# Patient Record
Sex: FEMALE | Race: WHITE | NOT HISPANIC OR LATINO | Employment: OTHER | URBAN - METROPOLITAN AREA
[De-identification: names, ages, dates, MRNs, and addresses within clinical notes are randomized per-mention and may not be internally consistent; named-entity substitution may affect disease eponyms.]

---

## 2017-06-25 ENCOUNTER — HOSPITAL ENCOUNTER (INPATIENT)
Facility: HOSPITAL | Age: 72
LOS: 2 days | Discharge: HOME | End: 2017-06-27
Attending: FAMILY MEDICINE | Admitting: HOSPITALIST

## 2017-06-25 DIAGNOSIS — Z74.09 IMPAIRED FUNCTIONAL MOBILITY, BALANCE, GAIT, AND ENDURANCE: ICD-10-CM

## 2017-06-25 DIAGNOSIS — I63.9 ACUTE CEREBROVASCULAR ACCIDENT (CVA) (HCC): Primary | ICD-10-CM

## 2017-06-25 DIAGNOSIS — E11.8 TYPE 2 DIABETES MELLITUS WITH COMPLICATION, WITH LONG-TERM CURRENT USE OF INSULIN (HCC): Chronic | ICD-10-CM

## 2017-06-25 DIAGNOSIS — Z79.4 TYPE 2 DIABETES MELLITUS WITH COMPLICATION, WITH LONG-TERM CURRENT USE OF INSULIN (HCC): Chronic | ICD-10-CM

## 2017-06-25 PROBLEM — N30.00 ACUTE CYSTITIS WITHOUT HEMATURIA: Status: ACTIVE | Noted: 2017-06-25

## 2017-06-25 PROBLEM — D72.829 LEUKOCYTOSIS: Status: ACTIVE | Noted: 2017-06-25

## 2017-06-25 PROBLEM — K21.9 GERD (GASTROESOPHAGEAL REFLUX DISEASE): Chronic | Status: ACTIVE | Noted: 2017-06-25

## 2017-06-25 PROBLEM — I10 ESSENTIAL HYPERTENSION: Chronic | Status: ACTIVE | Noted: 2017-06-25

## 2017-06-25 LAB — GLUCOSE BLDC GLUCOMTR-MCNC: 382 MG/DL (ref 70–130)

## 2017-06-25 PROCEDURE — 99223 1ST HOSP IP/OBS HIGH 75: CPT | Performed by: FAMILY MEDICINE

## 2017-06-25 PROCEDURE — 25010000003 CEFTRIAXONE PER 250 MG: Performed by: FAMILY MEDICINE

## 2017-06-25 PROCEDURE — 82962 GLUCOSE BLOOD TEST: CPT

## 2017-06-25 PROCEDURE — 63710000001 INSULIN DETEMIR PER 5 UNITS: Performed by: FAMILY MEDICINE

## 2017-06-25 RX ORDER — NICOTINE POLACRILEX 4 MG
15 LOZENGE BUCCAL
Status: DISCONTINUED | OUTPATIENT
Start: 2017-06-25 | End: 2017-06-27 | Stop reason: HOSPADM

## 2017-06-25 RX ORDER — ATORVASTATIN CALCIUM 40 MG/1
80 TABLET, FILM COATED ORAL NIGHTLY
Status: DISCONTINUED | OUTPATIENT
Start: 2017-06-25 | End: 2017-06-27 | Stop reason: HOSPADM

## 2017-06-25 RX ORDER — DEXTROSE AND SODIUM CHLORIDE 5; .45 G/100ML; G/100ML
100 INJECTION, SOLUTION INTRAVENOUS CONTINUOUS
Status: DISCONTINUED | OUTPATIENT
Start: 2017-06-25 | End: 2017-06-26

## 2017-06-25 RX ORDER — DEXTROSE MONOHYDRATE 25 G/50ML
25 INJECTION, SOLUTION INTRAVENOUS
Status: DISCONTINUED | OUTPATIENT
Start: 2017-06-25 | End: 2017-06-27 | Stop reason: HOSPADM

## 2017-06-25 RX ORDER — ACETAMINOPHEN 325 MG/1
650 TABLET ORAL EVERY 4 HOURS PRN
Status: DISCONTINUED | OUTPATIENT
Start: 2017-06-25 | End: 2017-06-27 | Stop reason: HOSPADM

## 2017-06-25 RX ORDER — SODIUM CHLORIDE 0.9 % (FLUSH) 0.9 %
1-10 SYRINGE (ML) INJECTION AS NEEDED
Status: DISCONTINUED | OUTPATIENT
Start: 2017-06-25 | End: 2017-06-27 | Stop reason: HOSPADM

## 2017-06-25 RX ORDER — ASPIRIN 325 MG
325 TABLET ORAL DAILY
Status: DISCONTINUED | OUTPATIENT
Start: 2017-06-26 | End: 2017-06-27 | Stop reason: HOSPADM

## 2017-06-25 RX ORDER — ASPIRIN 81 MG/1
81 TABLET ORAL DAILY
COMMUNITY
End: 2017-06-27 | Stop reason: HOSPADM

## 2017-06-25 RX ORDER — SIMVASTATIN 40 MG
40 TABLET ORAL NIGHTLY
COMMUNITY
End: 2017-06-27 | Stop reason: HOSPADM

## 2017-06-25 RX ORDER — LOSARTAN POTASSIUM AND HYDROCHLOROTHIAZIDE 25; 100 MG/1; MG/1
1 TABLET ORAL DAILY
COMMUNITY
End: 2017-07-05 | Stop reason: ALTCHOICE

## 2017-06-25 RX ORDER — ASPIRIN 300 MG/1
300 SUPPOSITORY RECTAL DAILY
Status: DISCONTINUED | OUTPATIENT
Start: 2017-06-26 | End: 2017-06-27 | Stop reason: HOSPADM

## 2017-06-25 RX ORDER — ACETAMINOPHEN 650 MG/1
650 SUPPOSITORY RECTAL EVERY 4 HOURS PRN
Status: DISCONTINUED | OUTPATIENT
Start: 2017-06-25 | End: 2017-06-27 | Stop reason: HOSPADM

## 2017-06-25 RX ORDER — CETIRIZINE HYDROCHLORIDE 10 MG/1
10 TABLET ORAL DAILY
COMMUNITY
End: 2017-09-22

## 2017-06-25 RX ORDER — LORATADINE 10 MG/1
10 TABLET ORAL AS NEEDED
COMMUNITY
End: 2017-09-22

## 2017-06-25 RX ORDER — CEFTRIAXONE SODIUM 1 G/50ML
1 INJECTION, SOLUTION INTRAVENOUS
Status: DISCONTINUED | OUTPATIENT
Start: 2017-06-25 | End: 2017-06-27 | Stop reason: HOSPADM

## 2017-06-25 RX ORDER — ENALAPRILAT 2.5 MG/2ML
0.62 INJECTION INTRAVENOUS EVERY 6 HOURS PRN
Status: DISCONTINUED | OUTPATIENT
Start: 2017-06-25 | End: 2017-06-27 | Stop reason: HOSPADM

## 2017-06-25 RX ADMIN — CEFTRIAXONE SODIUM 1 G: 1 INJECTION, SOLUTION INTRAVENOUS at 23:33

## 2017-06-25 RX ADMIN — INSULIN DETEMIR 20 UNITS: 100 INJECTION, SOLUTION SUBCUTANEOUS at 23:34

## 2017-06-25 RX ADMIN — DEXTROSE AND SODIUM CHLORIDE 100 ML/HR: 5; 450 INJECTION, SOLUTION INTRAVENOUS at 23:33

## 2017-06-26 ENCOUNTER — APPOINTMENT (OUTPATIENT)
Dept: CARDIOLOGY | Facility: HOSPITAL | Age: 72
End: 2017-06-26
Attending: FAMILY MEDICINE

## 2017-06-26 ENCOUNTER — APPOINTMENT (OUTPATIENT)
Dept: MRI IMAGING | Facility: HOSPITAL | Age: 72
End: 2017-06-26

## 2017-06-26 LAB
ALBUMIN SERPL-MCNC: 4 G/DL (ref 3.2–4.8)
ALBUMIN/GLOB SERPL: 1.4 G/DL (ref 1.5–2.5)
ALP SERPL-CCNC: 73 U/L (ref 25–100)
ALT SERPL W P-5'-P-CCNC: 16 U/L (ref 7–40)
ANION GAP SERPL CALCULATED.3IONS-SCNC: 9 MMOL/L (ref 3–11)
ARTICHOKE IGE QN: 109 MG/DL (ref 0–130)
AST SERPL-CCNC: 14 U/L (ref 0–33)
BACTERIA UR QL AUTO: ABNORMAL /HPF
BASOPHILS # BLD AUTO: 0.08 10*3/MM3 (ref 0–0.2)
BASOPHILS NFR BLD AUTO: 0.8 % (ref 0–1)
BH CV ECHO MEAS - AO ROOT AREA (BSA CORRECTED): 1.5
BH CV ECHO MEAS - AO ROOT AREA: 5.1 CM^2
BH CV ECHO MEAS - AO ROOT DIAM: 2.5 CM
BH CV ECHO MEAS - BSA(HAYCOCK): 1.8 M^2
BH CV ECHO MEAS - BSA(HAYCOCK): 1.8 M^2
BH CV ECHO MEAS - BSA: 1.7 M^2
BH CV ECHO MEAS - BSA: 1.7 M^2
BH CV ECHO MEAS - BZI_BMI: 28.2 KILOGRAMS/M^2
BH CV ECHO MEAS - BZI_BMI: 28.2 KILOGRAMS/M^2
BH CV ECHO MEAS - BZI_METRIC_HEIGHT: 157.5 CM
BH CV ECHO MEAS - BZI_METRIC_HEIGHT: 157.5 CM
BH CV ECHO MEAS - BZI_METRIC_WEIGHT: 69.9 KG
BH CV ECHO MEAS - BZI_METRIC_WEIGHT: 69.9 KG
BH CV ECHO MEAS - CONTRAST EF (2CH): 48.3 ML/M^2
BH CV ECHO MEAS - CONTRAST EF 4CH: 64.6 ML/M^2
BH CV ECHO MEAS - EDV(CUBED): 42.9 ML
BH CV ECHO MEAS - EDV(MOD-SP2): 60 ML
BH CV ECHO MEAS - EDV(MOD-SP4): 79 ML
BH CV ECHO MEAS - EDV(TEICH): 50.9 ML
BH CV ECHO MEAS - EF(CUBED): 82.4 %
BH CV ECHO MEAS - EF(MOD-SP2): 48.3 %
BH CV ECHO MEAS - EF(MOD-SP4): 64.6 %
BH CV ECHO MEAS - EF(TEICH): 76.1 %
BH CV ECHO MEAS - ESV(CUBED): 7.6 ML
BH CV ECHO MEAS - ESV(MOD-SP2): 31 ML
BH CV ECHO MEAS - ESV(MOD-SP4): 28 ML
BH CV ECHO MEAS - ESV(TEICH): 12.1 ML
BH CV ECHO MEAS - FS: 43.9 %
BH CV ECHO MEAS - IVS/LVPW: 1.5
BH CV ECHO MEAS - IVSD: 1.1 CM
BH CV ECHO MEAS - LA/AO: 1.3
BH CV ECHO MEAS - LAT PEAK E' VEL: 7.5 CM/SEC
BH CV ECHO MEAS - LV DIASTOLIC VOL/BSA (35-75): 46.2 ML/M^2
BH CV ECHO MEAS - LV MASS(C)D: 96.2 GRAMS
BH CV ECHO MEAS - LV MASS(C)DI: 56.3 GRAMS/M^2
BH CV ECHO MEAS - LV SYSTOLIC VOL/BSA (12-30): 16.4 ML/M^2
BH CV ECHO MEAS - LVIDD: 3.6 CM
BH CV ECHO MEAS - LVIDS: 2.4 CM
BH CV ECHO MEAS - LVLD AP2: 7.1 CM
BH CV ECHO MEAS - LVLD AP4: 7.6 CM
BH CV ECHO MEAS - LVLS AP2: 6.6 CM
BH CV ECHO MEAS - LVLS AP4: 6.9 CM
BH CV ECHO MEAS - LVOT AREA (M): 2.5 CM^2
BH CV ECHO MEAS - LVOT AREA: 2.7 CM^2
BH CV ECHO MEAS - LVOT DIAM: 1.8 CM
BH CV ECHO MEAS - LVPWD: 1.1 CM
BH CV ECHO MEAS - MED PEAK E' VEL: 4.9 CM/SEC
BH CV ECHO MEAS - MV A MAX VEL: 100 CM/SEC
BH CV ECHO MEAS - MV DEC TIME: 0.21 SEC
BH CV ECHO MEAS - MV E MAX VEL: 66 CM/SEC
BH CV ECHO MEAS - MV E/A: 0.66
BH CV ECHO MEAS - PA ACC SLOPE: 1259 CM/SEC^2
BH CV ECHO MEAS - PA ACC TIME: 0.1 SEC
BH CV ECHO MEAS - PA PR(ACCEL): 34.6 MMHG
BH CV ECHO MEAS - PI END-D VEL: 119.1 CM/SEC
BH CV ECHO MEAS - SI(CUBED): 20.6 ML/M^2
BH CV ECHO MEAS - SI(MOD-SP2): 17 ML/M^2
BH CV ECHO MEAS - SI(MOD-SP4): 29.8 ML/M^2
BH CV ECHO MEAS - SI(TEICH): 22.6 ML/M^2
BH CV ECHO MEAS - SV(CUBED): 35.3 ML
BH CV ECHO MEAS - SV(MOD-SP2): 29 ML
BH CV ECHO MEAS - SV(MOD-SP4): 51 ML
BH CV ECHO MEAS - SV(TEICH): 38.7 ML
BH CV ECHO MEAS - TAPSE (>1.6): 1.7 CM2
BH CV XLRA - RV BASE: 3.1 CM
BH CV XLRA - RV LENGTH: 5.2 CM
BH CV XLRA - RV MID: 2.4 CM
BH CV XLRA - TDI S': 9.9 CM/SEC
BH CV XLRA MEAS LEFT CCA RATIO VEL: 138 CM/SEC
BH CV XLRA MEAS LEFT DIST CCA EDV: 19.6 CM/SEC
BH CV XLRA MEAS LEFT DIST CCA PSV: 139.5 CM/SEC
BH CV XLRA MEAS LEFT DIST ICA EDV: 22.6 CM/SEC
BH CV XLRA MEAS LEFT DIST ICA PSV: 69.8 CM/SEC
BH CV XLRA MEAS LEFT ICA RATIO VEL: 220 CM/SEC
BH CV XLRA MEAS LEFT ICA/CCA RATIO: 1.6
BH CV XLRA MEAS LEFT MID ICA EDV: 23.9 CM/SEC
BH CV XLRA MEAS LEFT MID ICA PSV: 98.1 CM/SEC
BH CV XLRA MEAS LEFT PROX CCA EDV: 10.1 CM/SEC
BH CV XLRA MEAS LEFT PROX CCA PSV: 81.7 CM/SEC
BH CV XLRA MEAS LEFT PROX ECA PSV: 234.3 CM/SEC
BH CV XLRA MEAS LEFT PROX ICA EDV: 23.9 CM/SEC
BH CV XLRA MEAS LEFT PROX ICA PSV: 221.7 CM/SEC
BH CV XLRA MEAS LEFT PROX SCLA PSV: 268.7 CM/SEC
BH CV XLRA MEAS LEFT VERTEBRAL A EDV: 15.7 CM/SEC
BH CV XLRA MEAS LEFT VERTEBRAL A PSV: 63.7 CM/SEC
BH CV XLRA MEAS RIGHT CCA RATIO VEL: 86.1 CM/SEC
BH CV XLRA MEAS RIGHT DIST CCA EDV: 14.5 CM/SEC
BH CV XLRA MEAS RIGHT DIST CCA PSV: 86.7 CM/SEC
BH CV XLRA MEAS RIGHT DIST ICA EDV: 28.7 CM/SEC
BH CV XLRA MEAS RIGHT DIST ICA PSV: 97 CM/SEC
BH CV XLRA MEAS RIGHT ICA RATIO VEL: 95.2 CM/SEC
BH CV XLRA MEAS RIGHT ICA/CCA RATIO: 1.1
BH CV XLRA MEAS RIGHT MID ICA EDV: 26.5 CM/SEC
BH CV XLRA MEAS RIGHT MID ICA PSV: 82.7 CM/SEC
BH CV XLRA MEAS RIGHT PROX CCA EDV: 15.1 CM/SEC
BH CV XLRA MEAS RIGHT PROX CCA PSV: 84.9 CM/SEC
BH CV XLRA MEAS RIGHT PROX ECA PSV: 131 CM/SEC
BH CV XLRA MEAS RIGHT PROX ICA EDV: 21.8 CM/SEC
BH CV XLRA MEAS RIGHT PROX ICA PSV: 96 CM/SEC
BH CV XLRA MEAS RIGHT PROX SCLA PSV: 156.2 CM/SEC
BH CV XLRA MEAS RIGHT VERTEBRAL A EDV: 24.3 CM/SEC
BH CV XLRA MEAS RIGHT VERTEBRAL A PSV: 103.1 CM/SEC
BILIRUB SERPL-MCNC: 0.6 MG/DL (ref 0.3–1.2)
BILIRUB UR QL STRIP: NEGATIVE
BUN BLD-MCNC: 15 MG/DL (ref 9–23)
BUN/CREAT SERPL: 16.7 (ref 7–25)
CALCIUM SPEC-SCNC: 9.9 MG/DL (ref 8.7–10.4)
CHLORIDE SERPL-SCNC: 96 MMOL/L (ref 99–109)
CHOLEST SERPL-MCNC: 169 MG/DL (ref 0–200)
CLARITY UR: CLEAR
CO2 SERPL-SCNC: 29 MMOL/L (ref 20–31)
COLOR UR: YELLOW
CREAT BLD-MCNC: 0.9 MG/DL (ref 0.6–1.3)
DEPRECATED RDW RBC AUTO: 43 FL (ref 37–54)
E/E' RATIO: 11
EOSINOPHIL # BLD AUTO: 0.45 10*3/MM3 (ref 0.1–0.3)
EOSINOPHIL NFR BLD AUTO: 4.3 % (ref 0–3)
ERYTHROCYTE [DISTWIDTH] IN BLOOD BY AUTOMATED COUNT: 12.5 % (ref 11.3–14.5)
GFR SERPL CREATININE-BSD FRML MDRD: 62 ML/MIN/1.73
GLOBULIN UR ELPH-MCNC: 2.8 GM/DL
GLUCOSE BLD-MCNC: 327 MG/DL (ref 70–100)
GLUCOSE BLDC GLUCOMTR-MCNC: 299 MG/DL (ref 70–130)
GLUCOSE BLDC GLUCOMTR-MCNC: 310 MG/DL (ref 70–130)
GLUCOSE BLDC GLUCOMTR-MCNC: 382 MG/DL (ref 70–130)
GLUCOSE BLDC GLUCOMTR-MCNC: 391 MG/DL (ref 70–130)
GLUCOSE BLDC GLUCOMTR-MCNC: 420 MG/DL (ref 70–130)
GLUCOSE BLDC GLUCOMTR-MCNC: 447 MG/DL (ref 70–130)
GLUCOSE BLDC GLUCOMTR-MCNC: 448 MG/DL (ref 70–130)
GLUCOSE BLDC GLUCOMTR-MCNC: 467 MG/DL (ref 70–130)
GLUCOSE UR STRIP-MCNC: ABNORMAL MG/DL
HBA1C MFR BLD: 13.3 % (ref 4.8–5.6)
HCT VFR BLD AUTO: 39.7 % (ref 34.5–44)
HDLC SERPL-MCNC: 47 MG/DL (ref 40–60)
HGB BLD-MCNC: 13.4 G/DL (ref 11.5–15.5)
HGB UR QL STRIP.AUTO: NEGATIVE
HYALINE CASTS UR QL AUTO: ABNORMAL /LPF
IMM GRANULOCYTES # BLD: 0.02 10*3/MM3 (ref 0–0.03)
IMM GRANULOCYTES NFR BLD: 0.2 % (ref 0–0.6)
KETONES UR QL STRIP: NEGATIVE
LEFT ARM BP: NORMAL MMHG
LEFT ATRIUM VOLUME INDEX: 37 ML/M2
LEUKOCYTE ESTERASE UR QL STRIP.AUTO: ABNORMAL
LV EF 2D ECHO EST: 65 %
LYMPHOCYTES # BLD AUTO: 4.69 10*3/MM3 (ref 0.6–4.8)
LYMPHOCYTES NFR BLD AUTO: 44.7 % (ref 24–44)
MCH RBC QN AUTO: 32.1 PG (ref 27–31)
MCHC RBC AUTO-ENTMCNC: 33.8 G/DL (ref 32–36)
MCV RBC AUTO: 95 FL (ref 80–99)
MONOCYTES # BLD AUTO: 0.72 10*3/MM3 (ref 0–1)
MONOCYTES NFR BLD AUTO: 6.9 % (ref 0–12)
NEUTROPHILS # BLD AUTO: 4.53 10*3/MM3 (ref 1.5–8.3)
NEUTROPHILS NFR BLD AUTO: 43.1 % (ref 41–71)
NITRITE UR QL STRIP: POSITIVE
PH UR STRIP.AUTO: 7.5 [PH] (ref 5–8)
PLATELET # BLD AUTO: 248 10*3/MM3 (ref 150–450)
PMV BLD AUTO: 10 FL (ref 6–12)
POTASSIUM BLD-SCNC: 3.6 MMOL/L (ref 3.5–5.5)
PROT SERPL-MCNC: 6.8 G/DL (ref 5.7–8.2)
PROT UR QL STRIP: NEGATIVE
RBC # BLD AUTO: 4.18 10*6/MM3 (ref 3.89–5.14)
RBC # UR: ABNORMAL /HPF
REF LAB TEST METHOD: ABNORMAL
SODIUM BLD-SCNC: 134 MMOL/L (ref 132–146)
SP GR UR STRIP: 1.02 (ref 1–1.03)
SQUAMOUS #/AREA URNS HPF: ABNORMAL /HPF
TRIGL SERPL-MCNC: 154 MG/DL (ref 0–150)
UROBILINOGEN UR QL STRIP: ABNORMAL
WBC NRBC COR # BLD: 10.49 10*3/MM3 (ref 3.5–10.8)
WBC UR QL AUTO: ABNORMAL /HPF

## 2017-06-26 PROCEDURE — 93306 TTE W/DOPPLER COMPLETE: CPT

## 2017-06-26 PROCEDURE — 25010000003 CEFTRIAXONE PER 250 MG: Performed by: FAMILY MEDICINE

## 2017-06-26 PROCEDURE — 63710000001 INSULIN LISPRO (HUMAN) PER 5 UNITS: Performed by: FAMILY MEDICINE

## 2017-06-26 PROCEDURE — 92523 SPEECH SOUND LANG COMPREHEN: CPT

## 2017-06-26 PROCEDURE — 93880 EXTRACRANIAL BILAT STUDY: CPT

## 2017-06-26 PROCEDURE — 97162 PT EVAL MOD COMPLEX 30 MIN: CPT

## 2017-06-26 PROCEDURE — 92610 EVALUATE SWALLOWING FUNCTION: CPT

## 2017-06-26 PROCEDURE — 93306 TTE W/DOPPLER COMPLETE: CPT | Performed by: INTERNAL MEDICINE

## 2017-06-26 PROCEDURE — 93880 EXTRACRANIAL BILAT STUDY: CPT | Performed by: INTERNAL MEDICINE

## 2017-06-26 PROCEDURE — 99223 1ST HOSP IP/OBS HIGH 75: CPT | Performed by: PSYCHIATRY & NEUROLOGY

## 2017-06-26 PROCEDURE — 83036 HEMOGLOBIN GLYCOSYLATED A1C: CPT | Performed by: FAMILY MEDICINE

## 2017-06-26 PROCEDURE — 80053 COMPREHEN METABOLIC PANEL: CPT | Performed by: FAMILY MEDICINE

## 2017-06-26 PROCEDURE — 99233 SBSQ HOSP IP/OBS HIGH 50: CPT | Performed by: HOSPITALIST

## 2017-06-26 PROCEDURE — 85025 COMPLETE CBC W/AUTO DIFF WBC: CPT | Performed by: FAMILY MEDICINE

## 2017-06-26 PROCEDURE — 63710000001 INSULIN DETEMIR PER 5 UNITS: Performed by: HOSPITALIST

## 2017-06-26 PROCEDURE — 82962 GLUCOSE BLOOD TEST: CPT

## 2017-06-26 PROCEDURE — 81001 URINALYSIS AUTO W/SCOPE: CPT | Performed by: FAMILY MEDICINE

## 2017-06-26 PROCEDURE — 87086 URINE CULTURE/COLONY COUNT: CPT | Performed by: FAMILY MEDICINE

## 2017-06-26 PROCEDURE — 97166 OT EVAL MOD COMPLEX 45 MIN: CPT

## 2017-06-26 PROCEDURE — 80061 LIPID PANEL: CPT | Performed by: FAMILY MEDICINE

## 2017-06-26 PROCEDURE — 70551 MRI BRAIN STEM W/O DYE: CPT

## 2017-06-26 RX ADMIN — INSULIN LISPRO 4 UNITS: 100 INJECTION, SOLUTION INTRAVENOUS; SUBCUTANEOUS at 10:01

## 2017-06-26 RX ADMIN — ATORVASTATIN CALCIUM 80 MG: 40 TABLET, FILM COATED ORAL at 20:47

## 2017-06-26 RX ADMIN — CEFTRIAXONE SODIUM 1 G: 1 INJECTION, SOLUTION INTRAVENOUS at 20:47

## 2017-06-26 RX ADMIN — INSULIN LISPRO 6 UNITS: 100 INJECTION, SOLUTION INTRAVENOUS; SUBCUTANEOUS at 17:53

## 2017-06-26 RX ADMIN — ASPIRIN 325 MG ORAL TABLET 325 MG: 325 PILL ORAL at 10:02

## 2017-06-26 RX ADMIN — INSULIN LISPRO 7 UNITS: 100 INJECTION, SOLUTION INTRAVENOUS; SUBCUTANEOUS at 12:04

## 2017-06-26 RX ADMIN — INSULIN DETEMIR 20 UNITS: 100 INJECTION, SOLUTION SUBCUTANEOUS at 20:48

## 2017-06-26 RX ADMIN — INSULIN LISPRO 6 UNITS: 100 INJECTION, SOLUTION INTRAVENOUS; SUBCUTANEOUS at 20:47

## 2017-06-26 RX ADMIN — INSULIN DETEMIR 30 UNITS: 100 INJECTION, SOLUTION SUBCUTANEOUS at 15:14

## 2017-06-27 VITALS
TEMPERATURE: 97.9 F | SYSTOLIC BLOOD PRESSURE: 160 MMHG | WEIGHT: 154 LBS | RESPIRATION RATE: 16 BRPM | DIASTOLIC BLOOD PRESSURE: 68 MMHG | OXYGEN SATURATION: 95 % | HEIGHT: 62 IN | BODY MASS INDEX: 28.34 KG/M2 | HEART RATE: 93 BPM

## 2017-06-27 PROBLEM — N30.00 ACUTE CYSTITIS WITHOUT HEMATURIA: Status: RESOLVED | Noted: 2017-06-25 | Resolved: 2017-06-27

## 2017-06-27 PROBLEM — D72.829 LEUKOCYTOSIS: Status: RESOLVED | Noted: 2017-06-25 | Resolved: 2017-06-27

## 2017-06-27 LAB
ANION GAP SERPL CALCULATED.3IONS-SCNC: 8 MMOL/L (ref 3–11)
BUN BLD-MCNC: 14 MG/DL (ref 9–23)
BUN/CREAT SERPL: 15.6 (ref 7–25)
CALCIUM SPEC-SCNC: 9.9 MG/DL (ref 8.7–10.4)
CHLORIDE SERPL-SCNC: 99 MMOL/L (ref 99–109)
CO2 SERPL-SCNC: 31 MMOL/L (ref 20–31)
CREAT BLD-MCNC: 0.9 MG/DL (ref 0.6–1.3)
GFR SERPL CREATININE-BSD FRML MDRD: 62 ML/MIN/1.73
GLUCOSE BLD-MCNC: 175 MG/DL (ref 70–100)
GLUCOSE BLDC GLUCOMTR-MCNC: 201 MG/DL (ref 70–130)
GLUCOSE BLDC GLUCOMTR-MCNC: 266 MG/DL (ref 70–130)
POTASSIUM BLD-SCNC: 3.8 MMOL/L (ref 3.5–5.5)
SODIUM BLD-SCNC: 138 MMOL/L (ref 132–146)

## 2017-06-27 PROCEDURE — 63710000001 INSULIN DETEMIR PER 5 UNITS: Performed by: HOSPITALIST

## 2017-06-27 PROCEDURE — 80048 BASIC METABOLIC PNL TOTAL CA: CPT | Performed by: HOSPITALIST

## 2017-06-27 PROCEDURE — 82962 GLUCOSE BLOOD TEST: CPT

## 2017-06-27 PROCEDURE — 99231 SBSQ HOSP IP/OBS SF/LOW 25: CPT | Performed by: PSYCHIATRY & NEUROLOGY

## 2017-06-27 PROCEDURE — G0108 DIAB MANAGE TRN  PER INDIV: HCPCS | Performed by: REGISTERED NURSE

## 2017-06-27 PROCEDURE — 99239 HOSP IP/OBS DSCHRG MGMT >30: CPT | Performed by: PHYSICIAN ASSISTANT

## 2017-06-27 RX ORDER — ASPIRIN 325 MG
325 TABLET ORAL DAILY
Qty: 30 TABLET | Refills: 0 | Status: SHIPPED | OUTPATIENT
Start: 2017-06-27 | End: 2020-07-20 | Stop reason: HOSPADM

## 2017-06-27 RX ORDER — ATORVASTATIN CALCIUM 80 MG/1
80 TABLET, FILM COATED ORAL NIGHTLY
Qty: 30 TABLET | Refills: 0 | Status: SHIPPED | OUTPATIENT
Start: 2017-06-27 | End: 2017-07-05 | Stop reason: SDUPTHER

## 2017-06-27 RX ORDER — LISINOPRIL 10 MG/1
10 TABLET ORAL DAILY
Qty: 30 TABLET | Refills: 0 | Status: SHIPPED | OUTPATIENT
Start: 2017-06-27 | End: 2017-07-05 | Stop reason: SDUPTHER

## 2017-06-27 RX ORDER — CLOPIDOGREL BISULFATE 75 MG/1
75 TABLET ORAL DAILY
Qty: 90 TABLET | Refills: 0 | Status: SHIPPED | OUTPATIENT
Start: 2017-06-27 | End: 2017-09-11 | Stop reason: SDUPTHER

## 2017-06-27 RX ADMIN — INSULIN DETEMIR 20 UNITS: 100 INJECTION, SOLUTION SUBCUTANEOUS at 08:04

## 2017-06-27 RX ADMIN — ASPIRIN 325 MG ORAL TABLET 325 MG: 325 PILL ORAL at 08:04

## 2017-06-27 RX ADMIN — INSULIN LISPRO 4 UNITS: 100 INJECTION, SOLUTION INTRAVENOUS; SUBCUTANEOUS at 12:15

## 2017-06-27 RX ADMIN — INSULIN LISPRO 3 UNITS: 100 INJECTION, SOLUTION INTRAVENOUS; SUBCUTANEOUS at 08:04

## 2017-06-28 ENCOUNTER — TRANSITIONAL CARE MANAGEMENT TELEPHONE ENCOUNTER (OUTPATIENT)
Dept: INTERNAL MEDICINE | Facility: CLINIC | Age: 72
End: 2017-06-28

## 2017-06-28 LAB — BACTERIA SPEC AEROBE CULT: NORMAL

## 2017-07-05 ENCOUNTER — OFFICE VISIT (OUTPATIENT)
Dept: INTERNAL MEDICINE | Facility: CLINIC | Age: 72
End: 2017-07-05

## 2017-07-05 VITALS
DIASTOLIC BLOOD PRESSURE: 60 MMHG | OXYGEN SATURATION: 97 % | TEMPERATURE: 99.1 F | HEART RATE: 73 BPM | BODY MASS INDEX: 31.73 KG/M2 | HEIGHT: 59 IN | SYSTOLIC BLOOD PRESSURE: 132 MMHG | WEIGHT: 157.38 LBS | RESPIRATION RATE: 16 BRPM

## 2017-07-05 DIAGNOSIS — N39.0 URINARY TRACT INFECTION, SITE UNSPECIFIED: ICD-10-CM

## 2017-07-05 DIAGNOSIS — Z09 HOSPITAL DISCHARGE FOLLOW-UP: ICD-10-CM

## 2017-07-05 DIAGNOSIS — E11.8 TYPE 2 DIABETES MELLITUS WITH COMPLICATION, WITH LONG-TERM CURRENT USE OF INSULIN (HCC): Primary | Chronic | ICD-10-CM

## 2017-07-05 DIAGNOSIS — I63.9 ACUTE CEREBROVASCULAR ACCIDENT (CVA) (HCC): ICD-10-CM

## 2017-07-05 DIAGNOSIS — Z79.4 TYPE 2 DIABETES MELLITUS WITH COMPLICATION, WITH LONG-TERM CURRENT USE OF INSULIN (HCC): Primary | Chronic | ICD-10-CM

## 2017-07-05 DIAGNOSIS — I10 ESSENTIAL HYPERTENSION: ICD-10-CM

## 2017-07-05 DIAGNOSIS — E78.5 HYPERLIPIDEMIA, UNSPECIFIED HYPERLIPIDEMIA TYPE: ICD-10-CM

## 2017-07-05 LAB
BILIRUB BLD-MCNC: NEGATIVE MG/DL
CLARITY, POC: CLEAR
COLOR UR: YELLOW
EXPIRATION DATE: ABNORMAL
GLUCOSE UR STRIP-MCNC: NEGATIVE MG/DL
KETONES UR QL: NEGATIVE
LEUKOCYTE EST, POC: ABNORMAL
Lab: ABNORMAL
NITRITE UR-MCNC: NEGATIVE MG/ML
PH UR: 5 [PH] (ref 5–8)
PROT UR STRIP-MCNC: NEGATIVE MG/DL
RBC # UR STRIP: NEGATIVE /UL
SP GR UR: 1 (ref 1–1.03)
UROBILINOGEN UR QL: NORMAL

## 2017-07-05 PROCEDURE — 87086 URINE CULTURE/COLONY COUNT: CPT | Performed by: PHYSICIAN ASSISTANT

## 2017-07-05 PROCEDURE — 81003 URINALYSIS AUTO W/O SCOPE: CPT | Performed by: PHYSICIAN ASSISTANT

## 2017-07-05 PROCEDURE — 99495 TRANSJ CARE MGMT MOD F2F 14D: CPT | Performed by: PHYSICIAN ASSISTANT

## 2017-07-05 RX ORDER — ATORVASTATIN CALCIUM 80 MG/1
80 TABLET, FILM COATED ORAL NIGHTLY
Qty: 30 TABLET | Refills: 0 | Status: SHIPPED | OUTPATIENT
Start: 2017-07-05 | End: 2017-08-25 | Stop reason: SDUPTHER

## 2017-07-05 RX ORDER — LISINOPRIL 10 MG/1
10 TABLET ORAL DAILY
Qty: 30 TABLET | Refills: 2 | Status: SHIPPED | OUTPATIENT
Start: 2017-07-05 | End: 2017-08-07 | Stop reason: SDUPTHER

## 2017-07-05 NOTE — PATIENT INSTRUCTIONS
Switch to Novolog 70/30 40 units before breakfast and 30 units before supper.  At the time of insulin given, pls test blood sugar and call me in 1 week with glucose numbers.

## 2017-07-05 NOTE — PROGRESS NOTES
Transitional Care Follow Up Visit  Subjective     Beryl Montoya is a 72 y.o. female who presents for a transitional care management visit.    Within 48 business hours after discharge our office contacted her via telephone to coordinate her care and needs.      I reviewed and discussed the details of that call along with the discharge summary, hospital problems, inpatient lab results, inpatient diagnostic studies, and consultation reports with Beryl.    Date of TCM Phone Call 6/28/2017 6/29/2017   [facility]   Date of Admission 6/25/2017 6/25/2017   Date of Discharge 6/27/2017 6/27/2017   Risk for Readmission (LACE Score) 7 -   Discharge Disposition Home or Self Care Home or Self Care       History of Present Illness   Pt here to establish care. Formerly saw Dr Cardenas in [place] in the last 6 months.    Course During Hospital Stay:  PT here after hospitalization as noted above.  SHe had been at [facility] prior to that for difficulty speaking but no weakness. Dr Wilkes was consulted for stroke of the left temporal area.  She was also found to have HTN and UTI.  She was started on plavix, lisinopril, and lipitor was increased to 80mg.    Going to speech therapy, hasn't need OT.     L7DW--vj'd many years ago, on lantus 25 units BID and Ng 8 units before meals.  Doesn't give Ng if glucose is low.  Am numbers on log sheet show .  % shots per day is challenging for her.  Metformin causes bothersome diarrhea.         says she has clear productive coughing since hospital charge.            The following portions of the patient's history were reviewed and updated as appropriate: allergies, current medications, past family history, past medical history, past social history, past surgical history and problem list.    Review of Systems   Constitutional: Negative for appetite change, chills and fever.   Respiratory: Negative for shortness of breath.     Cardiovascular: Negative for chest pain and leg swelling.   Gastrointestinal: Negative for constipation and diarrhea.   Genitourinary: Positive for urgency (has some urine leakage.). Negative for dysuria.   Psychiatric/Behavioral: Positive for confusion. Negative for behavioral problems, dysphoric mood and sleep disturbance. The patient is not nervous/anxious.        Objective   Physical Exam   Constitutional: She appears well-developed and well-nourished.   HENT:   Head: Normocephalic.   Right Ear: Hearing, tympanic membrane and external ear normal.   Left Ear: Hearing, tympanic membrane and external ear normal.   Nose: Nose normal.   Mouth/Throat: Oropharynx is clear and moist.   Eyes: Conjunctivae and EOM are normal. Pupils are equal, round, and reactive to light. No scleral icterus.   Neck: Normal carotid pulses present. Carotid bruit is not present. No tracheal deviation present. No thyromegaly present.   Cardiovascular: Normal rate, regular rhythm, normal heart sounds and intact distal pulses.    No murmur heard.  No pitting edema of the LE.   Pulmonary/Chest: Effort normal. No respiratory distress. She has no decreased breath sounds. She has no wheezes. She has no rhonchi. She has no rales. She exhibits no tenderness.   Abdominal: Soft. Bowel sounds are normal. She exhibits no distension and no mass. There is no tenderness.   Musculoskeletal: She exhibits no edema or tenderness.   Lymphadenopathy:     She has no cervical adenopathy.   Neurological: She has normal strength. She displays normal reflexes. No cranial nerve deficit.   Reflex Scores:       Patellar reflexes are 2+ on the right side and 2+ on the left side.  Skin: No rash noted. No erythema.   Psychiatric: She has a normal mood and affect. Judgment and thought content normal. She is slowed.   Pt has trouble understanding, limited words used and cannot give history.   Vitals reviewed.      Assessment/Plan   Beryl was seen today for establish  care, cerebrovascular accident, diabetes, hyperlipidemia and cough.    Diagnoses and all orders for this visit:    Type 2 diabetes mellitus with complication, with long-term current use of insulin  -     insulin aspart protamine & aspart (NOVOLOG) 70/30 100 unit/mL; Take 40 units before breakfast and 30 before supper  -     lisinopril (PRINIVIL,ZESTRIL) 10 MG tablet; Take 1 tablet by mouth Daily.    Hyperlipidemia, unspecified hyperlipidemia type  -     atorvastatin (LIPITOR) 80 MG tablet; Take 1 tablet by mouth Every Night.    Essential hypertension  -     lisinopril (PRINIVIL,ZESTRIL) 10 MG tablet; Take 1 tablet by mouth Daily.    Acute cerebrovascular accident (CVA)  -     Ambulatory Referral to Neurology    Hospital discharge follow-up  -     POC Urinalysis Dipstick, Automated  -     Urine Culture    Urinary tract infection, site unspecified  -     Urine Culture        Reviewed to not take NSAIDs with plavix.  5 insulin shots per day is difficult for her with recent CVA so will try Ng 70/30.

## 2017-07-07 LAB — BACTERIA SPEC AEROBE CULT: NORMAL

## 2017-08-07 ENCOUNTER — OFFICE VISIT (OUTPATIENT)
Dept: INTERNAL MEDICINE | Facility: CLINIC | Age: 72
End: 2017-08-07

## 2017-08-07 VITALS
OXYGEN SATURATION: 99 % | TEMPERATURE: 97.7 F | RESPIRATION RATE: 18 BRPM | BODY MASS INDEX: 31.91 KG/M2 | SYSTOLIC BLOOD PRESSURE: 132 MMHG | DIASTOLIC BLOOD PRESSURE: 74 MMHG | WEIGHT: 158 LBS | HEART RATE: 85 BPM

## 2017-08-07 DIAGNOSIS — Z79.4 TYPE 2 DIABETES MELLITUS WITH COMPLICATION, WITH LONG-TERM CURRENT USE OF INSULIN (HCC): Primary | Chronic | ICD-10-CM

## 2017-08-07 DIAGNOSIS — E11.8 TYPE 2 DIABETES MELLITUS WITH COMPLICATION, WITH LONG-TERM CURRENT USE OF INSULIN (HCC): Primary | Chronic | ICD-10-CM

## 2017-08-07 DIAGNOSIS — I10 ESSENTIAL HYPERTENSION: ICD-10-CM

## 2017-08-07 DIAGNOSIS — I63.9 ACUTE CEREBROVASCULAR ACCIDENT (CVA) (HCC): ICD-10-CM

## 2017-08-07 PROCEDURE — 99214 OFFICE O/P EST MOD 30 MIN: CPT | Performed by: PHYSICIAN ASSISTANT

## 2017-08-07 RX ORDER — LISINOPRIL 20 MG/1
20 TABLET ORAL DAILY
Qty: 30 TABLET | Refills: 5 | Status: SHIPPED | OUTPATIENT
Start: 2017-08-07 | End: 2017-09-11

## 2017-08-07 NOTE — PROGRESS NOTES
Subjective   Beryl Montoya is a 72 y.o. female.   Chief Complaint   Patient presents with   • Diabetes     f/u       History of Present Illness   Pt here for diabetes f/u.  Has changed to NG 70/30 40 u in the am and 30 un in pm.  Brings logbook in showing variable glucose, am today was 59 and other am range from 224 to 140.  She is controlling the amt of carbs she eats.     Pt has done speech therapy.  Doing all ADLs. Not socially isolating herself now.  Reading is still slow.   says that she needs help with turning on the oven.  The following portions of the patient's history were reviewed and updated as appropriate: allergies, current medications and problem list.    Review of Systems   Neurological: Positive for speech difficulty.   Psychiatric/Behavioral: Positive for decreased concentration.       Objective   Physical Exam   Constitutional: She appears well-developed and well-nourished.   HENT:   Head: Normocephalic and atraumatic.   Right Ear: External ear normal.   Left Ear: External ear normal.   Eyes: Conjunctivae are normal.   Cardiovascular: Normal rate, regular rhythm and normal heart sounds.  Exam reveals no gallop and no friction rub.    No murmur heard.  Pulmonary/Chest: Effort normal and breath sounds normal.   Psychiatric: She has a normal mood and affect.   Vitals reviewed.    Speech is more spontaneous but continues to search for words.  Assessment/Plan   Beryl was seen today for diabetes.    Diagnoses and all orders for this visit:    Type 2 diabetes mellitus with complication, with long-term current use of insulin  -     lisinopril (PRINIVIL,ZESTRIL) 20 MG tablet; Take 1 tablet by mouth Daily.    Acute cerebrovascular accident (CVA)    Essential hypertension  -     lisinopril (PRINIVIL,ZESTRIL) 20 MG tablet; Take 1 tablet by mouth Daily.

## 2017-08-07 NOTE — PATIENT INSTRUCTIONS
Low blood sugar.  Less than 70 is considered low.  So recommend 1/2 c juice and then meal within 30 min and sugar should improve.  Can take 1/2 dose of insulin if glucose is low.    Increase lisinopril to 20mg.  MOnitor several times per week.

## 2017-08-15 ENCOUNTER — OFFICE VISIT (OUTPATIENT)
Dept: NEUROLOGY | Facility: CLINIC | Age: 72
End: 2017-08-15

## 2017-08-15 VITALS
BODY MASS INDEX: 31.25 KG/M2 | HEIGHT: 59 IN | DIASTOLIC BLOOD PRESSURE: 80 MMHG | WEIGHT: 155 LBS | OXYGEN SATURATION: 97 % | HEART RATE: 93 BPM | SYSTOLIC BLOOD PRESSURE: 126 MMHG

## 2017-08-15 DIAGNOSIS — I63.9 ACUTE CEREBROVASCULAR ACCIDENT (CVA) (HCC): Primary | ICD-10-CM

## 2017-08-15 PROCEDURE — 99214 OFFICE O/P EST MOD 30 MIN: CPT | Performed by: PSYCHIATRY & NEUROLOGY

## 2017-08-15 NOTE — ASSESSMENT & PLAN NOTE
Speech difficulty improving with SLP    Continue risk factor management decreasing sugars, HTN    ASA/Plavix/Lisinopril/Lipitor

## 2017-08-15 NOTE — PROGRESS NOTES
Subjective:     Patient ID: Beryl Montoya is a 72 y.o. female.    History of Present Illness     72 y.o. woman referred by Kacie Varela for stroke as a new patient to my practice.  Presented on 6/24/17 with trouble speaking and reading.  Doing SLP in [place].  Deficits of word finding and reading improving.  Intact reception.      Reviewed Kacie Varela's and [facility] notes:    H/o IDDM, GERD, HTN, HLP and TIA,  Admitted on 6/25/17 for difficulty speaking and not recognizing familiar people.      MRI Brain, my review of films, acute left temporal/parietal/insula infarct, small vessel dz and atrophy.    Echo - EF 65%  C U/S - 0 - 49%  Labs - Gluc 327, Chol 169; A1C 13.3    Discharged on Lisinopril, Lipitor, Plavix and SLP at home.     The following portions of the patient's history were reviewed and updated as appropriate: allergies, current medications, past family history, past medical history, past social history, past surgical history and problem list.    Review of Systems   Constitutional: Positive for fatigue. Negative for activity change and unexpected weight change.   HENT: Negative for tinnitus and trouble swallowing.    Eyes: Negative for photophobia and visual disturbance.   Respiratory: Negative for apnea, cough and choking.    Cardiovascular: Negative for leg swelling.   Gastrointestinal: Negative for nausea and vomiting.   Endocrine: Negative for cold intolerance and heat intolerance.   Genitourinary: Negative for difficulty urinating, frequency, menstrual problem and urgency.   Musculoskeletal: Negative for back pain, gait problem, myalgias and neck pain.   Skin: Negative for color change and rash.   Allergic/Immunologic: Negative for immunocompromised state.   Neurological: Positive for speech difficulty. Negative for dizziness, tremors, seizures, syncope, facial asymmetry, weakness, light-headedness, numbness and headaches.   Hematological: Negative for adenopathy. Does not bruise/bleed easily.  "  Psychiatric/Behavioral: Positive for decreased concentration. Negative for behavioral problems, confusion, hallucinations and sleep disturbance.        Objective:  Vitals:    08/15/17 0829   BP: 126/80   BP Location: Right arm   Patient Position: Sitting   Cuff Size: Adult   Pulse: 93   SpO2: 97%   Weight: 155 lb (70.3 kg)   Height: 59\" (149.9 cm)       Neurologic Exam     Mental Status   Oriented to person, place, and time.   Registration: recalls 3 of 3 objects. Recall at 5 minutes: recalls 3 of 3 objects. Follows 3 step commands.   Attention: normal. Concentration: normal.   Speech: (Mild dysfluency, no if ands and buts)  Level of consciousness: alert  Knowledge: good and consistent with education.   Able to name object. Able to read. Able to repeat. Able to write. Normal comprehension.     Cranial Nerves     CN II   Visual fields full to confrontation.   Visual acuity: normal  Right visual field deficit: none  Left visual field deficit: none     CN III, IV, VI   Pupils are equal, round, and reactive to light.  Extraocular motions are normal.   Right pupil: Shape: regular. Reactivity: brisk. Consensual response: intact.   Left pupil: Shape: regular. Reactivity: brisk. Consensual response: intact.   Nystagmus: none   Diplopia: none  Ophthalmoparesis: none  Upgaze: normal  Downgaze: normal  Conjugate gaze: present  Vestibulo-ocular reflex: present    CN V   Facial sensation intact.   Right corneal reflex: normal  Left corneal reflex: normal    CN VII   Right facial weakness: none  Left facial weakness: none    CN VIII   Hearing: intact    CN IX, X   Palate: symmetric  Right gag reflex: normal  Left gag reflex: normal    CN XI   Right sternocleidomastoid strength: normal  Left sternocleidomastoid strength: normal    CN XII   Tongue: not atrophic  Fasciculations: absent  Tongue deviation: none    Motor Exam   Muscle bulk: normal  Overall muscle tone: normal  Right arm tone: normal  Left arm tone: normal  Right leg " tone: normal  Left leg tone: normal    Strength   Strength 5/5 throughout.     Sensory Exam   Light touch normal.   Vibration normal.   Proprioception normal.   Pinprick normal.     Gait, Coordination, and Reflexes     Gait  Gait: normal    Coordination   Romberg: negative  Finger to nose coordination: normal  Heel to shin coordination: normal  Tandem walking coordination: normal    Tremor   Resting tremor: absent  Intention tremor: absent  Action tremor: absent    Reflexes   Reflexes 2+ except as noted.       Physical Exam   Constitutional: She is oriented to person, place, and time. Vital signs are normal. She appears well-developed and well-nourished.   HENT:   Head: Normocephalic and atraumatic.   Eyes: EOM and lids are normal. Pupils are equal, round, and reactive to light.   Fundoscopic exam:       The right eye shows no exudate, no hemorrhage and no papilledema. The right eye shows venous pulsations.        The left eye shows no exudate, no hemorrhage and no papilledema. The left eye shows venous pulsations.   Neck: Normal range of motion and phonation normal. Normal carotid pulses present. Carotid bruit is not present. No thyroid mass and no thyromegaly present.   Cardiovascular: Normal rate, regular rhythm and normal heart sounds.    Pulmonary/Chest: Effort normal.   Neurological: She is oriented to person, place, and time. She has normal strength. She has a normal Finger-Nose-Finger Test, a normal Heel to Shin Test, a normal Romberg Test and a normal Tandem Gait Test. Gait normal.   Skin: Skin is warm and dry.   Psychiatric: She has a normal mood and affect.   Nursing note and vitals reviewed.      Assessment/Plan:       Problems Addressed this Visit        Other    Acute cerebrovascular accident (CVA) - Primary     Speech difficulty improving with SLP    Continue risk factor management decreasing sugars, HTN    ASA/Plavix/Lisinopril/Lipitor

## 2017-08-25 DIAGNOSIS — E78.5 HYPERLIPIDEMIA, UNSPECIFIED HYPERLIPIDEMIA TYPE: ICD-10-CM

## 2017-08-25 RX ORDER — ATORVASTATIN CALCIUM 80 MG/1
TABLET, FILM COATED ORAL
Qty: 30 TABLET | Refills: 0 | Status: SHIPPED | OUTPATIENT
Start: 2017-08-25 | End: 2017-09-27 | Stop reason: SDUPTHER

## 2017-09-11 ENCOUNTER — OFFICE VISIT (OUTPATIENT)
Dept: INTERNAL MEDICINE | Facility: CLINIC | Age: 72
End: 2017-09-11

## 2017-09-11 VITALS
OXYGEN SATURATION: 99 % | RESPIRATION RATE: 20 BRPM | HEART RATE: 98 BPM | TEMPERATURE: 97.9 F | DIASTOLIC BLOOD PRESSURE: 82 MMHG | WEIGHT: 157 LBS | SYSTOLIC BLOOD PRESSURE: 144 MMHG | BODY MASS INDEX: 31.71 KG/M2

## 2017-09-11 DIAGNOSIS — J30.2 SEASONAL ALLERGIC RHINITIS, UNSPECIFIED ALLERGIC RHINITIS TRIGGER: ICD-10-CM

## 2017-09-11 DIAGNOSIS — Z79.4 TYPE 2 DIABETES MELLITUS WITH COMPLICATION, WITH LONG-TERM CURRENT USE OF INSULIN (HCC): Primary | Chronic | ICD-10-CM

## 2017-09-11 DIAGNOSIS — I10 ESSENTIAL HYPERTENSION: ICD-10-CM

## 2017-09-11 DIAGNOSIS — I63.9 ACUTE CEREBROVASCULAR ACCIDENT (CVA) (HCC): ICD-10-CM

## 2017-09-11 DIAGNOSIS — E11.8 TYPE 2 DIABETES MELLITUS WITH COMPLICATION, WITH LONG-TERM CURRENT USE OF INSULIN (HCC): Primary | Chronic | ICD-10-CM

## 2017-09-11 LAB
EXPIRATION DATE: NORMAL
HBA1C MFR BLD: 6.5 %
Lab: NORMAL

## 2017-09-11 PROCEDURE — 83036 HEMOGLOBIN GLYCOSYLATED A1C: CPT | Performed by: PHYSICIAN ASSISTANT

## 2017-09-11 PROCEDURE — 99215 OFFICE O/P EST HI 40 MIN: CPT | Performed by: PHYSICIAN ASSISTANT

## 2017-09-11 RX ORDER — LANCETS 28 GAUGE
EACH MISCELLANEOUS
COMMUNITY
Start: 2017-08-17 | End: 2017-09-22

## 2017-09-11 RX ORDER — BLOOD SUGAR DIAGNOSTIC
STRIP MISCELLANEOUS
COMMUNITY
Start: 2017-08-17 | End: 2017-09-22

## 2017-09-11 RX ORDER — FLUTICASONE PROPIONATE 50 MCG
2 SPRAY, SUSPENSION (ML) NASAL DAILY
Qty: 1 BOTTLE | Refills: 2 | Status: SHIPPED | OUTPATIENT
Start: 2017-09-11 | End: 2017-11-29

## 2017-09-11 RX ORDER — INSULIN LISPRO 100 [IU]/ML
30-40 INJECTION, SUSPENSION SUBCUTANEOUS 2 TIMES DAILY
Qty: 18 ML | Refills: 5 | Status: SHIPPED | OUTPATIENT
Start: 2017-09-11 | End: 2017-09-22

## 2017-09-11 RX ORDER — LOSARTAN POTASSIUM AND HYDROCHLOROTHIAZIDE 12.5; 5 MG/1; MG/1
1 TABLET ORAL DAILY
Qty: 30 TABLET | Refills: 2 | Status: SHIPPED | OUTPATIENT
Start: 2017-09-11 | End: 2017-11-29 | Stop reason: SDUPTHER

## 2017-09-11 RX ORDER — CLOPIDOGREL BISULFATE 75 MG/1
75 TABLET ORAL DAILY
Qty: 90 TABLET | Refills: 0 | Status: SHIPPED | OUTPATIENT
Start: 2017-09-11 | End: 2017-11-29 | Stop reason: SDUPTHER

## 2017-09-11 NOTE — PATIENT INSTRUCTIONS
Recommend Testing glucose before breakfast and supper.  Eat same amt of carbs at every meal as much as possible.  Decrease supper time insulin to 26 units, to avoid low blood in the am.    Continue 40 units of insulin in the morning, assuming that glucose stable.  Otherwise can wait until lunch OR can take 1/2 dose in the morning.    Eat more non starch vegetable, frozen is good.    A1c today is 6.5%.  Unless you are anemic, this is great improvement.

## 2017-09-11 NOTE — PROGRESS NOTES
Subjective   Beryl Montoya is a 72 y.o. female.   Chief Complaint   Patient presents with   • Diabetes     f/u       History of Present Illness   Pt here for Diabetes follow up.  NG70/30 is no longer covered by insurance.  Glucose tends to be up and down--glucose log brought for review.  This am was 164, other days less than 100.  Ng 70/30 40 units in the am or lunch time if glucose is in low 100s and 30 units at supper.    HTn--uncontrolled, lisinopril was increased this interval.  She developed cough since dose increase.    Recent CVA--speech is improved and continues to go to speech therapy.  The following portions of the patient's history were reviewed and updated as appropriate: allergies, current medications and problem list.    Review of Systems   HENT: Negative.    Respiratory: Negative for shortness of breath.    Cardiovascular: Negative for chest pain.   Gastrointestinal: Negative.    Genitourinary: Negative.    Musculoskeletal: Negative.    Neurological: Negative.    Psychiatric/Behavioral: Positive for confusion and decreased concentration. Negative for sleep disturbance. The patient is not nervous/anxious.        Objective   Physical Exam   Constitutional: She appears well-developed and well-nourished.   HENT:   Head: Normocephalic and atraumatic.   Right Ear: External ear normal.   Left Ear: External ear normal.   Eyes: Conjunctivae are normal.   Cardiovascular: Normal rate, regular rhythm and normal heart sounds.  Exam reveals no gallop and no friction rub.    No murmur heard.  Pulmonary/Chest: Effort normal and breath sounds normal.   Psychiatric: She has a normal mood and affect. Her speech is delayed.   Vitals reviewed.      Assessment/Plan   Beryl was seen today for diabetes.    Diagnoses and all orders for this visit:    Type 2 diabetes mellitus with complication, with long-term current use of insulin  -     POC Glycosylated Hemoglobin (Hb A1C)  -     losartan-hydrochlorothiazide (HYZAAR)  50-12.5 MG per tablet; Take 1 tablet by mouth Daily.  -     Discontinue: Insulin Lispro Prot & Lispro (HUMALOG MIX 75/25 KWIKPEN) (75-25) 100 UNIT/ML suspension pen-injector; Inject 30-40 Units under the skin 2 (Two) Times a Day. 40 units in the am and 30 units at supper    Essential hypertension  -     losartan-hydrochlorothiazide (HYZAAR) 50-12.5 MG per tablet; Take 1 tablet by mouth Daily.    Seasonal allergic rhinitis, unspecified allergic rhinitis trigger  -     fluticasone (FLONASE) 50 MCG/ACT nasal spray; 2 sprays into each nostril Daily.    Acute cerebrovascular accident (CVA)  -     clopidogrel (PLAVIX) 75 MG tablet; Take 1 tablet by mouth Daily for 90 days.      See pt instructions.    Spent 25/40 min discussing strategies for better glucose control, titration of insulin, foods to eat and to avoid.

## 2017-09-22 ENCOUNTER — HOSPITAL ENCOUNTER (INPATIENT)
Facility: HOSPITAL | Age: 72
LOS: 3 days | Discharge: HOME | End: 2017-09-25
Attending: EMERGENCY MEDICINE | Admitting: INTERNAL MEDICINE

## 2017-09-22 ENCOUNTER — APPOINTMENT (OUTPATIENT)
Dept: MRI IMAGING | Facility: HOSPITAL | Age: 72
End: 2017-09-22

## 2017-09-22 DIAGNOSIS — Z74.09 IMPAIRED MOBILITY AND ADLS: ICD-10-CM

## 2017-09-22 DIAGNOSIS — Z78.9 IMPAIRED MOBILITY AND ADLS: ICD-10-CM

## 2017-09-22 DIAGNOSIS — Z79.4 TYPE 2 DIABETES MELLITUS WITH COMPLICATION, WITH LONG-TERM CURRENT USE OF INSULIN (HCC): Chronic | ICD-10-CM

## 2017-09-22 DIAGNOSIS — E11.8 TYPE 2 DIABETES MELLITUS WITH COMPLICATION, WITH LONG-TERM CURRENT USE OF INSULIN (HCC): Chronic | ICD-10-CM

## 2017-09-22 DIAGNOSIS — Z74.09 IMPAIRED FUNCTIONAL MOBILITY, BALANCE, GAIT, AND ENDURANCE: ICD-10-CM

## 2017-09-22 DIAGNOSIS — I65.23 BILATERAL CAROTID ARTERY STENOSIS: ICD-10-CM

## 2017-09-22 DIAGNOSIS — I63.9 ACUTE ISCHEMIC STROKE (HCC): Primary | ICD-10-CM

## 2017-09-22 LAB
ANION GAP SERPL CALCULATED.3IONS-SCNC: 11 MMOL/L (ref 3–11)
BACTERIA UR QL AUTO: ABNORMAL /HPF
BASOPHILS # BLD AUTO: 0.1 10*3/MM3 (ref 0–0.2)
BASOPHILS NFR BLD AUTO: 0.9 % (ref 0–1)
BILIRUB UR QL STRIP: NEGATIVE
BUN BLD-MCNC: 19 MG/DL (ref 9–23)
BUN/CREAT SERPL: 19 (ref 7–25)
CALCIUM SPEC-SCNC: 10.2 MG/DL (ref 8.7–10.4)
CHLORIDE SERPL-SCNC: 100 MMOL/L (ref 99–109)
CLARITY UR: ABNORMAL
CO2 SERPL-SCNC: 29 MMOL/L (ref 20–31)
COLOR UR: YELLOW
CREAT BLD-MCNC: 1 MG/DL (ref 0.6–1.3)
DEPRECATED RDW RBC AUTO: 45.6 FL (ref 37–54)
EOSINOPHIL # BLD AUTO: 0.44 10*3/MM3 (ref 0–0.3)
EOSINOPHIL NFR BLD AUTO: 4.2 % (ref 0–3)
ERYTHROCYTE [DISTWIDTH] IN BLOOD BY AUTOMATED COUNT: 12.8 % (ref 11.3–14.5)
GFR SERPL CREATININE-BSD FRML MDRD: 55 ML/MIN/1.73
GLUCOSE BLD-MCNC: 198 MG/DL (ref 70–100)
GLUCOSE BLDC GLUCOMTR-MCNC: 151 MG/DL (ref 70–130)
GLUCOSE UR STRIP-MCNC: NEGATIVE MG/DL
HCT VFR BLD AUTO: 42.9 % (ref 34.5–44)
HGB BLD-MCNC: 14.7 G/DL (ref 11.5–15.5)
HGB UR QL STRIP.AUTO: NEGATIVE
HYALINE CASTS UR QL AUTO: ABNORMAL /LPF
IMM GRANULOCYTES # BLD: 0.03 10*3/MM3 (ref 0–0.03)
IMM GRANULOCYTES NFR BLD: 0.3 % (ref 0–0.6)
KETONES UR QL STRIP: NEGATIVE
LEUKOCYTE ESTERASE UR QL STRIP.AUTO: ABNORMAL
LYMPHOCYTES # BLD AUTO: 4.27 10*3/MM3 (ref 0.6–4.8)
LYMPHOCYTES NFR BLD AUTO: 40.6 % (ref 24–44)
MCH RBC QN AUTO: 33.3 PG (ref 27–31)
MCHC RBC AUTO-ENTMCNC: 34.3 G/DL (ref 32–36)
MCV RBC AUTO: 97.1 FL (ref 80–99)
MONOCYTES # BLD AUTO: 0.67 10*3/MM3 (ref 0–1)
MONOCYTES NFR BLD AUTO: 6.4 % (ref 0–12)
NEUTROPHILS # BLD AUTO: 5.02 10*3/MM3 (ref 1.5–8.3)
NEUTROPHILS NFR BLD AUTO: 47.6 % (ref 41–71)
NITRITE UR QL STRIP: NEGATIVE
PH UR STRIP.AUTO: 6 [PH] (ref 5–8)
PLATELET # BLD AUTO: 269 10*3/MM3 (ref 150–450)
PMV BLD AUTO: 9.9 FL (ref 6–12)
POTASSIUM BLD-SCNC: 3.9 MMOL/L (ref 3.5–5.5)
PROT UR QL STRIP: NEGATIVE
RBC # BLD AUTO: 4.42 10*6/MM3 (ref 3.89–5.14)
RBC # UR: ABNORMAL /HPF
REF LAB TEST METHOD: ABNORMAL
SODIUM BLD-SCNC: 140 MMOL/L (ref 132–146)
SP GR UR STRIP: 1.02 (ref 1–1.03)
SQUAMOUS #/AREA URNS HPF: ABNORMAL /HPF
UROBILINOGEN UR QL STRIP: ABNORMAL
WBC NRBC COR # BLD: 10.53 10*3/MM3 (ref 3.5–10.8)
WBC UR QL AUTO: ABNORMAL /HPF

## 2017-09-22 PROCEDURE — 93005 ELECTROCARDIOGRAM TRACING: CPT | Performed by: EMERGENCY MEDICINE

## 2017-09-22 PROCEDURE — 80048 BASIC METABOLIC PNL TOTAL CA: CPT | Performed by: EMERGENCY MEDICINE

## 2017-09-22 PROCEDURE — 82962 GLUCOSE BLOOD TEST: CPT

## 2017-09-22 PROCEDURE — 87186 SC STD MICRODIL/AGAR DIL: CPT | Performed by: EMERGENCY MEDICINE

## 2017-09-22 PROCEDURE — 81001 URINALYSIS AUTO W/SCOPE: CPT | Performed by: EMERGENCY MEDICINE

## 2017-09-22 PROCEDURE — 99284 EMERGENCY DEPT VISIT MOD MDM: CPT

## 2017-09-22 PROCEDURE — 63710000001 INSULIN LISPRO PROTAMINE-INSULIN LISPRO (75-25) 100 UNIT/ML SUSPENSION 10 ML VIAL: Performed by: NURSE PRACTITIONER

## 2017-09-22 PROCEDURE — 93010 ELECTROCARDIOGRAM REPORT: CPT | Performed by: INTERNAL MEDICINE

## 2017-09-22 PROCEDURE — 85025 COMPLETE CBC W/AUTO DIFF WBC: CPT | Performed by: EMERGENCY MEDICINE

## 2017-09-22 PROCEDURE — 87086 URINE CULTURE/COLONY COUNT: CPT | Performed by: EMERGENCY MEDICINE

## 2017-09-22 PROCEDURE — 63710000001 INSULIN LISPRO (HUMAN) PER 5 UNITS: Performed by: NURSE PRACTITIONER

## 2017-09-22 PROCEDURE — 93005 ELECTROCARDIOGRAM TRACING: CPT | Performed by: NURSE PRACTITIONER

## 2017-09-22 PROCEDURE — 70551 MRI BRAIN STEM W/O DYE: CPT

## 2017-09-22 PROCEDURE — 99223 1ST HOSP IP/OBS HIGH 75: CPT | Performed by: FAMILY MEDICINE

## 2017-09-22 RX ORDER — ONDANSETRON 2 MG/ML
4 INJECTION INTRAMUSCULAR; INTRAVENOUS EVERY 6 HOURS PRN
Status: DISCONTINUED | OUTPATIENT
Start: 2017-09-22 | End: 2017-09-22

## 2017-09-22 RX ORDER — ACETAMINOPHEN 650 MG/1
650 SUPPOSITORY RECTAL EVERY 4 HOURS PRN
Status: DISCONTINUED | OUTPATIENT
Start: 2017-09-22 | End: 2017-09-25 | Stop reason: HOSPADM

## 2017-09-22 RX ORDER — NICOTINE POLACRILEX 4 MG
15 LOZENGE BUCCAL
Status: DISCONTINUED | OUTPATIENT
Start: 2017-09-22 | End: 2017-09-25 | Stop reason: HOSPADM

## 2017-09-22 RX ORDER — LOSARTAN POTASSIUM AND HYDROCHLOROTHIAZIDE 12.5; 5 MG/1; MG/1
1 TABLET ORAL DAILY
Status: DISCONTINUED | OUTPATIENT
Start: 2017-09-23 | End: 2017-09-25 | Stop reason: HOSPADM

## 2017-09-22 RX ORDER — ACETAMINOPHEN 325 MG/1
650 TABLET ORAL EVERY 4 HOURS PRN
Status: DISCONTINUED | OUTPATIENT
Start: 2017-09-22 | End: 2017-09-25 | Stop reason: HOSPADM

## 2017-09-22 RX ORDER — ATORVASTATIN CALCIUM 40 MG/1
80 TABLET, FILM COATED ORAL NIGHTLY
Status: DISCONTINUED | OUTPATIENT
Start: 2017-09-22 | End: 2017-09-25 | Stop reason: HOSPADM

## 2017-09-22 RX ORDER — SODIUM CHLORIDE 0.9 % (FLUSH) 0.9 %
1-10 SYRINGE (ML) INJECTION AS NEEDED
Status: DISCONTINUED | OUTPATIENT
Start: 2017-09-22 | End: 2017-09-25 | Stop reason: HOSPADM

## 2017-09-22 RX ORDER — FLUTICASONE PROPIONATE 50 MCG
2 SPRAY, SUSPENSION (ML) NASAL DAILY
Status: DISCONTINUED | OUTPATIENT
Start: 2017-09-23 | End: 2017-09-25 | Stop reason: HOSPADM

## 2017-09-22 RX ORDER — ONDANSETRON 2 MG/ML
4 INJECTION INTRAMUSCULAR; INTRAVENOUS EVERY 6 HOURS PRN
Status: DISCONTINUED | OUTPATIENT
Start: 2017-09-22 | End: 2017-09-25 | Stop reason: HOSPADM

## 2017-09-22 RX ORDER — DEXTROSE MONOHYDRATE 25 G/50ML
25 INJECTION, SOLUTION INTRAVENOUS
Status: DISCONTINUED | OUTPATIENT
Start: 2017-09-22 | End: 2017-09-25 | Stop reason: HOSPADM

## 2017-09-22 RX ORDER — ATORVASTATIN CALCIUM 40 MG/1
80 TABLET, FILM COATED ORAL DAILY
Status: DISCONTINUED | OUTPATIENT
Start: 2017-09-23 | End: 2017-09-22

## 2017-09-22 RX ORDER — ASPIRIN 325 MG
325 TABLET ORAL DAILY
Status: DISCONTINUED | OUTPATIENT
Start: 2017-09-23 | End: 2017-09-25 | Stop reason: HOSPADM

## 2017-09-22 RX ORDER — CLOPIDOGREL BISULFATE 75 MG/1
75 TABLET ORAL DAILY
Status: DISCONTINUED | OUTPATIENT
Start: 2017-09-23 | End: 2017-09-25 | Stop reason: HOSPADM

## 2017-09-22 RX ORDER — ONDANSETRON 4 MG/1
4 TABLET, FILM COATED ORAL EVERY 6 HOURS PRN
Status: DISCONTINUED | OUTPATIENT
Start: 2017-09-22 | End: 2017-09-25 | Stop reason: HOSPADM

## 2017-09-22 RX ADMIN — INSULIN LISPRO 15 UNITS: 100 INJECTION, SUSPENSION SUBCUTANEOUS at 23:30

## 2017-09-22 RX ADMIN — ATORVASTATIN CALCIUM 80 MG: 40 TABLET, FILM COATED ORAL at 23:28

## 2017-09-22 RX ADMIN — INSULIN LISPRO 2 UNITS: 100 INJECTION, SOLUTION INTRAVENOUS; SUBCUTANEOUS at 23:28

## 2017-09-23 ENCOUNTER — APPOINTMENT (OUTPATIENT)
Dept: CARDIOLOGY | Facility: HOSPITAL | Age: 72
End: 2017-09-23

## 2017-09-23 LAB
ANION GAP SERPL CALCULATED.3IONS-SCNC: 10 MMOL/L (ref 3–11)
ARTICHOKE IGE QN: 50 MG/DL (ref 0–130)
BH CV ECHO MEAS - BSA(HAYCOCK): 1.7 M^2
BH CV ECHO MEAS - BSA: 1.7 M^2
BH CV ECHO MEAS - BZI_BMI: 29.7 KILOGRAMS/M^2
BH CV ECHO MEAS - BZI_METRIC_HEIGHT: 152.4 CM
BH CV ECHO MEAS - BZI_METRIC_WEIGHT: 68.9 KG
BH CV XLRA MEAS LEFT CCA RATIO VEL: 70 CM/SEC
BH CV XLRA MEAS LEFT DIST CCA EDV: 15.7 CM/SEC
BH CV XLRA MEAS LEFT DIST CCA PSV: 145 CM/SEC
BH CV XLRA MEAS LEFT DIST ICA EDV: 25.4 CM/SEC
BH CV XLRA MEAS LEFT DIST ICA PSV: 105 CM/SEC
BH CV XLRA MEAS LEFT ICA RATIO VEL: 163 CM/SEC
BH CV XLRA MEAS LEFT ICA/CCA RATIO: 2.3
BH CV XLRA MEAS LEFT MID CCA EDV: 10.5 CM/SEC
BH CV XLRA MEAS LEFT MID CCA PSV: 70 CM/SEC
BH CV XLRA MEAS LEFT MID ICA EDV: 15.7 CM/SEC
BH CV XLRA MEAS LEFT MID ICA PSV: 77.8 CM/SEC
BH CV XLRA MEAS LEFT PROX CCA EDV: 8.3 CM/SEC
BH CV XLRA MEAS LEFT PROX CCA PSV: 70.6 CM/SEC
BH CV XLRA MEAS LEFT PROX ECA PSV: 145 CM/SEC
BH CV XLRA MEAS LEFT PROX ICA EDV: 18.9 CM/SEC
BH CV XLRA MEAS LEFT PROX ICA PSV: 163 CM/SEC
BH CV XLRA MEAS LEFT PROX SCLA PSV: 236 CM/SEC
BH CV XLRA MEAS LEFT VERTEBRAL A EDV: 8.4 CM/SEC
BH CV XLRA MEAS LEFT VERTEBRAL A PSV: 44 CM/SEC
BH CV XLRA MEAS RIGHT CCA RATIO VEL: 64.8 CM/SEC
BH CV XLRA MEAS RIGHT DIST CCA EDV: 14.9 CM/SEC
BH CV XLRA MEAS RIGHT DIST CCA PSV: 144 CM/SEC
BH CV XLRA MEAS RIGHT DIST ICA EDV: 23.1 CM/SEC
BH CV XLRA MEAS RIGHT DIST ICA PSV: 91.8 CM/SEC
BH CV XLRA MEAS RIGHT ICA RATIO VEL: 152 CM/SEC
BH CV XLRA MEAS RIGHT ICA/CCA RATIO: 2.3
BH CV XLRA MEAS RIGHT MID CCA EDV: 10.2 CM/SEC
BH CV XLRA MEAS RIGHT MID CCA PSV: 64.8 CM/SEC
BH CV XLRA MEAS RIGHT MID ICA EDV: 21.1 CM/SEC
BH CV XLRA MEAS RIGHT MID ICA PSV: 73.7 CM/SEC
BH CV XLRA MEAS RIGHT PROX CCA EDV: 8.2 CM/SEC
BH CV XLRA MEAS RIGHT PROX CCA PSV: 69.1 CM/SEC
BH CV XLRA MEAS RIGHT PROX ECA PSV: 116 CM/SEC
BH CV XLRA MEAS RIGHT PROX ICA EDV: 23.6 CM/SEC
BH CV XLRA MEAS RIGHT PROX ICA PSV: 152 CM/SEC
BH CV XLRA MEAS RIGHT PROX SCLA PSV: 185 CM/SEC
BH CV XLRA MEAS RIGHT VERTEBRAL A EDV: 16.7 CM/SEC
BH CV XLRA MEAS RIGHT VERTEBRAL A PSV: 94.8 CM/SEC
BUN BLD-MCNC: 12 MG/DL (ref 9–23)
BUN/CREAT SERPL: 15 (ref 7–25)
CALCIUM SPEC-SCNC: 9.4 MG/DL (ref 8.7–10.4)
CHLORIDE SERPL-SCNC: 102 MMOL/L (ref 99–109)
CHOLEST SERPL-MCNC: 111 MG/DL (ref 0–200)
CO2 SERPL-SCNC: 26 MMOL/L (ref 20–31)
CREAT BLD-MCNC: 0.8 MG/DL (ref 0.6–1.3)
DEPRECATED RDW RBC AUTO: 46.7 FL (ref 37–54)
ERYTHROCYTE [DISTWIDTH] IN BLOOD BY AUTOMATED COUNT: 12.9 % (ref 11.3–14.5)
GFR SERPL CREATININE-BSD FRML MDRD: 71 ML/MIN/1.73
GLUCOSE BLD-MCNC: 133 MG/DL (ref 70–100)
GLUCOSE BLDC GLUCOMTR-MCNC: 152 MG/DL (ref 70–130)
GLUCOSE BLDC GLUCOMTR-MCNC: 190 MG/DL (ref 70–130)
GLUCOSE BLDC GLUCOMTR-MCNC: 205 MG/DL (ref 70–130)
GLUCOSE BLDC GLUCOMTR-MCNC: 205 MG/DL (ref 70–130)
HBA1C MFR BLD: 6.5 % (ref 4.8–5.6)
HCT VFR BLD AUTO: 44.3 % (ref 34.5–44)
HDLC SERPL-MCNC: 50 MG/DL (ref 40–60)
HGB BLD-MCNC: 14.3 G/DL (ref 11.5–15.5)
LEFT ARM BP: NORMAL MMHG
MCH RBC QN AUTO: 31.8 PG (ref 27–31)
MCHC RBC AUTO-ENTMCNC: 32.3 G/DL (ref 32–36)
MCV RBC AUTO: 98.4 FL (ref 80–99)
PA ADP PRP-ACNC: 92 PRU
PLATELET # BLD AUTO: 247 10*3/MM3 (ref 150–450)
PMV BLD AUTO: 9.8 FL (ref 6–12)
POTASSIUM BLD-SCNC: 3.5 MMOL/L (ref 3.5–5.5)
RBC # BLD AUTO: 4.5 10*6/MM3 (ref 3.89–5.14)
SODIUM BLD-SCNC: 138 MMOL/L (ref 132–146)
TRIGL SERPL-MCNC: 72 MG/DL (ref 0–150)
TSH SERPL DL<=0.05 MIU/L-ACNC: 3.43 MIU/ML (ref 0.35–5.35)
WBC NRBC COR # BLD: 8.46 10*3/MM3 (ref 3.5–10.8)

## 2017-09-23 PROCEDURE — 93880 EXTRACRANIAL BILAT STUDY: CPT | Performed by: INTERNAL MEDICINE

## 2017-09-23 PROCEDURE — 97166 OT EVAL MOD COMPLEX 45 MIN: CPT | Performed by: OCCUPATIONAL THERAPIST

## 2017-09-23 PROCEDURE — 80048 BASIC METABOLIC PNL TOTAL CA: CPT | Performed by: NURSE PRACTITIONER

## 2017-09-23 PROCEDURE — 63710000001 INSULIN LISPRO PROTAMINE-INSULIN LISPRO (75-25) 100 UNIT/ML SUSPENSION 10 ML VIAL: Performed by: NURSE PRACTITIONER

## 2017-09-23 PROCEDURE — 25010000002 SULFUR HEXAFLUORIDE MICROSPH 60.7-25 MG RECONSTITUTED SUSPENSION: Performed by: INTERNAL MEDICINE

## 2017-09-23 PROCEDURE — 99223 1ST HOSP IP/OBS HIGH 75: CPT | Performed by: PSYCHIATRY & NEUROLOGY

## 2017-09-23 PROCEDURE — 83036 HEMOGLOBIN GLYCOSYLATED A1C: CPT | Performed by: NURSE PRACTITIONER

## 2017-09-23 PROCEDURE — 85576 BLOOD PLATELET AGGREGATION: CPT | Performed by: NURSE PRACTITIONER

## 2017-09-23 PROCEDURE — 93306 TTE W/DOPPLER COMPLETE: CPT | Performed by: INTERNAL MEDICINE

## 2017-09-23 PROCEDURE — 25010000002 HEPARIN (PORCINE) PER 1000 UNITS: Performed by: INTERNAL MEDICINE

## 2017-09-23 PROCEDURE — 93880 EXTRACRANIAL BILAT STUDY: CPT

## 2017-09-23 PROCEDURE — 82962 GLUCOSE BLOOD TEST: CPT

## 2017-09-23 PROCEDURE — 92523 SPEECH SOUND LANG COMPREHEN: CPT

## 2017-09-23 PROCEDURE — 80061 LIPID PANEL: CPT | Performed by: NURSE PRACTITIONER

## 2017-09-23 PROCEDURE — 93306 TTE W/DOPPLER COMPLETE: CPT

## 2017-09-23 PROCEDURE — 97162 PT EVAL MOD COMPLEX 30 MIN: CPT

## 2017-09-23 PROCEDURE — 99232 SBSQ HOSP IP/OBS MODERATE 35: CPT | Performed by: INTERNAL MEDICINE

## 2017-09-23 PROCEDURE — 84443 ASSAY THYROID STIM HORMONE: CPT | Performed by: NURSE PRACTITIONER

## 2017-09-23 PROCEDURE — 85027 COMPLETE CBC AUTOMATED: CPT | Performed by: NURSE PRACTITIONER

## 2017-09-23 RX ORDER — HEPARIN SODIUM 5000 [USP'U]/ML
5000 INJECTION, SOLUTION INTRAVENOUS; SUBCUTANEOUS EVERY 12 HOURS SCHEDULED
Status: DISCONTINUED | OUTPATIENT
Start: 2017-09-23 | End: 2017-09-25 | Stop reason: HOSPADM

## 2017-09-23 RX ADMIN — INSULIN LISPRO 3 UNITS: 100 INJECTION, SOLUTION INTRAVENOUS; SUBCUTANEOUS at 12:58

## 2017-09-23 RX ADMIN — FLUTICASONE PROPIONATE 2 SPRAY: 50 SPRAY, METERED NASAL at 08:31

## 2017-09-23 RX ADMIN — LOSARTAN POTASSIUM AND HYDROCHLOROTHIAZIDE 1 TABLET: 12.5; 5 TABLET ORAL at 08:31

## 2017-09-23 RX ADMIN — HEPARIN SODIUM 5000 UNITS: 5000 INJECTION, SOLUTION INTRAVENOUS; SUBCUTANEOUS at 21:10

## 2017-09-23 RX ADMIN — HEPARIN SODIUM 5000 UNITS: 5000 INJECTION, SOLUTION INTRAVENOUS; SUBCUTANEOUS at 13:43

## 2017-09-23 RX ADMIN — SULFUR HEXAFLUORIDE 3 ML: KIT at 10:09

## 2017-09-23 RX ADMIN — CLOPIDOGREL BISULFATE 75 MG: 75 TABLET ORAL at 08:31

## 2017-09-23 RX ADMIN — ASPIRIN 325 MG ORAL TABLET 325 MG: 325 PILL ORAL at 08:31

## 2017-09-23 RX ADMIN — INSULIN LISPRO 3 UNITS: 100 INJECTION, SOLUTION INTRAVENOUS; SUBCUTANEOUS at 17:21

## 2017-09-23 RX ADMIN — ATORVASTATIN CALCIUM 80 MG: 40 TABLET, FILM COATED ORAL at 21:10

## 2017-09-23 RX ADMIN — INSULIN LISPRO 20 UNITS: 100 INJECTION, SUSPENSION SUBCUTANEOUS at 08:31

## 2017-09-23 RX ADMIN — INSULIN LISPRO 2 UNITS: 100 INJECTION, SOLUTION INTRAVENOUS; SUBCUTANEOUS at 21:10

## 2017-09-23 RX ADMIN — INSULIN LISPRO 2 UNITS: 100 INJECTION, SOLUTION INTRAVENOUS; SUBCUTANEOUS at 08:31

## 2017-09-23 RX ADMIN — INSULIN LISPRO 15 UNITS: 100 INJECTION, SUSPENSION SUBCUTANEOUS at 17:21

## 2017-09-24 LAB
BH CV ECHO MEAS - AO ROOT AREA (BSA CORRECTED): 1.3
BH CV ECHO MEAS - AO ROOT AREA: 3.8 CM^2
BH CV ECHO MEAS - AO ROOT DIAM: 2.2 CM
BH CV ECHO MEAS - BSA(HAYCOCK): 1.7 M^2
BH CV ECHO MEAS - BSA: 1.7 M^2
BH CV ECHO MEAS - BZI_BMI: 29.7 KILOGRAMS/M^2
BH CV ECHO MEAS - BZI_METRIC_HEIGHT: 152.4 CM
BH CV ECHO MEAS - BZI_METRIC_WEIGHT: 68.9 KG
BH CV ECHO MEAS - CONTRAST EF (2CH): 50.8 ML/M^2
BH CV ECHO MEAS - CONTRAST EF 4CH: 57.5 ML/M^2
BH CV ECHO MEAS - EDV(CUBED): 64.5 ML
BH CV ECHO MEAS - EDV(MOD-SP2): 63 ML
BH CV ECHO MEAS - EDV(MOD-SP4): 87 ML
BH CV ECHO MEAS - EDV(TEICH): 70.4 ML
BH CV ECHO MEAS - EF(CUBED): 67.7 %
BH CV ECHO MEAS - EF(MOD-SP2): 50.8 %
BH CV ECHO MEAS - EF(TEICH): 59.9 %
BH CV ECHO MEAS - ESV(CUBED): 20.8 ML
BH CV ECHO MEAS - ESV(MOD-SP2): 31 ML
BH CV ECHO MEAS - ESV(MOD-SP4): 37 ML
BH CV ECHO MEAS - ESV(TEICH): 28.3 ML
BH CV ECHO MEAS - FS: 31.4 %
BH CV ECHO MEAS - IVS/LVPW: 0.97
BH CV ECHO MEAS - IVSD: 1 CM
BH CV ECHO MEAS - LA DIMENSION: 3.4 CM
BH CV ECHO MEAS - LA/AO: 1.5
BH CV ECHO MEAS - LV DIASTOLIC VOL/BSA (35-75): 52.4 ML/M^2
BH CV ECHO MEAS - LV MASS(C)D: 135.8 GRAMS
BH CV ECHO MEAS - LV MASS(C)DI: 81.8 GRAMS/M^2
BH CV ECHO MEAS - LV SYSTOLIC VOL/BSA (12-30): 22.3 ML/M^2
BH CV ECHO MEAS - LVIDD: 4 CM
BH CV ECHO MEAS - LVIDS: 2.8 CM
BH CV ECHO MEAS - LVLD AP2: 7.3 CM
BH CV ECHO MEAS - LVLD AP4: 7.4 CM
BH CV ECHO MEAS - LVLS AP2: 7 CM
BH CV ECHO MEAS - LVLS AP4: 6.8 CM
BH CV ECHO MEAS - LVPWD: 1.1 CM
BH CV ECHO MEAS - MV A MAX VEL: 113 CM/SEC
BH CV ECHO MEAS - MV DEC SLOPE: 265 CM/SEC^2
BH CV ECHO MEAS - MV DEC TIME: 0.26 SEC
BH CV ECHO MEAS - MV E MAX VEL: 71.6 CM/SEC
BH CV ECHO MEAS - MV E/A: 0.63
BH CV ECHO MEAS - MV P1/2T MAX VEL: 69.5 CM/SEC
BH CV ECHO MEAS - MV P1/2T: 76.8 MSEC
BH CV ECHO MEAS - MVA P1/2T LCG: 3.2 CM^2
BH CV ECHO MEAS - MVA(P1/2T): 2.9 CM^2
BH CV ECHO MEAS - PA ACC SLOPE: 845 CM/SEC^2
BH CV ECHO MEAS - PA ACC TIME: 0.13 SEC
BH CV ECHO MEAS - PA PR(ACCEL): 18.7 MMHG
BH CV ECHO MEAS - RAP SYSTOLE: 10 MMHG
BH CV ECHO MEAS - RV MAX PG: 3.3 MMHG
BH CV ECHO MEAS - RV V1 MAX: 90.3 CM/SEC
BH CV ECHO MEAS - RVSP: 31.2 MMHG
BH CV ECHO MEAS - SI(CUBED): 26.3 ML/M^2
BH CV ECHO MEAS - SI(MOD-SP2): 19.3 ML/M^2
BH CV ECHO MEAS - SI(MOD-SP4): 30.1 ML/M^2
BH CV ECHO MEAS - SI(TEICH): 25.4 ML/M^2
BH CV ECHO MEAS - SV(CUBED): 43.7 ML
BH CV ECHO MEAS - SV(MOD-SP2): 32 ML
BH CV ECHO MEAS - SV(MOD-SP4): 50 ML
BH CV ECHO MEAS - SV(TEICH): 42.1 ML
BH CV ECHO MEAS - TAPSE (>1.6): 2 CM2
BH CV ECHO MEAS - TR MAX VEL: 230 CM/SEC
BH CV XLRA - RV BASE: 2.2 CM
BH CV XLRA - RV LENGTH: 5 CM
BH CV XLRA - RV MID: 1.7 CM
BH CV XLRA - TDI S': 14.5 CM/SEC
GLUCOSE BLDC GLUCOMTR-MCNC: 199 MG/DL (ref 70–130)
GLUCOSE BLDC GLUCOMTR-MCNC: 231 MG/DL (ref 70–130)
GLUCOSE BLDC GLUCOMTR-MCNC: 253 MG/DL (ref 70–130)
GLUCOSE BLDC GLUCOMTR-MCNC: 94 MG/DL (ref 70–130)
LEFT ATRIUM VOLUME INDEX: 18.1 ML/M2
LEFT ATRIUM VOLUME: 30 CM3
LV EF 2D ECHO EST: 45 %
PA ADP PRP-ACNC: 59 PRU

## 2017-09-24 PROCEDURE — 99232 SBSQ HOSP IP/OBS MODERATE 35: CPT | Performed by: PSYCHIATRY & NEUROLOGY

## 2017-09-24 PROCEDURE — 82962 GLUCOSE BLOOD TEST: CPT

## 2017-09-24 PROCEDURE — 99232 SBSQ HOSP IP/OBS MODERATE 35: CPT | Performed by: NURSE PRACTITIONER

## 2017-09-24 PROCEDURE — 25010000002 HEPARIN (PORCINE) PER 1000 UNITS: Performed by: INTERNAL MEDICINE

## 2017-09-24 PROCEDURE — 99222 1ST HOSP IP/OBS MODERATE 55: CPT | Performed by: NEUROLOGICAL SURGERY

## 2017-09-24 PROCEDURE — 85576 BLOOD PLATELET AGGREGATION: CPT | Performed by: NEUROLOGICAL SURGERY

## 2017-09-24 PROCEDURE — 63710000001 INSULIN LISPRO PROTAMINE-INSULIN LISPRO (75-25) 100 UNIT/ML SUSPENSION 10 ML VIAL: Performed by: NURSE PRACTITIONER

## 2017-09-24 RX ADMIN — FLUTICASONE PROPIONATE 2 SPRAY: 50 SPRAY, METERED NASAL at 08:58

## 2017-09-24 RX ADMIN — INSULIN LISPRO 20 UNITS: 100 INJECTION, SUSPENSION SUBCUTANEOUS at 09:03

## 2017-09-24 RX ADMIN — HEPARIN SODIUM 5000 UNITS: 5000 INJECTION, SOLUTION INTRAVENOUS; SUBCUTANEOUS at 08:57

## 2017-09-24 RX ADMIN — LOSARTAN POTASSIUM AND HYDROCHLOROTHIAZIDE 1 TABLET: 12.5; 5 TABLET ORAL at 08:58

## 2017-09-24 RX ADMIN — HEPARIN SODIUM 5000 UNITS: 5000 INJECTION, SOLUTION INTRAVENOUS; SUBCUTANEOUS at 21:29

## 2017-09-24 RX ADMIN — ATORVASTATIN CALCIUM 80 MG: 40 TABLET, FILM COATED ORAL at 21:29

## 2017-09-24 RX ADMIN — INSULIN LISPRO 3 UNITS: 100 INJECTION, SOLUTION INTRAVENOUS; SUBCUTANEOUS at 17:54

## 2017-09-24 RX ADMIN — ASPIRIN 325 MG ORAL TABLET 325 MG: 325 PILL ORAL at 08:58

## 2017-09-24 RX ADMIN — INSULIN LISPRO 20 UNITS: 100 INJECTION, SUSPENSION SUBCUTANEOUS at 17:53

## 2017-09-24 RX ADMIN — INSULIN LISPRO 2 UNITS: 100 INJECTION, SOLUTION INTRAVENOUS; SUBCUTANEOUS at 09:02

## 2017-09-24 RX ADMIN — CLOPIDOGREL BISULFATE 75 MG: 75 TABLET ORAL at 08:58

## 2017-09-24 RX ADMIN — INSULIN LISPRO 4 UNITS: 100 INJECTION, SOLUTION INTRAVENOUS; SUBCUTANEOUS at 12:43

## 2017-09-25 VITALS
HEART RATE: 82 BPM | BODY MASS INDEX: 29.51 KG/M2 | WEIGHT: 150.3 LBS | HEIGHT: 60 IN | RESPIRATION RATE: 15 BRPM | OXYGEN SATURATION: 99 % | DIASTOLIC BLOOD PRESSURE: 67 MMHG | TEMPERATURE: 97.8 F | SYSTOLIC BLOOD PRESSURE: 164 MMHG

## 2017-09-25 PROBLEM — I65.23 BILATERAL CAROTID ARTERY STENOSIS: Status: ACTIVE | Noted: 2017-09-22

## 2017-09-25 LAB
GLUCOSE BLDC GLUCOMTR-MCNC: 100 MG/DL (ref 70–130)
GLUCOSE BLDC GLUCOMTR-MCNC: 128 MG/DL (ref 70–130)

## 2017-09-25 PROCEDURE — B3181ZZ FLUOROSCOPY OF BILATERAL INTERNAL CAROTID ARTERIES USING LOW OSMOLAR CONTRAST: ICD-10-PCS | Performed by: NEUROLOGICAL SURGERY

## 2017-09-25 PROCEDURE — C1894 INTRO/SHEATH, NON-LASER: HCPCS | Performed by: NEUROLOGICAL SURGERY

## 2017-09-25 PROCEDURE — 0 IODIXANOL PER 1 ML: Performed by: NEUROLOGICAL SURGERY

## 2017-09-25 PROCEDURE — 25010000002 FENTANYL CITRATE (PF) 100 MCG/2ML SOLUTION: Performed by: NEUROLOGICAL SURGERY

## 2017-09-25 PROCEDURE — B3121ZZ FLUOROSCOPY OF LEFT SUBCLAVIAN ARTERY USING LOW OSMOLAR CONTRAST: ICD-10-PCS | Performed by: NEUROLOGICAL SURGERY

## 2017-09-25 PROCEDURE — B31G1ZZ FLUOROSCOPY OF BILATERAL VERTEBRAL ARTERIES USING LOW OSMOLAR CONTRAST: ICD-10-PCS | Performed by: NEUROLOGICAL SURGERY

## 2017-09-25 PROCEDURE — 36224 PLACE CATH CAROTD ART: CPT | Performed by: NEUROLOGICAL SURGERY

## 2017-09-25 PROCEDURE — C1769 GUIDE WIRE: HCPCS | Performed by: NEUROLOGICAL SURGERY

## 2017-09-25 PROCEDURE — 36226 PLACE CATH VERTEBRAL ART: CPT | Performed by: NEUROLOGICAL SURGERY

## 2017-09-25 PROCEDURE — B3151ZZ FLUOROSCOPY OF BILATERAL COMMON CAROTID ARTERIES USING LOW OSMOLAR CONTRAST: ICD-10-PCS | Performed by: NEUROLOGICAL SURGERY

## 2017-09-25 PROCEDURE — 99239 HOSP IP/OBS DSCHRG MGMT >30: CPT | Performed by: NURSE PRACTITIONER

## 2017-09-25 PROCEDURE — C1760 CLOSURE DEV, VASC: HCPCS | Performed by: NEUROLOGICAL SURGERY

## 2017-09-25 PROCEDURE — 99024 POSTOP FOLLOW-UP VISIT: CPT | Performed by: NEUROLOGICAL SURGERY

## 2017-09-25 PROCEDURE — 82962 GLUCOSE BLOOD TEST: CPT

## 2017-09-25 PROCEDURE — 25010000002 MIDAZOLAM PER 1 MG: Performed by: NEUROLOGICAL SURGERY

## 2017-09-25 RX ORDER — SODIUM CHLORIDE 9 MG/ML
INJECTION, SOLUTION INTRAVENOUS CONTINUOUS PRN
Status: DISCONTINUED | OUTPATIENT
Start: 2017-09-25 | End: 2017-09-25 | Stop reason: HOSPADM

## 2017-09-25 RX ORDER — FENTANYL CITRATE 50 UG/ML
INJECTION, SOLUTION INTRAMUSCULAR; INTRAVENOUS AS NEEDED
Status: DISCONTINUED | OUTPATIENT
Start: 2017-09-25 | End: 2017-09-25 | Stop reason: HOSPADM

## 2017-09-25 RX ORDER — MIDAZOLAM HYDROCHLORIDE 1 MG/ML
INJECTION INTRAMUSCULAR; INTRAVENOUS AS NEEDED
Status: DISCONTINUED | OUTPATIENT
Start: 2017-09-25 | End: 2017-09-25 | Stop reason: HOSPADM

## 2017-09-25 RX ORDER — SODIUM CHLORIDE 9 MG/ML
75 INJECTION, SOLUTION INTRAVENOUS CONTINUOUS
Status: ACTIVE | OUTPATIENT
Start: 2017-09-25 | End: 2017-09-25

## 2017-09-25 RX ORDER — SODIUM CHLORIDE 0.9 % (FLUSH) 0.9 %
1-10 SYRINGE (ML) INJECTION AS NEEDED
Status: DISCONTINUED | OUTPATIENT
Start: 2017-09-25 | End: 2017-09-25 | Stop reason: HOSPADM

## 2017-09-25 RX ORDER — LIDOCAINE HYDROCHLORIDE 10 MG/ML
INJECTION, SOLUTION INFILTRATION; PERINEURAL AS NEEDED
Status: DISCONTINUED | OUTPATIENT
Start: 2017-09-25 | End: 2017-09-25 | Stop reason: HOSPADM

## 2017-09-25 RX ORDER — IODIXANOL 320 MG/ML
INJECTION, SOLUTION INTRAVASCULAR AS NEEDED
Status: DISCONTINUED | OUTPATIENT
Start: 2017-09-25 | End: 2017-09-25 | Stop reason: HOSPADM

## 2017-09-25 RX ADMIN — LOSARTAN POTASSIUM AND HYDROCHLOROTHIAZIDE 1 TABLET: 12.5; 5 TABLET ORAL at 16:15

## 2017-09-26 ENCOUNTER — TRANSITIONAL CARE MANAGEMENT TELEPHONE ENCOUNTER (OUTPATIENT)
Dept: INTERNAL MEDICINE | Facility: CLINIC | Age: 72
End: 2017-09-26

## 2017-09-27 DIAGNOSIS — E78.5 HYPERLIPIDEMIA, UNSPECIFIED HYPERLIPIDEMIA TYPE: ICD-10-CM

## 2017-09-27 RX ORDER — ATORVASTATIN CALCIUM 80 MG/1
TABLET, FILM COATED ORAL
Qty: 30 TABLET | Refills: 4 | Status: SHIPPED | OUTPATIENT
Start: 2017-09-27 | End: 2017-11-29 | Stop reason: SDUPTHER

## 2017-09-28 LAB
BACTERIA SPEC AEROBE CULT: ABNORMAL
BACTERIA SPEC AEROBE CULT: ABNORMAL

## 2017-10-12 ENCOUNTER — TELEPHONE (OUTPATIENT)
Dept: INTERNAL MEDICINE | Facility: CLINIC | Age: 72
End: 2017-10-12

## 2017-10-12 NOTE — TELEPHONE ENCOUNTER
----- Message from Krista Antunez sent at 10/12/2017  1:03 PM EDT -----  Pt called needing free style testing strips called in.     She can be reached at [phone]  [facility] in [place]

## 2017-11-29 ENCOUNTER — OFFICE VISIT (OUTPATIENT)
Dept: INTERNAL MEDICINE | Facility: CLINIC | Age: 72
End: 2017-11-29

## 2017-11-29 VITALS
HEIGHT: 60 IN | WEIGHT: 156.2 LBS | DIASTOLIC BLOOD PRESSURE: 70 MMHG | BODY MASS INDEX: 30.67 KG/M2 | SYSTOLIC BLOOD PRESSURE: 142 MMHG | TEMPERATURE: 97.5 F

## 2017-11-29 DIAGNOSIS — Z86.73 HISTORY OF CVA (CEREBROVASCULAR ACCIDENT): ICD-10-CM

## 2017-11-29 DIAGNOSIS — I10 ESSENTIAL HYPERTENSION: Chronic | ICD-10-CM

## 2017-11-29 DIAGNOSIS — Z79.4 TYPE 2 DIABETES MELLITUS WITH COMPLICATION, WITH LONG-TERM CURRENT USE OF INSULIN (HCC): Primary | Chronic | ICD-10-CM

## 2017-11-29 DIAGNOSIS — E11.8 TYPE 2 DIABETES MELLITUS WITH COMPLICATION, WITH LONG-TERM CURRENT USE OF INSULIN (HCC): Chronic | ICD-10-CM

## 2017-11-29 DIAGNOSIS — Z79.4 TYPE 2 DIABETES MELLITUS WITH COMPLICATION, WITH LONG-TERM CURRENT USE OF INSULIN (HCC): Chronic | ICD-10-CM

## 2017-11-29 DIAGNOSIS — K21.9 GASTROESOPHAGEAL REFLUX DISEASE WITHOUT ESOPHAGITIS: Chronic | ICD-10-CM

## 2017-11-29 DIAGNOSIS — E11.8 TYPE 2 DIABETES MELLITUS WITH COMPLICATION, WITH LONG-TERM CURRENT USE OF INSULIN (HCC): Primary | Chronic | ICD-10-CM

## 2017-11-29 DIAGNOSIS — I10 ESSENTIAL HYPERTENSION: ICD-10-CM

## 2017-11-29 DIAGNOSIS — E78.5 HYPERLIPIDEMIA, UNSPECIFIED HYPERLIPIDEMIA TYPE: ICD-10-CM

## 2017-11-29 DIAGNOSIS — I63.9 ACUTE CEREBROVASCULAR ACCIDENT (CVA) (HCC): ICD-10-CM

## 2017-11-29 PROCEDURE — 99204 OFFICE O/P NEW MOD 45 MIN: CPT | Performed by: INTERNAL MEDICINE

## 2017-11-29 RX ORDER — INSULIN LISPRO 100 [IU]/ML
INJECTION, SUSPENSION SUBCUTANEOUS
Qty: 15 PEN | Refills: 2 | Status: SHIPPED | OUTPATIENT
Start: 2017-11-29 | End: 2018-12-20 | Stop reason: SDUPTHER

## 2017-11-29 RX ORDER — BLOOD-GLUCOSE METER
1 KIT MISCELLANEOUS 4 TIMES DAILY
COMMUNITY
Start: 2017-11-09 | End: 2019-08-27 | Stop reason: SDUPTHER

## 2017-11-29 RX ORDER — CLOPIDOGREL BISULFATE 75 MG/1
75 TABLET ORAL DAILY
Qty: 90 TABLET | Refills: 2 | Status: SHIPPED | OUTPATIENT
Start: 2017-11-29 | End: 2018-11-05 | Stop reason: SDUPTHER

## 2017-11-29 RX ORDER — INSULIN LISPRO 100 [IU]/ML
30 INJECTION, SUSPENSION SUBCUTANEOUS DAILY
Qty: 15 PEN | Refills: 2 | Status: SHIPPED | OUTPATIENT
Start: 2017-11-29 | End: 2017-11-29 | Stop reason: SDUPTHER

## 2017-11-29 RX ORDER — LOSARTAN POTASSIUM AND HYDROCHLOROTHIAZIDE 12.5; 5 MG/1; MG/1
1 TABLET ORAL DAILY
Qty: 90 TABLET | Refills: 2 | Status: SHIPPED | OUTPATIENT
Start: 2017-11-29 | End: 2018-08-28

## 2017-11-29 RX ORDER — LISINOPRIL 20 MG/1
20 TABLET ORAL DAILY
COMMUNITY
End: 2017-11-29 | Stop reason: SDUPTHER

## 2017-11-29 RX ORDER — LISINOPRIL 20 MG/1
20 TABLET ORAL DAILY
Qty: 90 TABLET | Refills: 2 | Status: SHIPPED | OUTPATIENT
Start: 2017-11-29 | End: 2018-08-28

## 2017-11-29 RX ORDER — ATORVASTATIN CALCIUM 80 MG/1
80 TABLET, FILM COATED ORAL DAILY
Qty: 90 TABLET | Refills: 2 | Status: SHIPPED | OUTPATIENT
Start: 2017-11-29 | End: 2018-11-26 | Stop reason: SDUPTHER

## 2017-11-29 NOTE — PATIENT INSTRUCTIONS
Diabetes Mellitus and Food  It is important for you to manage your blood sugar (glucose) level. Your blood glucose level can be greatly affected by what you eat. Eating healthier foods in the appropriate amounts throughout the day at about the same time each day will help you control your blood glucose level. It can also help slow or prevent worsening of your diabetes mellitus. Healthy eating may even help you improve the level of your blood pressure and reach or maintain a healthy weight.   General recommendations for healthful eating and cooking habits include:  · Eating meals and snacks regularly. Avoid going long periods of time without eating to lose weight.  · Eating a diet that consists mainly of plant-based foods, such as fruits, vegetables, nuts, legumes, and whole grains.  · Using low-heat cooking methods, such as baking, instead of high-heat cooking methods, such as deep frying.  Work with your dietitian to make sure you understand how to use the Nutrition Facts information on food labels.  HOW CAN FOOD AFFECT ME?  Carbohydrates  Carbohydrates affect your blood glucose level more than any other type of food. Your dietitian will help you determine how many carbohydrates to eat at each meal and teach you how to count carbohydrates. Counting carbohydrates is important to keep your blood glucose at a healthy level, especially if you are using insulin or taking certain medicines for diabetes mellitus.  Alcohol  Alcohol can cause sudden decreases in blood glucose (hypoglycemia), especially if you use insulin or take certain medicines for diabetes mellitus. Hypoglycemia can be a life-threatening condition. Symptoms of hypoglycemia (sleepiness, dizziness, and disorientation) are similar to symptoms of having too much alcohol.   If your health care provider has given you approval to drink alcohol, do so in moderation and use the following guidelines:  · Women should not have more than one drink per day, and men  should not have more than two drinks per day. One drink is equal to:    12 oz of beer.    5 oz of wine.    1½ oz of hard liquor.  · Do not drink on an empty stomach.  · Keep yourself hydrated. Have water, diet soda, or unsweetened iced tea.  · Regular soda, juice, and other mixers might contain a lot of carbohydrates and should be counted.  WHAT FOODS ARE NOT RECOMMENDED?  As you make food choices, it is important to remember that all foods are not the same. Some foods have fewer nutrients per serving than other foods, even though they might have the same number of calories or carbohydrates. It is difficult to get your body what it needs when you eat foods with fewer nutrients. Examples of foods that you should avoid that are high in calories and carbohydrates but low in nutrients include:  · Trans fats (most processed foods list trans fats on the Nutrition Facts label).  · Regular soda.  · Juice.  · Candy.  · Sweets, such as cake, pie, doughnuts, and cookies.  · Fried foods.  WHAT FOODS CAN I EAT?  Eat nutrient-rich foods, which will nourish your body and keep you healthy. The food you should eat also will depend on several factors, including:  · The calories you need.  · The medicines you take.  · Your weight.  · Your blood glucose level.  · Your blood pressure level.  · Your cholesterol level.  You should eat a variety of foods, including:  · Protein.    Lean cuts of meat.    Proteins low in saturated fats, such as fish, egg whites, and beans. Avoid processed meats.  · Fruits and vegetables.    Fruits and vegetables that may help control blood glucose levels, such as apples, mangoes, and yams.  · Dairy products.    Choose fat-free or low-fat dairy products, such as milk, yogurt, and cheese.  · Grains, bread, pasta, and rice.    Choose whole grain products, such as multigrain bread, whole oats, and brown rice. These foods may help control blood pressure.  · Fats.    Foods containing healthful fats, such as nuts,  avocado, olive oil, canola oil, and fish.  DOES EVERYONE WITH DIABETES MELLITUS HAVE THE SAME MEAL PLAN?  Because every person with diabetes mellitus is different, there is not one meal plan that works for everyone. It is very important that you meet with a dietitian who will help you create a meal plan that is just right for you.     This information is not intended to replace advice given to you by your health care provider. Make sure you discuss any questions you have with your health care provider.     Document Released: 09/14/2006 Document Revised: 01/08/2016 Document Reviewed: 11/14/2014  [employer] Interactive Patient Education ©2017 [employer]

## 2017-11-29 NOTE — PROGRESS NOTES
Subjective   Beryl Montoya is a 72 y.o. female here to establish care for DMII, HTN, HLD, GERD, history CVA 2-3 mos ago.  DMII: on 75/25 40u qam and 30u qpm with decent control. Brings glc log for review and am fasting values are controlled but lunch time values are generally upper 100s to low 200s. Last A1c 2 mos ago was 6.5%. Denies any symptomatic lows. HTN: takes meds, no issues there. HLD: takes statin. GERD: controlled on no meds. CVA: some residual right sided weakness and difficulty with articulation. Completed PT. Moving to [place] for the winter with her . Needs refills. Got flu vaccine earlier this year at [facility].     Review of Systems   HENT: Negative.    Eyes: Negative.    Respiratory: Negative.    Cardiovascular: Negative.    Gastrointestinal: Negative.    Endocrine: Negative.    Genitourinary: Negative.    Musculoskeletal: Negative.    Skin: Negative.    Allergic/Immunologic: Negative.    Neurological: Positive for speech difficulty and weakness.   Hematological: Negative.    Psychiatric/Behavioral: Negative.        Past Medical History:   Diagnosis Date   • Allergic    • Diabetes mellitus    • GERD (gastroesophageal reflux disease)    • High cholesterol    • Hyperlipidemia    • Hypertension    • Stroke    • TIA (transient ischemic attack)      Family History   Problem Relation Age of Onset   • Diabetes Mother    • Cancer Mother    • Hypertension Mother    • Diabetes Father    • Heart disease Father      Past Surgical History:   Procedure Laterality Date   • CATARACT EXTRACTION Bilateral    • EYE SURGERY     • HYSTERECTOMY      total   • INTERVENTIONAL RADIOLOGY PROCEDURE Bilateral 9/25/2017    Procedure: Carotid Cerebral Angiogram;  Surgeon: Maldonado Casas MD;  Location: [facility] INVASIVE LOCATION;  Service:      Social History     Social History   • Marital status:      Spouse name: N/A   • Number of children: N/A   • Years of education: N/A     Occupational History   • Not  "on file.     Social History Main Topics   • Smoking status: Former Smoker     Packs/day: 0.25     Years: 4.00     Types: Cigarettes     Quit date: 9/22/2005   • Smokeless tobacco: Never Used   • Alcohol use No   • Drug use: No   • Sexual activity: Defer     Other Topics Concern   • Not on file     Social History Narrative         Current Outpatient Prescriptions:   •  lisinopril (PRINIVIL,ZESTRIL) 20 MG tablet, Take 20 mg by mouth Daily., Disp: , Rfl:   •  aspirin 325 MG tablet, Take 1 tablet by mouth Daily., Disp: 30 tablet, Rfl: 0  •  atorvastatin (LIPITOR) 80 MG tablet, TAKE ONE TABLET BY MOUTH EVERY NIGHT, Disp: 30 tablet, Rfl: 4  •  clopidogrel (PLAVIX) 75 MG tablet, Take 1 tablet by mouth Daily for 90 days., Disp: 90 tablet, Rfl: 0  •  FREESTYLE LITE test strip, 1 each by Other route 4 (Four) Times a Day., Disp: , Rfl:   •  glucose blood (FREESTYLE TEST STRIPS) test strip, Test TID, Disp: 100 each, Rfl: 2  •  insulin lispro protamine-insulin lispro (humaLOG 75-25) (75-25) 100 UNIT/ML suspension injection, Inject 30 Units under the skin Every Evening., Disp: , Rfl:   •  losartan-hydrochlorothiazide (HYZAAR) 50-12.5 MG per tablet, Take 1 tablet by mouth Daily., Disp: 30 tablet, Rfl: 2    Objective   /70 (BP Location: Right arm, Patient Position: Sitting, Cuff Size: Adult)  Temp 97.5 °F (36.4 °C) (Temporal Artery )   Ht 60\" (152.4 cm)  Wt 156 lb 3.2 oz (70.9 kg)  BMI 30.51 kg/m2  Physical Exam   Constitutional: She is oriented to person, place, and time. She appears well-developed and well-nourished.   HENT:   Head: Normocephalic and atraumatic.   Nose: Nose normal.   Eyes: Conjunctivae are normal.   Cardiovascular: Normal rate, regular rhythm and normal heart sounds.  Exam reveals no gallop and no friction rub.    No murmur heard.  Pulmonary/Chest: Effort normal and breath sounds normal. She has no wheezes.   Musculoskeletal:   Normal gait and station   Neurological: She is alert and oriented to " person, place, and time.   Mild right hemiparesis and mild dysarthria   Skin: Skin is warm and dry.   Psychiatric: She has a normal mood and affect. Her behavior is normal. Judgment and thought content normal.   Vitals reviewed.      Assessment/Plan   Beryl was seen today for establish care.    Diagnoses and all orders for this visit:    Type 2 diabetes mellitus with complication, with long-term current use of insulin  -continue 75/25 pens, consider switching to vials in the future    Gastroesophageal reflux disease without esophagitis  -controlled on no current meds    History of CVA (cerebrovascular accident)  -continue plavix and risk reduction by having good BP control and glc control    Essential hypertension  -elevated today, will follow    Would ideally like to see pt again in 1-2 months for repeat A1c, but she is moving to [place] for the winter. Told her to establish with someone in [place] so she can be monitored as some of her readings are high

## 2018-05-29 ENCOUNTER — OFFICE VISIT (OUTPATIENT)
Dept: INTERNAL MEDICINE | Facility: CLINIC | Age: 73
End: 2018-05-29

## 2018-05-29 VITALS
HEIGHT: 60 IN | DIASTOLIC BLOOD PRESSURE: 74 MMHG | BODY MASS INDEX: 31.49 KG/M2 | SYSTOLIC BLOOD PRESSURE: 138 MMHG | WEIGHT: 160.4 LBS | TEMPERATURE: 97.9 F

## 2018-05-29 DIAGNOSIS — K21.9 GASTROESOPHAGEAL REFLUX DISEASE WITHOUT ESOPHAGITIS: Chronic | ICD-10-CM

## 2018-05-29 DIAGNOSIS — Z79.4 TYPE 2 DIABETES MELLITUS WITH COMPLICATION, WITH LONG-TERM CURRENT USE OF INSULIN (HCC): Primary | Chronic | ICD-10-CM

## 2018-05-29 DIAGNOSIS — I10 ESSENTIAL HYPERTENSION: Chronic | ICD-10-CM

## 2018-05-29 DIAGNOSIS — E11.8 TYPE 2 DIABETES MELLITUS WITH COMPLICATION, WITH LONG-TERM CURRENT USE OF INSULIN (HCC): Primary | Chronic | ICD-10-CM

## 2018-05-29 LAB
ANION GAP SERPL CALCULATED.3IONS-SCNC: 7 MMOL/L (ref 3–11)
BUN BLD-MCNC: 15 MG/DL (ref 9–23)
BUN/CREAT SERPL: 16.7 (ref 7–25)
CALCIUM SPEC-SCNC: 9.6 MG/DL (ref 8.7–10.4)
CHLORIDE SERPL-SCNC: 104 MMOL/L (ref 99–109)
CO2 SERPL-SCNC: 31 MMOL/L (ref 20–31)
CREAT BLD-MCNC: 0.9 MG/DL (ref 0.6–1.3)
GFR SERPL CREATININE-BSD FRML MDRD: 62 ML/MIN/1.73
GLUCOSE BLD-MCNC: 132 MG/DL (ref 70–100)
HBA1C MFR BLD: 6.8 % (ref 4.8–5.6)
POTASSIUM BLD-SCNC: 4.1 MMOL/L (ref 3.5–5.5)
SODIUM BLD-SCNC: 142 MMOL/L (ref 132–146)

## 2018-05-29 PROCEDURE — 99214 OFFICE O/P EST MOD 30 MIN: CPT | Performed by: INTERNAL MEDICINE

## 2018-05-29 PROCEDURE — 83036 HEMOGLOBIN GLYCOSYLATED A1C: CPT | Performed by: INTERNAL MEDICINE

## 2018-05-29 PROCEDURE — 80048 BASIC METABOLIC PNL TOTAL CA: CPT | Performed by: INTERNAL MEDICINE

## 2018-05-29 NOTE — PROGRESS NOTES
"Subjective   Beryl Montoya is a 72 y.o. female here for follow-up DMII, HTN, and GERD. DMII: due for A1c, notices glc is \"all over the place\" though A1c is controlled. Just on insulin. Had severe GI upset with metformin in the past. Would like glc to be more stable. She has had some lows but doesn't recall any hypoglycemic sx. HTN: no issues, takes med daily. No sx. GERD: well-controlled on med.      The following portions of the patient's history were reviewed and updated as appropriate: allergies, current medications, past medical history, past social history, past surgical history and problem list.    Review of Systems:  General: negative  CV: negative  Respiratory: negative  GI: negative  Neuro: negative  Endo: see hpi    Objective   /74 (BP Location: Left arm, Patient Position: Sitting, Cuff Size: Adult)   Temp 97.9 °F (36.6 °C) (Temporal Artery )   Ht 152.4 cm (60\")   Wt 72.8 kg (160 lb 6.4 oz)   BMI 31.33 kg/m²     Physical Exam   Constitutional: She is oriented to person, place, and time. She appears well-developed and well-nourished.   Cardiovascular: Normal rate, regular rhythm and normal heart sounds.    Pulmonary/Chest: Effort normal and breath sounds normal. She has no wheezes. She has no rales.   Neurological: She is alert and oriented to person, place, and time.   Skin: Skin is warm and dry.   Psychiatric: She has a normal mood and affect. Her behavior is normal. Thought content normal.   Vitals reviewed.      Assessment/Plan   Beryl was seen today for hypertension and diabetes.    Diagnoses and all orders for this visit:    Type 2 diabetes mellitus with complication, with long-term current use of insulin  -     Hemoglobin A1c  -     Basic Metabolic Panel  -     Empagliflozin (JARDIANCE) 10 MG tablet; Take 10 mg by mouth Daily.  -     Discussed sx of hypoglycemia, advised juice or honey, pt has glucose tabs as well  -     May have to decrease insulin dosage with addition of jardiance, " discussed with pt and     Essential hypertension  -     Basic Metabolic Panel    Gastroesophageal reflux disease without esophagitis  -controlled with med, continue

## 2018-05-29 NOTE — PATIENT INSTRUCTIONS

## 2018-06-13 ENCOUNTER — TELEPHONE (OUTPATIENT)
Dept: INTERNAL MEDICINE | Facility: CLINIC | Age: 73
End: 2018-06-13

## 2018-06-13 NOTE — TELEPHONE ENCOUNTER
I spoke with patient's  and he reports that they see Dr. Gordon at [facility] in [place].  I called office and requested record.  When I receive it I will scan it in.

## 2018-08-13 RX ORDER — BLOOD-GLUCOSE METER
KIT MISCELLANEOUS
Qty: 100 EACH | Refills: 2 | Status: SHIPPED | OUTPATIENT
Start: 2018-08-13 | End: 2019-07-01 | Stop reason: SDUPTHER

## 2018-08-28 ENCOUNTER — OFFICE VISIT (OUTPATIENT)
Dept: INTERNAL MEDICINE | Facility: CLINIC | Age: 73
End: 2018-08-28

## 2018-08-28 VITALS
TEMPERATURE: 97.5 F | DIASTOLIC BLOOD PRESSURE: 80 MMHG | BODY MASS INDEX: 31.41 KG/M2 | HEIGHT: 60 IN | WEIGHT: 160 LBS | SYSTOLIC BLOOD PRESSURE: 132 MMHG

## 2018-08-28 DIAGNOSIS — E11.8 TYPE 2 DIABETES MELLITUS WITH COMPLICATION, WITH LONG-TERM CURRENT USE OF INSULIN (HCC): Chronic | ICD-10-CM

## 2018-08-28 DIAGNOSIS — I10 ESSENTIAL HYPERTENSION: Primary | Chronic | ICD-10-CM

## 2018-08-28 DIAGNOSIS — Z79.4 TYPE 2 DIABETES MELLITUS WITH COMPLICATION, WITH LONG-TERM CURRENT USE OF INSULIN (HCC): Chronic | ICD-10-CM

## 2018-08-28 LAB — HBA1C MFR BLD: 6.4 %

## 2018-08-28 PROCEDURE — 83036 HEMOGLOBIN GLYCOSYLATED A1C: CPT | Performed by: INTERNAL MEDICINE

## 2018-08-28 PROCEDURE — 99214 OFFICE O/P EST MOD 30 MIN: CPT | Performed by: INTERNAL MEDICINE

## 2018-08-28 RX ORDER — FLASH GLUCOSE SENSOR
1 KIT MISCELLANEOUS
Qty: 1 EACH | Refills: 0 | Status: SHIPPED | OUTPATIENT
Start: 2018-08-28 | End: 2019-08-27

## 2018-08-28 RX ORDER — FLASH GLUCOSE SENSOR
1 KIT MISCELLANEOUS
Qty: 3 EACH | Refills: 0 | Status: SHIPPED | OUTPATIENT
Start: 2018-08-28 | End: 2018-09-25 | Stop reason: SDUPTHER

## 2018-08-28 RX ORDER — FLASH GLUCOSE SENSOR
1 KIT MISCELLANEOUS
Qty: 1 EACH | Refills: 0 | Status: SHIPPED | OUTPATIENT
Start: 2018-08-28 | End: 2018-08-28 | Stop reason: SDUPTHER

## 2018-08-28 RX ORDER — LOSARTAN POTASSIUM 100 MG/1
100 TABLET ORAL DAILY
Qty: 30 TABLET | Refills: 5 | Status: SHIPPED | OUTPATIENT
Start: 2018-08-28 | End: 2019-02-20 | Stop reason: SDUPTHER

## 2018-08-28 NOTE — PROGRESS NOTES
"Subjective   Beryl Montoya is a 73 y.o. female here for follow-up HTN, DMII. HTN: has urinary frequency and thinks it could be her BP med. She has to urinate frequently and \"right then\" and sometimes has some dribbling of urine. Has to wear pads. Distressing to pt. DMII: taking meds as prescribed. Jardiance is expensive, however. Glc is <150 all day, this am it was 73, she was asymptomatic. Denies hypoglycemia. Interested in new meter that you don't have to prick skin constantly.      The following portions of the patient's history were reviewed and updated as appropriate: allergies, current medications, past medical history, past social history and problem list.    Review of Systems:  General: negative  CV: negative  Respiratory: negative  : urinary frequency, incontinence  Neuro: memory loss  Psych: negative  Endo: negative    Objective   /80 (BP Location: Left arm, Patient Position: Sitting, Cuff Size: Adult)   Temp 97.5 °F (36.4 °C) (Temporal Artery )   Ht 152.4 cm (60\")   Wt 72.6 kg (160 lb)   BMI 31.25 kg/m²     Physical Exam   Constitutional: She appears well-developed and well-nourished.   Cardiovascular: Normal rate, regular rhythm and normal heart sounds.    Pulmonary/Chest: Effort normal and breath sounds normal. She has no wheezes. She has no rales.   Neurological: She is alert.   alert   Skin: Skin is warm and dry.   Psychiatric: She has a normal mood and affect. Her behavior is normal. Thought content normal.   Vitals reviewed.      Assessment/Plan   Beryl was seen today for hypertension and diabetes.    Diagnoses and all orders for this visit:    Essential hypertension  -     losartan (COZAAR) 100 MG tablet; Take 1 tablet by mouth Daily. Increased dose. Stop losartan/HCTZ to decrease urinary frequency/incontinence    Type 2 diabetes mellitus with complication, with long-term current use of insulin (CMS/[facility])  -     POC Glycosylated Hemoglobin (Hb A1C): controlled  -     Ertugliflozin " L-PyroglutamicAc (STEGLATRO) 5 MG tablet; Take 1 tablet by mouth Every Morning.  -     Continuous Blood Gluc Sensor ([employer] SALMA SENSOR SYSTEM) misc; 1 each 6 (Six) Times a Day.  Counseling was given to patient and family for the following topics: diagnostic results, instructions for management, patient and family education, impressions and risks and benefits of treatment options . Total time of the encounter was 25 minutes and >50% of time was spent counseling.

## 2018-09-25 DIAGNOSIS — Z79.4 TYPE 2 DIABETES MELLITUS WITH COMPLICATION, WITH LONG-TERM CURRENT USE OF INSULIN (HCC): Chronic | ICD-10-CM

## 2018-09-25 DIAGNOSIS — E11.8 TYPE 2 DIABETES MELLITUS WITH COMPLICATION, WITH LONG-TERM CURRENT USE OF INSULIN (HCC): Chronic | ICD-10-CM

## 2018-09-25 RX ORDER — FLASH GLUCOSE SENSOR
KIT MISCELLANEOUS
Qty: 3 EACH | Refills: 0 | Status: SHIPPED | OUTPATIENT
Start: 2018-09-25 | End: 2018-10-31 | Stop reason: SDUPTHER

## 2018-10-31 DIAGNOSIS — E11.8 TYPE 2 DIABETES MELLITUS WITH COMPLICATION, WITH LONG-TERM CURRENT USE OF INSULIN (HCC): Chronic | ICD-10-CM

## 2018-10-31 DIAGNOSIS — Z79.4 TYPE 2 DIABETES MELLITUS WITH COMPLICATION, WITH LONG-TERM CURRENT USE OF INSULIN (HCC): Chronic | ICD-10-CM

## 2018-10-31 RX ORDER — FLASH GLUCOSE SENSOR
KIT MISCELLANEOUS
Qty: 3 EACH | Refills: 2 | Status: SHIPPED | OUTPATIENT
Start: 2018-10-31 | End: 2019-02-11 | Stop reason: SDUPTHER

## 2018-11-05 DIAGNOSIS — I63.9 ACUTE CEREBROVASCULAR ACCIDENT (CVA) (HCC): ICD-10-CM

## 2018-11-05 RX ORDER — CLOPIDOGREL BISULFATE 75 MG/1
TABLET ORAL
Qty: 90 TABLET | Refills: 2 | Status: SHIPPED | OUTPATIENT
Start: 2018-11-05 | End: 2019-08-19 | Stop reason: SDUPTHER

## 2018-11-26 DIAGNOSIS — E78.5 HYPERLIPIDEMIA, UNSPECIFIED HYPERLIPIDEMIA TYPE: ICD-10-CM

## 2018-11-26 RX ORDER — ATORVASTATIN CALCIUM 80 MG/1
TABLET, FILM COATED ORAL
Qty: 90 TABLET | Refills: 2 | Status: SHIPPED | OUTPATIENT
Start: 2018-11-26 | End: 2019-08-19 | Stop reason: SDUPTHER

## 2018-11-27 ENCOUNTER — OFFICE VISIT (OUTPATIENT)
Dept: INTERNAL MEDICINE | Facility: CLINIC | Age: 73
End: 2018-11-27

## 2018-11-27 VITALS
HEIGHT: 60 IN | TEMPERATURE: 97.6 F | SYSTOLIC BLOOD PRESSURE: 140 MMHG | DIASTOLIC BLOOD PRESSURE: 80 MMHG | WEIGHT: 159.8 LBS | BODY MASS INDEX: 31.37 KG/M2

## 2018-11-27 DIAGNOSIS — I10 ESSENTIAL HYPERTENSION: Primary | Chronic | ICD-10-CM

## 2018-11-27 DIAGNOSIS — Z86.73 HISTORY OF CVA (CEREBROVASCULAR ACCIDENT): ICD-10-CM

## 2018-11-27 DIAGNOSIS — E11.8 TYPE 2 DIABETES MELLITUS WITH COMPLICATION, WITH LONG-TERM CURRENT USE OF INSULIN (HCC): Chronic | ICD-10-CM

## 2018-11-27 DIAGNOSIS — B00.1 COLD SORE: ICD-10-CM

## 2018-11-27 DIAGNOSIS — J06.9 UPPER RESPIRATORY TRACT INFECTION, UNSPECIFIED TYPE: ICD-10-CM

## 2018-11-27 DIAGNOSIS — Z79.4 TYPE 2 DIABETES MELLITUS WITH COMPLICATION, WITH LONG-TERM CURRENT USE OF INSULIN (HCC): Chronic | ICD-10-CM

## 2018-11-27 LAB
ALBUMIN SERPL-MCNC: 4.27 G/DL (ref 3.2–4.8)
ALBUMIN/GLOB SERPL: 1.8 G/DL (ref 1.5–2.5)
ALP SERPL-CCNC: 93 U/L (ref 25–100)
ALT SERPL W P-5'-P-CCNC: 27 U/L (ref 7–40)
ANION GAP SERPL CALCULATED.3IONS-SCNC: 10 MMOL/L (ref 3–11)
ARTICHOKE IGE QN: 53 MG/DL (ref 0–130)
AST SERPL-CCNC: 25 U/L (ref 0–33)
BILIRUB SERPL-MCNC: 0.6 MG/DL (ref 0.3–1.2)
BUN BLD-MCNC: 18 MG/DL (ref 9–23)
BUN/CREAT SERPL: 17.8 (ref 7–25)
CALCIUM SPEC-SCNC: 9.5 MG/DL (ref 8.7–10.4)
CHLORIDE SERPL-SCNC: 103 MMOL/L (ref 99–109)
CHOLEST SERPL-MCNC: 126 MG/DL (ref 0–200)
CO2 SERPL-SCNC: 29 MMOL/L (ref 20–31)
CREAT BLD-MCNC: 1.01 MG/DL (ref 0.6–1.3)
GFR SERPL CREATININE-BSD FRML MDRD: 54 ML/MIN/1.73
GLOBULIN UR ELPH-MCNC: 2.4 GM/DL
GLUCOSE BLD-MCNC: 216 MG/DL (ref 70–100)
HBA1C MFR BLD: 6.9 % (ref 4.8–5.6)
HDLC SERPL-MCNC: 61 MG/DL (ref 40–60)
POTASSIUM BLD-SCNC: 4.1 MMOL/L (ref 3.5–5.5)
PROT SERPL-MCNC: 6.7 G/DL (ref 5.7–8.2)
SODIUM BLD-SCNC: 142 MMOL/L (ref 132–146)
TRIGL SERPL-MCNC: 143 MG/DL (ref 0–150)

## 2018-11-27 PROCEDURE — 82043 UR ALBUMIN QUANTITATIVE: CPT | Performed by: INTERNAL MEDICINE

## 2018-11-27 PROCEDURE — 83036 HEMOGLOBIN GLYCOSYLATED A1C: CPT | Performed by: INTERNAL MEDICINE

## 2018-11-27 PROCEDURE — 80053 COMPREHEN METABOLIC PANEL: CPT | Performed by: INTERNAL MEDICINE

## 2018-11-27 PROCEDURE — 99214 OFFICE O/P EST MOD 30 MIN: CPT | Performed by: INTERNAL MEDICINE

## 2018-11-27 PROCEDURE — 80061 LIPID PANEL: CPT | Performed by: INTERNAL MEDICINE

## 2018-11-27 PROCEDURE — 82570 ASSAY OF URINE CREATININE: CPT | Performed by: INTERNAL MEDICINE

## 2018-11-27 RX ORDER — VALACYCLOVIR HYDROCHLORIDE 1 G/1
2000 TABLET, FILM COATED ORAL 2 TIMES DAILY
Qty: 4 TABLET | Refills: 0 | Status: SHIPPED | OUTPATIENT
Start: 2018-11-27 | End: 2018-11-28

## 2018-11-27 RX ORDER — DOXYCYCLINE HYCLATE 100 MG
100 TABLET ORAL 2 TIMES DAILY
Qty: 10 TABLET | Refills: 0 | Status: SHIPPED | OUTPATIENT
Start: 2018-11-27 | End: 2018-12-02

## 2018-11-27 NOTE — PROGRESS NOTES
"Subjective   Beryl Montoya is a 73 y.o. female here for follow-up HTN, DMII, CVA hx. HTN: No issues, takes med daily. Denies HA. DMII: due for A1c and microalbumin. Had eye exam this year. Taking steglatro which is a new med. CVA: some residual effect, word finding difficulty, mild confusion sometimes. No new sx. She has a cold sore on left upper lip, has no med but had taken PO med before. She has also had cough for 3 weeks. Her  was sick but he has cleared it. No SOA. Yellow mucus production from coughing. No sinus sx. No fever or chills, body aches.     The following portions of the patient's history were reviewed and updated as appropriate: allergies, current medications, past medical history, past social history and problem list.    Review of Systems:  General: negative  CV: negative  Respiratory: negative  Neuro: see hpi  Psych: negative  Endo: negative    Objective   /80 (BP Location: Left arm, Patient Position: Sitting, Cuff Size: Adult)   Temp 97.6 °F (36.4 °C) (Temporal)   Ht 152.4 cm (60\")   Wt 72.5 kg (159 lb 12.8 oz)   BMI 31.21 kg/m²     Physical Exam   Constitutional: She appears well-developed and well-nourished.   Cardiovascular: Normal rate, regular rhythm and normal heart sounds.   Pulmonary/Chest: Effort normal and breath sounds normal. She has no wheezes. She has no rales.   Neurological: She is alert.   Skin: Skin is warm and dry.   Cold sore left upper lip   Psychiatric: She has a normal mood and affect. Her behavior is normal. Thought content normal.   Vitals reviewed.      Assessment/Plan   Beryl was seen today for diabetes and hypertension.    Diagnoses and all orders for this visit:    Essential hypertension  -a bit elevated today, will follow and adjust as necessary    Type 2 diabetes mellitus with complication, with long-term current use of insulin (CMS/[facility])  -     Hemoglobin A1c  -     Comprehensive Metabolic Panel  -     Lipid Panel  -     Microalbumin / Creatinine " Urine Ratio - Urine, Clean Catch    History of CVA (cerebrovascular accident)  -     Lipid Panel    Cold sore  -     valACYclovir (VALTREX) 1000 MG tablet; Take 2 tablets by mouth 2 (Two) Times a Day for 1 day.    Upper respiratory tract infection, unspecified type  -     doxycycline (VIBRAMYICN) 100 MG tablet; Take 1 tablet by mouth 2 (Two) Times a Day for 5 days.

## 2018-11-29 LAB
CREAT 24H UR-MCNC: 54.1 MG/DL
MICROALBUMIN UR-MCNC: 3.4 UG/ML
MICROALBUMIN/CREAT UR: 6.3 MG/G CREAT (ref 0–30)

## 2018-12-06 RX ORDER — PEN NEEDLE, DIABETIC 32GX 5/32"
NEEDLE, DISPOSABLE MISCELLANEOUS
Refills: 2 | Status: CANCELLED | OUTPATIENT
Start: 2018-12-06

## 2018-12-20 RX ORDER — INSULIN LISPRO 100 [IU]/ML
INJECTION, SUSPENSION SUBCUTANEOUS
Qty: 15 PEN | Refills: 2 | Status: SHIPPED | OUTPATIENT
Start: 2018-12-20 | End: 2019-04-18 | Stop reason: SDUPTHER

## 2019-02-11 DIAGNOSIS — E11.8 TYPE 2 DIABETES MELLITUS WITH COMPLICATION, WITH LONG-TERM CURRENT USE OF INSULIN (HCC): Chronic | ICD-10-CM

## 2019-02-11 DIAGNOSIS — Z79.4 TYPE 2 DIABETES MELLITUS WITH COMPLICATION, WITH LONG-TERM CURRENT USE OF INSULIN (HCC): Chronic | ICD-10-CM

## 2019-02-11 RX ORDER — FLASH GLUCOSE SENSOR
KIT MISCELLANEOUS
Qty: 3 EACH | Refills: 2 | Status: SHIPPED | OUTPATIENT
Start: 2019-02-11 | End: 2019-05-11 | Stop reason: SDUPTHER

## 2019-02-20 DIAGNOSIS — I10 ESSENTIAL HYPERTENSION: Chronic | ICD-10-CM

## 2019-02-20 RX ORDER — LOSARTAN POTASSIUM 100 MG/1
TABLET ORAL
Qty: 90 TABLET | Refills: 1 | Status: SHIPPED | OUTPATIENT
Start: 2019-02-20 | End: 2019-07-01 | Stop reason: SDUPTHER

## 2019-02-27 ENCOUNTER — OFFICE VISIT (OUTPATIENT)
Dept: INTERNAL MEDICINE | Facility: CLINIC | Age: 74
End: 2019-02-27

## 2019-02-27 VITALS
HEART RATE: 76 BPM | SYSTOLIC BLOOD PRESSURE: 118 MMHG | HEIGHT: 60 IN | WEIGHT: 157.8 LBS | TEMPERATURE: 97.4 F | BODY MASS INDEX: 30.98 KG/M2 | DIASTOLIC BLOOD PRESSURE: 70 MMHG

## 2019-02-27 DIAGNOSIS — Z79.4 TYPE 2 DIABETES MELLITUS WITH COMPLICATION, WITH LONG-TERM CURRENT USE OF INSULIN (HCC): Chronic | ICD-10-CM

## 2019-02-27 DIAGNOSIS — R94.4 DECREASED GFR: ICD-10-CM

## 2019-02-27 DIAGNOSIS — I10 ESSENTIAL HYPERTENSION: Primary | Chronic | ICD-10-CM

## 2019-02-27 DIAGNOSIS — E11.8 TYPE 2 DIABETES MELLITUS WITH COMPLICATION, WITH LONG-TERM CURRENT USE OF INSULIN (HCC): Chronic | ICD-10-CM

## 2019-02-27 LAB
ANION GAP SERPL CALCULATED.3IONS-SCNC: 8 MMOL/L (ref 3–11)
BUN BLD-MCNC: 15 MG/DL (ref 9–23)
BUN/CREAT SERPL: 16.3 (ref 7–25)
CALCIUM SPEC-SCNC: 9.1 MG/DL (ref 8.7–10.4)
CHLORIDE SERPL-SCNC: 104 MMOL/L (ref 99–109)
CO2 SERPL-SCNC: 27 MMOL/L (ref 20–31)
CREAT BLD-MCNC: 0.92 MG/DL (ref 0.6–1.3)
GFR SERPL CREATININE-BSD FRML MDRD: 60 ML/MIN/1.73
GLUCOSE BLD-MCNC: 139 MG/DL (ref 70–100)
HBA1C MFR BLD: 5.8 % (ref 4.8–5.6)
POTASSIUM BLD-SCNC: 4.1 MMOL/L (ref 3.5–5.5)
SODIUM BLD-SCNC: 139 MMOL/L (ref 132–146)

## 2019-02-27 PROCEDURE — 83036 HEMOGLOBIN GLYCOSYLATED A1C: CPT | Performed by: INTERNAL MEDICINE

## 2019-02-27 PROCEDURE — 99214 OFFICE O/P EST MOD 30 MIN: CPT | Performed by: INTERNAL MEDICINE

## 2019-02-27 PROCEDURE — 80048 BASIC METABOLIC PNL TOTAL CA: CPT | Performed by: INTERNAL MEDICINE

## 2019-02-27 NOTE — PROGRESS NOTES
"Subjective   Beryl Montoya is a 73 y.o. female here for follow-up HTN, DMII, and decreased GFR. HTN: no issues, takes med daily. No hypotension. DMII: on steglatro and seems to be working well. The insulin she takes most days at 30-40 units. Has had some hypoglycemia at night if she takes the 40 units. Bases dose on how high the glc is; if it is upper 200s then she takes 40u. No hx CKD, kidney problems. Noted on last labs and previously the decreased GFR.      The following portions of the patient's history were reviewed and updated as appropriate: allergies, current medications, past medical history, past social history and problem list.    Review of Systems:  General: negative  CV: negative  Respiratory: negative  Neuro: negative  : negative  Endo: hyper and hypoglycemia    Objective   /70   Pulse 76   Temp 97.4 °F (36.3 °C)   Ht 152.4 cm (60\")   Wt 71.6 kg (157 lb 12.8 oz)   BMI 30.82 kg/m²     Physical Exam   Constitutional: She appears well-developed and well-nourished.   Cardiovascular: Normal rate, regular rhythm and normal heart sounds.   Pulmonary/Chest: Effort normal and breath sounds normal. She has no wheezes. She has no rales.   Neurological: She is alert.   Skin: Skin is warm and dry.   Psychiatric: She has a normal mood and affect. Her behavior is normal. Thought content normal.   Vitals reviewed.      Assessment/Plan   Beryl was seen today for diabetes and hypertension.    Diagnoses and all orders for this visit:    Essential hypertension  -     Basic Metabolic Panel  -     At goal, continue current meds    Type 2 diabetes mellitus with complication, with long-term current use of insulin (CMS/[facility])  -     Hemoglobin A1c: last A1c 6.9%  -     Continue steglatro, may need to increase. Don't rec 40u, always use 30u of insulin    Decreased GFR  -     Basic Metabolic Panel  -     Discussed with pt that she likely has CKD related to HTN and DMII           "

## 2019-04-18 RX ORDER — INSULIN LISPRO 100 [IU]/ML
INJECTION, SUSPENSION SUBCUTANEOUS
Qty: 15 PEN | Refills: 2 | Status: SHIPPED | OUTPATIENT
Start: 2019-04-18 | End: 2019-08-27 | Stop reason: SDUPTHER

## 2019-04-23 ENCOUNTER — OFFICE VISIT (OUTPATIENT)
Dept: INTERNAL MEDICINE | Facility: CLINIC | Age: 74
End: 2019-04-23

## 2019-04-23 VITALS
HEIGHT: 60 IN | SYSTOLIC BLOOD PRESSURE: 130 MMHG | TEMPERATURE: 97.9 F | BODY MASS INDEX: 30.82 KG/M2 | WEIGHT: 157 LBS | DIASTOLIC BLOOD PRESSURE: 80 MMHG | HEART RATE: 71 BPM | OXYGEN SATURATION: 98 % | RESPIRATION RATE: 20 BRPM

## 2019-04-23 DIAGNOSIS — Z00.00 MEDICARE ANNUAL WELLNESS VISIT, SUBSEQUENT: Primary | ICD-10-CM

## 2019-04-23 DIAGNOSIS — Z11.59 NEED FOR HEPATITIS C SCREENING TEST: ICD-10-CM

## 2019-04-23 DIAGNOSIS — Z86.73 HISTORY OF CVA (CEREBROVASCULAR ACCIDENT): ICD-10-CM

## 2019-04-23 DIAGNOSIS — I65.23 BILATERAL CAROTID ARTERY STENOSIS: ICD-10-CM

## 2019-04-23 DIAGNOSIS — Z23 NEED FOR TDAP VACCINATION: ICD-10-CM

## 2019-04-23 LAB
CHOLEST SERPL-MCNC: 141 MG/DL (ref 0–200)
HCV AB SER DONR QL: NORMAL
HDLC SERPL-MCNC: 66 MG/DL (ref 40–60)
LDLC SERPL CALC-MCNC: 58 MG/DL (ref 0–100)
LDLC/HDLC SERPL: 0.88 {RATIO}
TRIGL SERPL-MCNC: 84 MG/DL (ref 0–150)
VLDLC SERPL-MCNC: 16.8 MG/DL (ref 5–40)

## 2019-04-23 PROCEDURE — 86803 HEPATITIS C AB TEST: CPT | Performed by: NURSE PRACTITIONER

## 2019-04-23 PROCEDURE — 80061 LIPID PANEL: CPT | Performed by: NURSE PRACTITIONER

## 2019-04-23 PROCEDURE — G0439 PPPS, SUBSEQ VISIT: HCPCS | Performed by: NURSE PRACTITIONER

## 2019-04-23 NOTE — PROGRESS NOTES
QUICK REFERENCE INFORMATION:  The ABCs of the Annual Wellness Visit    Subsequent Medicare Wellness Visit     HEALTH RISK ASSESSMENT    : 1945    Recent Hospitalizations:  No hospitalization(s) within the last year..  ccc      Current Medical Providers:  Patient Care Team:  Argelia Gamez MD as PCP - General (Internal Medicine)  Argelia Gamez MD as PCP - Claims Attributed        Smoking Status:  Social History     Tobacco Use   Smoking Status Former Smoker   • Packs/day: 0.25   • Years: 4.00   • Pack years: 1.00   • Types: Cigarettes   • Last attempt to quit: 2005   • Years since quittin.5   Smokeless Tobacco Never Used       Alcohol Consumption:  Social History     Substance and Sexual Activity   Alcohol Use No       Depression Screen:   PHQ-2/PHQ-9 Depression Screening 2019   Little interest or pleasure in doing things 0   Feeling down, depressed, or hopeless 0   Trouble falling or staying asleep, or sleeping too much -   Feeling tired or having little energy -   Poor appetite or overeating -   Feeling bad about yourself - or that you are a failure or have let yourself or your family down -   Trouble concentrating on things, such as reading the newspaper or watching television -   Moving or speaking so slowly that other people could have noticed. Or the opposite - being so fidgety or restless that you have been moving around a lot more than usual -   Thoughts that you would be better off dead, or of hurting yourself in some way -   Total Score 0       Health Habits and Functional and Cognitive Screening:  Functional & Cognitive Status 2019   Do you have difficulty preparing food and eating? No   Do you have difficulty bathing yourself, getting dressed or grooming yourself? No   Do you have difficulty using the toilet? No   Do you have difficulty moving around from place to place? No   Do you have trouble with steps or getting out of a bed or a chair? No   In  the past year have you fallen or experienced a near fall? No   Current Diet Well Balanced Diet   Dental Exam Up to date   Eye Exam Up to date   Exercise (times per week) 0 times per week   Current Exercise Activities Include Walking   Do you need help using the phone?  No   Are you deaf or do you have serious difficulty hearing?  No   Do you need help with transportation? No   Do you need help shopping? No   Do you need help preparing meals?  No   Do you need help with housework?  No   Do you need help with laundry? No   Do you need help taking your medications? No   Do you need help managing money? No   Do you ever drive or ride in a car without wearing a seat belt? No   Have you felt unusual stress, anger or loneliness in the last month? No   Who do you live with? Spouse   If you need help, do you have trouble finding someone available to you? No   Have you been bothered in the last four weeks by sexual problems? No   Do you have difficulty concentrating, remembering or making decisions? No           Does the patient have evidence of cognitive impairment? No    Asiprin use counseling: Taking ASA appropriately as indicated      Recent Lab Results:       Lab Results   Component Value Date    HGBA1C 5.80 (H) 02/27/2019     Lab Results   Component Value Date    CHOL 126 11/27/2018    TRIG 143 11/27/2018    HDL 61 (H) 11/27/2018           Age-appropriate Screening Schedule:  Refer to the list below for future screening recommendations based on patient's age, sex and/or medical conditions. Orders for these recommended tests are listed in the plan section. The patient has been provided with a written plan.    Health Maintenance   Topic Date Due   • TDAP/TD VACCINES (1 - Tdap) 06/01/1964   • DIABETIC EYE EXAM  06/08/2019   • INFLUENZA VACCINE  08/01/2019   • HEMOGLOBIN A1C  08/27/2019   • LIPID PANEL  11/27/2019   • URINE MICROALBUMIN  11/27/2019   • MAMMOGRAM  03/01/2020   • DIABETIC FOOT EXAM  04/23/2020   • COLONOSCOPY   02/27/2024   • PNEUMOCOCCAL VACCINES (65+ LOW/MEDIUM RISK)  Completed   • ZOSTER VACCINE  Discontinued        Subjective   History of Present Illness    Beryl Montoya is a 73 y.o. female who presents for an Annual Wellness Visit.    The following portions of the patient's history were reviewed and updated as appropriate: allergies, current medications, past family history, past medical history, past social history, past surgical history and problem list.    Outpatient Medications Prior to Visit   Medication Sig Dispense Refill   • aspirin 325 MG tablet Take 1 tablet by mouth Daily. 30 tablet 0   • atorvastatin (LIPITOR) 80 MG tablet TAKE ONE TABLET BY MOUTH ONCE DAILY 90 tablet 2   • clopidogrel (PLAVIX) 75 MG tablet TAKE ONE TABLET BY MOUTH ONCE DAILY FOR 90 DAYS 90 tablet 2   • Continuous Blood Gluc  (FREESTYLE SALMA READER) device 1 each 6 (Six) Times a Day. E11.9 1 each 0   • Continuous Blood Gluc Sensor (FREESTYLE SALMA SENSOR SYSTEM) USE   TO CHECK GLUCOSE SIX TIMES DAILY AS DIRECTED 3 each 2   • Ertugliflozin L-PyroglutamicAc (STEGLATRO) 5 MG tablet Take 1 tablet by mouth Every Morning. 30 tablet 5   • FREESTYLE LITE test strip 1 each by Other route 4 (Four) Times a Day.     • FREESTYLE LITE test strip USE ONE STRIP TO CHECK GLUCOSE THREE TIMES DAILY AND AS NEEDED 100 each 2   • Insulin Lispro Prot & Lispro (HUMALOG MIX 75/25 KWIKPEN) (75-25) 100 UNIT/ML suspension pen-injector INJECT 40 UNITS SUBCUTANEOUSLY IN THE MORNING, THEN INJECT 30 UNITS SUBCUTANEOUSLY IN THE EVENING 15 pen 2   • insulin lispro protamine-insulin lispro (humaLOG 75-25) (75-25) 100 UNIT/ML suspension injection Inject 30 Units under the skin Every Evening.     • Insulin Pen Needle (BD PEN NEEDLE DOROTHY U/F) 32G X 4 MM misc One each three times daily E11.8 300 each 2   • losartan (COZAAR) 100 MG tablet TAKE 1 TABLET BY MOUTH ONCE DAILY 90 tablet 1     No facility-administered medications prior to visit.        Patient Active  Problem List   Diagnosis   • History of CVA (cerebrovascular accident)   • Essential hypertension   • Type 2 diabetes mellitus with complication, with long-term current use of insulin (CMS/[facility])   • GERD (gastroesophageal reflux disease)   • Bilateral carotid artery stenosis       Advance Care Planning:  Patient does not have an advance directive - information provided to the patient today    Identification of Risk Factors:  Risk factors include: weight .    Review of Systems   Constitutional: Negative for activity change, appetite change, fatigue and unexpected weight change.   HENT: Negative for congestion, ear pain, rhinorrhea, sinus pressure, sore throat and trouble swallowing.    Eyes: Negative for pain and visual disturbance.   Respiratory: Negative for cough, chest tightness, shortness of breath and wheezing.    Cardiovascular: Negative for chest pain, palpitations and leg swelling.   Gastrointestinal: Negative for abdominal pain, blood in stool, constipation, diarrhea, nausea and vomiting.   Endocrine: Negative for cold intolerance, heat intolerance, polydipsia and polyphagia.   Genitourinary: Negative for dysuria, frequency, hematuria, menstrual problem, urgency and vaginal discharge.   Musculoskeletal: Negative for arthralgias, back pain, joint swelling and myalgias.   Skin: Negative for color change, pallor, rash and wound.   Allergic/Immunologic: Negative for environmental allergies, food allergies and immunocompromised state.   Neurological: Negative for dizziness, tremors, seizures, syncope, facial asymmetry, speech difficulty, weakness, numbness and headaches.   Hematological: Negative for adenopathy. Does not bruise/bleed easily.   Psychiatric/Behavioral: Negative for decreased concentration, dysphoric mood, sleep disturbance and suicidal ideas. The patient is not nervous/anxious.        Compared to one year ago, the patient feels her physical health is better.  Compared to one year ago, the patient  feels her mental health is better.    Objective     Physical Exam   Constitutional: She is oriented to person, place, and time. She appears well-developed and well-nourished. She is cooperative. No distress.   HENT:   Head: Normocephalic.   Right Ear: Tympanic membrane and external ear normal.   Left Ear: Tympanic membrane and external ear normal.   Nose: Nose normal. Right sinus exhibits no maxillary sinus tenderness and no frontal sinus tenderness. Left sinus exhibits no maxillary sinus tenderness and no frontal sinus tenderness.   Mouth/Throat: Oropharynx is clear and moist and mucous membranes are normal. No oropharyngeal exudate.   Eyes: Conjunctivae and EOM are normal. Pupils are equal, round, and reactive to light. No scleral icterus.   Neck: Normal range of motion. Neck supple. Carotid bruit is not present. No neck rigidity. No tracheal deviation present. No thyroid mass and no thyromegaly present.   Cardiovascular: Normal rate, regular rhythm, normal heart sounds and intact distal pulses. Exam reveals no gallop and no friction rub.   No murmur heard.  Pulmonary/Chest: Effort normal and breath sounds normal. No respiratory distress. She has no wheezes. She has no rales.   Abdominal: Soft. Normal appearance and bowel sounds are normal. She exhibits no distension and no mass. There is no hepatosplenomegaly. There is no tenderness. There is no rebound and no guarding. No hernia.   Musculoskeletal: Normal range of motion. She exhibits no edema, tenderness or deformity.   ROM normal with all major joints    Beryl had a diabetic foot exam performed today.   During the foot exam she had a monofilament test performed.    Neurological Sensory Findings - Unaltered hot/cold right ankle/foot discrimination and unaltered hot/cold left ankle/foot discrimination. Unaltered sharp/dull right ankle/foot discrimination and unaltered sharp/dull left ankle/foot discrimination.  Vascular Status -  Her right foot exhibits normal  "foot vasculature  and no edema. Her left foot exhibits normal foot vasculature  and no edema.  Skin Integrity  -  Her right foot skin is intact.Her left foot skin is intact..  Lymphadenopathy:        Head (right side): No submental, no submandibular, no tonsillar, no preauricular, no posterior auricular and no occipital adenopathy present.        Head (left side): No submental, no submandibular, no tonsillar, no preauricular, no posterior auricular and no occipital adenopathy present.     She has no cervical adenopathy.        Right cervical: No superficial cervical, no deep cervical and no posterior cervical adenopathy present.       Left cervical: No superficial cervical, no deep cervical and no posterior cervical adenopathy present.   Neurological: She is alert and oriented to person, place, and time. She displays no atrophy and no tremor. No cranial nerve deficit or sensory deficit. She exhibits normal muscle tone. Coordination and gait normal. GCS eye subscore is 4. GCS verbal subscore is 5. GCS motor subscore is 6.   Skin: Skin is warm and dry. Capillary refill takes 2 to 3 seconds. No rash noted. She is not diaphoretic. No cyanosis. Nails show no clubbing.   Psychiatric: She has a normal mood and affect. Her speech is normal and behavior is normal. Judgment and thought content normal. Cognition and memory are normal.   Nursing note and vitals reviewed.      Vitals:    04/23/19 0924   BP: 130/80   Pulse: 71   Resp: 20   Temp: 97.9 °F (36.6 °C)   TempSrc: Temporal   SpO2: 98%   Weight: 71.2 kg (157 lb)   Height: 152.4 cm (60\")   PainSc: 0-No pain       Patient's Body mass index is 30.66 kg/m². BMI is above normal parameters. Recommendations include: nutrition counseling.      Assessment/Plan   Patient Self-Management and Personalized Health Advice  The patient has been provided with information about: designing advance directives and preventive services including:   · Advance directive, Fall Risk assessment " done, Nutrition counseling provided, TdaP vaccine.    Visit Diagnoses:    ICD-10-CM ICD-9-CM   1. Medicare annual wellness visit, subsequent Z00.00 V70.0   2. Need for hepatitis C screening test Z11.59 V73.89   3. History of CVA (cerebrovascular accident) Z86.73 V12.54   4. Need for Tdap vaccination Z23 V06.1   5. Bilateral carotid artery stenosis I65.23 433.10     433.30       Orders Placed This Encounter   Procedures   • Hepatitis C Antibody   • Lipid Panel       Outpatient Encounter Medications as of 4/23/2019   Medication Sig Dispense Refill   • aspirin 325 MG tablet Take 1 tablet by mouth Daily. 30 tablet 0   • atorvastatin (LIPITOR) 80 MG tablet TAKE ONE TABLET BY MOUTH ONCE DAILY 90 tablet 2   • clopidogrel (PLAVIX) 75 MG tablet TAKE ONE TABLET BY MOUTH ONCE DAILY FOR 90 DAYS 90 tablet 2   • Continuous Blood Gluc  (FREESTYLE SALMA READER) device 1 each 6 (Six) Times a Day. E11.9 1 each 0   • Continuous Blood Gluc Sensor (FREESTYLE SALMA SENSOR SYSTEM) USE   TO CHECK GLUCOSE SIX TIMES DAILY AS DIRECTED 3 each 2   • Ertugliflozin L-PyroglutamicAc (STEGLATRO) 5 MG tablet Take 1 tablet by mouth Every Morning. 30 tablet 5   • FREESTYLE LITE test strip 1 each by Other route 4 (Four) Times a Day.     • FREESTYLE LITE test strip USE ONE STRIP TO CHECK GLUCOSE THREE TIMES DAILY AND AS NEEDED 100 each 2   • Insulin Lispro Prot & Lispro (HUMALOG MIX 75/25 KWIKPEN) (75-25) 100 UNIT/ML suspension pen-injector INJECT 40 UNITS SUBCUTANEOUSLY IN THE MORNING, THEN INJECT 30 UNITS SUBCUTANEOUSLY IN THE EVENING 15 pen 2   • insulin lispro protamine-insulin lispro (humaLOG 75-25) (75-25) 100 UNIT/ML suspension injection Inject 30 Units under the skin Every Evening.     • Insulin Pen Needle (BD PEN NEEDLE DOROTHY U/F) 32G X 4 MM misc One each three times daily E11.8 300 each 2   • losartan (COZAAR) 100 MG tablet TAKE 1 TABLET BY MOUTH ONCE DAILY 90 tablet 1   • Tdap (ADACEL) 5-2-15.5 LF-MCG/0.5 injection Inject 0.5 mL into  the appropriate muscle as directed by prescriber 1 (One) Time for 1 dose. 0.5 mL 0     No facility-administered encounter medications on file as of 4/23/2019.        Reviewed use of high risk medication in the elderly: not applicable  Reviewed for potential of harmful drug interactions in the elderly: not applicable    Follow Up:  Return for Next scheduled follow up.     An After Visit Summary and PPPS with all of these plans were given to the patient.

## 2019-05-11 DIAGNOSIS — E11.8 TYPE 2 DIABETES MELLITUS WITH COMPLICATION, WITH LONG-TERM CURRENT USE OF INSULIN (HCC): Chronic | ICD-10-CM

## 2019-05-11 DIAGNOSIS — Z79.4 TYPE 2 DIABETES MELLITUS WITH COMPLICATION, WITH LONG-TERM CURRENT USE OF INSULIN (HCC): Chronic | ICD-10-CM

## 2019-05-13 RX ORDER — FLASH GLUCOSE SENSOR
KIT MISCELLANEOUS
Qty: 300 EACH | Refills: 1 | Status: SHIPPED | OUTPATIENT
Start: 2019-05-13 | End: 2019-08-27

## 2019-05-28 ENCOUNTER — OFFICE VISIT (OUTPATIENT)
Dept: INTERNAL MEDICINE | Facility: CLINIC | Age: 74
End: 2019-05-28

## 2019-05-28 VITALS
TEMPERATURE: 97.9 F | HEIGHT: 60 IN | WEIGHT: 156.8 LBS | SYSTOLIC BLOOD PRESSURE: 134 MMHG | BODY MASS INDEX: 30.78 KG/M2 | DIASTOLIC BLOOD PRESSURE: 78 MMHG

## 2019-05-28 DIAGNOSIS — E11.8 TYPE 2 DIABETES MELLITUS WITH COMPLICATION, WITH LONG-TERM CURRENT USE OF INSULIN (HCC): Primary | Chronic | ICD-10-CM

## 2019-05-28 DIAGNOSIS — I10 ESSENTIAL HYPERTENSION: Chronic | ICD-10-CM

## 2019-05-28 DIAGNOSIS — Z79.4 TYPE 2 DIABETES MELLITUS WITH COMPLICATION, WITH LONG-TERM CURRENT USE OF INSULIN (HCC): Primary | Chronic | ICD-10-CM

## 2019-05-28 LAB — HBA1C MFR BLD: 6.3 %

## 2019-05-28 PROCEDURE — 83036 HEMOGLOBIN GLYCOSYLATED A1C: CPT | Performed by: INTERNAL MEDICINE

## 2019-05-28 PROCEDURE — 99213 OFFICE O/P EST LOW 20 MIN: CPT | Performed by: INTERNAL MEDICINE

## 2019-05-28 NOTE — PROGRESS NOTES
"Subjective   Beryl Montoya is a 73 y.o. female here for follow-up DMII and HTN. DMII: has been at goal, no hypoglycemia. Having difficulty with the krystin system and it being very far off from finger pricks she is doing. Just got it from [employer]. HTN: no issues, has been at goal. Overall feeling well, no complaints.     The following portions of the patient's history were reviewed and updated as appropriate: allergies, current medications, past medical history, past social history and problem list.    Review of Systems:  General: negative  CV: negative  Respiratory: negative  Neuro: negative  Endo: negative    Objective   /78 (BP Location: Left arm, Patient Position: Sitting, Cuff Size: Adult)   Temp 97.9 °F (36.6 °C) (Temporal)   Ht 152.4 cm (60\")   Wt 71.1 kg (156 lb 12.8 oz)   BMI 30.62 kg/m²     Physical Exam   Constitutional: She is oriented to person, place, and time. She appears well-developed and well-nourished.   Cardiovascular: Normal rate, regular rhythm and normal heart sounds.   Pulmonary/Chest: Effort normal and breath sounds normal. She has no wheezes. She has no rales.   Neurological: She is alert and oriented to person, place, and time.   Skin: Skin is warm and dry.   Psychiatric: She has a normal mood and affect. Her behavior is normal. Thought content normal.   Vitals reviewed.      Assessment/Plan   Beryl was seen today for diabetes and hypertension.    Diagnoses and all orders for this visit:    Type 2 diabetes mellitus with complication, with long-term current use of insulin (CMS/[facility])  -     POC Glycosylated Hemoglobin (Hb A1C)    Essential hypertension           "

## 2019-06-03 DIAGNOSIS — E11.8 TYPE 2 DIABETES MELLITUS WITH COMPLICATION, WITH LONG-TERM CURRENT USE OF INSULIN (HCC): Chronic | ICD-10-CM

## 2019-06-03 DIAGNOSIS — Z79.4 TYPE 2 DIABETES MELLITUS WITH COMPLICATION, WITH LONG-TERM CURRENT USE OF INSULIN (HCC): Chronic | ICD-10-CM

## 2019-06-03 RX ORDER — ERTUGLIFLOZIN 5 MG/1
TABLET, FILM COATED ORAL
Qty: 30 TABLET | Refills: 5 | Status: SHIPPED | OUTPATIENT
Start: 2019-06-03 | End: 2019-08-27

## 2019-06-07 ENCOUNTER — OFFICE VISIT (OUTPATIENT)
Dept: ORTHOPEDIC SURGERY | Facility: CLINIC | Age: 74
End: 2019-06-07

## 2019-06-07 VITALS — BODY MASS INDEX: 30.25 KG/M2 | HEIGHT: 60 IN | WEIGHT: 154.1 LBS | HEART RATE: 84 BPM | OXYGEN SATURATION: 98 %

## 2019-06-07 DIAGNOSIS — M17.11 PRIMARY OSTEOARTHRITIS OF RIGHT KNEE: ICD-10-CM

## 2019-06-07 DIAGNOSIS — M25.561 RIGHT KNEE PAIN, UNSPECIFIED CHRONICITY: ICD-10-CM

## 2019-06-07 DIAGNOSIS — M70.50 PES ANSERINE BURSITIS: Primary | ICD-10-CM

## 2019-06-07 PROCEDURE — 99204 OFFICE O/P NEW MOD 45 MIN: CPT | Performed by: ORTHOPAEDIC SURGERY

## 2019-06-07 NOTE — PROGRESS NOTES
Orthopaedic Clinic Note: Knee New Patient    Chief Complaint   Patient presents with   • Right Knee - Pain        HPI    Beryl Montoya is a 74 y.o. female who presents with right knee pain for 1 month(s). Onset has been atraumatic and gradual nature.  Pain is localized the medial proximal tibia.  She rates it a 1/10 on the pain scale currently but with activity it increases to a 4/10 on the pain scale.  She denies any swelling or instability symptoms.  Her symptoms are worse with walking and climbing stairs.  Sitting resting eases her pain.  She occasionally has episodes of her knee giving way.  She describes the pain as a dull aching sensation.  She is here to discuss treatment options for ongoing right knee pain.  She is ambulating with no assistive device today.  Past Medical History:   Diagnosis Date   • Allergic    • Diabetes mellitus (CMS/HCC)    • GERD (gastroesophageal reflux disease)    • High cholesterol    • Hyperlipidemia    • Hypertension    • Stroke (CMS/HCC)    • TIA (transient ischemic attack)       Past Surgical History:   Procedure Laterality Date   • CATARACT EXTRACTION Bilateral    • EYE SURGERY     • HYSTERECTOMY      total   • INTERVENTIONAL RADIOLOGY PROCEDURE Bilateral 2017    Procedure: Carotid Cerebral Angiogram;  Surgeon: Maldonado Casas MD;  Location: [facility] INVASIVE LOCATION;  Service:       Family History   Problem Relation Age of Onset   • Diabetes Mother    • Cancer Mother    • Hypertension Mother    • Diabetes Father    • Heart disease Father      Social History     Socioeconomic History   • Marital status:      Spouse name: Not on file   • Number of children: Not on file   • Years of education: Not on file   • Highest education level: Not on file   Tobacco Use   • Smoking status: Former Smoker     Packs/day: 0.25     Years: 4.00     Pack years: 1.00     Types: Cigarettes     Last attempt to quit: 2005     Years since quittin.7   • Smokeless  tobacco: Never Used   Substance and Sexual Activity   • Alcohol use: No   • Drug use: No   • Sexual activity: Defer      Current Outpatient Medications on File Prior to Visit   Medication Sig Dispense Refill   • aspirin 325 MG tablet Take 1 tablet by mouth Daily. 30 tablet 0   • atorvastatin (LIPITOR) 80 MG tablet TAKE ONE TABLET BY MOUTH ONCE DAILY 90 tablet 2   • clopidogrel (PLAVIX) 75 MG tablet TAKE ONE TABLET BY MOUTH ONCE DAILY FOR 90 DAYS 90 tablet 2   • Continuous Blood Gluc  (FREESTYLE SALMA READER) device 1 each 6 (Six) Times a Day. E11.9 1 each 0   • Continuous Blood Gluc Sensor (FREESTYLE SALMA SENSOR SYSTEM) USE TO CHECK GLUCOSE SIX TIMES DAILY AS DIRECTED 300 each 1   • FREESTYLE LITE test strip 1 each by Other route 4 (Four) Times a Day.     • FREESTYLE LITE test strip USE ONE STRIP TO CHECK GLUCOSE THREE TIMES DAILY AND AS NEEDED 100 each 2   • Insulin Lispro Prot & Lispro (HUMALOG MIX 75/25 KWIKPEN) (75-25) 100 UNIT/ML suspension pen-injector INJECT 40 UNITS SUBCUTANEOUSLY IN THE MORNING, THEN INJECT 30 UNITS SUBCUTANEOUSLY IN THE EVENING 15 pen 2   • insulin lispro protamine-insulin lispro (humaLOG 75-25) (75-25) 100 UNIT/ML suspension injection Inject 30 Units under the skin Every Evening.     • Insulin Pen Needle (BD PEN NEEDLE DOROTHY U/F) 32G X 4 MM misc One each three times daily E11.8 300 each 2   • losartan (COZAAR) 100 MG tablet TAKE 1 TABLET BY MOUTH ONCE DAILY 90 tablet 1   • STEGLATRO 5 MG tablet TAKE 1 TABLET BY MOUTH ONCE DAILY IN THE MORNING 30 tablet 5     No current facility-administered medications on file prior to visit.       No Known Allergies     Review of Systems   Constitutional: Negative.    HENT: Negative.    Eyes: Negative.    Respiratory: Negative.    Cardiovascular: Negative.    Gastrointestinal: Negative.    Endocrine: Negative.    Genitourinary: Negative.    Musculoskeletal: Positive for joint swelling.   Skin: Negative.    Allergic/Immunologic: Negative.   "  Neurological: Negative.    Hematological: Negative.    Psychiatric/Behavioral: Negative.         The patient's Review of Systems was personally reviewed and confirmed as accurate.    The following portions of the patient's history were reviewed and updated as appropriate: allergies, current medications, past family history, past medical history, past social history, past surgical history and problem list.    Physical Exam  Pulse 84, height 152.4 cm (60\"), weight 69.9 kg (154 lb 1.6 oz), SpO2 98 %, not currently breastfeeding.    Body mass index is 30.1 kg/m².    GENERAL APPEARANCE: awake, alert & oriented x 3, in no acute distress and well developed, well nourished  PSYCH: normal affect  LUNGS:  breathing nonlabored  EYES: sclera anicteric  CARDIOVASCULAR: palpable dorsalis pedis, palpable posterior tibial bilaterally. Capillary refill less than 2 seconds  EXTREMITIES: no clubbing, cyanosis  GAIT:  Normal            Right Lower Extremity Exam:   ----------  Hip Exam  ----------  FLEXION CONTRACTURE: None  FLEXION: 110 degrees  INTERNAL ROTATION: 20 degrees at 90 degrees of flexion   EXTERNAL ROTATION: 40 degrees at 90 degrees of flexion    PAIN WITH HIP MOTION: no  ----------  Knee Exam  ----------  ALIGNMENT: neutral, no varus or valgus deformity     RANGE OF MOTION:  Normal (0-120 degrees) with no extensor lag or flexion contracture  LIGAMENTOUS STABILITY:   stable to varus and valgus stress at 0 and 30 degrees without any evidence of laxity     STRENGTH:  5/5 knee flexion, extension. 5/5 ankle dorsiflexion and plantarflexion.     PAIN WITH PALPATION: tender to palpation about pes bursa, denies medial or lateral joint line pain  KNEE EFFUSION: no  PAIN WITH KNEE ROM: no  PATELLAR CREPITUS: yes, asymptomatic  SPECIAL EXAM FINDINGS:  Negative patellar compression    REFLEXES:  PATELLAR 2+/4  ACHILLES 2+/4    CLONUS: negative  STRAIGHT LEG TEST:   negative    SENSATION TO LIGHT TOUCH:  DEEP PERONEAL/SUPERFICIAL " PERONEAL/SURAL/SAPHENOUS/TIBIAL:   intact    EDEMA:  no  ERYTHEMA:  no  WOUNDS/INCISIONS: none, no overlying skin problems.      Left Lower Extremity Exam:   ----------  Hip Exam  ----------  FLEXION CONTRACTURE: None  FLEXION: 110 degrees  INTERNAL ROTATION: 20 degrees at 90 degrees of flexion   EXTERNAL ROTATION: 40 degrees at 90 degrees of flexion    PAIN WITH HIP MOTION: no  ----------  Knee Exam  ----------  ALIGNMENT: neutral, no varus or valgus deformity     RANGE OF MOTION:  Normal (0-120 degrees) with no extensor lag or flexion contracture  LIGAMENTOUS STABILITY:   stable to varus and valgus stress at 0 and 30 degrees without any evidence of laxity     STRENGTH:  5/5 knee flexion, extension. 5/5 ankle dorsiflexion and plantarflexion.     PAIN WITH PALPATION: denies tenderness to palpation about the knee, denies medial or lateral joint line pain  KNEE EFFUSION: no  PAIN WITH KNEE ROM: no  PATELLAR CREPITUS: yes, asymptomatic  SPECIAL EXAM FINDINGS:  Negative patellar compression    REFLEXES:  PATELLAR 2+/4  ACHILLES 2+/4    CLONUS: negative  STRAIGHT LEG TEST:   negative    SENSATION TO LIGHT TOUCH:  DEEP PERONEAL/SUPERFICIAL PERONEAL/SURAL/SAPHENOUS/TIBIAL:   intact    EDEMA:  no  ERYTHEMA:  no  WOUNDS/INCISIONS: none, no overlying skin problems.    ______________________________________________________________________  ______________________________________________________________________    RADIOGRAPHIC FINDINGS:   Indication: Right knee pain    Comparison: No prior xrays are available for comparison    Right knee(s) 4 views: mild tricompartmental osteoarthritis. No acute bony injury or fracture      Assessment/Plan:   Diagnosis Plan   1. Pes anserine bursitis  diclofenac (VOLTAREN) 1 % gel gel   2. Right knee pain, unspecified chronicity  XR Knee 4+ View Right   3. Primary osteoarthritis of right knee       Patient symptoms are consistent with pes bursitis.  I discussed treatment options with her.  She  is agreeable to Voltaren gel to apply topically to the affected area.  She will follow-up as needed.    Josue Molina MD  06/07/19  10:12 AM

## 2019-07-01 DIAGNOSIS — I10 ESSENTIAL HYPERTENSION: Chronic | ICD-10-CM

## 2019-07-01 RX ORDER — BLOOD-GLUCOSE METER
KIT MISCELLANEOUS
Qty: 100 EACH | Refills: 2 | Status: SHIPPED | OUTPATIENT
Start: 2019-07-01 | End: 2019-08-27

## 2019-07-01 RX ORDER — LOSARTAN POTASSIUM 100 MG/1
TABLET ORAL
Qty: 90 TABLET | Refills: 1 | Status: SHIPPED | OUTPATIENT
Start: 2019-07-01 | End: 2019-09-30 | Stop reason: SDUPTHER

## 2019-08-19 DIAGNOSIS — E78.5 HYPERLIPIDEMIA, UNSPECIFIED HYPERLIPIDEMIA TYPE: ICD-10-CM

## 2019-08-19 DIAGNOSIS — I63.9 ACUTE CEREBROVASCULAR ACCIDENT (CVA) (HCC): ICD-10-CM

## 2019-08-19 RX ORDER — CLOPIDOGREL BISULFATE 75 MG/1
TABLET ORAL
Qty: 90 TABLET | Refills: 2 | Status: SHIPPED | OUTPATIENT
Start: 2019-08-19 | End: 2020-05-20

## 2019-08-19 RX ORDER — ATORVASTATIN CALCIUM 80 MG/1
TABLET, FILM COATED ORAL
Qty: 90 TABLET | Refills: 2 | Status: SHIPPED | OUTPATIENT
Start: 2019-08-19 | End: 2019-08-27 | Stop reason: SDUPTHER

## 2019-08-27 ENCOUNTER — OFFICE VISIT (OUTPATIENT)
Dept: INTERNAL MEDICINE | Facility: CLINIC | Age: 74
End: 2019-08-27

## 2019-08-27 VITALS
HEIGHT: 60 IN | SYSTOLIC BLOOD PRESSURE: 130 MMHG | WEIGHT: 157 LBS | TEMPERATURE: 98.3 F | DIASTOLIC BLOOD PRESSURE: 82 MMHG | BODY MASS INDEX: 30.82 KG/M2

## 2019-08-27 DIAGNOSIS — E11.8 TYPE 2 DIABETES MELLITUS WITH COMPLICATION, WITH LONG-TERM CURRENT USE OF INSULIN (HCC): Primary | Chronic | ICD-10-CM

## 2019-08-27 DIAGNOSIS — Z86.73 HISTORY OF CVA (CEREBROVASCULAR ACCIDENT): ICD-10-CM

## 2019-08-27 DIAGNOSIS — E78.5 HYPERLIPIDEMIA, UNSPECIFIED HYPERLIPIDEMIA TYPE: ICD-10-CM

## 2019-08-27 DIAGNOSIS — I10 ESSENTIAL HYPERTENSION: Chronic | ICD-10-CM

## 2019-08-27 DIAGNOSIS — Z79.4 TYPE 2 DIABETES MELLITUS WITH COMPLICATION, WITH LONG-TERM CURRENT USE OF INSULIN (HCC): Primary | Chronic | ICD-10-CM

## 2019-08-27 LAB — HBA1C MFR BLD: 6.3 %

## 2019-08-27 PROCEDURE — 83036 HEMOGLOBIN GLYCOSYLATED A1C: CPT | Performed by: INTERNAL MEDICINE

## 2019-08-27 PROCEDURE — 99214 OFFICE O/P EST MOD 30 MIN: CPT | Performed by: INTERNAL MEDICINE

## 2019-08-27 RX ORDER — ATORVASTATIN CALCIUM 80 MG/1
80 TABLET, FILM COATED ORAL DAILY
Qty: 90 TABLET | Refills: 2 | Status: SHIPPED | OUTPATIENT
Start: 2019-08-27 | End: 2020-09-17 | Stop reason: SDUPTHER

## 2019-08-27 RX ORDER — INSULIN LISPRO 100 [IU]/ML
INJECTION, SUSPENSION SUBCUTANEOUS
Qty: 7 PEN | Refills: 11 | Status: SHIPPED | OUTPATIENT
Start: 2019-08-27 | End: 2020-03-09 | Stop reason: SDUPTHER

## 2019-08-27 RX ORDER — BLOOD-GLUCOSE METER
1 KIT MISCELLANEOUS
Qty: 300 EACH | Refills: 3 | Status: ON HOLD | OUTPATIENT
Start: 2019-08-27 | End: 2020-07-06

## 2019-09-30 DIAGNOSIS — I10 ESSENTIAL HYPERTENSION: Chronic | ICD-10-CM

## 2019-09-30 RX ORDER — LOSARTAN POTASSIUM 100 MG/1
TABLET ORAL
Qty: 90 TABLET | Refills: 1 | Status: SHIPPED | OUTPATIENT
Start: 2019-09-30 | End: 2020-04-06

## 2019-11-01 RX ORDER — FLASH GLUCOSE SENSOR
KIT MISCELLANEOUS
Qty: 2 EACH | Refills: 2 | Status: ON HOLD | OUTPATIENT
Start: 2019-11-01 | End: 2020-07-06

## 2019-11-26 ENCOUNTER — OFFICE VISIT (OUTPATIENT)
Dept: INTERNAL MEDICINE | Facility: CLINIC | Age: 74
End: 2019-11-26

## 2019-11-26 VITALS
DIASTOLIC BLOOD PRESSURE: 78 MMHG | SYSTOLIC BLOOD PRESSURE: 130 MMHG | HEIGHT: 60 IN | TEMPERATURE: 97.6 F | WEIGHT: 159.2 LBS | BODY MASS INDEX: 31.25 KG/M2

## 2019-11-26 DIAGNOSIS — I65.23 BILATERAL CAROTID ARTERY STENOSIS: ICD-10-CM

## 2019-11-26 DIAGNOSIS — I10 ESSENTIAL HYPERTENSION: Primary | Chronic | ICD-10-CM

## 2019-11-26 DIAGNOSIS — E11.8 TYPE 2 DIABETES MELLITUS WITH COMPLICATION, WITH LONG-TERM CURRENT USE OF INSULIN (HCC): Chronic | ICD-10-CM

## 2019-11-26 DIAGNOSIS — Z79.4 TYPE 2 DIABETES MELLITUS WITH COMPLICATION, WITH LONG-TERM CURRENT USE OF INSULIN (HCC): Chronic | ICD-10-CM

## 2019-11-26 LAB — HBA1C MFR BLD: 6 %

## 2019-11-26 PROCEDURE — 83036 HEMOGLOBIN GLYCOSYLATED A1C: CPT | Performed by: INTERNAL MEDICINE

## 2019-11-26 PROCEDURE — 99214 OFFICE O/P EST MOD 30 MIN: CPT | Performed by: INTERNAL MEDICINE

## 2019-11-26 RX ORDER — INFLUENZA A VIRUS A/MICHIGAN/45/2015 X-275 (H1N1) ANTIGEN (FORMALDEHYDE INACTIVATED), INFLUENZA A VIRUS A/SINGAPORE/INFIMH-16-0019/2016 IVR-186 (H3N2) ANTIGEN (FORMALDEHYDE INACTIVATED), AND INFLUENZA B VIRUS B/MARYLAND/15/2016 BX-69A (A B/COLORADO/6/2017-LIKE VIRUS) ANTIGEN (FORMALDEHYDE INACTIVATED) 60; 60; 60 UG/.5ML; UG/.5ML; UG/.5ML
INJECTION, SUSPENSION INTRAMUSCULAR
Refills: 0 | COMMUNITY
Start: 2019-10-28 | End: 2020-04-07

## 2019-11-26 NOTE — PROGRESS NOTES
"Subjective   Beryl Montoya is a 74 y.o. female here for follow-up DMII, HTN, carotid artery stenosis.  DMII: Last visits to culture was increased as she was having some labile glucose values.  She says her fasting glucose is now around 120.  She does feel that her glucose is more stable and is not jumping up throughout the day.  She does say she can have some higher values in the afternoon, around 250.  She is compliant with all of her medications.  HTN: Has been controlled long-term and no issues with medication.  No dizziness or symptoms of hypotension.  Carotid artery stenosis: History of CVA.  On aspirin and Plavix as well as atorvastatin 80 mg.  No new neurological symptoms.    I have reviewed the following portions of the patient's history and confirmed they are accurate: current medications, past medical history, past surgical history and problem list     I have personally completed the patient's review of systems.    Review of Systems:  General: negative  CV: negative  Respiratory: negative  Neuro: negative  Endo: Negative    Objective   /78 (BP Location: Left arm, Patient Position: Sitting, Cuff Size: Large Adult)   Temp 97.6 °F (36.4 °C) (Temporal)   Ht 152.4 cm (60\")   Wt 72.2 kg (159 lb 3.2 oz)   BMI 31.09 kg/m²     Physical Exam   Constitutional: She is oriented to person, place, and time. She appears well-developed and well-nourished.   HENT:   Head: Normocephalic and atraumatic.   Pulmonary/Chest: Effort normal.   Musculoskeletal:   Normal gait and station   Neurological: She is alert and oriented to person, place, and time.   Skin: Skin is warm and dry.   Psychiatric: She has a normal mood and affect. Her behavior is normal. Judgment and thought content normal.   Vitals reviewed.      Assessment/Plan   Beryl was seen today for hypertension and diabetes.    Diagnoses and all orders for this visit:    Essential hypertension  -Chronic controlled, continue current medications    Type 2 " diabetes mellitus with complication, with long-term current use of insulin (CMS/[facility])  -     POC Glycosylated Hemoglobin (Hb A1C): 6%  -More stable values with the increase of Steglatro.  No hypoglycemia.  Continue current medications    Bilateral carotid artery stenosis  -Continue aspirin Plavix  -Chronic stable           Whitney Aquino MA    Please note that portions of this note were completed with a voice recognition program. Efforts were made to edit the dictations, but occasionally words are mistranscribed.

## 2019-12-01 DIAGNOSIS — Z79.4 TYPE 2 DIABETES MELLITUS WITH COMPLICATION, WITH LONG-TERM CURRENT USE OF INSULIN (HCC): Chronic | ICD-10-CM

## 2019-12-01 DIAGNOSIS — E11.8 TYPE 2 DIABETES MELLITUS WITH COMPLICATION, WITH LONG-TERM CURRENT USE OF INSULIN (HCC): Chronic | ICD-10-CM

## 2019-12-02 RX ORDER — ERTUGLIFLOZIN 15 MG/1
TABLET, FILM COATED ORAL
Qty: 90 TABLET | Refills: 0 | Status: SHIPPED | OUTPATIENT
Start: 2019-12-02 | End: 2020-03-02

## 2020-03-02 DIAGNOSIS — Z79.4 TYPE 2 DIABETES MELLITUS WITH COMPLICATION, WITH LONG-TERM CURRENT USE OF INSULIN (HCC): Chronic | ICD-10-CM

## 2020-03-02 DIAGNOSIS — E11.8 TYPE 2 DIABETES MELLITUS WITH COMPLICATION, WITH LONG-TERM CURRENT USE OF INSULIN (HCC): Chronic | ICD-10-CM

## 2020-03-02 RX ORDER — ERTUGLIFLOZIN 15 MG/1
TABLET, FILM COATED ORAL
Qty: 90 TABLET | Refills: 0 | Status: SHIPPED | OUTPATIENT
Start: 2020-03-02 | End: 2020-03-10

## 2020-03-04 ENCOUNTER — TELEPHONE (OUTPATIENT)
Dept: INTERNAL MEDICINE | Facility: CLINIC | Age: 75
End: 2020-03-04

## 2020-03-04 NOTE — TELEPHONE ENCOUNTER
Frances with [facility] called and stated that the STEGLATRO 15 MG tablet was denied.  Frances asked that a new medication be called in for the patient.    [facility] PH: [phone]    Please advise.

## 2020-03-04 NOTE — TELEPHONE ENCOUNTER
We done a PA for this. They denied it. No longer on formulary. I spoke with her  and he said she had enough medication to last until you got back from vacation to change it.

## 2020-03-09 DIAGNOSIS — E11.8 TYPE 2 DIABETES MELLITUS WITH COMPLICATION, WITH LONG-TERM CURRENT USE OF INSULIN (HCC): Chronic | ICD-10-CM

## 2020-03-09 DIAGNOSIS — Z79.4 TYPE 2 DIABETES MELLITUS WITH COMPLICATION, WITH LONG-TERM CURRENT USE OF INSULIN (HCC): Chronic | ICD-10-CM

## 2020-03-09 RX ORDER — INSULIN LISPRO 100 [IU]/ML
INJECTION, SUSPENSION SUBCUTANEOUS
Qty: 7 PEN | Refills: 11 | Status: SHIPPED | OUTPATIENT
Start: 2020-03-09 | End: 2020-07-20 | Stop reason: HOSPADM

## 2020-03-10 NOTE — TELEPHONE ENCOUNTER
I put in to change to jardiance. She should qualify for that one for cardiovascular protection anyway with her stroke.

## 2020-03-30 ENCOUNTER — TELEPHONE (OUTPATIENT)
Dept: INTERNAL MEDICINE | Facility: CLINIC | Age: 75
End: 2020-03-30

## 2020-03-30 NOTE — TELEPHONE ENCOUNTER
Patients  is calling stating that her legs are very swollen and retaining water. Patients  is not sure what to do to help her. Please advise and call back    Confirmed pharmacy  [facility] [address]    Call back

## 2020-03-30 NOTE — TELEPHONE ENCOUNTER
Patients  sts she has never had this before so that's why she is getting anxious with it. No sob or discoloring. Just swollen and retaining flood

## 2020-04-06 DIAGNOSIS — I10 ESSENTIAL HYPERTENSION: Chronic | ICD-10-CM

## 2020-04-06 RX ORDER — LOSARTAN POTASSIUM 100 MG/1
TABLET ORAL
Qty: 90 TABLET | Refills: 0 | Status: ON HOLD | OUTPATIENT
Start: 2020-04-06 | End: 2020-06-23 | Stop reason: DRUGHIGH

## 2020-04-07 ENCOUNTER — OFFICE VISIT (OUTPATIENT)
Dept: INTERNAL MEDICINE | Facility: CLINIC | Age: 75
End: 2020-04-07

## 2020-04-07 DIAGNOSIS — R60.9 PERIPHERAL EDEMA: Primary | ICD-10-CM

## 2020-04-07 PROCEDURE — 99441 PR PHYS/QHP TELEPHONE EVALUATION 5-10 MIN: CPT | Performed by: INTERNAL MEDICINE

## 2020-04-07 RX ORDER — FUROSEMIDE 20 MG/1
10-20 TABLET ORAL DAILY PRN
Qty: 30 TABLET | Refills: 0 | Status: SHIPPED | OUTPATIENT
Start: 2020-04-07 | End: 2020-04-27

## 2020-04-07 NOTE — PROGRESS NOTES
Subjective   Beryl Montoya is a 74 y.o. female seen via telephone visit for new swelling of feet.  She said is been occurring about the last week or so.  She has never had this before.  She says she wakes up with the swelling in her feet, she has had to wear sandals because her feet do not fit in her shoes very well.  She denies any pain, redness, tingling in the feet, no change to the skin on the feet.  She said rarely it can go to the ankle but that does not happen very often.  She says it gets better throughout the day if she is active.  She denies any shortness of breath.  She has no history of renal or cardiac disease though she does have history of stroke.  No new medications.  She does admit to eating a high  sodium diet and probably not drinking as much she should.  She does report normal urination.  She otherwise says she has been doing pretty good.  She says her and her  are keeping it to the company during the pandemic.    I have reviewed the following portions of the patient's history and confirmed they are accurate: current medications, past medical history, past social history and problem list     I have personally completed the patient's review of systems.    Review of Systems:  General: negative  CV: negative  Respiratory: negative  MSK: Swelling in feet  Skin: Negative    Objective   There were no vitals taken for this visit.    Physical Exam    Assessment/Plan   Beryl was seen today for edema.    Diagnoses and all orders for this visit:    Peripheral edema  -     furosemide (Lasix) 20 MG tablet; Take 0.5-1 tablets by mouth Daily As Needed (swelling).  -New problem, advised patient to take only as needed as it can be hard on the kidneys.  Advised her to take it for the next 3 days to see if it improves swelling in feet.  Also advised less salt intake, more water intake, more movement, suggested daily walks.  If this does not work or symptoms worsen, we will have to get blood work to  evaluate for kidney or heart etiology.  She has no shortness of breath or other concerning symptoms which would trigger me to think it was anything more than peripheral edema or venous stasis.        This visit has been rescheduled as a phone visit to comply with patient safety concerns in accordance with CDC recommendations. Total time of discussion was 8 minutes.         Whitney Aquino MA    Please note that portions of this note were completed with a voice recognition program. Efforts were made to edit the dictations, but occasionally words are mistranscribed.

## 2020-04-27 DIAGNOSIS — R60.9 PERIPHERAL EDEMA: ICD-10-CM

## 2020-04-27 RX ORDER — FUROSEMIDE 20 MG/1
TABLET ORAL
Qty: 30 TABLET | Refills: 0 | Status: SHIPPED | OUTPATIENT
Start: 2020-04-27 | End: 2020-05-26

## 2020-05-20 DIAGNOSIS — I63.9 ACUTE CEREBROVASCULAR ACCIDENT (CVA) (HCC): ICD-10-CM

## 2020-05-20 RX ORDER — CLOPIDOGREL BISULFATE 75 MG/1
TABLET ORAL
Qty: 90 TABLET | Refills: 0 | Status: SHIPPED | OUTPATIENT
Start: 2020-05-20 | End: 2020-06-29

## 2020-05-25 DIAGNOSIS — R60.9 PERIPHERAL EDEMA: ICD-10-CM

## 2020-05-26 RX ORDER — FUROSEMIDE 20 MG/1
TABLET ORAL
Qty: 30 TABLET | Refills: 0 | Status: ON HOLD | OUTPATIENT
Start: 2020-05-26 | End: 2020-06-23 | Stop reason: DRUGHIGH

## 2020-06-03 ENCOUNTER — OFFICE VISIT (OUTPATIENT)
Dept: INTERNAL MEDICINE | Facility: CLINIC | Age: 75
End: 2020-06-03

## 2020-06-03 VITALS
WEIGHT: 166 LBS | DIASTOLIC BLOOD PRESSURE: 68 MMHG | TEMPERATURE: 97.3 F | SYSTOLIC BLOOD PRESSURE: 124 MMHG | BODY MASS INDEX: 32.59 KG/M2 | HEIGHT: 60 IN

## 2020-06-03 DIAGNOSIS — E11.8 TYPE 2 DIABETES MELLITUS WITH COMPLICATION, WITH LONG-TERM CURRENT USE OF INSULIN (HCC): Primary | Chronic | ICD-10-CM

## 2020-06-03 DIAGNOSIS — F43.21 GRIEF: ICD-10-CM

## 2020-06-03 DIAGNOSIS — Z79.4 TYPE 2 DIABETES MELLITUS WITH COMPLICATION, WITH LONG-TERM CURRENT USE OF INSULIN (HCC): Primary | Chronic | ICD-10-CM

## 2020-06-03 DIAGNOSIS — I10 ESSENTIAL HYPERTENSION: Chronic | ICD-10-CM

## 2020-06-03 LAB
ALBUMIN SERPL-MCNC: 3.8 G/DL (ref 3.5–5.2)
ALBUMIN UR-MCNC: 7.2 MG/DL
ALBUMIN/GLOB SERPL: 1.1 G/DL
ALP SERPL-CCNC: 106 U/L (ref 39–117)
ALT SERPL W P-5'-P-CCNC: 14 U/L (ref 1–33)
ANION GAP SERPL CALCULATED.3IONS-SCNC: 13.4 MMOL/L (ref 5–15)
AST SERPL-CCNC: 23 U/L (ref 1–32)
BILIRUB SERPL-MCNC: 0.6 MG/DL (ref 0.2–1.2)
BUN BLD-MCNC: 17 MG/DL (ref 8–23)
BUN/CREAT SERPL: 14.4 (ref 7–25)
CALCIUM SPEC-SCNC: 8.8 MG/DL (ref 8.6–10.5)
CHLORIDE SERPL-SCNC: 103 MMOL/L (ref 98–107)
CHOLEST SERPL-MCNC: 115 MG/DL (ref 0–200)
CO2 SERPL-SCNC: 24.6 MMOL/L (ref 22–29)
CREAT BLD-MCNC: 1.18 MG/DL (ref 0.57–1)
CREAT UR-MCNC: 197.5 MG/DL
DEPRECATED RDW RBC AUTO: 43.5 FL (ref 37–54)
ERYTHROCYTE [DISTWIDTH] IN BLOOD BY AUTOMATED COUNT: 12.3 % (ref 12.3–15.4)
GFR SERPL CREATININE-BSD FRML MDRD: 45 ML/MIN/1.73
GLOBULIN UR ELPH-MCNC: 3.5 GM/DL
GLUCOSE BLD-MCNC: 127 MG/DL (ref 65–99)
HBA1C MFR BLD: 5.8 %
HCT VFR BLD AUTO: 38.2 % (ref 34–46.6)
HDLC SERPL-MCNC: 41 MG/DL (ref 40–60)
HGB BLD-MCNC: 12.9 G/DL (ref 12–15.9)
LDLC SERPL CALC-MCNC: 59 MG/DL (ref 0–100)
LDLC/HDLC SERPL: 1.43 {RATIO}
MCH RBC QN AUTO: 32.7 PG (ref 26.6–33)
MCHC RBC AUTO-ENTMCNC: 33.8 G/DL (ref 31.5–35.7)
MCV RBC AUTO: 97 FL (ref 79–97)
MICROALBUMIN/CREAT UR: 36.5 MG/G
PLATELET # BLD AUTO: 247 10*3/MM3 (ref 140–450)
PMV BLD AUTO: 10.3 FL (ref 6–12)
POTASSIUM BLD-SCNC: 3.1 MMOL/L (ref 3.5–5.2)
PROT SERPL-MCNC: 7.3 G/DL (ref 6–8.5)
RBC # BLD AUTO: 3.94 10*6/MM3 (ref 3.77–5.28)
SODIUM BLD-SCNC: 141 MMOL/L (ref 136–145)
TRIGL SERPL-MCNC: 76 MG/DL (ref 0–150)
VLDLC SERPL-MCNC: 15.2 MG/DL (ref 5–40)
WBC NRBC COR # BLD: 6.85 10*3/MM3 (ref 3.4–10.8)

## 2020-06-03 PROCEDURE — 80053 COMPREHEN METABOLIC PANEL: CPT | Performed by: INTERNAL MEDICINE

## 2020-06-03 PROCEDURE — 36415 COLL VENOUS BLD VENIPUNCTURE: CPT | Performed by: INTERNAL MEDICINE

## 2020-06-03 PROCEDURE — 83036 HEMOGLOBIN GLYCOSYLATED A1C: CPT | Performed by: INTERNAL MEDICINE

## 2020-06-03 PROCEDURE — 85027 COMPLETE CBC AUTOMATED: CPT | Performed by: INTERNAL MEDICINE

## 2020-06-03 PROCEDURE — 82570 ASSAY OF URINE CREATININE: CPT | Performed by: INTERNAL MEDICINE

## 2020-06-03 PROCEDURE — 80061 LIPID PANEL: CPT | Performed by: INTERNAL MEDICINE

## 2020-06-03 PROCEDURE — 99214 OFFICE O/P EST MOD 30 MIN: CPT | Performed by: INTERNAL MEDICINE

## 2020-06-03 PROCEDURE — 82043 UR ALBUMIN QUANTITATIVE: CPT | Performed by: INTERNAL MEDICINE

## 2020-06-03 NOTE — PROGRESS NOTES
"Subjective   Beryl Montoya is a 75 y.o. female here for follow-up DMII, HTN, new grief. DMII: on insulin, had to drop jardiance. She was having negative SE to jardiance so stopped it. She denies any hypoglycemia. She is compliant with insulin. It is expensive, however. Wonders if there is alternative. HTN: no issues, has been controlled long term. No issues with meds. Grief: sister just  and pt is still sad about it. Cries sometimes when she thinks of her. Says she just wants to hold her hand again. She denies overt depression. Sleeping ok.    I have reviewed the following portions of the patient's history and confirmed they are accurate: current medications, past medical history, past social history and problem list     I have personally completed the patient's review of systems.    Review of Systems:  General: negative  CV: negative  Respiratory: negative  Neuro: negative  Psych: sadness, grief  Endo: negative    Objective   /68 (BP Location: Left arm, Patient Position: Sitting, Cuff Size: Large Adult)   Temp 97.3 °F (36.3 °C) (Temporal)   Ht 152.4 cm (60\")   Wt 75.3 kg (166 lb)   BMI 32.42 kg/m²     Physical Exam   Constitutional: She is oriented to person, place, and time. She appears well-developed and well-nourished.   HENT:   Head: Normocephalic and atraumatic.   Eyes: Conjunctivae are normal.   Pulmonary/Chest: Effort normal.   Neurological: She is alert and oriented to person, place, and time.   Skin: Skin is warm and dry.   Psychiatric: She has a normal mood and affect. Her behavior is normal. Judgment and thought content normal.   Vitals reviewed.      Assessment/Plan   Beryl was seen today for diabetes and hypertension.    Diagnoses and all orders for this visit:    Type 2 diabetes mellitus with complication, with long-term current use of insulin (CMS/[facility])  -     Comprehensive Metabolic Panel  -     Lipid Panel  -     Microalbumin / Creatinine Urine Ratio - Urine, Clean Catch  -     POC " Glycosylated Hemoglobin (Hb A1C)  -chronic controlled, continue current meds. Had to drop jardiance due to SE. She is to get with insurance to see if there is a cheaper alternative, may switch to vials    Essential hypertension  -     CBC (No Diff)  -chronic controlled, continue current meds    Grief  -Told patient she is experiencing grief which is totally normal for this soon after her sister's death, she is trying to keep busy yet also deal with the death           Whitney Aquino MA    Please note that portions of this note were completed with a voice recognition program. Efforts were made to edit the dictations, but occasionally words are mistranscribed.

## 2020-06-09 ENCOUNTER — TELEPHONE (OUTPATIENT)
Dept: INTERNAL MEDICINE | Facility: CLINIC | Age: 75
End: 2020-06-09

## 2020-06-09 NOTE — TELEPHONE ENCOUNTER
----- Message from Argelia Gamez MD sent at 6/8/2020  7:56 AM EDT -----  Please call the patient regarding her abnormal result. Tell her blood work overall looks ok except kidney function a bit decreased. Her BP is so good I want her to halve her losartan pill. Then we can recheck kidney function in 1 month. No other concerns. Her  also has lab results, I sent his to [facility] but if he's curious could read him what I wrote.

## 2020-06-15 ENCOUNTER — TRANSCRIBE ORDERS (OUTPATIENT)
Dept: ADMINISTRATIVE | Facility: HOSPITAL | Age: 75
End: 2020-06-15

## 2020-06-15 DIAGNOSIS — R94.39 ABNORMAL STRESS TEST: Primary | ICD-10-CM

## 2020-06-19 ENCOUNTER — APPOINTMENT (OUTPATIENT)
Dept: PREADMISSION TESTING | Facility: HOSPITAL | Age: 75
End: 2020-06-19

## 2020-06-19 PROCEDURE — C9803 HOPD COVID-19 SPEC COLLECT: HCPCS

## 2020-06-19 PROCEDURE — U0002 COVID-19 LAB TEST NON-CDC: HCPCS

## 2020-06-19 PROCEDURE — U0004 COV-19 TEST NON-CDC HGH THRU: HCPCS

## 2020-06-20 LAB
REF LAB TEST METHOD: NORMAL
SARS-COV-2 RNA RESP QL NAA+PROBE: NOT DETECTED

## 2020-06-23 ENCOUNTER — APPOINTMENT (OUTPATIENT)
Dept: CARDIOLOGY | Facility: HOSPITAL | Age: 75
End: 2020-06-23

## 2020-06-23 ENCOUNTER — APPOINTMENT (OUTPATIENT)
Dept: GENERAL RADIOLOGY | Facility: HOSPITAL | Age: 75
End: 2020-06-23

## 2020-06-23 ENCOUNTER — HOSPITAL ENCOUNTER (OUTPATIENT)
Facility: HOSPITAL | Age: 75
Setting detail: HOSPITAL OUTPATIENT SURGERY
Discharge: HOME | End: 2020-06-23
Attending: INTERNAL MEDICINE | Admitting: INTERNAL MEDICINE

## 2020-06-23 VITALS
OXYGEN SATURATION: 94 % | RESPIRATION RATE: 18 BRPM | HEART RATE: 88 BPM | DIASTOLIC BLOOD PRESSURE: 90 MMHG | WEIGHT: 164 LBS | HEIGHT: 60 IN | BODY MASS INDEX: 32.2 KG/M2 | SYSTOLIC BLOOD PRESSURE: 185 MMHG

## 2020-06-23 DIAGNOSIS — R94.39 ABNORMAL STRESS TEST: ICD-10-CM

## 2020-06-23 LAB
ANION GAP SERPL CALCULATED.3IONS-SCNC: 13 MMOL/L (ref 5–15)
BUN BLD-MCNC: 12 MG/DL (ref 8–23)
BUN/CREAT SERPL: 11.9 (ref 7–25)
CALCIUM SPEC-SCNC: 9.3 MG/DL (ref 8.6–10.5)
CHLORIDE SERPL-SCNC: 99 MMOL/L (ref 98–107)
CO2 SERPL-SCNC: 24 MMOL/L (ref 22–29)
CREAT BLD-MCNC: 1.01 MG/DL (ref 0.57–1)
DEPRECATED RDW RBC AUTO: 48.9 FL (ref 37–54)
ERYTHROCYTE [DISTWIDTH] IN BLOOD BY AUTOMATED COUNT: 13.7 % (ref 12.3–15.4)
GFR SERPL CREATININE-BSD FRML MDRD: 53 ML/MIN/1.73
GLUCOSE BLD-MCNC: 166 MG/DL (ref 65–99)
GLUCOSE BLDC GLUCOMTR-MCNC: 140 MG/DL (ref 70–130)
HCT VFR BLD AUTO: 42.3 % (ref 34–46.6)
HGB BLD-MCNC: 13.8 G/DL (ref 12–15.9)
MCH RBC QN AUTO: 32 PG (ref 26.6–33)
MCHC RBC AUTO-ENTMCNC: 32.6 G/DL (ref 31.5–35.7)
MCV RBC AUTO: 98.1 FL (ref 79–97)
PLATELET # BLD AUTO: 317 10*3/MM3 (ref 140–450)
PMV BLD AUTO: 9.8 FL (ref 6–12)
POTASSIUM BLD-SCNC: 3.6 MMOL/L (ref 3.5–5.2)
RBC # BLD AUTO: 4.31 10*6/MM3 (ref 3.77–5.28)
SARS-COV-2 RNA RESP QL NAA+PROBE: NOT DETECTED
SODIUM BLD-SCNC: 136 MMOL/L (ref 136–145)
WBC NRBC COR # BLD: 8.71 10*3/MM3 (ref 3.4–10.8)

## 2020-06-23 PROCEDURE — C9803 HOPD COVID-19 SPEC COLLECT: HCPCS

## 2020-06-23 PROCEDURE — C1769 GUIDE WIRE: HCPCS | Performed by: INTERNAL MEDICINE

## 2020-06-23 PROCEDURE — 87635 SARS-COV-2 COVID-19 AMP PRB: CPT | Performed by: INTERNAL MEDICINE

## 2020-06-23 PROCEDURE — C1894 INTRO/SHEATH, NON-LASER: HCPCS | Performed by: INTERNAL MEDICINE

## 2020-06-23 PROCEDURE — 36415 COLL VENOUS BLD VENIPUNCTURE: CPT

## 2020-06-23 PROCEDURE — 93306 TTE W/DOPPLER COMPLETE: CPT

## 2020-06-23 PROCEDURE — 71045 X-RAY EXAM CHEST 1 VIEW: CPT

## 2020-06-23 PROCEDURE — 93880 EXTRACRANIAL BILAT STUDY: CPT

## 2020-06-23 PROCEDURE — 93458 L HRT ARTERY/VENTRICLE ANGIO: CPT | Performed by: INTERNAL MEDICINE

## 2020-06-23 PROCEDURE — 0 IOPAMIDOL PER 1 ML: Performed by: INTERNAL MEDICINE

## 2020-06-23 PROCEDURE — 99152 MOD SED SAME PHYS/QHP 5/>YRS: CPT | Performed by: INTERNAL MEDICINE

## 2020-06-23 PROCEDURE — 25010000002 HEPARIN (PORCINE) PER 1000 UNITS: Performed by: INTERNAL MEDICINE

## 2020-06-23 PROCEDURE — 25010000002 FENTANYL CITRATE (PF) 100 MCG/2ML SOLUTION: Performed by: INTERNAL MEDICINE

## 2020-06-23 PROCEDURE — 82962 GLUCOSE BLOOD TEST: CPT

## 2020-06-23 PROCEDURE — 25010000002 MIDAZOLAM PER 1 MG: Performed by: INTERNAL MEDICINE

## 2020-06-23 PROCEDURE — 85027 COMPLETE CBC AUTOMATED: CPT | Performed by: INTERNAL MEDICINE

## 2020-06-23 PROCEDURE — 80048 BASIC METABOLIC PNL TOTAL CA: CPT | Performed by: INTERNAL MEDICINE

## 2020-06-23 RX ORDER — TEMAZEPAM 7.5 MG/1
7.5 CAPSULE ORAL NIGHTLY PRN
Status: DISCONTINUED | OUTPATIENT
Start: 2020-06-23 | End: 2020-06-23 | Stop reason: HOSPADM

## 2020-06-23 RX ORDER — ASPIRIN 325 MG
325 TABLET, DELAYED RELEASE (ENTERIC COATED) ORAL DAILY
Status: DISCONTINUED | OUTPATIENT
Start: 2020-06-23 | End: 2020-06-23 | Stop reason: HOSPADM

## 2020-06-23 RX ORDER — SODIUM CHLORIDE 9 MG/ML
INJECTION, SOLUTION INTRAVENOUS CONTINUOUS PRN
Status: COMPLETED | OUTPATIENT
Start: 2020-06-23 | End: 2020-06-23

## 2020-06-23 RX ORDER — LIDOCAINE HYDROCHLORIDE 10 MG/ML
INJECTION, SOLUTION EPIDURAL; INFILTRATION; INTRACAUDAL; PERINEURAL AS NEEDED
Status: DISCONTINUED | OUTPATIENT
Start: 2020-06-23 | End: 2020-06-23 | Stop reason: HOSPADM

## 2020-06-23 RX ORDER — MORPHINE SULFATE 2 MG/ML
1 INJECTION, SOLUTION INTRAMUSCULAR; INTRAVENOUS EVERY 4 HOURS PRN
Status: DISCONTINUED | OUTPATIENT
Start: 2020-06-23 | End: 2020-06-23 | Stop reason: HOSPADM

## 2020-06-23 RX ORDER — LOSARTAN POTASSIUM 50 MG/1
50 TABLET ORAL DAILY
Status: ON HOLD | COMMUNITY
End: 2020-07-06

## 2020-06-23 RX ORDER — CARVEDILOL 3.12 MG/1
3.12 TABLET ORAL 2 TIMES DAILY
Qty: 180 TABLET | Refills: 3 | Status: SHIPPED | OUTPATIENT
Start: 2020-06-23 | End: 2020-07-20 | Stop reason: HOSPADM

## 2020-06-23 RX ORDER — MIDAZOLAM HYDROCHLORIDE 1 MG/ML
INJECTION INTRAMUSCULAR; INTRAVENOUS AS NEEDED
Status: DISCONTINUED | OUTPATIENT
Start: 2020-06-23 | End: 2020-06-23 | Stop reason: HOSPADM

## 2020-06-23 RX ORDER — DEXTROSE MONOHYDRATE 25 G/50ML
25 INJECTION, SOLUTION INTRAVENOUS
Status: DISCONTINUED | OUTPATIENT
Start: 2020-06-23 | End: 2020-06-23 | Stop reason: HOSPADM

## 2020-06-23 RX ORDER — HYDROCODONE BITARTRATE AND ACETAMINOPHEN 5; 325 MG/1; MG/1
1 TABLET ORAL EVERY 4 HOURS PRN
Status: DISCONTINUED | OUTPATIENT
Start: 2020-06-23 | End: 2020-06-23 | Stop reason: HOSPADM

## 2020-06-23 RX ORDER — SODIUM CHLORIDE 9 MG/ML
250 INJECTION, SOLUTION INTRAVENOUS CONTINUOUS
Status: ACTIVE | OUTPATIENT
Start: 2020-06-23 | End: 2020-06-23

## 2020-06-23 RX ORDER — SPIRONOLACTONE 25 MG/1
25 TABLET ORAL DAILY
Qty: 30 TABLET | Refills: 11 | Status: SHIPPED | OUTPATIENT
Start: 2020-06-23 | End: 2020-07-20 | Stop reason: HOSPADM

## 2020-06-23 RX ORDER — ALPRAZOLAM 0.25 MG/1
0.25 TABLET ORAL 3 TIMES DAILY PRN
Status: DISCONTINUED | OUTPATIENT
Start: 2020-06-23 | End: 2020-06-23 | Stop reason: HOSPADM

## 2020-06-23 RX ORDER — ACETAMINOPHEN 325 MG/1
650 TABLET ORAL EVERY 4 HOURS PRN
Status: DISCONTINUED | OUTPATIENT
Start: 2020-06-23 | End: 2020-06-23 | Stop reason: HOSPADM

## 2020-06-23 RX ORDER — NALOXONE HCL 0.4 MG/ML
0.4 VIAL (ML) INJECTION
Status: DISCONTINUED | OUTPATIENT
Start: 2020-06-23 | End: 2020-06-23 | Stop reason: HOSPADM

## 2020-06-23 RX ORDER — FUROSEMIDE 20 MG/1
20 TABLET ORAL DAILY
Status: ON HOLD | COMMUNITY
End: 2020-07-02

## 2020-06-23 RX ORDER — CETIRIZINE HYDROCHLORIDE 10 MG/1
10 TABLET ORAL DAILY
COMMUNITY
End: 2020-07-20 | Stop reason: HOSPADM

## 2020-06-23 RX ORDER — NICOTINE POLACRILEX 4 MG
15 LOZENGE BUCCAL
Status: DISCONTINUED | OUTPATIENT
Start: 2020-06-23 | End: 2020-06-23 | Stop reason: HOSPADM

## 2020-06-23 RX ORDER — FENTANYL CITRATE 50 UG/ML
INJECTION, SOLUTION INTRAMUSCULAR; INTRAVENOUS AS NEEDED
Status: DISCONTINUED | OUTPATIENT
Start: 2020-06-23 | End: 2020-06-23 | Stop reason: HOSPADM

## 2020-06-23 RX ORDER — OMEPRAZOLE 20 MG/1
20 CAPSULE, DELAYED RELEASE ORAL DAILY
COMMUNITY
End: 2020-07-31

## 2020-06-23 RX ADMIN — ASPIRIN 325 MG: 325 TABLET, COATED ORAL at 11:49

## 2020-06-23 NOTE — H&P
Pre-Cardiac Catheterization Report  Cardiovascular Laboratory  [facility]      Patient:  Beryl Montoya  :  1945  PCP:  Argelia Gamez MD  PHONE:  [phone]    DATE: 2020    BRIEF HPI:  Beryl Montoya is a 75 y.o. female with hypercholesterolemia, GERD, carotid artery stenosis, and a family history of coronary artery disease.  She is complaining of lower extremity edema that is been occurring for the past month.  She also has associated chest pain which she describes as a heaviness and shortness of breath, dyspnea on exertion, and fatigue.  Her symptoms are moderate in severity lasting minutes.  Her symptoms increase with exertion and are decreased with rest.  She recently underwent an abnormal stress test and now presents for left heart catheterization with possible intervention.    Cardiac Risk Factors:  advanced age (older than 55 for men, 65 for women), diabetes mellitus, dyslipidemia, family history of premature cardiovascular disease, obesity (BMI >= 30 kg/m2)    Anginal class in last 2 weeks:  CCS class II    CHF Class in last 2 weeks:  NYHA Class II    Cardiogenic shock:  no    Cardiac arrest <24 hours:  no    Stress test within last 6 months:   yes   Details:    Previous cardiac catheterization:  no  Details:     Previous CABG:  no  Details:      Allergies:     IV contrast allergy:  no  No Known Allergies    MEDICATIONS:  Prior to Admission medications    Medication Sig Start Date End Date Taking? Authorizing Provider   aspirin 325 MG tablet Take 1 tablet by mouth Daily. 17   Estella Humphries PA-C   atorvastatin (LIPITOR) 80 MG tablet Take 1 tablet by mouth Daily. 19   Argelia Gamez MD   clopidogrel (PLAVIX) 75 MG tablet Take 1 tablet by mouth once daily for 90 days 20   Argelia Gamez MD   Continuous Blood Gluc Sensor (FREESTYLE SALMA 14 DAY SENSOR) misc  USE TO CHECK GLUCOSE 6 TIMES DAILY AS DIRECTED 19    Argelia Gamez MD   diclofenac (VOLTAREN) 1 % gel gel Apply 4 g topically to the appropriate area as directed 4 (Four) Times a Day. Small amount to affected area 6/7/19   Josue Molina MD   FREESTYLE LITE test strip 1 each by Other route 6 (Six) Times a Day. 8/27/19   Argelia Gamez MD   furosemide (LASIX) 20 MG tablet TAKE 1/2 TO 1 (ONE-HALF TO ONE) TABLET BY MOUTH ONCE DAILY AS NEEDED FOR SWELLING 5/26/20   Argelia Gamez MD   Insulin Lispro Prot & Lispro (HUMALOG MIX 75/25 KWIKPEN) (75-25) 100 UNIT/ML suspension pen-injector pen INJECT 40 UNITS SUBCUTANEOUSLY IN THE MORNING, THEN INJECT 30 UNITS SUBCUTANEOUSLY IN THE EVENING 3/9/20   Argelia Gamez MD   Insulin Pen Needle (BD PEN NEEDLE DOROTHY U/F) 32G X 4 MM misc One each three times daily E11.8 3/11/20   Argelia Gamez MD   losartan (COZAAR) 100 MG tablet Take 1 tablet by mouth once daily 4/6/20   Argelia Gamez MD       Past medical & surgical history, social and family history reviewed in the electronic medical record.    ROS:  Cardiovascular ROS: positive for - chest pain, dyspnea on exertion, edema and shortness of breath    Physical Exam:    Vitals: There were no vitals filed for this visit. There were no vitals filed for this visit.    General Appearance:    Alert, cooperative, in no acute distress   Head:    Normocephalic, without obvious abnormality, atraumatic   Eyes:            Lids and lashes normal, conjunctivae and sclerae normal, no   icterus, no pallor, corneas clear, PERRLA   Ears:    Ears appear intact with no abnormalities noted   Neck:   No adenopathy, supple, trachea midline, no thyromegaly, +   carotid bruits, no JVD   Back:     No kyphosis present, no scoliosis present, range of motion normal   Lungs:     Clear to auscultation,respirations regular, even and                unlabored    Heart:    Regular rhythm and normal rate, normal S1 and S2, 2/6 SE          murmur, no gallop, no rub, no click   Chest Wall:    No abnormalities observed   Abdomen:     Normal bowel sounds, no masses, no organomegaly, soft     nontender, nondistended, no guarding, no rebound                tenderness   Rectal:     Deferred   Extremities:   Moves all extremities well, + edema, no cyanosis, no           redness   Pulses:   Pulses palpable and equal bilaterally   Skin:   No bleeding, bruising or rash   Neurologic:   Cranial nerves 2 - 12 grossly intact, sensation intact     Barbaeu Test:  Left: Normal  (oxymetric Allens) Right: Not assessed             Lab Results   Component Value Date    TRIG 76 06/03/2020    HDL 41 06/03/2020    AST 23 06/03/2020    ALT 14 06/03/2020           Impression      · Abnormal stress test    Plan     · Procedure to perform: [facility]  · Planned access: Left radial artery              AMARILIS Rajan  06/23/20  11:09

## 2020-06-23 NOTE — CONSULTS
CTS Consult    Patient Care Team:  Argelia Gamez MD as PCP - General (Internal Medicine)  Argelia Gamez MD as PCP - Claims Attributed      Reason for Consult:  Coronary artery disease    HPI  Patient is a 75 y.o. female with a history of GERD, hypertension, hyperlipidemia, diabetes mellitus, and stroke presented with complaints of chest pain, shortness of breath with exertion, fatigue and lower extremity edema for the last month.  She states that her symptoms last for several minutes and are exacerbated with exertion and relieved with rest. She had an abnormal stress test and underwent cardiac catheterization today which revealed severe three vessel coronary artery disease and severely impaired LV systolic function for which we have been asked to evaluate.  Patient denies current chest pain, shortness of breath, palpitations, diaphoresis, nausea or vomiting.      Review of Systems  All other systems are reviewed and are negative unless otherwise stated in HPI    History  Past Medical History:   Diagnosis Date   • Allergic    • Diabetes mellitus (CMS/HCC)    • GERD (gastroesophageal reflux disease)    • High cholesterol    • Hyperlipidemia    • Hypertension    • Stroke (CMS/HCC)    • TIA (transient ischemic attack)      Past Surgical History:   Procedure Laterality Date   • CATARACT EXTRACTION Bilateral    • EYE SURGERY     • HYSTERECTOMY      total   • INTERVENTIONAL RADIOLOGY PROCEDURE Bilateral 9/25/2017    Procedure: Carotid Cerebral Angiogram;  Surgeon: Maldonado Casas MD;  Location: [facility] INVASIVE LOCATION;  Service:      Family History   Problem Relation Age of Onset   • Diabetes Mother    • Cancer Mother    • Hypertension Mother    • Diabetes Father    • Heart disease Father      Social History     Tobacco Use   • Smoking status: Former Smoker     Packs/day: 0.25     Years: 4.00     Pack years: 1.00     Types: Cigarettes     Last attempt to quit: 9/22/2005     Years  since quittin.7   • Smokeless tobacco: Never Used   Substance Use Topics   • Alcohol use: No   • Drug use: No     Medications Prior to Admission   Medication Sig Dispense Refill Last Dose   • aspirin 325 MG tablet Take 1 tablet by mouth Daily. 30 tablet 0 2020 at 0900   • atorvastatin (LIPITOR) 80 MG tablet Take 1 tablet by mouth Daily. 90 tablet 2 2020 at 2100   • cetirizine (zyrTEC) 10 MG tablet Take 10 mg by mouth Daily.   2020 at 0700   • clopidogrel (PLAVIX) 75 MG tablet Take 1 tablet by mouth once daily for 90 days 90 tablet 0 2020 at 0700   • Continuous Blood Gluc Sensor (FREESTYLE SALMA 14 DAY SENSOR) misc  USE TO CHECK GLUCOSE 6 TIMES DAILY AS DIRECTED 2 each 2 2020 at Unknown time   • Docusate Calcium (STOOL SOFTENER PO) Take  by mouth Every Night.   2020 at 2100   • FREESTYLE LITE test strip 1 each by Other route 6 (Six) Times a Day. 300 each 3 2020 at Unknown time   • furosemide (LASIX) 20 MG tablet Take 20 mg by mouth Daily.   2020 at 0900   • Insulin Lispro Prot & Lispro (HUMALOG MIX 75/25 KWIKPEN) (75-25) 100 UNIT/ML suspension pen-injector pen INJECT 40 UNITS SUBCUTANEOUSLY IN THE MORNING, THEN INJECT 30 UNITS SUBCUTANEOUSLY IN THE EVENING (Patient taking differently: Pt takes 30 - 40 units BID depending on BG Level) 7 pen 11 2020 at 2100   • Insulin Pen Needle (BD PEN NEEDLE DOROTHY U/F) 32G X 4 MM misc One each three times daily E11.8 300 each 2 2020 at Unknown time   • losartan (COZAAR) 50 MG tablet Take 50 mg by mouth Daily.   2020 at 0700   • omeprazole (priLOSEC) 20 MG capsule Take 20 mg by mouth Daily.   2020 at 0700   • diclofenac (VOLTAREN) 1 % gel gel Apply 4 g topically to the appropriate area as directed 4 (Four) Times a Day. Small amount to affected area 300 g 3 Unknown at Unknown time     Allergies:  Patient has no known allergies.    Objective    Vital Signs  Heart Rate:  [] 92  Resp:  [16-18] 18  BP:  (136-185)/() 174/92    Physical Exam:  General Appearance: alert, appears stated age and cooperative  Head: normocephalic, without obvious abnormality and atraumatic  Throat: gums healthy and no oral lesions  Neck: carotid bruit, no adenopathy, suppple, trachea midline, no thyromegaly, and no JVD  Lungs: clear to auscultation, respirations regular, respirations even and respirations unlabored  Heart: DONAVAN, regular rhythm & normal rate, normal S1, S2, no murmur, no dash, no rub and no click  Abdomen: normal bowel sounds, no masses and soft non-tender  Extremities: 2+ BLE edema, moves extremities well  Pulses: Pulses palpable and equal bilaterally  Skin: no bleeding, bruising or rash  Neurologic: Mental Status orientated to person, place, time and situation.  Difficulty with memory and word finding.          Data Review:  Results from last 7 days   Lab Units 06/23/20  1120   WBC 10*3/mm3 8.71   HEMOGLOBIN g/dL 13.8   HEMATOCRIT % 42.3   PLATELETS 10*3/mm3 317     Results from last 7 days   Lab Units 06/23/20  1120   SODIUM mmol/L 136   POTASSIUM mmol/L 3.6   CHLORIDE mmol/L 99   CO2 mmol/L 24.0   BUN mg/dL 12   CREATININE mg/dL 1.01*   GLUCOSE mg/dL 166*   CALCIUM mg/dL 9.3     Coagulation: No results found for: INR, APTT  Cardiac markers:     ABGs:       Invalid input(s): PO2      Imaging Results (Last 72 Hours)     ** No results found for the last 72 hours. **            Cardiac catheterization   Angiographic Findings 6/23/20:  Coronary dominance, left  · LM:   Percent plaque  · LAD: Ostial 20 % stenosis, 100% occluded mid vessel  · LCX: Ostial 90% stenosis, first obtuse marginal 70% proximal stenosis, left PDA 70% stenosis  · RCA: Small vessel with diffuse 70% plaque     LV: LVEF mid and distal anterior, apical distal infra dyskinesis reduced ejection fraction about 30%        Assessment:        Abnormal stress test  Coronary artery disease  Impaired LV systolic function      Plan:  ECHO  Carotid  duplex  Patient to be discharged home with CABG scheduling per Dr. Lozano's timing      Lori Olson PA-C  06/23/20  16:03

## 2020-06-23 NOTE — DISCHARGE INSTR - APPOINTMENTS
Dr. Lozano's office will contact you regarding your up coming Coronary Artery Bypass Surgery (CABG)

## 2020-06-24 LAB
BH CV ECHO MEAS - AO MAX PG (FULL): 3 MMHG
BH CV ECHO MEAS - AO MAX PG: 4.8 MMHG
BH CV ECHO MEAS - AO MEAN PG (FULL): 1.9 MMHG
BH CV ECHO MEAS - AO MEAN PG: 2.9 MMHG
BH CV ECHO MEAS - AO ROOT AREA (BSA CORRECTED): 1.5
BH CV ECHO MEAS - AO ROOT AREA: 5.1 CM^2
BH CV ECHO MEAS - AO ROOT DIAM: 2.5 CM
BH CV ECHO MEAS - AO V2 MAX: 109.9 CM/SEC
BH CV ECHO MEAS - AO V2 MEAN: 81.2 CM/SEC
BH CV ECHO MEAS - AO V2 VTI: 23.7 CM
BH CV ECHO MEAS - AVA(I,A): 2.1 CM^2
BH CV ECHO MEAS - AVA(I,D): 2.1 CM^2
BH CV ECHO MEAS - AVA(V,A): 2 CM^2
BH CV ECHO MEAS - AVA(V,D): 2 CM^2
BH CV ECHO MEAS - BSA(HAYCOCK): 1.8 M^2
BH CV ECHO MEAS - BSA(HAYCOCK): 1.8 M^2
BH CV ECHO MEAS - BSA: 1.7 M^2
BH CV ECHO MEAS - BSA: 1.7 M^2
BH CV ECHO MEAS - BZI_BMI: 32 KILOGRAMS/M^2
BH CV ECHO MEAS - BZI_BMI: 32 KILOGRAMS/M^2
BH CV ECHO MEAS - BZI_METRIC_HEIGHT: 152.4 CM
BH CV ECHO MEAS - BZI_METRIC_HEIGHT: 152.4 CM
BH CV ECHO MEAS - BZI_METRIC_WEIGHT: 74.4 KG
BH CV ECHO MEAS - BZI_METRIC_WEIGHT: 74.4 KG
BH CV ECHO MEAS - EDV(CUBED): 52.7 ML
BH CV ECHO MEAS - EDV(MOD-SP2): 106 ML
BH CV ECHO MEAS - EDV(MOD-SP4): 89 ML
BH CV ECHO MEAS - EDV(TEICH): 60 ML
BH CV ECHO MEAS - EF(CUBED): 53.1 %
BH CV ECHO MEAS - EF(MOD-BP): 42 %
BH CV ECHO MEAS - EF(MOD-SP2): 50 %
BH CV ECHO MEAS - EF(MOD-SP4): 41.6 %
BH CV ECHO MEAS - EF(TEICH): 45.7 %
BH CV ECHO MEAS - EF{MOD-BP}: 43 %
BH CV ECHO MEAS - ESV(CUBED): 24.7 ML
BH CV ECHO MEAS - ESV(MOD-SP2): 53 ML
BH CV ECHO MEAS - ESV(MOD-SP4): 52 ML
BH CV ECHO MEAS - ESV(TEICH): 32.6 ML
BH CV ECHO MEAS - FS: 22.3 %
BH CV ECHO MEAS - IVS/LVPW: 0.87
BH CV ECHO MEAS - IVSD: 1 CM
BH CV ECHO MEAS - LA DIMENSION: 3.8 CM
BH CV ECHO MEAS - LA/AO: 1.4
BH CV ECHO MEAS - LAD MAJOR: 5.6 CM
BH CV ECHO MEAS - LAT PEAK E' VEL: 5.8 CM/SEC
BH CV ECHO MEAS - LATERAL E/E' RATIO: 17.2
BH CV ECHO MEAS - LV DIASTOLIC VOL/BSA (35-75): 51.9 ML/M^2
BH CV ECHO MEAS - LV MASS(C)D: 128.4 GRAMS
BH CV ECHO MEAS - LV MASS(C)DI: 74.8 GRAMS/M^2
BH CV ECHO MEAS - LV MAX PG: 1.8 MMHG
BH CV ECHO MEAS - LV MEAN PG: 0.99 MMHG
BH CV ECHO MEAS - LV SYSTOLIC VOL/BSA (12-30): 30.3 ML/M^2
BH CV ECHO MEAS - LV V1 MAX: 67.5 CM/SEC
BH CV ECHO MEAS - LV V1 MEAN: 45.1 CM/SEC
BH CV ECHO MEAS - LV V1 VTI: 15.6 CM
BH CV ECHO MEAS - LVIDD: 3.7 CM
BH CV ECHO MEAS - LVIDS: 2.9 CM
BH CV ECHO MEAS - LVLD AP2: 8.6 CM
BH CV ECHO MEAS - LVLD AP4: 8.5 CM
BH CV ECHO MEAS - LVLS AP2: 8.2 CM
BH CV ECHO MEAS - LVLS AP4: 7.6 CM
BH CV ECHO MEAS - LVOT AREA (M): 3.1 CM^2
BH CV ECHO MEAS - LVOT AREA: 3.2 CM^2
BH CV ECHO MEAS - LVOT DIAM: 2 CM
BH CV ECHO MEAS - LVPWD: 1.2 CM
BH CV ECHO MEAS - MED PEAK E' VEL: 3.8 CM/SEC
BH CV ECHO MEAS - MEDIAL E/E' RATIO: 25.6
BH CV ECHO MEAS - MV A MAX VEL: 72.9 CM/SEC
BH CV ECHO MEAS - MV DEC SLOPE: 354.5 CM/SEC^2
BH CV ECHO MEAS - MV DEC TIME: 0.19 SEC
BH CV ECHO MEAS - MV E MAX VEL: 100.9 CM/SEC
BH CV ECHO MEAS - MV E/A: 1.4
BH CV ECHO MEAS - MV P1/2T MAX VEL: 98 CM/SEC
BH CV ECHO MEAS - MV P1/2T: 80.9 MSEC
BH CV ECHO MEAS - MVA P1/2T LCG: 2.2 CM^2
BH CV ECHO MEAS - MVA(P1/2T): 2.7 CM^2
BH CV ECHO MEAS - PA ACC SLOPE: 428.2 CM/SEC^2
BH CV ECHO MEAS - PA ACC TIME: 0.14 SEC
BH CV ECHO MEAS - PA MAX PG: 2.1 MMHG
BH CV ECHO MEAS - PA PR(ACCEL): 14 MMHG
BH CV ECHO MEAS - PA V2 MAX: 72.5 CM/SEC
BH CV ECHO MEAS - PI END-D VEL: 86.3 CM/SEC
BH CV ECHO MEAS - SI(AO): 70.6 ML/M^2
BH CV ECHO MEAS - SI(CUBED): 16.3 ML/M^2
BH CV ECHO MEAS - SI(LVOT): 29 ML/M^2
BH CV ECHO MEAS - SI(MOD-SP2): 30.9 ML/M^2
BH CV ECHO MEAS - SI(MOD-SP4): 21.6 ML/M^2
BH CV ECHO MEAS - SI(TEICH): 16 ML/M^2
BH CV ECHO MEAS - SV(AO): 121.1 ML
BH CV ECHO MEAS - SV(CUBED): 28 ML
BH CV ECHO MEAS - SV(LVOT): 49.8 ML
BH CV ECHO MEAS - SV(MOD-SP2): 53 ML
BH CV ECHO MEAS - SV(MOD-SP4): 37 ML
BH CV ECHO MEAS - SV(TEICH): 27.4 ML
BH CV ECHO MEAS - TAPSE (>1.6): 1.6 CM2
BH CV ECHO MEASUREMENTS AVERAGE E/E' RATIO: 21.02
BH CV VAS BP LEFT ARM: NORMAL MMHG
BH CV XLRA - RV BASE: 3.5 CM
BH CV XLRA - RV LENGTH: 6.2 CM
BH CV XLRA - RV MID: 2.1 CM
BH CV XLRA - TDI S': 8.06 CM/SEC
BH CV XLRA MEAS LEFT CCA RATIO VEL: 77.1 CM/SEC
BH CV XLRA MEAS LEFT DIST CCA EDV: 14.2 CM/SEC
BH CV XLRA MEAS LEFT DIST CCA PSV: 77.6 CM/SEC
BH CV XLRA MEAS LEFT DIST ICA EDV: 14.8 CM/SEC
BH CV XLRA MEAS LEFT DIST ICA PSV: 69.5 CM/SEC
BH CV XLRA MEAS LEFT ICA RATIO VEL: 115 CM/SEC
BH CV XLRA MEAS LEFT ICA/CCA RATIO: 1.5
BH CV XLRA MEAS LEFT MID CCA EDV: 13.8 CM/SEC
BH CV XLRA MEAS LEFT MID CCA PSV: 69.7 CM/SEC
BH CV XLRA MEAS LEFT MID ICA EDV: 17.6 CM/SEC
BH CV XLRA MEAS LEFT MID ICA PSV: 70.1 CM/SEC
BH CV XLRA MEAS LEFT PROX CCA EDV: 6.4 CM/SEC
BH CV XLRA MEAS LEFT PROX CCA PSV: 67.8 CM/SEC
BH CV XLRA MEAS LEFT PROX ECA PSV: 156.4 CM/SEC
BH CV XLRA MEAS LEFT PROX ICA EDV: 18.2 CM/SEC
BH CV XLRA MEAS LEFT PROX ICA PSV: 115.2 CM/SEC
BH CV XLRA MEAS LEFT PROX SCLA PSV: 185.2 CM/SEC
BH CV XLRA MEAS LEFT VERTEBRAL A EDV: 9.1 CM/SEC
BH CV XLRA MEAS LEFT VERTEBRAL A PSV: 40.9 CM/SEC
BH CV XLRA MEAS RIGHT CCA RATIO VEL: 62.4 CM/SEC
BH CV XLRA MEAS RIGHT DIST CCA EDV: 10.5 CM/SEC
BH CV XLRA MEAS RIGHT DIST CCA PSV: 62.9 CM/SEC
BH CV XLRA MEAS RIGHT DIST ICA EDV: 19.6 CM/SEC
BH CV XLRA MEAS RIGHT DIST ICA PSV: 71.2 CM/SEC
BH CV XLRA MEAS RIGHT ICA RATIO VEL: 91.2 CM/SEC
BH CV XLRA MEAS RIGHT ICA/CCA RATIO: 1.5
BH CV XLRA MEAS RIGHT MID CCA EDV: 17.1 CM/SEC
BH CV XLRA MEAS RIGHT MID CCA PSV: 61.8 CM/SEC
BH CV XLRA MEAS RIGHT MID ICA EDV: 15.3 CM/SEC
BH CV XLRA MEAS RIGHT MID ICA PSV: 59.4 CM/SEC
BH CV XLRA MEAS RIGHT PROX CCA EDV: 12.1 CM/SEC
BH CV XLRA MEAS RIGHT PROX CCA PSV: 68.9 CM/SEC
BH CV XLRA MEAS RIGHT PROX ECA EDV: 15.7 CM/SEC
BH CV XLRA MEAS RIGHT PROX ECA PSV: 91.8 CM/SEC
BH CV XLRA MEAS RIGHT PROX ICA EDV: 20.1 CM/SEC
BH CV XLRA MEAS RIGHT PROX ICA PSV: 91.7 CM/SEC
BH CV XLRA MEAS RIGHT PROX SCLA PSV: 157.1 CM/SEC
BH CV XLRA MEAS RIGHT VERTEBRAL A EDV: 15.4 CM/SEC
BH CV XLRA MEAS RIGHT VERTEBRAL A PSV: 73.3 CM/SEC
LEFT ATRIUM VOLUME INDEX: 32.1 ML/M^2
LEFT ATRIUM VOLUME: 55 ML
LV EF 2D ECHO EST: 35 %
RIGHT ARM BP: NORMAL MMHG

## 2020-06-25 ENCOUNTER — PREP FOR SURGERY (OUTPATIENT)
Dept: OTHER | Facility: HOSPITAL | Age: 75
End: 2020-06-25

## 2020-06-25 DIAGNOSIS — I25.119 CORONARY ARTERY DISEASE INVOLVING NATIVE CORONARY ARTERY OF NATIVE HEART WITH ANGINA PECTORIS (HCC): Primary | ICD-10-CM

## 2020-06-25 RX ORDER — NITROGLYCERIN 0.4 MG/1
0.4 TABLET SUBLINGUAL
Status: CANCELLED | OUTPATIENT
Start: 2020-06-30

## 2020-06-25 RX ORDER — ASPIRIN 325 MG
325 TABLET ORAL NIGHTLY
Status: CANCELLED | OUTPATIENT
Start: 2020-06-29 | End: 2020-06-30

## 2020-06-25 RX ORDER — CHLORHEXIDINE GLUCONATE 500 MG/1
1 CLOTH TOPICAL EVERY 12 HOURS PRN
Status: CANCELLED | OUTPATIENT
Start: 2020-06-30

## 2020-06-25 RX ORDER — ACETAMINOPHEN 325 MG/1
650 TABLET ORAL EVERY 4 HOURS PRN
Status: CANCELLED | OUTPATIENT
Start: 2020-06-30

## 2020-06-25 RX ORDER — CHLORHEXIDINE GLUCONATE 0.12 MG/ML
15 RINSE ORAL ONCE
Status: CANCELLED | OUTPATIENT
Start: 2020-06-30 | End: 2020-06-30

## 2020-06-25 RX ORDER — CHLORHEXIDINE GLUCONATE 500 MG/1
1 CLOTH TOPICAL EVERY 12 HOURS PRN
Status: CANCELLED | OUTPATIENT
Start: 2020-06-29

## 2020-06-29 ENCOUNTER — HOSPITAL ENCOUNTER (OUTPATIENT)
Dept: PULMONOLOGY | Facility: HOSPITAL | Age: 75
Discharge: HOME | End: 2020-06-29

## 2020-06-29 ENCOUNTER — HOSPITAL ENCOUNTER (INPATIENT)
Facility: HOSPITAL | Age: 75
LOS: 20 days | Discharge: NURSING, REHAB OR LONG-TERM CARE | End: 2020-07-20
Attending: THORACIC SURGERY (CARDIOTHORACIC VASCULAR SURGERY) | Admitting: THORACIC SURGERY (CARDIOTHORACIC VASCULAR SURGERY)

## 2020-06-29 ENCOUNTER — HOSPITAL ENCOUNTER (OUTPATIENT)
Dept: GENERAL RADIOLOGY | Facility: HOSPITAL | Age: 75
Discharge: HOME | End: 2020-06-29
Admitting: NURSE PRACTITIONER

## 2020-06-29 ENCOUNTER — ANESTHESIA EVENT (OUTPATIENT)
Dept: PERIOP | Facility: HOSPITAL | Age: 75
End: 2020-06-29

## 2020-06-29 ENCOUNTER — APPOINTMENT (OUTPATIENT)
Dept: PREADMISSION TESTING | Facility: HOSPITAL | Age: 75
End: 2020-06-29

## 2020-06-29 VITALS — HEIGHT: 60 IN | WEIGHT: 161 LBS | OXYGEN SATURATION: 94 % | BODY MASS INDEX: 31.61 KG/M2

## 2020-06-29 DIAGNOSIS — R47.01 APHASIA: ICD-10-CM

## 2020-06-29 DIAGNOSIS — Z78.9 IMPAIRED MOBILITY AND ADLS: ICD-10-CM

## 2020-06-29 DIAGNOSIS — I25.119 CORONARY ARTERY DISEASE INVOLVING NATIVE CORONARY ARTERY OF NATIVE HEART WITH ANGINA PECTORIS (HCC): ICD-10-CM

## 2020-06-29 DIAGNOSIS — R13.12 OROPHARYNGEAL DYSPHAGIA: ICD-10-CM

## 2020-06-29 DIAGNOSIS — I63.9 ACUTE CVA (CEREBROVASCULAR ACCIDENT) (HCC): ICD-10-CM

## 2020-06-29 DIAGNOSIS — I25.119 CORONARY ARTERY DISEASE INVOLVING NATIVE CORONARY ARTERY OF NATIVE HEART WITH ANGINA PECTORIS (HCC): Primary | ICD-10-CM

## 2020-06-29 DIAGNOSIS — Z74.09 IMPAIRED MOBILITY AND ADLS: ICD-10-CM

## 2020-06-29 LAB
ABO GROUP BLD: NORMAL
ALBUMIN SERPL-MCNC: 3.5 G/DL (ref 3.5–5.2)
ALBUMIN/GLOB SERPL: 1 G/DL
ALP SERPL-CCNC: 116 U/L (ref 39–117)
ALT SERPL W P-5'-P-CCNC: 11 U/L (ref 1–33)
AMPHET+METHAMPHET UR QL: NEGATIVE
AMPHETAMINES UR QL: NEGATIVE
ANION GAP SERPL CALCULATED.3IONS-SCNC: 10 MMOL/L (ref 5–15)
APTT PPP: 36.1 SECONDS (ref 24–37)
AST SERPL-CCNC: 19 U/L (ref 1–32)
BARBITURATES UR QL SCN: NEGATIVE
BASOPHILS # BLD AUTO: 0.07 10*3/MM3 (ref 0–0.2)
BASOPHILS NFR BLD AUTO: 0.9 % (ref 0–1.5)
BENZODIAZ UR QL SCN: NEGATIVE
BILIRUB SERPL-MCNC: 0.7 MG/DL (ref 0.2–1.2)
BLD GP AB SCN SERPL QL: NEGATIVE
BUN SERPL-MCNC: 10 MG/DL (ref 8–23)
BUN/CREAT SERPL: 7.9 (ref 7–25)
BUPRENORPHINE SERPL-MCNC: NEGATIVE NG/ML
CALCIUM SPEC-SCNC: 8.9 MG/DL (ref 8.6–10.5)
CANNABINOIDS SERPL QL: NEGATIVE
CHLORIDE SERPL-SCNC: 103 MMOL/L (ref 98–107)
CO2 SERPL-SCNC: 29 MMOL/L (ref 22–29)
COCAINE UR QL: NEGATIVE
CREAT SERPL-MCNC: 1.26 MG/DL (ref 0.57–1)
DEPRECATED RDW RBC AUTO: 50.8 FL (ref 37–54)
EOSINOPHIL # BLD AUTO: 0.37 10*3/MM3 (ref 0–0.4)
EOSINOPHIL NFR BLD AUTO: 4.8 % (ref 0.3–6.2)
ERYTHROCYTE [DISTWIDTH] IN BLOOD BY AUTOMATED COUNT: 13.9 % (ref 12.3–15.4)
GFR SERPL CREATININE-BSD FRML MDRD: 41 ML/MIN/1.73
GLOBULIN UR ELPH-MCNC: 3.4 GM/DL
GLUCOSE SERPL-MCNC: 264 MG/DL (ref 65–99)
HBA1C MFR BLD: 6.9 % (ref 4.8–5.6)
HCT VFR BLD AUTO: 42.5 % (ref 34–46.6)
HGB BLD-MCNC: 13.5 G/DL (ref 12–15.9)
IMM GRANULOCYTES # BLD AUTO: 0.01 10*3/MM3 (ref 0–0.05)
IMM GRANULOCYTES NFR BLD AUTO: 0.1 % (ref 0–0.5)
INR PPP: 1.38 (ref 0.85–1.16)
LYMPHOCYTES # BLD AUTO: 1.92 10*3/MM3 (ref 0.7–3.1)
LYMPHOCYTES NFR BLD AUTO: 25 % (ref 19.6–45.3)
MAGNESIUM SERPL-MCNC: 1.8 MG/DL (ref 1.6–2.4)
MCH RBC QN AUTO: 31.4 PG (ref 26.6–33)
MCHC RBC AUTO-ENTMCNC: 31.8 G/DL (ref 31.5–35.7)
MCV RBC AUTO: 98.8 FL (ref 79–97)
METHADONE UR QL SCN: NEGATIVE
MONOCYTES # BLD AUTO: 0.53 10*3/MM3 (ref 0.1–0.9)
MONOCYTES NFR BLD AUTO: 6.9 % (ref 5–12)
NEUTROPHILS # BLD AUTO: 4.78 10*3/MM3 (ref 1.7–7)
NEUTROPHILS NFR BLD AUTO: 62.3 % (ref 42.7–76)
NRBC BLD AUTO-RTO: 0 /100 WBC (ref 0–0.2)
OPIATES UR QL: NEGATIVE
OXYCODONE UR QL SCN: NEGATIVE
PA ADP PRP-ACNC: 177 PRU
PCP UR QL SCN: NEGATIVE
PLATELET # BLD AUTO: 309 10*3/MM3 (ref 140–450)
PMV BLD AUTO: 9.8 FL (ref 6–12)
POTASSIUM SERPL-SCNC: 4 MMOL/L (ref 3.5–5.2)
PROPOXYPH UR QL: NEGATIVE
PROT SERPL-MCNC: 6.9 G/DL (ref 6–8.5)
PROTHROMBIN TIME: 16.7 SECONDS (ref 11.5–14)
RBC # BLD AUTO: 4.3 10*6/MM3 (ref 3.77–5.28)
RH BLD: POSITIVE
SARS-COV-2 RNA RESP QL NAA+PROBE: NOT DETECTED
SODIUM SERPL-SCNC: 142 MMOL/L (ref 136–145)
T&S EXPIRATION DATE: NORMAL
TRICYCLICS UR QL SCN: NEGATIVE
WBC NRBC COR # BLD: 7.68 10*3/MM3 (ref 3.4–10.8)

## 2020-06-29 PROCEDURE — 83036 HEMOGLOBIN GLYCOSYLATED A1C: CPT | Performed by: NURSE PRACTITIONER

## 2020-06-29 PROCEDURE — 85610 PROTHROMBIN TIME: CPT | Performed by: NURSE PRACTITIONER

## 2020-06-29 PROCEDURE — C9803 HOPD COVID-19 SPEC COLLECT: HCPCS

## 2020-06-29 PROCEDURE — 86923 COMPATIBILITY TEST ELECTRIC: CPT

## 2020-06-29 PROCEDURE — 83735 ASSAY OF MAGNESIUM: CPT | Performed by: NURSE PRACTITIONER

## 2020-06-29 PROCEDURE — 36415 COLL VENOUS BLD VENIPUNCTURE: CPT

## 2020-06-29 PROCEDURE — 94010 BREATHING CAPACITY TEST: CPT | Performed by: INTERNAL MEDICINE

## 2020-06-29 PROCEDURE — 86850 RBC ANTIBODY SCREEN: CPT | Performed by: NURSE PRACTITIONER

## 2020-06-29 PROCEDURE — 85576 BLOOD PLATELET AGGREGATION: CPT | Performed by: NURSE PRACTITIONER

## 2020-06-29 PROCEDURE — 71046 X-RAY EXAM CHEST 2 VIEWS: CPT

## 2020-06-29 PROCEDURE — 85730 THROMBOPLASTIN TIME PARTIAL: CPT | Performed by: NURSE PRACTITIONER

## 2020-06-29 PROCEDURE — 93005 ELECTROCARDIOGRAM TRACING: CPT

## 2020-06-29 PROCEDURE — 87635 SARS-COV-2 COVID-19 AMP PRB: CPT | Performed by: PEDIATRICS

## 2020-06-29 PROCEDURE — 93010 ELECTROCARDIOGRAM REPORT: CPT | Performed by: INTERNAL MEDICINE

## 2020-06-29 PROCEDURE — 80306 DRUG TEST PRSMV INSTRMNT: CPT | Performed by: NURSE PRACTITIONER

## 2020-06-29 PROCEDURE — 86901 BLOOD TYPING SEROLOGIC RH(D): CPT | Performed by: NURSE PRACTITIONER

## 2020-06-29 PROCEDURE — 80053 COMPREHEN METABOLIC PANEL: CPT | Performed by: NURSE PRACTITIONER

## 2020-06-29 PROCEDURE — 86900 BLOOD TYPING SEROLOGIC ABO: CPT | Performed by: NURSE PRACTITIONER

## 2020-06-29 PROCEDURE — 94010 BREATHING CAPACITY TEST: CPT

## 2020-06-29 PROCEDURE — 85025 COMPLETE CBC W/AUTO DIFF WBC: CPT | Performed by: NURSE PRACTITIONER

## 2020-06-29 RX ORDER — SODIUM CHLORIDE 0.9 % (FLUSH) 0.9 %
10 SYRINGE (ML) INJECTION AS NEEDED
Status: CANCELLED | OUTPATIENT
Start: 2020-06-29

## 2020-06-29 RX ORDER — SODIUM CHLORIDE 0.9 % (FLUSH) 0.9 %
10 SYRINGE (ML) INJECTION EVERY 12 HOURS SCHEDULED
Status: CANCELLED | OUTPATIENT
Start: 2020-06-29

## 2020-06-29 RX ORDER — ASPIRIN 325 MG
325 TABLET ORAL NIGHTLY
Status: SHIPPED | OUTPATIENT
Start: 2020-06-29 | End: 2020-06-30

## 2020-06-29 RX ORDER — CHLORHEXIDINE GLUCONATE 500 MG/1
1 CLOTH TOPICAL EVERY 12 HOURS PRN
Status: DISCONTINUED | OUTPATIENT
Start: 2020-06-29 | End: 2020-07-02

## 2020-06-29 RX ORDER — FAMOTIDINE 10 MG/ML
20 INJECTION, SOLUTION INTRAVENOUS ONCE
Status: CANCELLED | OUTPATIENT
Start: 2020-06-29 | End: 2020-06-29

## 2020-06-30 ENCOUNTER — APPOINTMENT (OUTPATIENT)
Dept: GENERAL RADIOLOGY | Facility: HOSPITAL | Age: 75
End: 2020-06-30

## 2020-06-30 ENCOUNTER — ANCILLARY PROCEDURE (OUTPATIENT)
Dept: PERIOP | Facility: HOSPITAL | Age: 75
End: 2020-06-30

## 2020-06-30 ENCOUNTER — ANESTHESIA (OUTPATIENT)
Dept: PERIOP | Facility: HOSPITAL | Age: 75
End: 2020-06-30

## 2020-06-30 PROBLEM — I50.9 CHF (CONGESTIVE HEART FAILURE): Status: ACTIVE | Noted: 2020-06-30

## 2020-06-30 PROBLEM — I25.5 ISCHEMIC CARDIOMYOPATHY: Status: ACTIVE | Noted: 2020-06-30

## 2020-06-30 PROBLEM — I65.23 BILATERAL CAROTID ARTERY STENOSIS: Status: ACTIVE | Noted: 2017-09-22

## 2020-06-30 LAB
ABO GROUP BLD: NORMAL
ACT BLD: 142 SECONDS (ref 82–152)
ACT BLD: 142 SECONDS (ref 82–152)
ACT BLD: 153 SECONDS (ref 82–152)
ACT BLD: 588 SECONDS (ref 82–152)
ACT BLD: 775 SECONDS (ref 82–152)
ACT BLD: 874 SECONDS (ref 82–152)
ACT BLD: 956 SECONDS (ref 82–152)
ALBUMIN SERPL-MCNC: 3 G/DL (ref 3.5–5.2)
ALBUMIN SERPL-MCNC: 3.3 G/DL (ref 3.5–5.2)
ALBUMIN SERPL-MCNC: 3.7 G/DL (ref 3.5–5.2)
ANION GAP SERPL CALCULATED.3IONS-SCNC: 13 MMOL/L (ref 5–15)
ANION GAP SERPL CALCULATED.3IONS-SCNC: 13 MMOL/L (ref 5–15)
ANION GAP SERPL CALCULATED.3IONS-SCNC: 14 MMOL/L (ref 5–15)
APTT PPP: 38.6 SECONDS (ref 24–37)
ARTERIAL PATENCY WRIST A: ABNORMAL
ATMOSPHERIC PRESS: ABNORMAL MM[HG]
BASE EXCESS BLDA CALC-SCNC: -0.5 MMOL/L (ref 0–2)
BASE EXCESS BLDA CALC-SCNC: 0.6 MMOL/L (ref 0–2)
BASE EXCESS BLDA CALC-SCNC: 1 MMOL/L (ref -5–5)
BASE EXCESS BLDA CALC-SCNC: 2 MMOL/L (ref -5–5)
BASE EXCESS BLDA CALC-SCNC: 2.1 MMOL/L (ref 0–2)
BASE EXCESS BLDA CALC-SCNC: 3 MMOL/L (ref -5–5)
BASE EXCESS BLDA CALC-SCNC: 6 MMOL/L (ref -5–5)
BASE EXCESS BLDA CALC-SCNC: 7 MMOL/L (ref -5–5)
BASE EXCESS BLDA CALC-SCNC: 7 MMOL/L (ref -5–5)
BDY SITE: ABNORMAL
BODY TEMPERATURE: 37 C
BUN SERPL-MCNC: 11 MG/DL (ref 8–23)
BUN SERPL-MCNC: 12 MG/DL (ref 8–23)
BUN SERPL-MCNC: 13 MG/DL (ref 8–23)
BUN/CREAT SERPL: 10.7 (ref 7–25)
BUN/CREAT SERPL: 12.1 (ref 7–25)
BUN/CREAT SERPL: 12.5 (ref 7–25)
CA-I BLDA-SCNC: 0.93 MMOL/L (ref 1.2–1.32)
CA-I BLDA-SCNC: 0.96 MMOL/L (ref 1.2–1.32)
CA-I BLDA-SCNC: 0.97 MMOL/L (ref 1.2–1.32)
CA-I BLDA-SCNC: 1.17 MMOL/L (ref 1.2–1.32)
CA-I BLDA-SCNC: 1.21 MMOL/L (ref 1.2–1.32)
CA-I BLDA-SCNC: 1.47 MMOL/L (ref 1.2–1.32)
CA-I SERPL ISE-MCNC: 1.33 MMOL/L (ref 1.12–1.32)
CALCIUM SPEC-SCNC: 8.7 MG/DL (ref 8.6–10.5)
CALCIUM SPEC-SCNC: 9.1 MG/DL (ref 8.6–10.5)
CALCIUM SPEC-SCNC: 9.3 MG/DL (ref 8.6–10.5)
CHLORIDE SERPL-SCNC: 105 MMOL/L (ref 98–107)
CHLORIDE SERPL-SCNC: 107 MMOL/L (ref 98–107)
CHLORIDE SERPL-SCNC: 107 MMOL/L (ref 98–107)
CO2 BLDA-SCNC: 25.8 MMOL/L (ref 22–33)
CO2 BLDA-SCNC: 26 MMOL/L (ref 22–33)
CO2 BLDA-SCNC: 27 MMOL/L (ref 24–29)
CO2 BLDA-SCNC: 27.6 MMOL/L (ref 22–33)
CO2 BLDA-SCNC: 28 MMOL/L (ref 24–29)
CO2 BLDA-SCNC: 30 MMOL/L (ref 24–29)
CO2 BLDA-SCNC: 30 MMOL/L (ref 24–29)
CO2 BLDA-SCNC: 32 MMOL/L (ref 24–29)
CO2 BLDA-SCNC: 33 MMOL/L (ref 24–29)
CO2 SERPL-SCNC: 23 MMOL/L (ref 22–29)
CO2 SERPL-SCNC: 24 MMOL/L (ref 22–29)
CO2 SERPL-SCNC: 25 MMOL/L (ref 22–29)
COHGB MFR BLD: 0.8 % (ref 0–2)
COHGB MFR BLD: 1.3 % (ref 0–2)
COHGB MFR BLD: 1.4 % (ref 0–2)
CREAT SERPL-MCNC: 0.88 MG/DL (ref 0.57–1)
CREAT SERPL-MCNC: 0.99 MG/DL (ref 0.57–1)
CREAT SERPL-MCNC: 1.22 MG/DL (ref 0.57–1)
DEPRECATED RDW RBC AUTO: 54.4 FL (ref 37–54)
DEPRECATED RDW RBC AUTO: 57.4 FL (ref 37–54)
DEPRECATED RDW RBC AUTO: 59.1 FL (ref 37–54)
ERYTHROCYTE [DISTWIDTH] IN BLOOD BY AUTOMATED COUNT: 15.8 % (ref 12.3–15.4)
ERYTHROCYTE [DISTWIDTH] IN BLOOD BY AUTOMATED COUNT: 16.2 % (ref 12.3–15.4)
ERYTHROCYTE [DISTWIDTH] IN BLOOD BY AUTOMATED COUNT: 16.6 % (ref 12.3–15.4)
GFR SERPL CREATININE-BSD FRML MDRD: 43 ML/MIN/1.73
GFR SERPL CREATININE-BSD FRML MDRD: 55 ML/MIN/1.73
GFR SERPL CREATININE-BSD FRML MDRD: 63 ML/MIN/1.73
GLUCOSE BLDC GLUCOMTR-MCNC: 103 MG/DL (ref 70–130)
GLUCOSE BLDC GLUCOMTR-MCNC: 169 MG/DL (ref 70–130)
GLUCOSE BLDC GLUCOMTR-MCNC: 183 MG/DL (ref 70–130)
GLUCOSE BLDC GLUCOMTR-MCNC: 203 MG/DL (ref 70–130)
GLUCOSE BLDC GLUCOMTR-MCNC: 215 MG/DL (ref 70–130)
GLUCOSE BLDC GLUCOMTR-MCNC: 215 MG/DL (ref 70–130)
GLUCOSE BLDC GLUCOMTR-MCNC: 65 MG/DL (ref 70–130)
GLUCOSE BLDC GLUCOMTR-MCNC: 67 MG/DL (ref 70–130)
GLUCOSE BLDC GLUCOMTR-MCNC: 87 MG/DL (ref 70–130)
GLUCOSE BLDC GLUCOMTR-MCNC: 91 MG/DL (ref 70–130)
GLUCOSE BLDC GLUCOMTR-MCNC: 93 MG/DL (ref 70–130)
GLUCOSE BLDC GLUCOMTR-MCNC: 94 MG/DL (ref 70–130)
GLUCOSE SERPL-MCNC: 118 MG/DL (ref 65–99)
GLUCOSE SERPL-MCNC: 192 MG/DL (ref 65–99)
GLUCOSE SERPL-MCNC: 79 MG/DL (ref 65–99)
HCO3 BLDA-SCNC: 24.7 MMOL/L (ref 20–26)
HCO3 BLDA-SCNC: 24.9 MMOL/L (ref 20–26)
HCO3 BLDA-SCNC: 25.5 MMOL/L (ref 22–26)
HCO3 BLDA-SCNC: 26.2 MMOL/L (ref 20–26)
HCO3 BLDA-SCNC: 27.2 MMOL/L (ref 22–26)
HCO3 BLDA-SCNC: 28.5 MMOL/L (ref 22–26)
HCO3 BLDA-SCNC: 28.9 MMOL/L (ref 22–26)
HCO3 BLDA-SCNC: 30.6 MMOL/L (ref 22–26)
HCO3 BLDA-SCNC: 31.2 MMOL/L (ref 22–26)
HCT VFR BLD AUTO: 25.4 % (ref 34–46.6)
HCT VFR BLD AUTO: 26.8 % (ref 34–46.6)
HCT VFR BLD AUTO: 28.7 % (ref 34–46.6)
HCT VFR BLD CALC: 26.7 %
HCT VFR BLD CALC: 27.3 %
HCT VFR BLD CALC: 28.4 %
HCT VFR BLDA CALC: 24 % (ref 38–51)
HCT VFR BLDA CALC: 26 % (ref 38–51)
HCT VFR BLDA CALC: 28 % (ref 38–51)
HCT VFR BLDA CALC: 30 % (ref 38–51)
HCT VFR BLDA CALC: 31 % (ref 38–51)
HCT VFR BLDA CALC: 37 % (ref 38–51)
HGB BLD-MCNC: 8 G/DL (ref 12–15.9)
HGB BLD-MCNC: 9.1 G/DL (ref 12–15.9)
HGB BLD-MCNC: 9.2 G/DL (ref 12–15.9)
HGB BLDA-MCNC: 10.2 G/DL (ref 12–17)
HGB BLDA-MCNC: 10.5 G/DL (ref 12–17)
HGB BLDA-MCNC: 12.6 G/DL (ref 12–17)
HGB BLDA-MCNC: 8.2 G/DL (ref 12–17)
HGB BLDA-MCNC: 8.7 G/DL (ref 14–18)
HGB BLDA-MCNC: 8.8 G/DL (ref 12–17)
HGB BLDA-MCNC: 8.9 G/DL (ref 14–18)
HGB BLDA-MCNC: 9.3 G/DL (ref 14–18)
HGB BLDA-MCNC: 9.5 G/DL (ref 12–17)
INHALED O2 CONCENTRATION: 100 %
INHALED O2 CONCENTRATION: 40 %
INHALED O2 CONCENTRATION: 40 %
INR PPP: 2.13 (ref 0.85–1.16)
MAGNESIUM SERPL-MCNC: 2.5 MG/DL (ref 1.6–2.4)
MAGNESIUM SERPL-MCNC: 2.6 MG/DL (ref 1.6–2.4)
MAGNESIUM SERPL-MCNC: 3.1 MG/DL (ref 1.6–2.4)
MCH RBC QN AUTO: 31 PG (ref 26.6–33)
MCH RBC QN AUTO: 31 PG (ref 26.6–33)
MCH RBC QN AUTO: 32 PG (ref 26.6–33)
MCHC RBC AUTO-ENTMCNC: 31.5 G/DL (ref 31.5–35.7)
MCHC RBC AUTO-ENTMCNC: 32.1 G/DL (ref 31.5–35.7)
MCHC RBC AUTO-ENTMCNC: 34 G/DL (ref 31.5–35.7)
MCV RBC AUTO: 94.4 FL (ref 79–97)
MCV RBC AUTO: 96.6 FL (ref 79–97)
MCV RBC AUTO: 98.4 FL (ref 79–97)
METHGB BLD QL: 0.8 % (ref 0–1.5)
METHGB BLD QL: 1.1 % (ref 0–1.5)
METHGB BLD QL: 1.2 % (ref 0–1.5)
MODALITY: ABNORMAL
NOTE: ABNORMAL
OXYHGB MFR BLDV: 94.7 % (ref 94–99)
OXYHGB MFR BLDV: 97.2 % (ref 94–99)
OXYHGB MFR BLDV: 98.5 % (ref 94–99)
PCO2 BLDA: 31.1 MM HG (ref 35–45)
PCO2 BLDA: 36.6 MM HG (ref 35–45)
PCO2 BLDA: 40.1 MM HG (ref 35–45)
PCO2 BLDA: 40.4 MM HG (ref 35–45)
PCO2 BLDA: 41 MM HG (ref 35–45)
PCO2 BLDA: 41.7 MM HG (ref 35–45)
PCO2 BLDA: 45.2 MM HG (ref 35–45)
PCO2 BLDA: 45.7 MM HG (ref 35–45)
PCO2 BLDA: 56 MM HG (ref 35–45)
PCO2 TEMP ADJ BLD: 31.1 MM HG (ref 35–45)
PCO2 TEMP ADJ BLD: 41.7 MM HG (ref 35–45)
PCO2 TEMP ADJ BLD: 45.7 MM HG (ref 35–45)
PEEP RESPIRATORY: 5 CM[H2O]
PH BLDA: 7.31 PH UNITS (ref 7.35–7.6)
PH BLDA: 7.37 PH UNITS (ref 7.35–7.45)
PH BLDA: 7.38 PH UNITS (ref 7.35–7.45)
PH BLDA: 7.4 PH UNITS (ref 7.35–7.6)
PH BLDA: 7.44 PH UNITS (ref 7.35–7.6)
PH BLDA: 7.45 PH UNITS (ref 7.35–7.6)
PH BLDA: 7.49 PH UNITS (ref 7.35–7.6)
PH BLDA: 7.5 PH UNITS (ref 7.35–7.6)
PH BLDA: 7.51 PH UNITS (ref 7.35–7.45)
PH, TEMP CORRECTED: 7.37 PH UNITS
PH, TEMP CORRECTED: 7.38 PH UNITS
PH, TEMP CORRECTED: 7.51 PH UNITS
PHOSPHATE SERPL-MCNC: 2.3 MG/DL (ref 2.5–4.5)
PHOSPHATE SERPL-MCNC: 2.7 MG/DL (ref 2.5–4.5)
PHOSPHATE SERPL-MCNC: 3 MG/DL (ref 2.5–4.5)
PLATELET # BLD AUTO: 167 10*3/MM3 (ref 140–450)
PLATELET # BLD AUTO: 195 10*3/MM3 (ref 140–450)
PLATELET # BLD AUTO: 205 10*3/MM3 (ref 140–450)
PMV BLD AUTO: 9.7 FL (ref 6–12)
PMV BLD AUTO: 9.8 FL (ref 6–12)
PMV BLD AUTO: 9.9 FL (ref 6–12)
PO2 BLDA: 144 MM HG (ref 83–108)
PO2 BLDA: 319 MMHG (ref 80–105)
PO2 BLDA: 383 MMHG (ref 80–105)
PO2 BLDA: 422 MMHG (ref 80–105)
PO2 BLDA: 436 MMHG (ref 80–105)
PO2 BLDA: 467 MM HG (ref 83–108)
PO2 BLDA: 55 MMHG (ref 80–105)
PO2 BLDA: 63 MMHG (ref 80–105)
PO2 BLDA: 92.6 MM HG (ref 83–108)
PO2 TEMP ADJ BLD: 144 MM HG (ref 83–108)
PO2 TEMP ADJ BLD: 467 MM HG (ref 83–108)
PO2 TEMP ADJ BLD: 92.6 MM HG (ref 83–108)
POTASSIUM BLDA-SCNC: 2.9 MMOL/L (ref 3.5–4.9)
POTASSIUM BLDA-SCNC: 3.3 MMOL/L (ref 3.5–4.9)
POTASSIUM BLDA-SCNC: 4.1 MMOL/L (ref 3.5–4.9)
POTASSIUM BLDA-SCNC: 4.1 MMOL/L (ref 3.5–4.9)
POTASSIUM BLDA-SCNC: 4.2 MMOL/L (ref 3.5–4.9)
POTASSIUM BLDA-SCNC: 4.2 MMOL/L (ref 3.5–4.9)
POTASSIUM SERPL-SCNC: 3.2 MMOL/L (ref 3.5–5.2)
POTASSIUM SERPL-SCNC: 3.3 MMOL/L (ref 3.5–5.2)
POTASSIUM SERPL-SCNC: 4.5 MMOL/L (ref 3.5–5.2)
POTASSIUM SERPL-SCNC: 4.7 MMOL/L (ref 3.5–5.2)
PROTHROMBIN TIME: 23.5 SECONDS (ref 11.5–14)
PSV: 10 CMH2O
PSV: 10 CMH2O
RBC # BLD AUTO: 2.58 10*6/MM3 (ref 3.77–5.28)
RBC # BLD AUTO: 2.84 10*6/MM3 (ref 3.77–5.28)
RBC # BLD AUTO: 2.97 10*6/MM3 (ref 3.77–5.28)
RH BLD: POSITIVE
SAO2 % BLDA: 100 % (ref 95–98)
SAO2 % BLDA: 89 % (ref 95–98)
SAO2 % BLDA: 92 % (ref 95–98)
SET MECH RESP RATE: 14
SODIUM BLD-SCNC: 141 MMOL/L (ref 138–146)
SODIUM BLD-SCNC: 143 MMOL/L (ref 138–146)
SODIUM BLD-SCNC: 144 MMOL/L (ref 138–146)
SODIUM BLD-SCNC: 145 MMOL/L (ref 138–146)
SODIUM SERPL-SCNC: 141 MMOL/L (ref 136–145)
SODIUM SERPL-SCNC: 145 MMOL/L (ref 136–145)
SODIUM SERPL-SCNC: 145 MMOL/L (ref 136–145)
VENTILATOR MODE: ABNORMAL
VENTILATOR MODE: ABNORMAL
VT ON VENT VENT: 500 ML
WBC # BLD AUTO: 5.63 10*3/MM3 (ref 3.4–10.8)
WBC # BLD AUTO: 7.72 10*3/MM3 (ref 3.4–10.8)
WBC # BLD AUTO: 8.32 10*3/MM3 (ref 3.4–10.8)

## 2020-06-30 PROCEDURE — C1894 INTRO/SHEATH, NON-LASER: HCPCS | Performed by: THORACIC SURGERY (CARDIOTHORACIC VASCULAR SURGERY)

## 2020-06-30 PROCEDURE — 84132 ASSAY OF SERUM POTASSIUM: CPT | Performed by: THORACIC SURGERY (CARDIOTHORACIC VASCULAR SURGERY)

## 2020-06-30 PROCEDURE — 33533 CABG ARTERIAL SINGLE: CPT | Performed by: THORACIC SURGERY (CARDIOTHORACIC VASCULAR SURGERY)

## 2020-06-30 PROCEDURE — 25010000003 CEFUROXIME SODIUM 1.5 G RECONSTITUTED SOLUTION: Performed by: ANESTHESIOLOGY

## 2020-06-30 PROCEDURE — 94002 VENT MGMT INPAT INIT DAY: CPT

## 2020-06-30 PROCEDURE — 36430 TRANSFUSION BLD/BLD COMPNT: CPT

## 2020-06-30 PROCEDURE — 82947 ASSAY GLUCOSE BLOOD QUANT: CPT

## 2020-06-30 PROCEDURE — 25010000002 PROPOFOL 10 MG/ML EMULSION: Performed by: THORACIC SURGERY (CARDIOTHORACIC VASCULAR SURGERY)

## 2020-06-30 PROCEDURE — 33533 CABG ARTERIAL SINGLE: CPT | Performed by: PHYSICIAN ASSISTANT

## 2020-06-30 PROCEDURE — 25010000002 PROPOFOL 10 MG/ML EMULSION: Performed by: ANESTHESIOLOGY

## 2020-06-30 PROCEDURE — 25010000002 MORPHINE PER 10 MG: Performed by: THORACIC SURGERY (CARDIOTHORACIC VASCULAR SURGERY)

## 2020-06-30 PROCEDURE — 25810000003 DEXTROSE 5 % WITH KCL 20 MEQ 20-5 MEQ/L-% SOLUTION: Performed by: PHYSICIAN ASSISTANT

## 2020-06-30 PROCEDURE — 06BP4ZZ EXCISION OF RIGHT SAPHENOUS VEIN, PERCUTANEOUS ENDOSCOPIC APPROACH: ICD-10-PCS | Performed by: THORACIC SURGERY (CARDIOTHORACIC VASCULAR SURGERY)

## 2020-06-30 PROCEDURE — 25010000002 DOBUTAMINE PER 250 MG: Performed by: PHYSICIAN ASSISTANT

## 2020-06-30 PROCEDURE — 94799 UNLISTED PULMONARY SVC/PX: CPT

## 2020-06-30 PROCEDURE — 25010000003 MILRINONE LACTATE IN DEXTROSE 20-5 MG/100ML-% SOLUTION: Performed by: PHYSICIAN ASSISTANT

## 2020-06-30 PROCEDURE — 86900 BLOOD TYPING SEROLOGIC ABO: CPT

## 2020-06-30 PROCEDURE — 82805 BLOOD GASES W/O2 SATURATION: CPT

## 2020-06-30 PROCEDURE — 33518 CABG ARTERY-VEIN TWO: CPT | Performed by: THORACIC SURGERY (CARDIOTHORACIC VASCULAR SURGERY)

## 2020-06-30 PROCEDURE — 83735 ASSAY OF MAGNESIUM: CPT | Performed by: PHYSICIAN ASSISTANT

## 2020-06-30 PROCEDURE — P9041 ALBUMIN (HUMAN),5%, 50ML: HCPCS | Performed by: PHYSICIAN ASSISTANT

## 2020-06-30 PROCEDURE — 85027 COMPLETE CBC AUTOMATED: CPT | Performed by: PHYSICIAN ASSISTANT

## 2020-06-30 PROCEDURE — 25010000002 MIDAZOLAM PER 1 MG: Performed by: ANESTHESIOLOGY

## 2020-06-30 PROCEDURE — P9037 PLATE PHERES LEUKOREDU IRRAD: HCPCS

## 2020-06-30 PROCEDURE — 80069 RENAL FUNCTION PANEL: CPT | Performed by: PHYSICIAN ASSISTANT

## 2020-06-30 PROCEDURE — 85014 HEMATOCRIT: CPT

## 2020-06-30 PROCEDURE — 25010000003 CEFUROXIME SODIUM 1.5 G RECONSTITUTED SOLUTION: Performed by: PHYSICIAN ASSISTANT

## 2020-06-30 PROCEDURE — 94770: CPT

## 2020-06-30 PROCEDURE — 85347 COAGULATION TIME ACTIVATED: CPT

## 2020-06-30 PROCEDURE — 25010000003 POTASSIUM CHLORIDE PER 2 MEQ: Performed by: PHYSICIAN ASSISTANT

## 2020-06-30 PROCEDURE — 25010000002 HEPARIN (PORCINE) PER 1000 UNITS: Performed by: ANESTHESIOLOGY

## 2020-06-30 PROCEDURE — C1751 CATH, INF, PER/CENT/MIDLINE: HCPCS | Performed by: ANESTHESIOLOGY

## 2020-06-30 PROCEDURE — 84132 ASSAY OF SERUM POTASSIUM: CPT

## 2020-06-30 PROCEDURE — 85730 THROMBOPLASTIN TIME PARTIAL: CPT | Performed by: PHYSICIAN ASSISTANT

## 2020-06-30 PROCEDURE — 99291 CRITICAL CARE FIRST HOUR: CPT | Performed by: INTERNAL MEDICINE

## 2020-06-30 PROCEDURE — 93318 ECHO TRANSESOPHAGEAL INTRAOP: CPT | Performed by: ANESTHESIOLOGY

## 2020-06-30 PROCEDURE — 25010000002 HEPARIN (PORCINE) PER 1000 UNITS: Performed by: THORACIC SURGERY (CARDIOTHORACIC VASCULAR SURGERY)

## 2020-06-30 PROCEDURE — 25010000002 PAPAVERINE PER 60 MG: Performed by: THORACIC SURGERY (CARDIOTHORACIC VASCULAR SURGERY)

## 2020-06-30 PROCEDURE — 84295 ASSAY OF SERUM SODIUM: CPT

## 2020-06-30 PROCEDURE — 021109W BYPASS CORONARY ARTERY, TWO ARTERIES FROM AORTA WITH AUTOLOGOUS VENOUS TISSUE, OPEN APPROACH: ICD-10-PCS | Performed by: THORACIC SURGERY (CARDIOTHORACIC VASCULAR SURGERY)

## 2020-06-30 PROCEDURE — 02100Z9 BYPASS CORONARY ARTERY, ONE ARTERY FROM LEFT INTERNAL MAMMARY, OPEN APPROACH: ICD-10-PCS | Performed by: THORACIC SURGERY (CARDIOTHORACIC VASCULAR SURGERY)

## 2020-06-30 PROCEDURE — 33518 CABG ARTERY-VEIN TWO: CPT | Performed by: PHYSICIAN ASSISTANT

## 2020-06-30 PROCEDURE — 33508 ENDOSCOPIC VEIN HARVEST: CPT | Performed by: THORACIC SURGERY (CARDIOTHORACIC VASCULAR SURGERY)

## 2020-06-30 PROCEDURE — 5A1221Z PERFORMANCE OF CARDIAC OUTPUT, CONTINUOUS: ICD-10-PCS | Performed by: THORACIC SURGERY (CARDIOTHORACIC VASCULAR SURGERY)

## 2020-06-30 PROCEDURE — 82330 ASSAY OF CALCIUM: CPT | Performed by: PHYSICIAN ASSISTANT

## 2020-06-30 PROCEDURE — 85027 COMPLETE CBC AUTOMATED: CPT | Performed by: THORACIC SURGERY (CARDIOTHORACIC VASCULAR SURGERY)

## 2020-06-30 PROCEDURE — 82330 ASSAY OF CALCIUM: CPT

## 2020-06-30 PROCEDURE — 80069 RENAL FUNCTION PANEL: CPT | Performed by: THORACIC SURGERY (CARDIOTHORACIC VASCULAR SURGERY)

## 2020-06-30 PROCEDURE — 85610 PROTHROMBIN TIME: CPT | Performed by: PHYSICIAN ASSISTANT

## 2020-06-30 PROCEDURE — 83735 ASSAY OF MAGNESIUM: CPT | Performed by: THORACIC SURGERY (CARDIOTHORACIC VASCULAR SURGERY)

## 2020-06-30 PROCEDURE — 25010000002 PROTAMINE SULFATE PER 10 MG

## 2020-06-30 PROCEDURE — 25010000002 PROTAMINE SULFATE PER 10 MG: Performed by: ANESTHESIOLOGY

## 2020-06-30 PROCEDURE — 25010000002 DOBUTAMINE 250 MG/20ML SOLUTION 20 ML VIAL: Performed by: ANESTHESIOLOGY

## 2020-06-30 PROCEDURE — P9016 RBC LEUKOCYTES REDUCED: HCPCS

## 2020-06-30 PROCEDURE — 25010000002 ALBUMIN HUMAN 5% PER 50 ML: Performed by: PHYSICIAN ASSISTANT

## 2020-06-30 PROCEDURE — 33508 ENDOSCOPIC VEIN HARVEST: CPT | Performed by: PHYSICIAN ASSISTANT

## 2020-06-30 PROCEDURE — 82803 BLOOD GASES ANY COMBINATION: CPT

## 2020-06-30 PROCEDURE — 93010 ELECTROCARDIOGRAM REPORT: CPT | Performed by: INTERNAL MEDICINE

## 2020-06-30 PROCEDURE — 71045 X-RAY EXAM CHEST 1 VIEW: CPT

## 2020-06-30 PROCEDURE — 93005 ELECTROCARDIOGRAM TRACING: CPT | Performed by: PHYSICIAN ASSISTANT

## 2020-06-30 PROCEDURE — 86901 BLOOD TYPING SEROLOGIC RH(D): CPT

## 2020-06-30 PROCEDURE — S0260 H&P FOR SURGERY: HCPCS | Performed by: THORACIC SURGERY (CARDIOTHORACIC VASCULAR SURGERY)

## 2020-06-30 RX ORDER — ACETAMINOPHEN 325 MG/1
650 TABLET ORAL EVERY 4 HOURS PRN
Status: DISCONTINUED | OUTPATIENT
Start: 2020-06-30 | End: 2020-06-30 | Stop reason: HOSPADM

## 2020-06-30 RX ORDER — POTASSIUM CHLORIDE 29.8 MG/ML
20 INJECTION INTRAVENOUS
Status: DISCONTINUED | OUTPATIENT
Start: 2020-06-30 | End: 2020-07-02

## 2020-06-30 RX ORDER — ALBUTEROL SULFATE 2.5 MG/3ML
2.5 SOLUTION RESPIRATORY (INHALATION) EVERY 4 HOURS PRN
Status: DISCONTINUED | OUTPATIENT
Start: 2020-06-30 | End: 2020-07-01

## 2020-06-30 RX ORDER — POTASSIUM CHLORIDE 750 MG/1
40 CAPSULE, EXTENDED RELEASE ORAL AS NEEDED
Status: DISCONTINUED | OUTPATIENT
Start: 2020-06-30 | End: 2020-07-07

## 2020-06-30 RX ORDER — CARVEDILOL 3.12 MG/1
3.12 TABLET ORAL 2 TIMES DAILY
Status: DISCONTINUED | OUTPATIENT
Start: 2020-06-30 | End: 2020-06-30

## 2020-06-30 RX ORDER — NOREPINEPHRINE BITARTRATE 1 MG/ML
INJECTION, SOLUTION INTRAVENOUS CONTINUOUS PRN
Status: DISCONTINUED | OUTPATIENT
Start: 2020-06-30 | End: 2020-06-30 | Stop reason: SURG

## 2020-06-30 RX ORDER — POTASSIUM CHLORIDE, DEXTROSE MONOHYDRATE 150; 5 MG/100ML; G/100ML
30 INJECTION, SOLUTION INTRAVENOUS CONTINUOUS
Status: DISCONTINUED | OUTPATIENT
Start: 2020-06-30 | End: 2020-07-04 | Stop reason: ALTCHOICE

## 2020-06-30 RX ORDER — SODIUM CHLORIDE, SODIUM LACTATE, POTASSIUM CHLORIDE, CALCIUM CHLORIDE 600; 310; 30; 20 MG/100ML; MG/100ML; MG/100ML; MG/100ML
9 INJECTION, SOLUTION INTRAVENOUS CONTINUOUS
Status: DISCONTINUED | OUTPATIENT
Start: 2020-06-30 | End: 2020-07-01

## 2020-06-30 RX ORDER — ROCURONIUM BROMIDE 10 MG/ML
INJECTION, SOLUTION INTRAVENOUS AS NEEDED
Status: DISCONTINUED | OUTPATIENT
Start: 2020-06-30 | End: 2020-06-30 | Stop reason: SURG

## 2020-06-30 RX ORDER — FAMOTIDINE 20 MG/1
20 TABLET, FILM COATED ORAL ONCE
Status: DISCONTINUED | OUTPATIENT
Start: 2020-06-30 | End: 2020-06-30

## 2020-06-30 RX ORDER — ETOMIDATE 2 MG/ML
INJECTION INTRAVENOUS AS NEEDED
Status: DISCONTINUED | OUTPATIENT
Start: 2020-06-30 | End: 2020-06-30 | Stop reason: SURG

## 2020-06-30 RX ORDER — ASPIRIN 325 MG
325 TABLET ORAL ONCE
Status: COMPLETED | OUTPATIENT
Start: 2020-06-30 | End: 2020-06-30

## 2020-06-30 RX ORDER — DOBUTAMINE HYDROCHLORIDE 100 MG/100ML
2-20 INJECTION INTRAVENOUS CONTINUOUS PRN
Status: DISCONTINUED | OUTPATIENT
Start: 2020-06-30 | End: 2020-07-02

## 2020-06-30 RX ORDER — MILRINONE LACTATE 0.2 MG/ML
.25-.75 INJECTION, SOLUTION INTRAVENOUS
Status: DISCONTINUED | OUTPATIENT
Start: 2020-06-30 | End: 2020-07-02

## 2020-06-30 RX ORDER — CHLORHEXIDINE GLUCONATE 0.12 MG/ML
15 RINSE ORAL ONCE
Status: COMPLETED | OUTPATIENT
Start: 2020-06-30 | End: 2020-06-30

## 2020-06-30 RX ORDER — CEFUROXIME SODIUM 1.5 G/16ML
INJECTION, POWDER, FOR SOLUTION INTRAVENOUS AS NEEDED
Status: DISCONTINUED | OUTPATIENT
Start: 2020-06-30 | End: 2020-06-30 | Stop reason: SURG

## 2020-06-30 RX ORDER — MIDAZOLAM HYDROCHLORIDE 1 MG/ML
INJECTION INTRAMUSCULAR; INTRAVENOUS AS NEEDED
Status: DISCONTINUED | OUTPATIENT
Start: 2020-06-30 | End: 2020-06-30 | Stop reason: SURG

## 2020-06-30 RX ORDER — NITROGLYCERIN 20 MG/100ML
5-200 INJECTION INTRAVENOUS CONTINUOUS PRN
Status: DISCONTINUED | OUTPATIENT
Start: 2020-06-30 | End: 2020-07-06

## 2020-06-30 RX ORDER — ALBUMIN, HUMAN INJ 5% 5 %
SOLUTION INTRAVENOUS
Status: DISPENSED
Start: 2020-06-30 | End: 2020-07-01

## 2020-06-30 RX ORDER — CHLORHEXIDINE GLUCONATE 0.12 MG/ML
15 RINSE ORAL EVERY 12 HOURS SCHEDULED
Status: DISCONTINUED | OUTPATIENT
Start: 2020-06-30 | End: 2020-07-03

## 2020-06-30 RX ORDER — DOPAMINE HYDROCHLORIDE 160 MG/100ML
2-20 INJECTION, SOLUTION INTRAVENOUS CONTINUOUS PRN
Status: DISCONTINUED | OUTPATIENT
Start: 2020-06-30 | End: 2020-07-02

## 2020-06-30 RX ORDER — SODIUM CHLORIDE 0.9 % (FLUSH) 0.9 %
30 SYRINGE (ML) INJECTION ONCE AS NEEDED
Status: DISCONTINUED | OUTPATIENT
Start: 2020-06-30 | End: 2020-07-02

## 2020-06-30 RX ORDER — ATORVASTATIN CALCIUM 40 MG/1
80 TABLET, FILM COATED ORAL NIGHTLY
Status: DISCONTINUED | OUTPATIENT
Start: 2020-06-30 | End: 2020-07-07

## 2020-06-30 RX ORDER — FENTANYL CITRATE 50 UG/ML
25 INJECTION, SOLUTION INTRAMUSCULAR; INTRAVENOUS
Status: DISCONTINUED | OUTPATIENT
Start: 2020-06-30 | End: 2020-07-02

## 2020-06-30 RX ORDER — PROPOFOL 10 MG/ML
VIAL (ML) INTRAVENOUS CONTINUOUS PRN
Status: DISCONTINUED | OUTPATIENT
Start: 2020-06-30 | End: 2020-06-30 | Stop reason: SURG

## 2020-06-30 RX ORDER — HEPARIN SODIUM 1000 [USP'U]/ML
INJECTION, SOLUTION INTRAVENOUS; SUBCUTANEOUS AS NEEDED
Status: DISCONTINUED | OUTPATIENT
Start: 2020-06-30 | End: 2020-06-30 | Stop reason: SURG

## 2020-06-30 RX ORDER — FAMOTIDINE 20 MG/1
20 TABLET, FILM COATED ORAL ONCE
Status: COMPLETED | OUTPATIENT
Start: 2020-06-30 | End: 2020-06-30

## 2020-06-30 RX ORDER — METOPROLOL TARTRATE 5 MG/5ML
2.5 INJECTION INTRAVENOUS EVERY 6 HOURS SCHEDULED
Status: DISCONTINUED | OUTPATIENT
Start: 2020-06-30 | End: 2020-07-01

## 2020-06-30 RX ORDER — ALBUMIN, HUMAN INJ 5% 5 %
500 SOLUTION INTRAVENOUS AS NEEDED
Status: COMPLETED | OUTPATIENT
Start: 2020-06-30 | End: 2020-06-30

## 2020-06-30 RX ORDER — FUROSEMIDE 20 MG/1
20 TABLET ORAL DAILY
Status: DISCONTINUED | OUTPATIENT
Start: 2020-06-30 | End: 2020-06-30

## 2020-06-30 RX ORDER — CALCIUM CHLORIDE 100 MG/ML
INJECTION INTRAVENOUS; INTRAVENTRICULAR AS NEEDED
Status: DISCONTINUED | OUTPATIENT
Start: 2020-06-30 | End: 2020-06-30 | Stop reason: SURG

## 2020-06-30 RX ORDER — MEPERIDINE HYDROCHLORIDE 25 MG/ML
25 INJECTION INTRAMUSCULAR; INTRAVENOUS; SUBCUTANEOUS EVERY 4 HOURS PRN
Status: ACTIVE | OUTPATIENT
Start: 2020-06-30 | End: 2020-06-30

## 2020-06-30 RX ORDER — SUFENTANIL CITRATE 50 UG/ML
INJECTION EPIDURAL; INTRAVENOUS AS NEEDED
Status: DISCONTINUED | OUTPATIENT
Start: 2020-06-30 | End: 2020-06-30 | Stop reason: SURG

## 2020-06-30 RX ORDER — LOSARTAN POTASSIUM 50 MG/1
50 TABLET ORAL DAILY
Status: DISCONTINUED | OUTPATIENT
Start: 2020-06-30 | End: 2020-06-30

## 2020-06-30 RX ORDER — HYDROCODONE BITARTRATE AND ACETAMINOPHEN 7.5; 325 MG/1; MG/1
1 TABLET ORAL EVERY 4 HOURS PRN
Status: DISCONTINUED | OUTPATIENT
Start: 2020-06-30 | End: 2020-07-02

## 2020-06-30 RX ORDER — LIDOCAINE HYDROCHLORIDE 10 MG/ML
0.5 INJECTION, SOLUTION EPIDURAL; INFILTRATION; INTRACAUDAL; PERINEURAL ONCE AS NEEDED
Status: COMPLETED | OUTPATIENT
Start: 2020-06-30 | End: 2020-06-30

## 2020-06-30 RX ORDER — SPIRONOLACTONE 25 MG/1
25 TABLET ORAL DAILY
Status: DISCONTINUED | OUTPATIENT
Start: 2020-06-30 | End: 2020-06-30

## 2020-06-30 RX ORDER — PROTAMINE SULFATE 10 MG/ML
INJECTION, SOLUTION INTRAVENOUS
Status: COMPLETED
Start: 2020-06-30 | End: 2020-06-30

## 2020-06-30 RX ORDER — PANTOPRAZOLE SODIUM 40 MG/1
40 TABLET, DELAYED RELEASE ORAL EVERY MORNING
Status: DISCONTINUED | OUTPATIENT
Start: 2020-07-01 | End: 2020-07-01

## 2020-06-30 RX ORDER — ASPIRIN 325 MG
325 TABLET ORAL DAILY
Status: DISCONTINUED | OUTPATIENT
Start: 2020-07-01 | End: 2020-07-04

## 2020-06-30 RX ORDER — DEXMEDETOMIDINE HYDROCHLORIDE 4 UG/ML
.2-1.5 INJECTION, SOLUTION INTRAVENOUS CONTINUOUS PRN
Status: DISCONTINUED | OUTPATIENT
Start: 2020-06-30 | End: 2020-07-02

## 2020-06-30 RX ORDER — SODIUM CHLORIDE 9 MG/ML
INJECTION, SOLUTION INTRAVENOUS AS NEEDED
Status: DISCONTINUED | OUTPATIENT
Start: 2020-06-30 | End: 2020-06-30 | Stop reason: HOSPADM

## 2020-06-30 RX ORDER — NOREPINEPHRINE BIT/0.9 % NACL 8 MG/250ML
.02-.3 INFUSION BOTTLE (ML) INTRAVENOUS CONTINUOUS PRN
Status: DISCONTINUED | OUTPATIENT
Start: 2020-06-30 | End: 2020-07-03

## 2020-06-30 RX ORDER — PHENYLEPHRINE HCL IN 0.9% NACL 0.5 MG/5ML
.5-3 SYRINGE (ML) INTRAVENOUS CONTINUOUS PRN
Status: DISCONTINUED | OUTPATIENT
Start: 2020-06-30 | End: 2020-07-03

## 2020-06-30 RX ORDER — PROTAMINE SULFATE 10 MG/ML
INJECTION, SOLUTION INTRAVENOUS AS NEEDED
Status: DISCONTINUED | OUTPATIENT
Start: 2020-06-30 | End: 2020-06-30 | Stop reason: SURG

## 2020-06-30 RX ORDER — CETIRIZINE HYDROCHLORIDE 10 MG/1
10 TABLET ORAL DAILY
Status: DISCONTINUED | OUTPATIENT
Start: 2020-06-30 | End: 2020-07-07

## 2020-06-30 RX ORDER — PAPAVERINE HYDROCHLORIDE 30 MG/ML
INJECTION INTRAMUSCULAR; INTRAVENOUS AS NEEDED
Status: DISCONTINUED | OUTPATIENT
Start: 2020-06-30 | End: 2020-06-30 | Stop reason: HOSPADM

## 2020-06-30 RX ORDER — ONDANSETRON 2 MG/ML
4 INJECTION INTRAMUSCULAR; INTRAVENOUS EVERY 6 HOURS PRN
Status: DISCONTINUED | OUTPATIENT
Start: 2020-06-30 | End: 2020-07-20 | Stop reason: HOSPADM

## 2020-06-30 RX ORDER — NITROGLYCERIN 0.4 MG/1
0.4 TABLET SUBLINGUAL
Status: DISCONTINUED | OUTPATIENT
Start: 2020-06-30 | End: 2020-06-30 | Stop reason: HOSPADM

## 2020-06-30 RX ORDER — MORPHINE SULFATE 2 MG/ML
2 INJECTION, SOLUTION INTRAMUSCULAR; INTRAVENOUS
Status: DISCONTINUED | OUTPATIENT
Start: 2020-06-30 | End: 2020-07-02

## 2020-06-30 RX ORDER — PROTAMINE SULFATE 10 MG/ML
50 INJECTION, SOLUTION INTRAVENOUS ONCE
Status: COMPLETED | OUTPATIENT
Start: 2020-06-30 | End: 2020-06-30

## 2020-06-30 RX ORDER — NALOXONE HCL 0.4 MG/ML
0.4 VIAL (ML) INJECTION
Status: DISCONTINUED | OUTPATIENT
Start: 2020-06-30 | End: 2020-07-02

## 2020-06-30 RX ORDER — OXYCODONE HYDROCHLORIDE AND ACETAMINOPHEN 5; 325 MG/1; MG/1
2 TABLET ORAL EVERY 4 HOURS PRN
Status: DISCONTINUED | OUTPATIENT
Start: 2020-06-30 | End: 2020-07-02

## 2020-06-30 RX ORDER — POTASSIUM CHLORIDE 1.5 G/1.77G
40 POWDER, FOR SOLUTION ORAL AS NEEDED
Status: DISCONTINUED | OUTPATIENT
Start: 2020-06-30 | End: 2020-07-07

## 2020-06-30 RX ORDER — DEXTROSE MONOHYDRATE 25 G/50ML
50 INJECTION, SOLUTION INTRAVENOUS
Status: DISCONTINUED | OUTPATIENT
Start: 2020-06-30 | End: 2020-06-30 | Stop reason: HOSPADM

## 2020-06-30 RX ORDER — SODIUM CHLORIDE 9 MG/ML
30 INJECTION, SOLUTION INTRAVENOUS CONTINUOUS PRN
Status: DISCONTINUED | OUTPATIENT
Start: 2020-06-30 | End: 2020-07-02

## 2020-06-30 RX ORDER — GLYCOPYRROLATE 0.2 MG/ML
INJECTION INTRAMUSCULAR; INTRAVENOUS AS NEEDED
Status: DISCONTINUED | OUTPATIENT
Start: 2020-06-30 | End: 2020-06-30 | Stop reason: SURG

## 2020-06-30 RX ADMIN — ETOMIDATE 10 MG: 2 INJECTION, SOLUTION INTRAVENOUS at 07:06

## 2020-06-30 RX ADMIN — ROCURONIUM BROMIDE 70 MG: 10 INJECTION INTRAVENOUS at 07:06

## 2020-06-30 RX ADMIN — CHLORHEXIDINE GLUCONATE 0.12% ORAL RINSE 15 ML: 1.2 LIQUID ORAL at 06:21

## 2020-06-30 RX ADMIN — ROCURONIUM BROMIDE 30 MG: 10 INJECTION INTRAVENOUS at 09:31

## 2020-06-30 RX ADMIN — MIDAZOLAM 2 MG: 1 INJECTION INTRAMUSCULAR; INTRAVENOUS at 09:31

## 2020-06-30 RX ADMIN — PROPOFOL 30 MCG/KG/MIN: 10 INJECTION, EMULSION INTRAVENOUS at 14:30

## 2020-06-30 RX ADMIN — POTASSIUM CHLORIDE AND DEXTROSE MONOHYDRATE 30 ML/HR: 150; 5 INJECTION, SOLUTION INTRAVENOUS at 11:08

## 2020-06-30 RX ADMIN — PROPOFOL 50 MCG/KG/MIN: 10 INJECTION, EMULSION INTRAVENOUS at 10:35

## 2020-06-30 RX ADMIN — MILRINONE LACTATE IN DEXTROSE 0.25 MCG/KG/MIN: 200 INJECTION, SOLUTION INTRAVENOUS at 21:53

## 2020-06-30 RX ADMIN — ALBUMIN HUMAN 500 ML: 0.05 INJECTION, SOLUTION INTRAVENOUS at 15:50

## 2020-06-30 RX ADMIN — FAMOTIDINE 20 MG: 20 TABLET, FILM COATED ORAL at 06:21

## 2020-06-30 RX ADMIN — SUFENTANIL CITRATE 100 MCG: 50 INJECTION EPIDURAL; INTRAVENOUS at 09:31

## 2020-06-30 RX ADMIN — CHLORHEXIDINE GLUCONATE 0.12% ORAL RINSE 15 ML: 1.2 LIQUID ORAL at 20:06

## 2020-06-30 RX ADMIN — NITROGLYCERIN 5 MCG/MIN: 20 INJECTION INTRAVENOUS at 17:08

## 2020-06-30 RX ADMIN — HEPARIN SODIUM 30000 UNITS: 1000 INJECTION, SOLUTION INTRAVENOUS; SUBCUTANEOUS at 08:06

## 2020-06-30 RX ADMIN — POTASSIUM CHLORIDE 20 MEQ: 29.8 INJECTION, SOLUTION INTRAVENOUS at 17:08

## 2020-06-30 RX ADMIN — LIDOCAINE HYDROCHLORIDE 0.2 ML: 10 INJECTION, SOLUTION EPIDURAL; INFILTRATION; INTRACAUDAL; PERINEURAL at 06:05

## 2020-06-30 RX ADMIN — PROTAMINE SULFATE 50 MG: 10 INJECTION, SOLUTION INTRAVENOUS at 11:50

## 2020-06-30 RX ADMIN — DOBUTAMINE HYDROCHLORIDE 4 MCG/KG/MIN: 100 INJECTION INTRAVENOUS at 22:35

## 2020-06-30 RX ADMIN — INSULIN HUMAN 2.8 UNITS/HR: 1 INJECTION, SOLUTION INTRAVENOUS at 20:03

## 2020-06-30 RX ADMIN — DEXTROSE MONOHYDRATE 12.5 ML: 25 INJECTION, SOLUTION INTRAVENOUS at 06:17

## 2020-06-30 RX ADMIN — CEFUROXIME SODIUM 1.5 G: 1.5 INJECTION, POWDER, FOR SOLUTION INTRAVENOUS at 17:43

## 2020-06-30 RX ADMIN — SUFENTANIL CITRATE 75 MCG: 50 INJECTION EPIDURAL; INTRAVENOUS at 07:35

## 2020-06-30 RX ADMIN — SUFENTANIL CITRATE 75 MCG: 50 INJECTION EPIDURAL; INTRAVENOUS at 07:06

## 2020-06-30 RX ADMIN — ASPIRIN 325 MG ORAL TABLET 325 MG: 325 PILL ORAL at 17:43

## 2020-06-30 RX ADMIN — CEFUROXIME 1.5 G: 1.5 INJECTION, POWDER, FOR SOLUTION INTRAVENOUS at 10:12

## 2020-06-30 RX ADMIN — MORPHINE SULFATE 2 MG: 2 INJECTION, SOLUTION INTRAMUSCULAR; INTRAVENOUS at 20:21

## 2020-06-30 RX ADMIN — ALBUMIN HUMAN 500 ML: 0.05 INJECTION, SOLUTION INTRAVENOUS at 11:10

## 2020-06-30 RX ADMIN — PROTAMINE SULFATE 150 MG: 10 INJECTION, SOLUTION INTRAVENOUS at 10:25

## 2020-06-30 RX ADMIN — PROTAMINE SULFATE 350 MG: 10 INJECTION, SOLUTION INTRAVENOUS at 10:08

## 2020-06-30 RX ADMIN — POTASSIUM CHLORIDE 20 MEQ: 29.8 INJECTION, SOLUTION INTRAVENOUS at 15:50

## 2020-06-30 RX ADMIN — HYDROCODONE BITARTRATE AND ACETAMINOPHEN 1 TABLET: 7.5; 325 TABLET ORAL at 22:05

## 2020-06-30 RX ADMIN — ATORVASTATIN CALCIUM 80 MG: 40 TABLET, FILM COATED ORAL at 20:06

## 2020-06-30 RX ADMIN — DOBUTAMINE 5 MCG/KG/MIN: 12.5 INJECTION, SOLUTION, CONCENTRATE INTRAVENOUS at 10:06

## 2020-06-30 RX ADMIN — SODIUM CHLORIDE, POTASSIUM CHLORIDE, SODIUM LACTATE AND CALCIUM CHLORIDE 9 ML/HR: 600; 310; 30; 20 INJECTION, SOLUTION INTRAVENOUS at 06:23

## 2020-06-30 RX ADMIN — CHLORHEXIDINE GLUCONATE 0.12% ORAL RINSE 15 ML: 1.2 LIQUID ORAL at 17:07

## 2020-06-30 RX ADMIN — NOREPINEPHRINE BITARTRATE 0.1 MCG/KG/MIN: 1 INJECTION, SOLUTION, CONCENTRATE INTRAVENOUS at 09:51

## 2020-06-30 RX ADMIN — CEFUROXIME 1.5 G: 1.5 INJECTION, POWDER, FOR SOLUTION INTRAVENOUS at 07:30

## 2020-06-30 RX ADMIN — MIDAZOLAM 3 MG: 1 INJECTION INTRAMUSCULAR; INTRAVENOUS at 07:06

## 2020-06-30 RX ADMIN — CALCIUM CHLORIDE 1 G: 100 INJECTION INTRAVENOUS; INTRAVENTRICULAR at 10:08

## 2020-06-30 RX ADMIN — MUPIROCIN 1 APPLICATION: 20 OINTMENT TOPICAL at 06:22

## 2020-06-30 RX ADMIN — GLYCOPYRROLATE 0.4 MG: 0.2 INJECTION INTRAMUSCULAR; INTRAVENOUS at 09:48

## 2020-06-30 NOTE — ANESTHESIA PROCEDURE NOTES
Airway  Urgency: elective    Date/Time: 6/30/2020 7:10 AM  Airway not difficult    General Information and Staff    Patient location during procedure: OR    Indications and Patient Condition  Indications for airway management: airway protection    Preoxygenated: yes  MILS not maintained throughout  Mask difficulty assessment: 1 - vent by mask    Final Airway Details  Final airway type: endotracheal airway      Successful airway: ETT  Cuffed: yes   Successful intubation technique: direct laryngoscopy  Endotracheal tube insertion site: oral  Blade: Edvyn  Blade size: 3  ETT size (mm): 7.0  Cormack-Lehane Classification: grade III - view of epiglottis only  Placement verified by: chest auscultation and capnometry   Measured from: lips  ETT/EBT  to lips (cm): 20  Number of attempts at approach: 1    Additional Comments  Negative epigastric sounds, Breath sound equal bilaterally with symmetric chest rise and fall

## 2020-06-30 NOTE — ANESTHESIA PREPROCEDURE EVALUATION
Anesthesia Evaluation     Patient summary reviewed and Nursing notes reviewed   NPO Solid Status: > 8 hours  NPO Liquid Status: > 8 hours           Airway   Mallampati: III  TM distance: >3 FB  Neck ROM: limited  Possible difficult intubation  Dental      Pulmonary     breath sounds clear to auscultation  Cardiovascular     ECG reviewed  Rhythm: regular  Rate: normal    (+) hypertension, CAD, angina, hyperlipidemia,       Neuro/Psych  (+) CVA, poor historian.,     GI/Hepatic/Renal/Endo    (+)   diabetes mellitus using insulin,     Musculoskeletal     Abdominal    Substance History      OB/GYN          Other                        Anesthesia Plan    ASA 4     general     intravenous induction     Anesthetic plan, all risks, benefits, and alternatives have been provided, discussed and informed consent has been obtained with: patient.    Plan discussed with CRNA.

## 2020-06-30 NOTE — ANESTHESIA PROCEDURE NOTES
Arterial Line      Patient reassessed immediately prior to procedure    Patient location during procedure: pre-op   Line placed for hemodynamic monitoring.  Preanesthetic Checklist  Completed: patient identified, site marked, surgical consent, pre-op evaluation, timeout performed, IV checked, risks and benefits discussed and monitors and equipment checked  Arterial Line Prep   Sterile Tech: cap, gloves and sterile barriers  Prep: ChloraPrep  Patient monitoring: blood pressure monitoring, continuous pulse oximetry and EKG  Arterial Line Procedure   Laterality:right  Location:  radial artery  Catheter size: 20 G   Guidance: ultrasound guided  Number of attempts: 1  Successful placement: yes  Post Assessment   Dressing Type: line sutured, occlusive dressing applied, secured with tape and wrist guard applied.   Complications no  Circ/Move/Sens Assessment: normal and unchanged.   Patient Tolerance: patient tolerated the procedure well with no apparent complications

## 2020-06-30 NOTE — ANESTHESIA POSTPROCEDURE EVALUATION
Patient: Beryl Montoya    Procedure Summary     Date:  06/30/20 Room / Location:   [facility]  /  [facility]    Anesthesia Start:  0658 Anesthesia Stop:  1113    Procedure:  SHERI PER ANESTHESIA, MEDIAN STERNOTOMY, CORONARY ARTERY BYPASS GRAFTING X 3 , UTILIZING THE LEFT INTERNAL MAMMARY ARTERY AND EVH OF RIGHT GREATER SAPHENOUS VEIN (N/A Chest) Diagnosis:       Coronary artery disease involving native coronary artery of native heart with angina pectoris (CMS/HCC)      (Coronary artery disease involving native coronary artery of native heart with angina pectoris (CMS/HCC) [I25.119])    Surgeon:  Jerry Lozano MD Provider:  José Palacio MD    Anesthesia Type:  general ASA Status:  4          Anesthesia Type: general    Vitals  Vitals Value Taken Time   /61 6/30/2020 11:09 AM   Temp     Pulse 80 6/30/2020 11:13 AM   Resp     SpO2 100 % 6/30/2020 11:13 AM   Vitals shown include unvalidated device data.        Post Anesthesia Care and Evaluation    Patient location during evaluation: ICU  Patient participation: complete - patient cannot participate  Pain management: adequate  Airway patency: patent    Cardiovascular status: acceptable and hemodynamically stable  Respiratory status: acceptable, ETT, ventilator and intubated

## 2020-06-30 NOTE — ANESTHESIA PROCEDURE NOTES
Central Line      Patient reassessed immediately prior to procedure    Patient location during procedure: OR  Indications: vascular access  Preanesthetic Checklist  Completed: patient identified, site marked, surgical consent, pre-op evaluation, timeout performed, IV checked, risks and benefits discussed and monitors and equipment checked  Central Line Prep  Sterile Tech:cap, gloves, gown, mask and sterile barriers  Prep: chloraprep  Patient monitoring: blood pressure monitoring, continuous pulse oximetry and EKG  Central Line Procedure  Laterality:right  Location:internal jugular  Catheter Type:Cordis and Sanborn-Kristine  Catheter Size:9 Fr  Guidance:ultrasound guided  PROCEDURE NOTE/ULTRASOUND INTERPRETATION.  Using ultrasound guidance the potential vascular sites for insertion of the catheter were visualized to determine the patency of the vessel to be used for vascular access.  After selecting the appropriate site for insertion, the needle was visualized under ultrasound being inserted into the internal jugular vein, followed by ultrasound confirmation of wire and catheter placement. There were no abnormalities seen on ultrasound; an image was taken; and the patient tolerated the procedure with no complications. Images: still images obtained, printed/placed on chart  Assessment  Post procedure:biopatch applied, line sutured, occlusive dressing applied and secured with tape  Assessement:blood return through all ports, free fluid flow and chest x-ray ordered  Complications:no  Patient Tolerance:patient tolerated the procedure well with no apparent complications

## 2020-06-30 NOTE — ANESTHESIA PROCEDURE NOTES
Procedure Performed: Emergent/Open-Heart Anesthesia SHERI     Start Time:        End Time:      Preanesthesia Checklist:  Patient identified, IV assessed, risks and benefits discussed, monitors and equipment assessed, procedure being performed at surgeon's request and anesthesia consent obtained.    General Procedure Information  Diagnostic Indications for Echo:  assessment of ascending aorta, assessment of surgical repair and hemodynamic monitoring  Physician Requesting Echo: Jerry Lozano MD  Location performed:  OR  Intubated  Bite block not placed  Heart visualized  Probe Insertion:  Easy  Probe Type:  Multiplane  Modalities:  Color flow mapping, continuous wave Doppler and pulse wave Doppler    Echocardiographic and Doppler Measurements    Ventricles    Right Ventricle:  Hypertrophy not present.  Thrombus not present.  Global function normal.    Left Ventricle:  Cavity size normal.  Hypertrophy not present.  Thrombus not present.  Global Function mildly impaired.  Ejection Fraction 52%.          Valves    Aortic Valve:  Annulus normal.  Stenosis not present.  Area: 1.8 cm².  Mean Gradient: 4 mean 2 mmHg.  Regurgitation absent.  Leaflets normal.  Leaflet motions normal.      Mitral Valve:  Annulus normal.  Stenosis not present.  Area: 4.7 cm².  Mean Gradient: 3 mean 1 mmHg.  Regurgitation mild.  Leaflets normal.  Leaflet motions normal.      Tricuspid Valve:  Annulus normal.  Stenosis not present.  Regurgitation trace.  Leaflets normal.  Leaflet motions normal.    Pulmonic Valve:  Annulus normal.  Stenosis not present.          Aorta    Ascending Aorta:  Size normal.  Dissection not present.  Plaque thickness greater than 3 mm.  Mobile plaque not present.    Aortic Arch:  Size normal.  Dissection not present.  Plaque thickness greater than 3 mm.  Mobile plaque not present.    Descending Aorta:  Size normal.  Dissection not present.  Plaque thickness greater than 3 mm.  Mobile plaque not present.           Atria    Right Atrium:  Size normal.  Spontaneous echo contrast not present.  Thrombus not present.  Tumor not present.  Device present.    Left Atrium:  Size normal.  Spontaneous echo contrast not present.  Thrombus not present.  Tumor not present.  Left atrial appendage normal.      Septa    Atrial Septum:  Intra-atrial septal morphology normal.      Ventricular Septum:  Intra-ventricular septum morphology normal.      Diastolic Function Measurements:  Diastolic Dysfunction Grade=  E= ms  A= ms  E/A Ratio= 3.5  DT= ms  S/D= 0.4  IVRT=    Other Findings  Pericardium:  normal  Pleural Effusion:  none  Pulmonary Arteries:  normal  Pulmonary Venous Flow:  normal    Anesthesia Information  Performed Personally  Anesthesiologist:  José Palacio MD      Echocardiogram Comments:       Informed consent was obtained preoperatively.  Ismael probe passed without difficulty. Post CABG:  EF ~ 40 % on inotropes with mid to apex moderately HK.  All findings discussed with surgeon.

## 2020-07-01 ENCOUNTER — APPOINTMENT (OUTPATIENT)
Dept: GENERAL RADIOLOGY | Facility: HOSPITAL | Age: 75
End: 2020-07-01

## 2020-07-01 LAB
ALBUMIN SERPL-MCNC: 4.3 G/DL (ref 3.5–5.2)
ANION GAP SERPL CALCULATED.3IONS-SCNC: 12 MMOL/L (ref 5–15)
ANION GAP SERPL CALCULATED.3IONS-SCNC: 9 MMOL/L (ref 5–15)
ARTERIAL PATENCY WRIST A: ABNORMAL
ATMOSPHERIC PRESS: ABNORMAL MM[HG]
BASE EXCESS BLDA CALC-SCNC: 1.2 MMOL/L (ref 0–2)
BASOPHILS # BLD AUTO: 0.04 10*3/MM3 (ref 0–0.2)
BASOPHILS NFR BLD AUTO: 0.4 % (ref 0–1.5)
BDY SITE: ABNORMAL
BH BB BLOOD EXPIRATION DATE: NORMAL
BH BB BLOOD TYPE BARCODE: 5100
BH BB BLOOD TYPE BARCODE: 5100
BH BB BLOOD TYPE BARCODE: 6200
BH BB BLOOD TYPE BARCODE: 7300
BH BB DISPENSE STATUS: NORMAL
BH BB PRODUCT CODE: NORMAL
BH BB UNIT NUMBER: NORMAL
BODY TEMPERATURE: 37 C
BUN SERPL-MCNC: 14 MG/DL (ref 8–23)
BUN SERPL-MCNC: 16 MG/DL (ref 8–23)
BUN/CREAT SERPL: 10.1 (ref 7–25)
BUN/CREAT SERPL: 10.9 (ref 7–25)
CALCIUM SPEC-SCNC: 8.5 MG/DL (ref 8.6–10.5)
CALCIUM SPEC-SCNC: 8.7 MG/DL (ref 8.6–10.5)
CHLORIDE SERPL-SCNC: 103 MMOL/L (ref 98–107)
CHLORIDE SERPL-SCNC: 103 MMOL/L (ref 98–107)
CO2 BLDA-SCNC: 27 MMOL/L (ref 22–33)
CO2 SERPL-SCNC: 26 MMOL/L (ref 22–29)
CO2 SERPL-SCNC: 27 MMOL/L (ref 22–29)
COHGB MFR BLD: 1 % (ref 0–2)
CREAT SERPL-MCNC: 1.38 MG/DL (ref 0.57–1)
CREAT SERPL-MCNC: 1.47 MG/DL (ref 0.57–1)
CROSSMATCH INTERPRETATION: NORMAL
CROSSMATCH INTERPRETATION: NORMAL
DEPRECATED RDW RBC AUTO: 57.4 FL (ref 37–54)
EOSINOPHIL # BLD AUTO: 0.03 10*3/MM3 (ref 0–0.4)
EOSINOPHIL NFR BLD AUTO: 0.3 % (ref 0.3–6.2)
EPAP: 0
ERYTHROCYTE [DISTWIDTH] IN BLOOD BY AUTOMATED COUNT: 16.1 % (ref 12.3–15.4)
GFR SERPL CREATININE-BSD FRML MDRD: 35 ML/MIN/1.73
GFR SERPL CREATININE-BSD FRML MDRD: 37 ML/MIN/1.73
GLUCOSE BLDC GLUCOMTR-MCNC: 101 MG/DL (ref 70–130)
GLUCOSE BLDC GLUCOMTR-MCNC: 103 MG/DL (ref 70–130)
GLUCOSE BLDC GLUCOMTR-MCNC: 107 MG/DL (ref 70–130)
GLUCOSE BLDC GLUCOMTR-MCNC: 109 MG/DL (ref 70–130)
GLUCOSE BLDC GLUCOMTR-MCNC: 110 MG/DL (ref 70–130)
GLUCOSE BLDC GLUCOMTR-MCNC: 116 MG/DL (ref 70–130)
GLUCOSE BLDC GLUCOMTR-MCNC: 117 MG/DL (ref 70–130)
GLUCOSE BLDC GLUCOMTR-MCNC: 118 MG/DL (ref 70–130)
GLUCOSE BLDC GLUCOMTR-MCNC: 119 MG/DL (ref 70–130)
GLUCOSE BLDC GLUCOMTR-MCNC: 120 MG/DL (ref 70–130)
GLUCOSE BLDC GLUCOMTR-MCNC: 121 MG/DL (ref 70–130)
GLUCOSE BLDC GLUCOMTR-MCNC: 134 MG/DL (ref 70–130)
GLUCOSE BLDC GLUCOMTR-MCNC: 139 MG/DL (ref 70–130)
GLUCOSE BLDC GLUCOMTR-MCNC: 151 MG/DL (ref 70–130)
GLUCOSE BLDC GLUCOMTR-MCNC: 152 MG/DL (ref 70–130)
GLUCOSE BLDC GLUCOMTR-MCNC: 98 MG/DL (ref 70–130)
GLUCOSE SERPL-MCNC: 107 MG/DL (ref 65–99)
GLUCOSE SERPL-MCNC: 119 MG/DL (ref 65–99)
HCO3 BLDA-SCNC: 25.8 MMOL/L (ref 20–26)
HCT VFR BLD AUTO: 25.3 % (ref 34–46.6)
HCT VFR BLD AUTO: 29.6 % (ref 34–46.6)
HCT VFR BLD CALC: 29.3 %
HGB BLD-MCNC: 8 G/DL (ref 12–15.9)
HGB BLD-MCNC: 9.8 G/DL (ref 12–15.9)
HGB BLDA-MCNC: 9.6 G/DL (ref 14–18)
IMM GRANULOCYTES # BLD AUTO: 0.03 10*3/MM3 (ref 0–0.05)
IMM GRANULOCYTES NFR BLD AUTO: 0.3 % (ref 0–0.5)
INHALED O2 CONCENTRATION: 40 %
INR PPP: 1.73 (ref 0.85–1.16)
IPAP: 0
LYMPHOCYTES # BLD AUTO: 0.6 10*3/MM3 (ref 0.7–3.1)
LYMPHOCYTES NFR BLD AUTO: 6.2 % (ref 19.6–45.3)
MAGNESIUM SERPL-MCNC: 2.4 MG/DL (ref 1.6–2.4)
MCH RBC QN AUTO: 30.9 PG (ref 26.6–33)
MCHC RBC AUTO-ENTMCNC: 31.6 G/DL (ref 31.5–35.7)
MCV RBC AUTO: 97.7 FL (ref 79–97)
METHGB BLD QL: 1 % (ref 0–1.5)
MODALITY: ABNORMAL
MONOCYTES # BLD AUTO: 0.59 10*3/MM3 (ref 0.1–0.9)
MONOCYTES NFR BLD AUTO: 6.1 % (ref 5–12)
NEUTROPHILS NFR BLD AUTO: 8.38 10*3/MM3 (ref 1.7–7)
NEUTROPHILS NFR BLD AUTO: 86.7 % (ref 42.7–76)
NOTE: ABNORMAL
NRBC BLD AUTO-RTO: 0 /100 WBC (ref 0–0.2)
OXYHGB MFR BLDV: 97.9 % (ref 94–99)
PAW @ PEAK INSP FLOW SETTING VENT: 0 CMH2O
PCO2 BLDA: 40.1 MM HG (ref 35–45)
PCO2 TEMP ADJ BLD: 40.1 MM HG (ref 35–45)
PEEP RESPIRATORY: 5 CM[H2O]
PH BLDA: 7.42 PH UNITS (ref 7.35–7.45)
PH, TEMP CORRECTED: 7.42 PH UNITS
PHOSPHATE SERPL-MCNC: 2.3 MG/DL (ref 2.5–4.5)
PHOSPHATE SERPL-MCNC: 3.1 MG/DL (ref 2.5–4.5)
PLATELET # BLD AUTO: 170 10*3/MM3 (ref 140–450)
PMV BLD AUTO: 10.2 FL (ref 6–12)
PO2 BLDA: 173 MM HG (ref 83–108)
PO2 TEMP ADJ BLD: 173 MM HG (ref 83–108)
POTASSIUM SERPL-SCNC: 4.1 MMOL/L (ref 3.5–5.2)
POTASSIUM SERPL-SCNC: 4.1 MMOL/L (ref 3.5–5.2)
PROTHROMBIN TIME: 20 SECONDS (ref 11.5–14)
PSV: 10 CMH2O
RBC # BLD AUTO: 2.59 10*6/MM3 (ref 3.77–5.28)
SODIUM SERPL-SCNC: 139 MMOL/L (ref 136–145)
SODIUM SERPL-SCNC: 141 MMOL/L (ref 136–145)
TOTAL RATE: 0 BREATHS/MINUTE
UNIT  ABO: NORMAL
UNIT  RH: NORMAL
VENTILATOR MODE: ABNORMAL
WBC # BLD AUTO: 9.67 10*3/MM3 (ref 3.4–10.8)

## 2020-07-01 PROCEDURE — 94770: CPT

## 2020-07-01 PROCEDURE — 94003 VENT MGMT INPAT SUBQ DAY: CPT

## 2020-07-01 PROCEDURE — 36430 TRANSFUSION BLD/BLD COMPNT: CPT

## 2020-07-01 PROCEDURE — 80048 BASIC METABOLIC PNL TOTAL CA: CPT | Performed by: INTERNAL MEDICINE

## 2020-07-01 PROCEDURE — P9016 RBC LEUKOCYTES REDUCED: HCPCS

## 2020-07-01 PROCEDURE — 82962 GLUCOSE BLOOD TEST: CPT

## 2020-07-01 PROCEDURE — 85025 COMPLETE CBC W/AUTO DIFF WBC: CPT | Performed by: PHYSICIAN ASSISTANT

## 2020-07-01 PROCEDURE — 83735 ASSAY OF MAGNESIUM: CPT | Performed by: PHYSICIAN ASSISTANT

## 2020-07-01 PROCEDURE — 25010000002 FUROSEMIDE PER 20 MG: Performed by: INTERNAL MEDICINE

## 2020-07-01 PROCEDURE — 94799 UNLISTED PULMONARY SVC/PX: CPT

## 2020-07-01 PROCEDURE — 93005 ELECTROCARDIOGRAM TRACING: CPT | Performed by: PHYSICIAN ASSISTANT

## 2020-07-01 PROCEDURE — 85018 HEMOGLOBIN: CPT | Performed by: INTERNAL MEDICINE

## 2020-07-01 PROCEDURE — 85014 HEMATOCRIT: CPT | Performed by: INTERNAL MEDICINE

## 2020-07-01 PROCEDURE — 86900 BLOOD TYPING SEROLOGIC ABO: CPT

## 2020-07-01 PROCEDURE — 84100 ASSAY OF PHOSPHORUS: CPT | Performed by: INTERNAL MEDICINE

## 2020-07-01 PROCEDURE — 25010000002 ALBUMIN HUMAN 25% PER 50 ML: Performed by: INTERNAL MEDICINE

## 2020-07-01 PROCEDURE — 71045 X-RAY EXAM CHEST 1 VIEW: CPT

## 2020-07-01 PROCEDURE — 85610 PROTHROMBIN TIME: CPT | Performed by: PHYSICIAN ASSISTANT

## 2020-07-01 PROCEDURE — 99024 POSTOP FOLLOW-UP VISIT: CPT | Performed by: THORACIC SURGERY (CARDIOTHORACIC VASCULAR SURGERY)

## 2020-07-01 PROCEDURE — 82805 BLOOD GASES W/O2 SATURATION: CPT

## 2020-07-01 PROCEDURE — 99291 CRITICAL CARE FIRST HOUR: CPT | Performed by: INTERNAL MEDICINE

## 2020-07-01 PROCEDURE — 25010000003 MILRINONE LACTATE IN DEXTROSE 20-5 MG/100ML-% SOLUTION: Performed by: PHYSICIAN ASSISTANT

## 2020-07-01 PROCEDURE — 93010 ELECTROCARDIOGRAM REPORT: CPT | Performed by: INTERNAL MEDICINE

## 2020-07-01 PROCEDURE — 25010000002 FENTANYL CITRATE (PF) 100 MCG/2ML SOLUTION: Performed by: THORACIC SURGERY (CARDIOTHORACIC VASCULAR SURGERY)

## 2020-07-01 PROCEDURE — P9047 ALBUMIN (HUMAN), 25%, 50ML: HCPCS | Performed by: INTERNAL MEDICINE

## 2020-07-01 PROCEDURE — 80069 RENAL FUNCTION PANEL: CPT | Performed by: PHYSICIAN ASSISTANT

## 2020-07-01 PROCEDURE — 25010000003 CEFUROXIME SODIUM 1.5 G RECONSTITUTED SOLUTION: Performed by: PHYSICIAN ASSISTANT

## 2020-07-01 RX ORDER — FUROSEMIDE 10 MG/ML
40 SOLUTION ORAL DAILY
Status: DISCONTINUED | OUTPATIENT
Start: 2020-07-01 | End: 2020-07-01

## 2020-07-01 RX ORDER — ALBUMIN (HUMAN) 12.5 G/50ML
25 SOLUTION INTRAVENOUS ONCE
Status: COMPLETED | OUTPATIENT
Start: 2020-07-01 | End: 2020-07-01

## 2020-07-01 RX ORDER — FUROSEMIDE 10 MG/ML
40 INJECTION INTRAMUSCULAR; INTRAVENOUS ONCE
Status: COMPLETED | OUTPATIENT
Start: 2020-07-01 | End: 2020-07-01

## 2020-07-01 RX ORDER — DOCUSATE SODIUM 50 MG/5 ML
100 LIQUID (ML) ORAL 2 TIMES DAILY
Status: DISCONTINUED | OUTPATIENT
Start: 2020-07-01 | End: 2020-07-08

## 2020-07-01 RX ORDER — LANSOPRAZOLE
30 KIT
Status: DISCONTINUED | OUTPATIENT
Start: 2020-07-01 | End: 2020-07-07

## 2020-07-01 RX ORDER — ACETAMINOPHEN 325 MG/1
650 TABLET ORAL EVERY 6 HOURS PRN
Status: DISCONTINUED | OUTPATIENT
Start: 2020-07-01 | End: 2020-07-07

## 2020-07-01 RX ADMIN — CHLORHEXIDINE GLUCONATE 0.12% ORAL RINSE 15 ML: 1.2 LIQUID ORAL at 20:12

## 2020-07-01 RX ADMIN — FENTANYL CITRATE 25 MCG: 50 INJECTION, SOLUTION INTRAMUSCULAR; INTRAVENOUS at 01:19

## 2020-07-01 RX ADMIN — DEXMEDETOMIDINE HYDROCHLORIDE 0.2 MCG/KG/HR: 400 INJECTION INTRAVENOUS at 05:21

## 2020-07-01 RX ADMIN — DOCUSATE SODIUM 100 MG: 50 LIQUID ORAL at 20:12

## 2020-07-01 RX ADMIN — ASPIRIN 325 MG ORAL TABLET 325 MG: 325 PILL ORAL at 08:01

## 2020-07-01 RX ADMIN — CEFUROXIME SODIUM 1.5 G: 1.5 INJECTION, POWDER, FOR SOLUTION INTRAVENOUS at 17:01

## 2020-07-01 RX ADMIN — MILRINONE LACTATE IN DEXTROSE 0.25 MCG/KG/MIN: 200 INJECTION, SOLUTION INTRAVENOUS at 09:22

## 2020-07-01 RX ADMIN — FUROSEMIDE 40 MG: 10 INJECTION, SOLUTION INTRAMUSCULAR; INTRAVENOUS at 17:01

## 2020-07-01 RX ADMIN — CETIRIZINE HYDROCHLORIDE 10 MG: 10 TABLET, FILM COATED ORAL at 08:01

## 2020-07-01 RX ADMIN — CEFUROXIME SODIUM 1.5 G: 1.5 INJECTION, POWDER, FOR SOLUTION INTRAVENOUS at 01:55

## 2020-07-01 RX ADMIN — Medication 0.11 MCG/KG/MIN: at 08:51

## 2020-07-01 RX ADMIN — CEFUROXIME SODIUM 1.5 G: 1.5 INJECTION, POWDER, FOR SOLUTION INTRAVENOUS at 09:22

## 2020-07-01 RX ADMIN — POTASSIUM & SODIUM PHOSPHATES POWDER PACK 280-160-250 MG 2 PACKET: 280-160-250 PACK at 05:15

## 2020-07-01 RX ADMIN — DEXMEDETOMIDINE HYDROCHLORIDE 0.2 MCG/KG/HR: 400 INJECTION INTRAVENOUS at 00:26

## 2020-07-01 RX ADMIN — ALBUMIN HUMAN 25 G: 0.25 SOLUTION INTRAVENOUS at 16:37

## 2020-07-01 RX ADMIN — CHLORHEXIDINE GLUCONATE 0.12% ORAL RINSE 15 ML: 1.2 LIQUID ORAL at 08:02

## 2020-07-01 RX ADMIN — SENNOSIDES 10 ML: 8.8 LIQUID ORAL at 20:12

## 2020-07-01 RX ADMIN — LANSOPRAZOLE 30 MG: KIT at 06:10

## 2020-07-01 RX ADMIN — ATORVASTATIN CALCIUM 80 MG: 40 TABLET, FILM COATED ORAL at 20:12

## 2020-07-02 ENCOUNTER — APPOINTMENT (OUTPATIENT)
Dept: GENERAL RADIOLOGY | Facility: HOSPITAL | Age: 75
End: 2020-07-02

## 2020-07-02 LAB
ANION GAP SERPL CALCULATED.3IONS-SCNC: 13 MMOL/L (ref 5–15)
BH BB BLOOD EXPIRATION DATE: NORMAL
BH BB BLOOD TYPE BARCODE: 5100
BH BB DISPENSE STATUS: NORMAL
BH BB PRODUCT CODE: NORMAL
BH BB UNIT NUMBER: NORMAL
BUN SERPL-MCNC: 16 MG/DL (ref 8–23)
BUN/CREAT SERPL: 13.8 (ref 7–25)
CALCIUM SPEC-SCNC: 8.5 MG/DL (ref 8.6–10.5)
CHLORIDE SERPL-SCNC: 102 MMOL/L (ref 98–107)
CO2 SERPL-SCNC: 25 MMOL/L (ref 22–29)
CREAT SERPL-MCNC: 1.16 MG/DL (ref 0.57–1)
CROSSMATCH INTERPRETATION: NORMAL
DEPRECATED RDW RBC AUTO: 54.4 FL (ref 37–54)
ERYTHROCYTE [DISTWIDTH] IN BLOOD BY AUTOMATED COUNT: 15.9 % (ref 12.3–15.4)
GFR SERPL CREATININE-BSD FRML MDRD: 46 ML/MIN/1.73
GLUCOSE BLDC GLUCOMTR-MCNC: 109 MG/DL (ref 70–130)
GLUCOSE BLDC GLUCOMTR-MCNC: 114 MG/DL (ref 70–130)
GLUCOSE BLDC GLUCOMTR-MCNC: 117 MG/DL (ref 70–130)
GLUCOSE BLDC GLUCOMTR-MCNC: 117 MG/DL (ref 70–130)
GLUCOSE BLDC GLUCOMTR-MCNC: 118 MG/DL (ref 70–130)
GLUCOSE BLDC GLUCOMTR-MCNC: 119 MG/DL (ref 70–130)
GLUCOSE BLDC GLUCOMTR-MCNC: 150 MG/DL (ref 70–130)
GLUCOSE BLDC GLUCOMTR-MCNC: 156 MG/DL (ref 70–130)
GLUCOSE BLDC GLUCOMTR-MCNC: 164 MG/DL (ref 70–130)
GLUCOSE BLDC GLUCOMTR-MCNC: 183 MG/DL (ref 70–130)
GLUCOSE SERPL-MCNC: 117 MG/DL (ref 65–99)
HCT VFR BLD AUTO: 29.2 % (ref 34–46.6)
HGB BLD-MCNC: 9.5 G/DL (ref 12–15.9)
MAGNESIUM SERPL-MCNC: 2 MG/DL (ref 1.6–2.4)
MCH RBC QN AUTO: 30.9 PG (ref 26.6–33)
MCHC RBC AUTO-ENTMCNC: 32.5 G/DL (ref 31.5–35.7)
MCV RBC AUTO: 95.1 FL (ref 79–97)
PHOSPHATE SERPL-MCNC: 3.4 MG/DL (ref 2.5–4.5)
PLATELET # BLD AUTO: 149 10*3/MM3 (ref 140–450)
PMV BLD AUTO: 10.6 FL (ref 6–12)
POTASSIUM SERPL-SCNC: 3.7 MMOL/L (ref 3.5–5.2)
RBC # BLD AUTO: 3.07 10*6/MM3 (ref 3.77–5.28)
SODIUM SERPL-SCNC: 140 MMOL/L (ref 136–145)
UNIT  ABO: NORMAL
UNIT  RH: NORMAL
WBC # BLD AUTO: 13.49 10*3/MM3 (ref 3.4–10.8)

## 2020-07-02 PROCEDURE — 93010 ELECTROCARDIOGRAM REPORT: CPT | Performed by: INTERNAL MEDICINE

## 2020-07-02 PROCEDURE — 83735 ASSAY OF MAGNESIUM: CPT | Performed by: THORACIC SURGERY (CARDIOTHORACIC VASCULAR SURGERY)

## 2020-07-02 PROCEDURE — 99024 POSTOP FOLLOW-UP VISIT: CPT | Performed by: THORACIC SURGERY (CARDIOTHORACIC VASCULAR SURGERY)

## 2020-07-02 PROCEDURE — 93005 ELECTROCARDIOGRAM TRACING: CPT | Performed by: PHYSICIAN ASSISTANT

## 2020-07-02 PROCEDURE — 92610 EVALUATE SWALLOWING FUNCTION: CPT

## 2020-07-02 PROCEDURE — 80048 BASIC METABOLIC PNL TOTAL CA: CPT | Performed by: PHYSICIAN ASSISTANT

## 2020-07-02 PROCEDURE — 99291 CRITICAL CARE FIRST HOUR: CPT | Performed by: INTERNAL MEDICINE

## 2020-07-02 PROCEDURE — P9047 ALBUMIN (HUMAN), 25%, 50ML: HCPCS | Performed by: INTERNAL MEDICINE

## 2020-07-02 PROCEDURE — 25010000002 FUROSEMIDE PER 20 MG: Performed by: INTERNAL MEDICINE

## 2020-07-02 PROCEDURE — 25010000003 CEFUROXIME SODIUM 1.5 G RECONSTITUTED SOLUTION: Performed by: PHYSICIAN ASSISTANT

## 2020-07-02 PROCEDURE — 84100 ASSAY OF PHOSPHORUS: CPT | Performed by: THORACIC SURGERY (CARDIOTHORACIC VASCULAR SURGERY)

## 2020-07-02 PROCEDURE — 25010000002 ALBUMIN HUMAN 25% PER 50 ML: Performed by: INTERNAL MEDICINE

## 2020-07-02 PROCEDURE — 82962 GLUCOSE BLOOD TEST: CPT

## 2020-07-02 PROCEDURE — 71045 X-RAY EXAM CHEST 1 VIEW: CPT

## 2020-07-02 PROCEDURE — 85027 COMPLETE CBC AUTOMATED: CPT | Performed by: PHYSICIAN ASSISTANT

## 2020-07-02 PROCEDURE — 25010000003 MILRINONE LACTATE IN DEXTROSE 20-5 MG/100ML-% SOLUTION: Performed by: PHYSICIAN ASSISTANT

## 2020-07-02 PROCEDURE — 97163 PT EVAL HIGH COMPLEX 45 MIN: CPT

## 2020-07-02 RX ORDER — FUROSEMIDE 20 MG/1
TABLET ORAL
Qty: 30 TABLET | Refills: 0 | OUTPATIENT
Start: 2020-07-02 | End: 2020-07-20 | Stop reason: HOSPADM

## 2020-07-02 RX ORDER — LOSARTAN POTASSIUM 25 MG/1
25 TABLET ORAL
Status: DISCONTINUED | OUTPATIENT
Start: 2020-07-02 | End: 2020-07-07

## 2020-07-02 RX ORDER — OXYCODONE HYDROCHLORIDE AND ACETAMINOPHEN 5; 325 MG/1; MG/1
1 TABLET ORAL EVERY 6 HOURS PRN
Status: DISCONTINUED | OUTPATIENT
Start: 2020-07-02 | End: 2020-07-07

## 2020-07-02 RX ORDER — AMINO ACIDS/PROTEIN HYDROLYS 15G-100/30
30 LIQUID (ML) ORAL 2 TIMES DAILY
Status: DISCONTINUED | OUTPATIENT
Start: 2020-07-02 | End: 2020-07-09

## 2020-07-02 RX ORDER — HYDROCODONE BITARTRATE AND ACETAMINOPHEN 5; 325 MG/1; MG/1
1 TABLET ORAL EVERY 6 HOURS PRN
Status: DISCONTINUED | OUTPATIENT
Start: 2020-07-02 | End: 2020-07-07

## 2020-07-02 RX ORDER — FUROSEMIDE 10 MG/ML
40 INJECTION INTRAMUSCULAR; INTRAVENOUS ONCE
Status: COMPLETED | OUTPATIENT
Start: 2020-07-02 | End: 2020-07-02

## 2020-07-02 RX ORDER — MORPHINE SULFATE 2 MG/ML
2 INJECTION, SOLUTION INTRAMUSCULAR; INTRAVENOUS
Status: DISCONTINUED | OUTPATIENT
Start: 2020-07-02 | End: 2020-07-10

## 2020-07-02 RX ORDER — DEXTROSE MONOHYDRATE 25 G/50ML
25-50 INJECTION, SOLUTION INTRAVENOUS
Status: DISCONTINUED | OUTPATIENT
Start: 2020-07-02 | End: 2020-07-06

## 2020-07-02 RX ORDER — CARVEDILOL 3.12 MG/1
3.12 TABLET ORAL 2 TIMES DAILY
Status: DISCONTINUED | OUTPATIENT
Start: 2020-07-02 | End: 2020-07-07

## 2020-07-02 RX ORDER — ALBUMIN (HUMAN) 12.5 G/50ML
25 SOLUTION INTRAVENOUS ONCE
Status: COMPLETED | OUTPATIENT
Start: 2020-07-02 | End: 2020-07-02

## 2020-07-02 RX ADMIN — INSULIN HUMAN 2 UNITS/HR: 1 INJECTION, SOLUTION INTRAVENOUS at 18:26

## 2020-07-02 RX ADMIN — ACETAMINOPHEN 650 MG: 325 TABLET, FILM COATED ORAL at 20:30

## 2020-07-02 RX ADMIN — LANSOPRAZOLE 30 MG: KIT at 05:49

## 2020-07-02 RX ADMIN — Medication 30 ML: at 20:26

## 2020-07-02 RX ADMIN — NITROGLYCERIN 5 MCG/MIN: 20 INJECTION INTRAVENOUS at 12:23

## 2020-07-02 RX ADMIN — SENNOSIDES 10 ML: 8.8 LIQUID ORAL at 08:01

## 2020-07-02 RX ADMIN — LOSARTAN POTASSIUM 25 MG: 25 TABLET, FILM COATED ORAL at 15:01

## 2020-07-02 RX ADMIN — CHLORHEXIDINE GLUCONATE 0.12% ORAL RINSE 15 ML: 1.2 LIQUID ORAL at 08:01

## 2020-07-02 RX ADMIN — MILRINONE LACTATE IN DEXTROSE 0.25 MCG/KG/MIN: 200 INJECTION, SOLUTION INTRAVENOUS at 04:33

## 2020-07-02 RX ADMIN — CARVEDILOL 3.12 MG: 3.12 TABLET, FILM COATED ORAL at 15:02

## 2020-07-02 RX ADMIN — CEFUROXIME SODIUM 1.5 G: 1.5 INJECTION, POWDER, FOR SOLUTION INTRAVENOUS at 02:06

## 2020-07-02 RX ADMIN — ACETAMINOPHEN 650 MG: 325 TABLET, FILM COATED ORAL at 12:48

## 2020-07-02 RX ADMIN — ALBUMIN HUMAN 25 G: 0.25 SOLUTION INTRAVENOUS at 12:02

## 2020-07-02 RX ADMIN — CHLORHEXIDINE GLUCONATE 0.12% ORAL RINSE 15 ML: 1.2 LIQUID ORAL at 20:30

## 2020-07-02 RX ADMIN — SENNOSIDES 10 ML: 8.8 LIQUID ORAL at 20:30

## 2020-07-02 RX ADMIN — ASPIRIN 325 MG ORAL TABLET 325 MG: 325 PILL ORAL at 08:01

## 2020-07-02 RX ADMIN — CARVEDILOL 3.12 MG: 3.12 TABLET, FILM COATED ORAL at 20:31

## 2020-07-02 RX ADMIN — FUROSEMIDE 40 MG: 10 INJECTION, SOLUTION INTRAMUSCULAR; INTRAVENOUS at 12:31

## 2020-07-02 RX ADMIN — CETIRIZINE HYDROCHLORIDE 10 MG: 10 TABLET, FILM COATED ORAL at 08:01

## 2020-07-02 RX ADMIN — DOCUSATE SODIUM 100 MG: 50 LIQUID ORAL at 08:01

## 2020-07-02 RX ADMIN — ATORVASTATIN CALCIUM 80 MG: 40 TABLET, FILM COATED ORAL at 20:30

## 2020-07-02 RX ADMIN — INSULIN HUMAN 1.2 UNITS/HR: 1 INJECTION, SOLUTION INTRAVENOUS at 02:24

## 2020-07-03 LAB
ALBUMIN SERPL-MCNC: 3.7 G/DL (ref 3.5–5.2)
ALP SERPL-CCNC: 70 U/L (ref 39–117)
ALT SERPL W P-5'-P-CCNC: 9 U/L (ref 1–33)
ANION GAP SERPL CALCULATED.3IONS-SCNC: 10 MMOL/L (ref 5–15)
AST SERPL-CCNC: 20 U/L (ref 1–32)
BASOPHILS # BLD AUTO: 0.04 10*3/MM3 (ref 0–0.2)
BASOPHILS NFR BLD AUTO: 0.5 % (ref 0–1.5)
BH BB BLOOD EXPIRATION DATE: NORMAL
BH BB BLOOD EXPIRATION DATE: NORMAL
BH BB BLOOD TYPE BARCODE: 5100
BH BB BLOOD TYPE BARCODE: 5100
BH BB DISPENSE STATUS: NORMAL
BH BB DISPENSE STATUS: NORMAL
BH BB PRODUCT CODE: NORMAL
BH BB PRODUCT CODE: NORMAL
BH BB UNIT NUMBER: NORMAL
BH BB UNIT NUMBER: NORMAL
BILIRUB SERPL-MCNC: 1.2 MG/DL (ref 0.2–1.2)
BUN SERPL-MCNC: 25 MG/DL (ref 8–23)
CALCIUM SPEC-SCNC: 8.6 MG/DL (ref 8.6–10.5)
CHLORIDE SERPL-SCNC: 102 MMOL/L (ref 98–107)
CHOLEST SERPL-MCNC: 55 MG/DL (ref 0–200)
CO2 SERPL-SCNC: 27 MMOL/L (ref 22–29)
CREAT SERPL-MCNC: 1.02 MG/DL (ref 0.57–1)
CROSSMATCH INTERPRETATION: NORMAL
CROSSMATCH INTERPRETATION: NORMAL
CRP SERPL-MCNC: 13.18 MG/DL (ref 0–0.5)
DEPRECATED RDW RBC AUTO: 54.2 FL (ref 37–54)
EOSINOPHIL # BLD AUTO: 0.22 10*3/MM3 (ref 0–0.4)
EOSINOPHIL NFR BLD AUTO: 2.9 % (ref 0.3–6.2)
ERYTHROCYTE [DISTWIDTH] IN BLOOD BY AUTOMATED COUNT: 15.6 % (ref 12.3–15.4)
GLUCOSE BLDC GLUCOMTR-MCNC: 103 MG/DL (ref 70–130)
GLUCOSE BLDC GLUCOMTR-MCNC: 104 MG/DL (ref 70–130)
GLUCOSE BLDC GLUCOMTR-MCNC: 118 MG/DL (ref 70–130)
GLUCOSE BLDC GLUCOMTR-MCNC: 123 MG/DL (ref 70–130)
GLUCOSE BLDC GLUCOMTR-MCNC: 137 MG/DL (ref 70–130)
GLUCOSE BLDC GLUCOMTR-MCNC: 145 MG/DL (ref 70–130)
GLUCOSE BLDC GLUCOMTR-MCNC: 162 MG/DL (ref 70–130)
GLUCOSE BLDC GLUCOMTR-MCNC: 163 MG/DL (ref 70–130)
GLUCOSE BLDC GLUCOMTR-MCNC: 177 MG/DL (ref 70–130)
GLUCOSE BLDC GLUCOMTR-MCNC: 181 MG/DL (ref 70–130)
GLUCOSE BLDC GLUCOMTR-MCNC: 192 MG/DL (ref 70–130)
GLUCOSE BLDC GLUCOMTR-MCNC: 203 MG/DL (ref 70–130)
GLUCOSE BLDC GLUCOMTR-MCNC: 89 MG/DL (ref 70–130)
GLUCOSE SERPL-MCNC: 175 MG/DL (ref 65–99)
HCT VFR BLD AUTO: 29.2 % (ref 34–46.6)
HGB BLD-MCNC: 9.4 G/DL (ref 12–15.9)
IMM GRANULOCYTES # BLD AUTO: 0.04 10*3/MM3 (ref 0–0.05)
IMM GRANULOCYTES NFR BLD AUTO: 0.5 % (ref 0–0.5)
LYMPHOCYTES # BLD AUTO: 1.19 10*3/MM3 (ref 0.7–3.1)
LYMPHOCYTES NFR BLD AUTO: 15.6 % (ref 19.6–45.3)
MAGNESIUM SERPL-MCNC: 2.2 MG/DL (ref 1.6–2.4)
MCH RBC QN AUTO: 30.6 PG (ref 26.6–33)
MCHC RBC AUTO-ENTMCNC: 32.2 G/DL (ref 31.5–35.7)
MCV RBC AUTO: 95.1 FL (ref 79–97)
MONOCYTES # BLD AUTO: 0.53 10*3/MM3 (ref 0.1–0.9)
MONOCYTES NFR BLD AUTO: 7 % (ref 5–12)
NEUTROPHILS NFR BLD AUTO: 5.6 10*3/MM3 (ref 1.7–7)
NEUTROPHILS NFR BLD AUTO: 73.5 % (ref 42.7–76)
NRBC BLD AUTO-RTO: 0 /100 WBC (ref 0–0.2)
PHOSPHATE SERPL-MCNC: 2.2 MG/DL (ref 2.5–4.5)
PLATELET # BLD AUTO: 119 10*3/MM3 (ref 140–450)
PMV BLD AUTO: 11 FL (ref 6–12)
POTASSIUM SERPL-SCNC: 3.6 MMOL/L (ref 3.5–5.2)
PREALB SERPL-MCNC: 7.4 MG/DL (ref 20–40)
PROT SERPL-MCNC: 5.7 G/DL (ref 6–8.5)
RBC # BLD AUTO: 3.07 10*6/MM3 (ref 3.77–5.28)
SODIUM SERPL-SCNC: 139 MMOL/L (ref 136–145)
TRIGL SERPL-MCNC: 58 MG/DL (ref 0–150)
UNIT  ABO: NORMAL
UNIT  ABO: NORMAL
UNIT  RH: NORMAL
UNIT  RH: NORMAL
WBC # BLD AUTO: 7.62 10*3/MM3 (ref 3.4–10.8)

## 2020-07-03 PROCEDURE — 84134 ASSAY OF PREALBUMIN: CPT | Performed by: INTERNAL MEDICINE

## 2020-07-03 PROCEDURE — 99024 POSTOP FOLLOW-UP VISIT: CPT | Performed by: THORACIC SURGERY (CARDIOTHORACIC VASCULAR SURGERY)

## 2020-07-03 PROCEDURE — 83735 ASSAY OF MAGNESIUM: CPT | Performed by: INTERNAL MEDICINE

## 2020-07-03 PROCEDURE — 97530 THERAPEUTIC ACTIVITIES: CPT

## 2020-07-03 PROCEDURE — 85025 COMPLETE CBC W/AUTO DIFF WBC: CPT | Performed by: INTERNAL MEDICINE

## 2020-07-03 PROCEDURE — 84478 ASSAY OF TRIGLYCERIDES: CPT | Performed by: INTERNAL MEDICINE

## 2020-07-03 PROCEDURE — 25010000002 FUROSEMIDE PER 20 MG: Performed by: INTERNAL MEDICINE

## 2020-07-03 PROCEDURE — 80053 COMPREHEN METABOLIC PANEL: CPT | Performed by: INTERNAL MEDICINE

## 2020-07-03 PROCEDURE — 82962 GLUCOSE BLOOD TEST: CPT

## 2020-07-03 PROCEDURE — 82465 ASSAY BLD/SERUM CHOLESTEROL: CPT | Performed by: INTERNAL MEDICINE

## 2020-07-03 PROCEDURE — 84100 ASSAY OF PHOSPHORUS: CPT | Performed by: INTERNAL MEDICINE

## 2020-07-03 PROCEDURE — 86140 C-REACTIVE PROTEIN: CPT | Performed by: INTERNAL MEDICINE

## 2020-07-03 PROCEDURE — 99232 SBSQ HOSP IP/OBS MODERATE 35: CPT | Performed by: INTERNAL MEDICINE

## 2020-07-03 RX ORDER — FUROSEMIDE 10 MG/ML
20 INJECTION INTRAMUSCULAR; INTRAVENOUS ONCE
Status: COMPLETED | OUTPATIENT
Start: 2020-07-03 | End: 2020-07-03

## 2020-07-03 RX ADMIN — LOSARTAN POTASSIUM 25 MG: 25 TABLET, FILM COATED ORAL at 08:58

## 2020-07-03 RX ADMIN — ATORVASTATIN CALCIUM 80 MG: 40 TABLET, FILM COATED ORAL at 21:09

## 2020-07-03 RX ADMIN — INSULIN HUMAN 2 UNITS/HR: 1 INJECTION, SOLUTION INTRAVENOUS at 02:01

## 2020-07-03 RX ADMIN — LANSOPRAZOLE 30 MG: KIT at 05:40

## 2020-07-03 RX ADMIN — INSULIN HUMAN 1 UNITS/HR: 1 INJECTION, SOLUTION INTRAVENOUS at 18:04

## 2020-07-03 RX ADMIN — ACETAMINOPHEN 650 MG: 325 TABLET, FILM COATED ORAL at 02:01

## 2020-07-03 RX ADMIN — CARVEDILOL 3.12 MG: 3.12 TABLET, FILM COATED ORAL at 08:58

## 2020-07-03 RX ADMIN — CETIRIZINE HYDROCHLORIDE 10 MG: 10 TABLET, FILM COATED ORAL at 08:58

## 2020-07-03 RX ADMIN — ASPIRIN 325 MG ORAL TABLET 325 MG: 325 PILL ORAL at 08:58

## 2020-07-03 RX ADMIN — Medication 30 ML: at 21:14

## 2020-07-03 RX ADMIN — CARVEDILOL 3.12 MG: 3.12 TABLET, FILM COATED ORAL at 21:09

## 2020-07-03 RX ADMIN — SODIUM CHLORIDE 15 MMOL: 900 INJECTION, SOLUTION INTRAVENOUS at 06:13

## 2020-07-03 RX ADMIN — ACETAMINOPHEN 650 MG: 325 TABLET, FILM COATED ORAL at 21:09

## 2020-07-03 RX ADMIN — FUROSEMIDE 20 MG: 10 INJECTION, SOLUTION INTRAMUSCULAR; INTRAVENOUS at 11:44

## 2020-07-04 ENCOUNTER — APPOINTMENT (OUTPATIENT)
Dept: CT IMAGING | Facility: HOSPITAL | Age: 75
End: 2020-07-04

## 2020-07-04 ENCOUNTER — APPOINTMENT (OUTPATIENT)
Dept: GENERAL RADIOLOGY | Facility: HOSPITAL | Age: 75
End: 2020-07-04

## 2020-07-04 ENCOUNTER — APPOINTMENT (OUTPATIENT)
Dept: MRI IMAGING | Facility: HOSPITAL | Age: 75
End: 2020-07-04

## 2020-07-04 LAB
ANION GAP SERPL CALCULATED.3IONS-SCNC: 9 MMOL/L (ref 5–15)
BUN SERPL-MCNC: 34 MG/DL (ref 8–23)
BUN/CREAT SERPL: 30.9 (ref 7–25)
CALCIUM SPEC-SCNC: 8.6 MG/DL (ref 8.6–10.5)
CHLORIDE SERPL-SCNC: 103 MMOL/L (ref 98–107)
CO2 SERPL-SCNC: 28 MMOL/L (ref 22–29)
CREAT SERPL-MCNC: 1.1 MG/DL (ref 0.57–1)
DEPRECATED RDW RBC AUTO: 54.2 FL (ref 37–54)
ERYTHROCYTE [DISTWIDTH] IN BLOOD BY AUTOMATED COUNT: 15.3 % (ref 12.3–15.4)
GFR SERPL CREATININE-BSD FRML MDRD: 48 ML/MIN/1.73
GLUCOSE BLDC GLUCOMTR-MCNC: 124 MG/DL (ref 70–130)
GLUCOSE BLDC GLUCOMTR-MCNC: 134 MG/DL (ref 70–130)
GLUCOSE BLDC GLUCOMTR-MCNC: 140 MG/DL (ref 70–130)
GLUCOSE BLDC GLUCOMTR-MCNC: 148 MG/DL (ref 70–130)
GLUCOSE BLDC GLUCOMTR-MCNC: 149 MG/DL (ref 70–130)
GLUCOSE BLDC GLUCOMTR-MCNC: 149 MG/DL (ref 70–130)
GLUCOSE BLDC GLUCOMTR-MCNC: 154 MG/DL (ref 70–130)
GLUCOSE SERPL-MCNC: 170 MG/DL (ref 65–99)
HCT VFR BLD AUTO: 31.9 % (ref 34–46.6)
HGB BLD-MCNC: 10.3 G/DL (ref 12–15.9)
MAGNESIUM SERPL-MCNC: 2.2 MG/DL (ref 1.6–2.4)
MCH RBC QN AUTO: 31.2 PG (ref 26.6–33)
MCHC RBC AUTO-ENTMCNC: 32.3 G/DL (ref 31.5–35.7)
MCV RBC AUTO: 96.7 FL (ref 79–97)
PHOSPHATE SERPL-MCNC: 2.1 MG/DL (ref 2.5–4.5)
PHOSPHATE SERPL-MCNC: 2.9 MG/DL (ref 2.5–4.5)
PLATELET # BLD AUTO: 136 10*3/MM3 (ref 140–450)
PMV BLD AUTO: 11.1 FL (ref 6–12)
POTASSIUM SERPL-SCNC: 4 MMOL/L (ref 3.5–5.2)
RBC # BLD AUTO: 3.3 10*6/MM3 (ref 3.77–5.28)
SODIUM SERPL-SCNC: 140 MMOL/L (ref 136–145)
WBC # BLD AUTO: 9.96 10*3/MM3 (ref 3.4–10.8)

## 2020-07-04 PROCEDURE — 84100 ASSAY OF PHOSPHORUS: CPT | Performed by: THORACIC SURGERY (CARDIOTHORACIC VASCULAR SURGERY)

## 2020-07-04 PROCEDURE — 71250 CT THORAX DX C-: CPT

## 2020-07-04 PROCEDURE — 71045 X-RAY EXAM CHEST 1 VIEW: CPT

## 2020-07-04 PROCEDURE — 63710000001 INSULIN DETEMIR PER 5 UNITS: Performed by: INTERNAL MEDICINE

## 2020-07-04 PROCEDURE — 70551 MRI BRAIN STEM W/O DYE: CPT

## 2020-07-04 PROCEDURE — 25010000002 ALBUMIN HUMAN 25% PER 50 ML: Performed by: NURSE PRACTITIONER

## 2020-07-04 PROCEDURE — 0042T HC CT CEREBRAL PERFUSION W/WO CONTRAST: CPT

## 2020-07-04 PROCEDURE — 82962 GLUCOSE BLOOD TEST: CPT

## 2020-07-04 PROCEDURE — 85027 COMPLETE CBC AUTOMATED: CPT | Performed by: THORACIC SURGERY (CARDIOTHORACIC VASCULAR SURGERY)

## 2020-07-04 PROCEDURE — 99222 1ST HOSP IP/OBS MODERATE 55: CPT | Performed by: CLINICAL NURSE SPECIALIST

## 2020-07-04 PROCEDURE — 70498 CT ANGIOGRAPHY NECK: CPT

## 2020-07-04 PROCEDURE — 99232 SBSQ HOSP IP/OBS MODERATE 35: CPT | Performed by: INTERNAL MEDICINE

## 2020-07-04 PROCEDURE — 70496 CT ANGIOGRAPHY HEAD: CPT

## 2020-07-04 PROCEDURE — 94640 AIRWAY INHALATION TREATMENT: CPT

## 2020-07-04 PROCEDURE — 83735 ASSAY OF MAGNESIUM: CPT | Performed by: THORACIC SURGERY (CARDIOTHORACIC VASCULAR SURGERY)

## 2020-07-04 PROCEDURE — 94799 UNLISTED PULMONARY SVC/PX: CPT

## 2020-07-04 PROCEDURE — 92610 EVALUATE SWALLOWING FUNCTION: CPT

## 2020-07-04 PROCEDURE — 99024 POSTOP FOLLOW-UP VISIT: CPT | Performed by: THORACIC SURGERY (CARDIOTHORACIC VASCULAR SURGERY)

## 2020-07-04 PROCEDURE — 80048 BASIC METABOLIC PNL TOTAL CA: CPT | Performed by: PHYSICIAN ASSISTANT

## 2020-07-04 PROCEDURE — P9047 ALBUMIN (HUMAN), 25%, 50ML: HCPCS | Performed by: NURSE PRACTITIONER

## 2020-07-04 PROCEDURE — 0 IOPAMIDOL PER 1 ML: Performed by: THORACIC SURGERY (CARDIOTHORACIC VASCULAR SURGERY)

## 2020-07-04 RX ORDER — ASPIRIN 81 MG/1
81 TABLET, CHEWABLE ORAL DAILY
Status: DISCONTINUED | OUTPATIENT
Start: 2020-07-05 | End: 2020-07-07

## 2020-07-04 RX ORDER — NICOTINE POLACRILEX 4 MG
15 LOZENGE BUCCAL
Status: DISCONTINUED | OUTPATIENT
Start: 2020-07-04 | End: 2020-07-06

## 2020-07-04 RX ORDER — CLOPIDOGREL BISULFATE 75 MG/1
75 TABLET ORAL DAILY
Status: DISCONTINUED | OUTPATIENT
Start: 2020-07-04 | End: 2020-07-07

## 2020-07-04 RX ORDER — DEXTROSE AND SODIUM CHLORIDE 5; .45 G/100ML; G/100ML
75 INJECTION, SOLUTION INTRAVENOUS CONTINUOUS
Status: DISCONTINUED | OUTPATIENT
Start: 2020-07-04 | End: 2020-07-04

## 2020-07-04 RX ORDER — DEXTROSE MONOHYDRATE 25 G/50ML
25 INJECTION, SOLUTION INTRAVENOUS
Status: DISCONTINUED | OUTPATIENT
Start: 2020-07-04 | End: 2020-07-06

## 2020-07-04 RX ORDER — SODIUM CHLORIDE 9 MG/ML
75 INJECTION, SOLUTION INTRAVENOUS CONTINUOUS
Status: DISCONTINUED | OUTPATIENT
Start: 2020-07-04 | End: 2020-07-05

## 2020-07-04 RX ORDER — SODIUM CHLORIDE 0.9 % (FLUSH) 0.9 %
10 SYRINGE (ML) INJECTION EVERY 12 HOURS SCHEDULED
Status: DISCONTINUED | OUTPATIENT
Start: 2020-07-04 | End: 2020-07-08

## 2020-07-04 RX ORDER — ALBUMIN (HUMAN) 12.5 G/50ML
25 SOLUTION INTRAVENOUS ONCE
Status: COMPLETED | OUTPATIENT
Start: 2020-07-04 | End: 2020-07-04

## 2020-07-04 RX ORDER — IPRATROPIUM BROMIDE AND ALBUTEROL SULFATE 2.5; .5 MG/3ML; MG/3ML
3 SOLUTION RESPIRATORY (INHALATION) EVERY 6 HOURS PRN
Status: DISCONTINUED | OUTPATIENT
Start: 2020-07-04 | End: 2020-07-20 | Stop reason: HOSPADM

## 2020-07-04 RX ORDER — SODIUM CHLORIDE 0.9 % (FLUSH) 0.9 %
10 SYRINGE (ML) INJECTION AS NEEDED
Status: DISCONTINUED | OUTPATIENT
Start: 2020-07-04 | End: 2020-07-08

## 2020-07-04 RX ADMIN — INSULIN HUMAN 3 UNITS/HR: 1 INJECTION, SOLUTION INTRAVENOUS at 05:34

## 2020-07-04 RX ADMIN — CLOPIDOGREL BISULFATE 75 MG: 75 TABLET ORAL at 17:57

## 2020-07-04 RX ADMIN — ATORVASTATIN CALCIUM 80 MG: 40 TABLET, FILM COATED ORAL at 21:28

## 2020-07-04 RX ADMIN — SODIUM CHLORIDE 75 ML/HR: 9 INJECTION, SOLUTION INTRAVENOUS at 17:59

## 2020-07-04 RX ADMIN — SODIUM CHLORIDE 15 MMOL: 900 INJECTION, SOLUTION INTRAVENOUS at 06:46

## 2020-07-04 RX ADMIN — ACETAMINOPHEN 650 MG: 325 TABLET, FILM COATED ORAL at 04:53

## 2020-07-04 RX ADMIN — LANSOPRAZOLE 30 MG: KIT at 05:34

## 2020-07-04 RX ADMIN — ACETAMINOPHEN 650 MG: 325 TABLET, FILM COATED ORAL at 19:22

## 2020-07-04 RX ADMIN — DEXTROSE AND SODIUM CHLORIDE 75 ML/HR: 5; 450 INJECTION, SOLUTION INTRAVENOUS at 10:10

## 2020-07-04 RX ADMIN — Medication 30 ML: at 12:08

## 2020-07-04 RX ADMIN — Medication 30 ML: at 21:28

## 2020-07-04 RX ADMIN — IOPAMIDOL 40 ML: 755 INJECTION, SOLUTION INTRAVENOUS at 14:58

## 2020-07-04 RX ADMIN — ASPIRIN 325 MG ORAL TABLET 325 MG: 325 PILL ORAL at 12:07

## 2020-07-04 RX ADMIN — CARVEDILOL 3.12 MG: 3.12 TABLET, FILM COATED ORAL at 21:28

## 2020-07-04 RX ADMIN — CETIRIZINE HYDROCHLORIDE 10 MG: 10 TABLET, FILM COATED ORAL at 12:08

## 2020-07-04 RX ADMIN — IOPAMIDOL 75 ML: 755 INJECTION, SOLUTION INTRAVENOUS at 15:50

## 2020-07-04 RX ADMIN — INSULIN DETEMIR 20 UNITS: 100 INJECTION, SOLUTION SUBCUTANEOUS at 14:20

## 2020-07-04 RX ADMIN — ALBUMIN HUMAN 25 G: 0.25 SOLUTION INTRAVENOUS at 04:53

## 2020-07-04 RX ADMIN — IPRATROPIUM BROMIDE AND ALBUTEROL SULFATE 3 ML: 2.5; .5 SOLUTION RESPIRATORY (INHALATION) at 00:59

## 2020-07-05 ENCOUNTER — APPOINTMENT (OUTPATIENT)
Dept: GENERAL RADIOLOGY | Facility: HOSPITAL | Age: 75
End: 2020-07-05

## 2020-07-05 PROBLEM — I31.9 PERICARDITIS: Status: ACTIVE | Noted: 2020-07-05

## 2020-07-05 PROBLEM — I63.9 ACUTE CVA (CEREBROVASCULAR ACCIDENT): Status: ACTIVE | Noted: 2020-07-05

## 2020-07-05 PROBLEM — I65.22 CAROTID STENOSIS, LEFT: Status: ACTIVE | Noted: 2020-07-05

## 2020-07-05 PROBLEM — Z95.1 S/P CABG X 3: Status: ACTIVE | Noted: 2020-07-05

## 2020-07-05 LAB
ALBUMIN SERPL-MCNC: 3.6 G/DL (ref 3.5–5.2)
ALBUMIN/GLOB SERPL: 1.4 G/DL
ALP SERPL-CCNC: 95 U/L (ref 39–117)
ALT SERPL W P-5'-P-CCNC: 14 U/L (ref 1–33)
ANION GAP SERPL CALCULATED.3IONS-SCNC: 11 MMOL/L (ref 5–15)
AST SERPL-CCNC: 29 U/L (ref 1–32)
BASOPHILS # BLD AUTO: 0.07 10*3/MM3 (ref 0–0.2)
BASOPHILS NFR BLD AUTO: 0.7 % (ref 0–1.5)
BILIRUB SERPL-MCNC: 1.3 MG/DL (ref 0.2–1.2)
BUN SERPL-MCNC: 33 MG/DL (ref 8–23)
BUN/CREAT SERPL: 34 (ref 7–25)
CALCIUM SPEC-SCNC: 8.5 MG/DL (ref 8.6–10.5)
CHLORIDE SERPL-SCNC: 102 MMOL/L (ref 98–107)
CHOLEST SERPL-MCNC: 68 MG/DL (ref 0–200)
CO2 SERPL-SCNC: 26 MMOL/L (ref 22–29)
CREAT SERPL-MCNC: 0.97 MG/DL (ref 0.57–1)
DEPRECATED RDW RBC AUTO: 54.7 FL (ref 37–54)
EOSINOPHIL # BLD AUTO: 0.41 10*3/MM3 (ref 0–0.4)
EOSINOPHIL NFR BLD AUTO: 4.1 % (ref 0.3–6.2)
ERYTHROCYTE [DISTWIDTH] IN BLOOD BY AUTOMATED COUNT: 15.1 % (ref 12.3–15.4)
GFR SERPL CREATININE-BSD FRML MDRD: 56 ML/MIN/1.73
GLOBULIN UR ELPH-MCNC: 2.5 GM/DL
GLUCOSE BLDC GLUCOMTR-MCNC: 187 MG/DL (ref 70–130)
GLUCOSE BLDC GLUCOMTR-MCNC: 193 MG/DL (ref 70–130)
GLUCOSE BLDC GLUCOMTR-MCNC: 215 MG/DL (ref 70–130)
GLUCOSE BLDC GLUCOMTR-MCNC: 245 MG/DL (ref 70–130)
GLUCOSE SERPL-MCNC: 228 MG/DL (ref 65–99)
HCT VFR BLD AUTO: 35.4 % (ref 34–46.6)
HDLC SERPL-MCNC: 18 MG/DL (ref 40–60)
HGB BLD-MCNC: 10.8 G/DL (ref 12–15.9)
IMM GRANULOCYTES # BLD AUTO: 0.05 10*3/MM3 (ref 0–0.05)
IMM GRANULOCYTES NFR BLD AUTO: 0.5 % (ref 0–0.5)
LDLC SERPL CALC-MCNC: 32 MG/DL (ref 0–100)
LDLC/HDLC SERPL: 1.78 {RATIO}
LYMPHOCYTES # BLD AUTO: 1.44 10*3/MM3 (ref 0.7–3.1)
LYMPHOCYTES NFR BLD AUTO: 14.5 % (ref 19.6–45.3)
MAGNESIUM SERPL-MCNC: 2.2 MG/DL (ref 1.6–2.4)
MCH RBC QN AUTO: 30.4 PG (ref 26.6–33)
MCHC RBC AUTO-ENTMCNC: 30.5 G/DL (ref 31.5–35.7)
MCV RBC AUTO: 99.7 FL (ref 79–97)
MONOCYTES # BLD AUTO: 1.06 10*3/MM3 (ref 0.1–0.9)
MONOCYTES NFR BLD AUTO: 10.6 % (ref 5–12)
NEUTROPHILS NFR BLD AUTO: 6.93 10*3/MM3 (ref 1.7–7)
NEUTROPHILS NFR BLD AUTO: 69.6 % (ref 42.7–76)
NRBC BLD AUTO-RTO: 0 /100 WBC (ref 0–0.2)
PLATELET # BLD AUTO: 162 10*3/MM3 (ref 140–450)
PMV BLD AUTO: 11.3 FL (ref 6–12)
POTASSIUM SERPL-SCNC: 4.2 MMOL/L (ref 3.5–5.2)
PROT SERPL-MCNC: 6.1 G/DL (ref 6–8.5)
RBC # BLD AUTO: 3.55 10*6/MM3 (ref 3.77–5.28)
SODIUM SERPL-SCNC: 139 MMOL/L (ref 136–145)
TRIGL SERPL-MCNC: 90 MG/DL (ref 0–150)
VLDLC SERPL-MCNC: 18 MG/DL
WBC # BLD AUTO: 9.96 10*3/MM3 (ref 3.4–10.8)

## 2020-07-05 PROCEDURE — 99232 SBSQ HOSP IP/OBS MODERATE 35: CPT | Performed by: INTERNAL MEDICINE

## 2020-07-05 PROCEDURE — 99024 POSTOP FOLLOW-UP VISIT: CPT | Performed by: THORACIC SURGERY (CARDIOTHORACIC VASCULAR SURGERY)

## 2020-07-05 PROCEDURE — 99231 SBSQ HOSP IP/OBS SF/LOW 25: CPT | Performed by: INTERNAL MEDICINE

## 2020-07-05 PROCEDURE — 94799 UNLISTED PULMONARY SVC/PX: CPT

## 2020-07-05 PROCEDURE — 83735 ASSAY OF MAGNESIUM: CPT | Performed by: INTERNAL MEDICINE

## 2020-07-05 PROCEDURE — 82962 GLUCOSE BLOOD TEST: CPT

## 2020-07-05 PROCEDURE — 85025 COMPLETE CBC W/AUTO DIFF WBC: CPT | Performed by: INTERNAL MEDICINE

## 2020-07-05 PROCEDURE — 99233 SBSQ HOSP IP/OBS HIGH 50: CPT | Performed by: PSYCHIATRY & NEUROLOGY

## 2020-07-05 PROCEDURE — 63710000001 INSULIN REGULAR HUMAN PER 5 UNITS: Performed by: NURSE PRACTITIONER

## 2020-07-05 PROCEDURE — 80053 COMPREHEN METABOLIC PANEL: CPT | Performed by: INTERNAL MEDICINE

## 2020-07-05 PROCEDURE — 97116 GAIT TRAINING THERAPY: CPT

## 2020-07-05 PROCEDURE — 80061 LIPID PANEL: CPT | Performed by: CLINICAL NURSE SPECIALIST

## 2020-07-05 PROCEDURE — 63710000001 INSULIN DETEMIR PER 5 UNITS: Performed by: INTERNAL MEDICINE

## 2020-07-05 PROCEDURE — 92610 EVALUATE SWALLOWING FUNCTION: CPT

## 2020-07-05 PROCEDURE — 71045 X-RAY EXAM CHEST 1 VIEW: CPT

## 2020-07-05 PROCEDURE — 97165 OT EVAL LOW COMPLEX 30 MIN: CPT

## 2020-07-05 PROCEDURE — 97530 THERAPEUTIC ACTIVITIES: CPT

## 2020-07-05 PROCEDURE — 25010000002 HEPARIN (PORCINE) PER 1000 UNITS: Performed by: INTERNAL MEDICINE

## 2020-07-05 RX ORDER — NICOTINE POLACRILEX 4 MG
15 LOZENGE BUCCAL
Status: DISCONTINUED | OUTPATIENT
Start: 2020-07-05 | End: 2020-07-07

## 2020-07-05 RX ORDER — HEPARIN SODIUM 5000 [USP'U]/ML
5000 INJECTION, SOLUTION INTRAVENOUS; SUBCUTANEOUS EVERY 8 HOURS SCHEDULED
Status: DISCONTINUED | OUTPATIENT
Start: 2020-07-05 | End: 2020-07-14

## 2020-07-05 RX ORDER — DEXTROSE MONOHYDRATE 25 G/50ML
25 INJECTION, SOLUTION INTRAVENOUS
Status: DISCONTINUED | OUTPATIENT
Start: 2020-07-05 | End: 2020-07-07

## 2020-07-05 RX ADMIN — SENNOSIDES 10 ML: 8.8 LIQUID ORAL at 08:48

## 2020-07-05 RX ADMIN — INSULIN HUMAN 3 UNITS: 100 INJECTION, SOLUTION PARENTERAL at 12:55

## 2020-07-05 RX ADMIN — ATORVASTATIN CALCIUM 80 MG: 40 TABLET, FILM COATED ORAL at 22:12

## 2020-07-05 RX ADMIN — ACETAMINOPHEN 650 MG: 325 TABLET, FILM COATED ORAL at 06:12

## 2020-07-05 RX ADMIN — ACETAMINOPHEN 650 MG: 325 TABLET, FILM COATED ORAL at 22:12

## 2020-07-05 RX ADMIN — LANSOPRAZOLE 30 MG: KIT at 06:13

## 2020-07-05 RX ADMIN — Medication 30 ML: at 08:51

## 2020-07-05 RX ADMIN — CLOPIDOGREL BISULFATE 75 MG: 75 TABLET ORAL at 08:49

## 2020-07-05 RX ADMIN — INSULIN HUMAN 3 UNITS: 100 INJECTION, SOLUTION PARENTERAL at 17:48

## 2020-07-05 RX ADMIN — IPRATROPIUM BROMIDE AND ALBUTEROL SULFATE 3 ML: 2.5; .5 SOLUTION RESPIRATORY (INHALATION) at 04:45

## 2020-07-05 RX ADMIN — CETIRIZINE HYDROCHLORIDE 10 MG: 10 TABLET, FILM COATED ORAL at 08:49

## 2020-07-05 RX ADMIN — CARVEDILOL 3.12 MG: 3.12 TABLET, FILM COATED ORAL at 22:12

## 2020-07-05 RX ADMIN — LOSARTAN POTASSIUM 25 MG: 25 TABLET, FILM COATED ORAL at 06:12

## 2020-07-05 RX ADMIN — INSULIN DETEMIR 20 UNITS: 100 INJECTION, SOLUTION SUBCUTANEOUS at 08:55

## 2020-07-05 RX ADMIN — HEPARIN SODIUM 5000 UNITS: 5000 INJECTION, SOLUTION INTRAVENOUS; SUBCUTANEOUS at 17:49

## 2020-07-05 RX ADMIN — DOCUSATE SODIUM 100 MG: 50 LIQUID ORAL at 08:48

## 2020-07-05 RX ADMIN — ASPIRIN 81 MG: 81 TABLET, CHEWABLE ORAL at 08:49

## 2020-07-05 RX ADMIN — CARVEDILOL 3.12 MG: 3.12 TABLET, FILM COATED ORAL at 08:48

## 2020-07-05 RX ADMIN — Medication 30 ML: at 22:13

## 2020-07-05 RX ADMIN — HEPARIN SODIUM 5000 UNITS: 5000 INJECTION, SOLUTION INTRAVENOUS; SUBCUTANEOUS at 22:12

## 2020-07-05 RX ADMIN — SODIUM CHLORIDE, PRESERVATIVE FREE 10 ML: 5 INJECTION INTRAVENOUS at 08:52

## 2020-07-06 ENCOUNTER — APPOINTMENT (OUTPATIENT)
Dept: GENERAL RADIOLOGY | Facility: HOSPITAL | Age: 75
End: 2020-07-06

## 2020-07-06 LAB
ANION GAP SERPL CALCULATED.3IONS-SCNC: 10 MMOL/L (ref 5–15)
BASOPHILS # BLD AUTO: 0.1 10*3/MM3 (ref 0–0.2)
BASOPHILS NFR BLD AUTO: 0.8 % (ref 0–1.5)
BUN SERPL-MCNC: 31 MG/DL (ref 8–23)
BUN/CREAT SERPL: 37.3 (ref 7–25)
CALCIUM SPEC-SCNC: 8.6 MG/DL (ref 8.6–10.5)
CHLORIDE SERPL-SCNC: 104 MMOL/L (ref 98–107)
CO2 SERPL-SCNC: 27 MMOL/L (ref 22–29)
CREAT SERPL-MCNC: 0.83 MG/DL (ref 0.57–1)
DEPRECATED RDW RBC AUTO: 50.6 FL (ref 37–54)
EOSINOPHIL # BLD AUTO: 0.41 10*3/MM3 (ref 0–0.4)
EOSINOPHIL NFR BLD AUTO: 3.2 % (ref 0.3–6.2)
ERYTHROCYTE [DISTWIDTH] IN BLOOD BY AUTOMATED COUNT: 14.7 % (ref 12.3–15.4)
ERYTHROCYTE [SEDIMENTATION RATE] IN BLOOD: 21 MM/HR (ref 0–30)
GFR SERPL CREATININE-BSD FRML MDRD: 67 ML/MIN/1.73
GLUCOSE BLDC GLUCOMTR-MCNC: 118 MG/DL (ref 70–130)
GLUCOSE BLDC GLUCOMTR-MCNC: 162 MG/DL (ref 70–130)
GLUCOSE BLDC GLUCOMTR-MCNC: 256 MG/DL (ref 70–130)
GLUCOSE SERPL-MCNC: 190 MG/DL (ref 65–99)
HCT VFR BLD AUTO: 32.9 % (ref 34–46.6)
HGB BLD-MCNC: 10.8 G/DL (ref 12–15.9)
IMM GRANULOCYTES # BLD AUTO: 0.06 10*3/MM3 (ref 0–0.05)
IMM GRANULOCYTES NFR BLD AUTO: 0.5 % (ref 0–0.5)
LYMPHOCYTES # BLD AUTO: 1.57 10*3/MM3 (ref 0.7–3.1)
LYMPHOCYTES NFR BLD AUTO: 12.4 % (ref 19.6–45.3)
MCH RBC QN AUTO: 31.4 PG (ref 26.6–33)
MCHC RBC AUTO-ENTMCNC: 32.8 G/DL (ref 31.5–35.7)
MCV RBC AUTO: 95.6 FL (ref 79–97)
MONOCYTES # BLD AUTO: 1.15 10*3/MM3 (ref 0.1–0.9)
MONOCYTES NFR BLD AUTO: 9 % (ref 5–12)
NEUTROPHILS NFR BLD AUTO: 74.1 % (ref 42.7–76)
NEUTROPHILS NFR BLD AUTO: 9.42 10*3/MM3 (ref 1.7–7)
NRBC BLD AUTO-RTO: 0 /100 WBC (ref 0–0.2)
PHOSPHATE SERPL-MCNC: 2.4 MG/DL (ref 2.5–4.5)
PLATELET # BLD AUTO: 191 10*3/MM3 (ref 140–450)
PMV BLD AUTO: 10.9 FL (ref 6–12)
POTASSIUM SERPL-SCNC: 4.1 MMOL/L (ref 3.5–5.2)
RBC # BLD AUTO: 3.44 10*6/MM3 (ref 3.77–5.28)
SODIUM SERPL-SCNC: 141 MMOL/L (ref 136–145)
WBC # BLD AUTO: 12.71 10*3/MM3 (ref 3.4–10.8)

## 2020-07-06 PROCEDURE — 82962 GLUCOSE BLOOD TEST: CPT

## 2020-07-06 PROCEDURE — 97530 THERAPEUTIC ACTIVITIES: CPT

## 2020-07-06 PROCEDURE — 85025 COMPLETE CBC W/AUTO DIFF WBC: CPT | Performed by: INTERNAL MEDICINE

## 2020-07-06 PROCEDURE — 74018 RADEX ABDOMEN 1 VIEW: CPT

## 2020-07-06 PROCEDURE — 63710000001 INSULIN DETEMIR PER 5 UNITS: Performed by: INTERNAL MEDICINE

## 2020-07-06 PROCEDURE — 84100 ASSAY OF PHOSPHORUS: CPT | Performed by: THORACIC SURGERY (CARDIOTHORACIC VASCULAR SURGERY)

## 2020-07-06 PROCEDURE — 99232 SBSQ HOSP IP/OBS MODERATE 35: CPT | Performed by: NURSE PRACTITIONER

## 2020-07-06 PROCEDURE — 25010000002 HEPARIN (PORCINE) PER 1000 UNITS: Performed by: INTERNAL MEDICINE

## 2020-07-06 PROCEDURE — 71045 X-RAY EXAM CHEST 1 VIEW: CPT

## 2020-07-06 PROCEDURE — 80048 BASIC METABOLIC PNL TOTAL CA: CPT | Performed by: INTERNAL MEDICINE

## 2020-07-06 PROCEDURE — 92523 SPEECH SOUND LANG COMPREHEN: CPT

## 2020-07-06 PROCEDURE — 93010 ELECTROCARDIOGRAM REPORT: CPT | Performed by: INTERNAL MEDICINE

## 2020-07-06 PROCEDURE — 94799 UNLISTED PULMONARY SVC/PX: CPT

## 2020-07-06 PROCEDURE — 93005 ELECTROCARDIOGRAM TRACING: CPT | Performed by: INTERNAL MEDICINE

## 2020-07-06 PROCEDURE — 94667 MNPJ CHEST WALL 1ST: CPT

## 2020-07-06 PROCEDURE — 63710000001 INSULIN REGULAR HUMAN PER 5 UNITS: Performed by: NURSE PRACTITIONER

## 2020-07-06 PROCEDURE — 92610 EVALUATE SWALLOWING FUNCTION: CPT

## 2020-07-06 PROCEDURE — 85652 RBC SED RATE AUTOMATED: CPT | Performed by: INTERNAL MEDICINE

## 2020-07-06 PROCEDURE — 99024 POSTOP FOLLOW-UP VISIT: CPT | Performed by: THORACIC SURGERY (CARDIOTHORACIC VASCULAR SURGERY)

## 2020-07-06 PROCEDURE — 99291 CRITICAL CARE FIRST HOUR: CPT | Performed by: INTERNAL MEDICINE

## 2020-07-06 RX ORDER — BUMETANIDE 0.25 MG/ML
2 INJECTION INTRAMUSCULAR; INTRAVENOUS ONCE
Status: COMPLETED | OUTPATIENT
Start: 2020-07-06 | End: 2020-07-06

## 2020-07-06 RX ADMIN — INSULIN DETEMIR 20 UNITS: 100 INJECTION, SOLUTION SUBCUTANEOUS at 08:50

## 2020-07-06 RX ADMIN — HEPARIN SODIUM 5000 UNITS: 5000 INJECTION, SOLUTION INTRAVENOUS; SUBCUTANEOUS at 05:22

## 2020-07-06 RX ADMIN — ASPIRIN 81 MG: 81 TABLET, CHEWABLE ORAL at 08:50

## 2020-07-06 RX ADMIN — HEPARIN SODIUM 5000 UNITS: 5000 INJECTION, SOLUTION INTRAVENOUS; SUBCUTANEOUS at 21:30

## 2020-07-06 RX ADMIN — DOCUSATE SODIUM 100 MG: 50 LIQUID ORAL at 21:30

## 2020-07-06 RX ADMIN — INSULIN HUMAN 2 UNITS: 100 INJECTION, SOLUTION PARENTERAL at 05:21

## 2020-07-06 RX ADMIN — Medication 30 ML: at 21:36

## 2020-07-06 RX ADMIN — CARVEDILOL 3.12 MG: 3.12 TABLET, FILM COATED ORAL at 21:30

## 2020-07-06 RX ADMIN — LANSOPRAZOLE 30 MG: KIT at 05:21

## 2020-07-06 RX ADMIN — SODIUM PHOSPHATE, MONOBASIC, MONOHYDRATE 15 MMOL: 276; 142 INJECTION, SOLUTION INTRAVENOUS at 06:22

## 2020-07-06 RX ADMIN — LOSARTAN POTASSIUM 25 MG: 25 TABLET, FILM COATED ORAL at 08:50

## 2020-07-06 RX ADMIN — SODIUM CHLORIDE, PRESERVATIVE FREE 10 ML: 5 INJECTION INTRAVENOUS at 21:31

## 2020-07-06 RX ADMIN — CETIRIZINE HYDROCHLORIDE 10 MG: 10 TABLET, FILM COATED ORAL at 08:50

## 2020-07-06 RX ADMIN — HEPARIN SODIUM 5000 UNITS: 5000 INJECTION, SOLUTION INTRAVENOUS; SUBCUTANEOUS at 15:41

## 2020-07-06 RX ADMIN — ATORVASTATIN CALCIUM 80 MG: 40 TABLET, FILM COATED ORAL at 21:30

## 2020-07-06 RX ADMIN — INSULIN HUMAN 4 UNITS: 100 INJECTION, SOLUTION PARENTERAL at 12:53

## 2020-07-06 RX ADMIN — CARVEDILOL 3.12 MG: 3.12 TABLET, FILM COATED ORAL at 08:51

## 2020-07-06 RX ADMIN — CLOPIDOGREL BISULFATE 75 MG: 75 TABLET ORAL at 08:51

## 2020-07-06 RX ADMIN — Medication 30 ML: at 08:51

## 2020-07-06 RX ADMIN — SENNOSIDES 10 ML: 8.8 LIQUID ORAL at 21:30

## 2020-07-06 RX ADMIN — SODIUM CHLORIDE, PRESERVATIVE FREE 10 ML: 5 INJECTION INTRAVENOUS at 08:51

## 2020-07-06 RX ADMIN — INSULIN HUMAN 2 UNITS: 100 INJECTION, SOLUTION PARENTERAL at 18:34

## 2020-07-06 RX ADMIN — IPRATROPIUM BROMIDE AND ALBUTEROL SULFATE 3 ML: 2.5; .5 SOLUTION RESPIRATORY (INHALATION) at 06:28

## 2020-07-06 RX ADMIN — ACETAMINOPHEN 650 MG: 325 TABLET, FILM COATED ORAL at 05:21

## 2020-07-06 RX ADMIN — INSULIN HUMAN 2 UNITS: 100 INJECTION, SOLUTION PARENTERAL at 00:54

## 2020-07-06 RX ADMIN — BUMETANIDE 2 MG: 0.25 INJECTION INTRAMUSCULAR; INTRAVENOUS at 10:41

## 2020-07-07 ENCOUNTER — APPOINTMENT (OUTPATIENT)
Dept: GENERAL RADIOLOGY | Facility: HOSPITAL | Age: 75
End: 2020-07-07

## 2020-07-07 LAB
ALBUMIN SERPL-MCNC: 3.3 G/DL (ref 3.5–5.2)
ANION GAP SERPL CALCULATED.3IONS-SCNC: 11 MMOL/L (ref 5–15)
BASOPHILS # BLD AUTO: 0.1 10*3/MM3 (ref 0–0.2)
BASOPHILS NFR BLD AUTO: 0.8 % (ref 0–1.5)
BUN SERPL-MCNC: 30 MG/DL (ref 8–23)
BUN/CREAT SERPL: 36.6 (ref 7–25)
CALCIUM SPEC-SCNC: 8.6 MG/DL (ref 8.6–10.5)
CHLORIDE SERPL-SCNC: 103 MMOL/L (ref 98–107)
CO2 SERPL-SCNC: 29 MMOL/L (ref 22–29)
CREAT SERPL-MCNC: 0.82 MG/DL (ref 0.57–1)
DEPRECATED RDW RBC AUTO: 51.9 FL (ref 37–54)
EOSINOPHIL # BLD AUTO: 0.48 10*3/MM3 (ref 0–0.4)
EOSINOPHIL NFR BLD AUTO: 4 % (ref 0.3–6.2)
ERYTHROCYTE [DISTWIDTH] IN BLOOD BY AUTOMATED COUNT: 15 % (ref 12.3–15.4)
GFR SERPL CREATININE-BSD FRML MDRD: 68 ML/MIN/1.73
GLUCOSE BLDC GLUCOMTR-MCNC: 132 MG/DL (ref 70–130)
GLUCOSE BLDC GLUCOMTR-MCNC: 172 MG/DL (ref 70–130)
GLUCOSE BLDC GLUCOMTR-MCNC: 180 MG/DL (ref 70–130)
GLUCOSE BLDC GLUCOMTR-MCNC: 237 MG/DL (ref 70–130)
GLUCOSE SERPL-MCNC: 142 MG/DL (ref 65–99)
HCT VFR BLD AUTO: 33.7 % (ref 34–46.6)
HGB BLD-MCNC: 10.7 G/DL (ref 12–15.9)
IMM GRANULOCYTES # BLD AUTO: 0.06 10*3/MM3 (ref 0–0.05)
IMM GRANULOCYTES NFR BLD AUTO: 0.5 % (ref 0–0.5)
LYMPHOCYTES # BLD AUTO: 1.81 10*3/MM3 (ref 0.7–3.1)
LYMPHOCYTES NFR BLD AUTO: 15.1 % (ref 19.6–45.3)
MAGNESIUM SERPL-MCNC: 2.1 MG/DL (ref 1.6–2.4)
MCH RBC QN AUTO: 30.5 PG (ref 26.6–33)
MCHC RBC AUTO-ENTMCNC: 31.8 G/DL (ref 31.5–35.7)
MCV RBC AUTO: 96 FL (ref 79–97)
MONOCYTES # BLD AUTO: 1.13 10*3/MM3 (ref 0.1–0.9)
MONOCYTES NFR BLD AUTO: 9.4 % (ref 5–12)
NEUTROPHILS NFR BLD AUTO: 70.2 % (ref 42.7–76)
NEUTROPHILS NFR BLD AUTO: 8.42 10*3/MM3 (ref 1.7–7)
NRBC BLD AUTO-RTO: 0 /100 WBC (ref 0–0.2)
NT-PROBNP SERPL-MCNC: ABNORMAL PG/ML (ref 0–1800)
PHOSPHATE SERPL-MCNC: 3.4 MG/DL (ref 2.5–4.5)
PLATELET # BLD AUTO: 202 10*3/MM3 (ref 140–450)
PMV BLD AUTO: 11.2 FL (ref 6–12)
POTASSIUM SERPL-SCNC: 3.4 MMOL/L (ref 3.5–5.2)
PROCALCITONIN SERPL-MCNC: 0.36 NG/ML (ref 0–0.25)
RBC # BLD AUTO: 3.51 10*6/MM3 (ref 3.77–5.28)
SODIUM SERPL-SCNC: 143 MMOL/L (ref 136–145)
WBC # BLD AUTO: 12 10*3/MM3 (ref 3.4–10.8)

## 2020-07-07 PROCEDURE — 83880 ASSAY OF NATRIURETIC PEPTIDE: CPT | Performed by: INTERNAL MEDICINE

## 2020-07-07 PROCEDURE — 94799 UNLISTED PULMONARY SVC/PX: CPT

## 2020-07-07 PROCEDURE — 83735 ASSAY OF MAGNESIUM: CPT | Performed by: INTERNAL MEDICINE

## 2020-07-07 PROCEDURE — 84145 PROCALCITONIN (PCT): CPT | Performed by: INTERNAL MEDICINE

## 2020-07-07 PROCEDURE — 74018 RADEX ABDOMEN 1 VIEW: CPT

## 2020-07-07 PROCEDURE — 63710000001 INSULIN REGULAR HUMAN PER 5 UNITS: Performed by: NURSE PRACTITIONER

## 2020-07-07 PROCEDURE — 99233 SBSQ HOSP IP/OBS HIGH 50: CPT | Performed by: PSYCHIATRY & NEUROLOGY

## 2020-07-07 PROCEDURE — 80069 RENAL FUNCTION PANEL: CPT | Performed by: INTERNAL MEDICINE

## 2020-07-07 PROCEDURE — 85025 COMPLETE CBC W/AUTO DIFF WBC: CPT | Performed by: INTERNAL MEDICINE

## 2020-07-07 PROCEDURE — 71045 X-RAY EXAM CHEST 1 VIEW: CPT

## 2020-07-07 PROCEDURE — 99233 SBSQ HOSP IP/OBS HIGH 50: CPT | Performed by: INTERNAL MEDICINE

## 2020-07-07 PROCEDURE — 99024 POSTOP FOLLOW-UP VISIT: CPT | Performed by: THORACIC SURGERY (CARDIOTHORACIC VASCULAR SURGERY)

## 2020-07-07 PROCEDURE — 25010000002 HEPARIN (PORCINE) PER 1000 UNITS: Performed by: INTERNAL MEDICINE

## 2020-07-07 PROCEDURE — 97530 THERAPEUTIC ACTIVITIES: CPT

## 2020-07-07 PROCEDURE — 63710000001 INSULIN DETEMIR PER 5 UNITS: Performed by: INTERNAL MEDICINE

## 2020-07-07 PROCEDURE — 82962 GLUCOSE BLOOD TEST: CPT

## 2020-07-07 PROCEDURE — 94668 MNPJ CHEST WALL SBSQ: CPT

## 2020-07-07 RX ORDER — METHYLPHENIDATE HYDROCHLORIDE 5 MG/1
5 TABLET ORAL ONCE
Status: COMPLETED | OUTPATIENT
Start: 2020-07-07 | End: 2020-07-07

## 2020-07-07 RX ORDER — LOSARTAN POTASSIUM 50 MG/1
100 TABLET ORAL
Status: DISCONTINUED | OUTPATIENT
Start: 2020-07-08 | End: 2020-07-14

## 2020-07-07 RX ORDER — HYDROCODONE BITARTRATE AND ACETAMINOPHEN 5; 325 MG/1; MG/1
1 TABLET ORAL EVERY 6 HOURS PRN
Status: DISCONTINUED | OUTPATIENT
Start: 2020-07-07 | End: 2020-07-10

## 2020-07-07 RX ORDER — POTASSIUM CHLORIDE 1.5 G/1.77G
40 POWDER, FOR SOLUTION ORAL AS NEEDED
Status: DISCONTINUED | OUTPATIENT
Start: 2020-07-07 | End: 2020-07-10

## 2020-07-07 RX ORDER — ACETAMINOPHEN 325 MG/1
650 TABLET ORAL EVERY 6 HOURS PRN
Status: DISCONTINUED | OUTPATIENT
Start: 2020-07-07 | End: 2020-07-20 | Stop reason: HOSPADM

## 2020-07-07 RX ORDER — OXYCODONE HYDROCHLORIDE AND ACETAMINOPHEN 5; 325 MG/1; MG/1
1 TABLET ORAL EVERY 6 HOURS PRN
Status: DISCONTINUED | OUTPATIENT
Start: 2020-07-07 | End: 2020-07-10

## 2020-07-07 RX ORDER — ASPIRIN 81 MG/1
81 TABLET, CHEWABLE ORAL DAILY
Status: DISCONTINUED | OUTPATIENT
Start: 2020-07-08 | End: 2020-07-20 | Stop reason: HOSPADM

## 2020-07-07 RX ORDER — LOSARTAN POTASSIUM 50 MG/1
100 TABLET ORAL
Status: DISCONTINUED | OUTPATIENT
Start: 2020-07-08 | End: 2020-07-07

## 2020-07-07 RX ORDER — CLOPIDOGREL BISULFATE 75 MG/1
75 TABLET ORAL DAILY
Status: DISCONTINUED | OUTPATIENT
Start: 2020-07-08 | End: 2020-07-14

## 2020-07-07 RX ORDER — BUMETANIDE 0.25 MG/ML
2 INJECTION INTRAMUSCULAR; INTRAVENOUS ONCE
Status: COMPLETED | OUTPATIENT
Start: 2020-07-07 | End: 2020-07-07

## 2020-07-07 RX ORDER — LANSOPRAZOLE
30 KIT
Status: DISCONTINUED | OUTPATIENT
Start: 2020-07-08 | End: 2020-07-20 | Stop reason: HOSPADM

## 2020-07-07 RX ORDER — CARVEDILOL 3.12 MG/1
3.12 TABLET ORAL 2 TIMES DAILY
Status: DISCONTINUED | OUTPATIENT
Start: 2020-07-07 | End: 2020-07-14

## 2020-07-07 RX ORDER — CETIRIZINE HYDROCHLORIDE 10 MG/1
10 TABLET ORAL DAILY
Status: DISCONTINUED | OUTPATIENT
Start: 2020-07-08 | End: 2020-07-13

## 2020-07-07 RX ORDER — ATORVASTATIN CALCIUM 40 MG/1
80 TABLET, FILM COATED ORAL NIGHTLY
Status: DISCONTINUED | OUTPATIENT
Start: 2020-07-07 | End: 2020-07-20 | Stop reason: HOSPADM

## 2020-07-07 RX ADMIN — POTASSIUM CHLORIDE 40 MEQ: 1.5 POWDER, FOR SOLUTION ORAL at 06:59

## 2020-07-07 RX ADMIN — HEPARIN SODIUM 5000 UNITS: 5000 INJECTION, SOLUTION INTRAVENOUS; SUBCUTANEOUS at 13:33

## 2020-07-07 RX ADMIN — HEPARIN SODIUM 5000 UNITS: 5000 INJECTION, SOLUTION INTRAVENOUS; SUBCUTANEOUS at 21:17

## 2020-07-07 RX ADMIN — Medication 30 ML: at 20:05

## 2020-07-07 RX ADMIN — INSULIN HUMAN 2 UNITS: 100 INJECTION, SOLUTION PARENTERAL at 18:06

## 2020-07-07 RX ADMIN — HEPARIN SODIUM 5000 UNITS: 5000 INJECTION, SOLUTION INTRAVENOUS; SUBCUTANEOUS at 05:28

## 2020-07-07 RX ADMIN — LOSARTAN POTASSIUM 25 MG: 25 TABLET, FILM COATED ORAL at 09:03

## 2020-07-07 RX ADMIN — CARVEDILOL 3.12 MG: 3.12 TABLET, FILM COATED ORAL at 09:03

## 2020-07-07 RX ADMIN — DOCUSATE SODIUM 100 MG: 50 LIQUID ORAL at 20:03

## 2020-07-07 RX ADMIN — ATORVASTATIN CALCIUM 80 MG: 40 TABLET, FILM COATED ORAL at 20:04

## 2020-07-07 RX ADMIN — BUMETANIDE 2 MG: 0.25 INJECTION INTRAMUSCULAR; INTRAVENOUS at 13:32

## 2020-07-07 RX ADMIN — LOSARTAN POTASSIUM 75 MG: 25 TABLET, FILM COATED ORAL at 13:33

## 2020-07-07 RX ADMIN — IPRATROPIUM BROMIDE AND ALBUTEROL SULFATE 3 ML: 2.5; .5 SOLUTION RESPIRATORY (INHALATION) at 20:01

## 2020-07-07 RX ADMIN — METHYLPHENIDATE HYDROCHLORIDE 5 MG: 5 TABLET ORAL at 16:46

## 2020-07-07 RX ADMIN — CARVEDILOL 3.12 MG: 3.12 TABLET, FILM COATED ORAL at 20:03

## 2020-07-07 RX ADMIN — INSULIN HUMAN 2 UNITS: 100 INJECTION, SOLUTION PARENTERAL at 05:28

## 2020-07-07 RX ADMIN — SENNOSIDES 10 ML: 8.8 LIQUID ORAL at 20:03

## 2020-07-07 RX ADMIN — SODIUM CHLORIDE, PRESERVATIVE FREE 10 ML: 5 INJECTION INTRAVENOUS at 20:04

## 2020-07-07 RX ADMIN — ASPIRIN 81 MG: 81 TABLET, CHEWABLE ORAL at 09:03

## 2020-07-07 RX ADMIN — LANSOPRAZOLE 30 MG: KIT at 05:28

## 2020-07-07 RX ADMIN — INSULIN DETEMIR 20 UNITS: 100 INJECTION, SOLUTION SUBCUTANEOUS at 09:03

## 2020-07-07 RX ADMIN — CETIRIZINE HYDROCHLORIDE 10 MG: 10 TABLET, FILM COATED ORAL at 09:03

## 2020-07-07 RX ADMIN — Medication 30 ML: at 09:04

## 2020-07-07 RX ADMIN — CLOPIDOGREL BISULFATE 75 MG: 75 TABLET ORAL at 09:03

## 2020-07-07 RX ADMIN — INSULIN HUMAN 3 UNITS: 100 INJECTION, SOLUTION PARENTERAL at 13:32

## 2020-07-08 ENCOUNTER — APPOINTMENT (OUTPATIENT)
Dept: CARDIOLOGY | Facility: HOSPITAL | Age: 75
End: 2020-07-08

## 2020-07-08 ENCOUNTER — APPOINTMENT (OUTPATIENT)
Dept: GENERAL RADIOLOGY | Facility: HOSPITAL | Age: 75
End: 2020-07-08

## 2020-07-08 ENCOUNTER — APPOINTMENT (OUTPATIENT)
Dept: NEUROLOGY | Facility: HOSPITAL | Age: 75
End: 2020-07-08

## 2020-07-08 LAB
ANION GAP SERPL CALCULATED.3IONS-SCNC: 10 MMOL/L (ref 5–15)
BASOPHILS # BLD AUTO: 0.09 10*3/MM3 (ref 0–0.2)
BASOPHILS NFR BLD AUTO: 0.8 % (ref 0–1.5)
BH CV LOW VAS RIGHT GREATER SAPH AK VESSEL: 1
BH CV LOW VAS RIGHT GREATER SAPH BK VESSEL: 1
BH CV LOWER VASCULAR LEFT COMMON FEMORAL AUGMENT: NORMAL
BH CV LOWER VASCULAR LEFT COMMON FEMORAL COMPRESS: NORMAL
BH CV LOWER VASCULAR LEFT COMMON FEMORAL PHASIC: NORMAL
BH CV LOWER VASCULAR LEFT COMMON FEMORAL SPONT: NORMAL
BH CV LOWER VASCULAR LEFT DISTAL FEMORAL AUGMENT: NORMAL
BH CV LOWER VASCULAR LEFT DISTAL FEMORAL COMPRESS: NORMAL
BH CV LOWER VASCULAR LEFT DISTAL FEMORAL PHASIC: NORMAL
BH CV LOWER VASCULAR LEFT DISTAL FEMORAL SPONT: NORMAL
BH CV LOWER VASCULAR LEFT GASTRONEMIUS COMPRESS: NORMAL
BH CV LOWER VASCULAR LEFT GREATER SAPH AK COMPRESS: NORMAL
BH CV LOWER VASCULAR LEFT GREATER SAPH BK COMPRESS: NORMAL
BH CV LOWER VASCULAR LEFT LESSER SAPH COMPRESS: NORMAL
BH CV LOWER VASCULAR LEFT MID FEMORAL AUGMENT: NORMAL
BH CV LOWER VASCULAR LEFT MID FEMORAL COMPRESS: NORMAL
BH CV LOWER VASCULAR LEFT MID FEMORAL PHASIC: NORMAL
BH CV LOWER VASCULAR LEFT MID FEMORAL SPONT: NORMAL
BH CV LOWER VASCULAR LEFT PERONEAL COMPRESS: NORMAL
BH CV LOWER VASCULAR LEFT POPLITEAL AUGMENT: NORMAL
BH CV LOWER VASCULAR LEFT POPLITEAL COMPRESS: NORMAL
BH CV LOWER VASCULAR LEFT POPLITEAL PHASIC: NORMAL
BH CV LOWER VASCULAR LEFT POPLITEAL SPONT: NORMAL
BH CV LOWER VASCULAR LEFT POSTERIOR TIBIAL COMPRESS: NORMAL
BH CV LOWER VASCULAR LEFT PROFUNDA FEMORAL AUGMENT: NORMAL
BH CV LOWER VASCULAR LEFT PROFUNDA FEMORAL COMPRESS: NORMAL
BH CV LOWER VASCULAR LEFT PROFUNDA FEMORAL PHASIC: NORMAL
BH CV LOWER VASCULAR LEFT PROFUNDA FEMORAL SPONT: NORMAL
BH CV LOWER VASCULAR LEFT PROXIMAL FEMORAL AUGMENT: NORMAL
BH CV LOWER VASCULAR LEFT PROXIMAL FEMORAL COMPRESS: NORMAL
BH CV LOWER VASCULAR LEFT PROXIMAL FEMORAL PHASIC: NORMAL
BH CV LOWER VASCULAR LEFT PROXIMAL FEMORAL SPONT: NORMAL
BH CV LOWER VASCULAR LEFT SAPHENOFEMORAL JUNCTION AUGMENT: NORMAL
BH CV LOWER VASCULAR LEFT SAPHENOFEMORAL JUNCTION COMPRESS: NORMAL
BH CV LOWER VASCULAR LEFT SAPHENOFEMORAL JUNCTION PHASIC: NORMAL
BH CV LOWER VASCULAR LEFT SAPHENOFEMORAL JUNCTION SPONT: NORMAL
BH CV LOWER VASCULAR LEFT SOLEAL COMPRESS: NORMAL
BH CV LOWER VASCULAR RIGHT COMMON FEMORAL AUGMENT: NORMAL
BH CV LOWER VASCULAR RIGHT COMMON FEMORAL COMPRESS: NORMAL
BH CV LOWER VASCULAR RIGHT COMMON FEMORAL PHASIC: NORMAL
BH CV LOWER VASCULAR RIGHT COMMON FEMORAL SPONT: NORMAL
BH CV LOWER VASCULAR RIGHT DISTAL FEMORAL AUGMENT: NORMAL
BH CV LOWER VASCULAR RIGHT DISTAL FEMORAL COMPRESS: NORMAL
BH CV LOWER VASCULAR RIGHT DISTAL FEMORAL PHASIC: NORMAL
BH CV LOWER VASCULAR RIGHT DISTAL FEMORAL SPONT: NORMAL
BH CV LOWER VASCULAR RIGHT GASTRONEMIUS COMPRESS: NORMAL
BH CV LOWER VASCULAR RIGHT GREATER SAPH AK COMPRESS: NORMAL
BH CV LOWER VASCULAR RIGHT GREATER SAPH BK COMPRESS: NORMAL
BH CV LOWER VASCULAR RIGHT LESSER SAPH COMPRESS: NORMAL
BH CV LOWER VASCULAR RIGHT MID FEMORAL AUGMENT: NORMAL
BH CV LOWER VASCULAR RIGHT MID FEMORAL COMPRESS: NORMAL
BH CV LOWER VASCULAR RIGHT MID FEMORAL PHASIC: NORMAL
BH CV LOWER VASCULAR RIGHT MID FEMORAL SPONT: NORMAL
BH CV LOWER VASCULAR RIGHT PERONEAL COMPRESS: NORMAL
BH CV LOWER VASCULAR RIGHT POPLITEAL AUGMENT: NORMAL
BH CV LOWER VASCULAR RIGHT POPLITEAL COMPRESS: NORMAL
BH CV LOWER VASCULAR RIGHT POPLITEAL PHASIC: NORMAL
BH CV LOWER VASCULAR RIGHT POPLITEAL SPONT: NORMAL
BH CV LOWER VASCULAR RIGHT POSTERIOR TIBIAL COMPRESS: NORMAL
BH CV LOWER VASCULAR RIGHT PROFUNDA FEMORAL AUGMENT: NORMAL
BH CV LOWER VASCULAR RIGHT PROFUNDA FEMORAL COMPRESS: NORMAL
BH CV LOWER VASCULAR RIGHT PROFUNDA FEMORAL PHASIC: NORMAL
BH CV LOWER VASCULAR RIGHT PROFUNDA FEMORAL SPONT: NORMAL
BH CV LOWER VASCULAR RIGHT PROXIMAL FEMORAL AUGMENT: NORMAL
BH CV LOWER VASCULAR RIGHT PROXIMAL FEMORAL COMPRESS: NORMAL
BH CV LOWER VASCULAR RIGHT PROXIMAL FEMORAL PHASIC: NORMAL
BH CV LOWER VASCULAR RIGHT PROXIMAL FEMORAL SPONT: NORMAL
BH CV LOWER VASCULAR RIGHT SAPHENOFEMORAL JUNCTION AUGMENT: NORMAL
BH CV LOWER VASCULAR RIGHT SAPHENOFEMORAL JUNCTION COMPRESS: NORMAL
BH CV LOWER VASCULAR RIGHT SAPHENOFEMORAL JUNCTION PHASIC: NORMAL
BH CV LOWER VASCULAR RIGHT SAPHENOFEMORAL JUNCTION SPONT: NORMAL
BH CV LOWER VASCULAR RIGHT SOLEAL COMPRESS: NORMAL
BUN SERPL-MCNC: 32 MG/DL (ref 8–23)
BUN/CREAT SERPL: 36 (ref 7–25)
CALCIUM SPEC-SCNC: 8.7 MG/DL (ref 8.6–10.5)
CHLORIDE SERPL-SCNC: 103 MMOL/L (ref 98–107)
CO2 SERPL-SCNC: 31 MMOL/L (ref 22–29)
CREAT SERPL-MCNC: 0.89 MG/DL (ref 0.57–1)
DEPRECATED RDW RBC AUTO: 54.4 FL (ref 37–54)
EOSINOPHIL # BLD AUTO: 0.47 10*3/MM3 (ref 0–0.4)
EOSINOPHIL NFR BLD AUTO: 4.2 % (ref 0.3–6.2)
ERYTHROCYTE [DISTWIDTH] IN BLOOD BY AUTOMATED COUNT: 15.3 % (ref 12.3–15.4)
GFR SERPL CREATININE-BSD FRML MDRD: 62 ML/MIN/1.73
GLUCOSE BLDC GLUCOMTR-MCNC: 121 MG/DL (ref 70–130)
GLUCOSE BLDC GLUCOMTR-MCNC: 123 MG/DL (ref 70–130)
GLUCOSE BLDC GLUCOMTR-MCNC: 149 MG/DL (ref 70–130)
GLUCOSE BLDC GLUCOMTR-MCNC: 167 MG/DL (ref 70–130)
GLUCOSE SERPL-MCNC: 103 MG/DL (ref 65–99)
HCT VFR BLD AUTO: 32.1 % (ref 34–46.6)
HGB BLD-MCNC: 10 G/DL (ref 12–15.9)
IMM GRANULOCYTES # BLD AUTO: 0.07 10*3/MM3 (ref 0–0.05)
IMM GRANULOCYTES NFR BLD AUTO: 0.6 % (ref 0–0.5)
LYMPHOCYTES # BLD AUTO: 1.81 10*3/MM3 (ref 0.7–3.1)
LYMPHOCYTES NFR BLD AUTO: 16.1 % (ref 19.6–45.3)
MCH RBC QN AUTO: 30.5 PG (ref 26.6–33)
MCHC RBC AUTO-ENTMCNC: 31.2 G/DL (ref 31.5–35.7)
MCV RBC AUTO: 97.9 FL (ref 79–97)
MONOCYTES # BLD AUTO: 1.08 10*3/MM3 (ref 0.1–0.9)
MONOCYTES NFR BLD AUTO: 9.6 % (ref 5–12)
NEUTROPHILS NFR BLD AUTO: 68.7 % (ref 42.7–76)
NEUTROPHILS NFR BLD AUTO: 7.75 10*3/MM3 (ref 1.7–7)
NRBC BLD AUTO-RTO: 0 /100 WBC (ref 0–0.2)
NT-PROBNP SERPL-MCNC: ABNORMAL PG/ML (ref 0–1800)
PLATELET # BLD AUTO: 224 10*3/MM3 (ref 140–450)
PMV BLD AUTO: 11.2 FL (ref 6–12)
POTASSIUM SERPL-SCNC: 3.7 MMOL/L (ref 3.5–5.2)
RBC # BLD AUTO: 3.28 10*6/MM3 (ref 3.77–5.28)
SODIUM SERPL-SCNC: 144 MMOL/L (ref 136–145)
WBC # BLD AUTO: 11.27 10*3/MM3 (ref 3.4–10.8)

## 2020-07-08 PROCEDURE — C1894 INTRO/SHEATH, NON-LASER: HCPCS

## 2020-07-08 PROCEDURE — 95816 EEG AWAKE AND DROWSY: CPT

## 2020-07-08 PROCEDURE — 25010000002 HEPARIN (PORCINE) PER 1000 UNITS: Performed by: INTERNAL MEDICINE

## 2020-07-08 PROCEDURE — 63710000001 INSULIN DETEMIR PER 5 UNITS: Performed by: INTERNAL MEDICINE

## 2020-07-08 PROCEDURE — 82962 GLUCOSE BLOOD TEST: CPT

## 2020-07-08 PROCEDURE — 94668 MNPJ CHEST WALL SBSQ: CPT

## 2020-07-08 PROCEDURE — 94799 UNLISTED PULMONARY SVC/PX: CPT

## 2020-07-08 PROCEDURE — C1751 CATH, INF, PER/CENT/MIDLINE: HCPCS

## 2020-07-08 PROCEDURE — 85025 COMPLETE CBC W/AUTO DIFF WBC: CPT | Performed by: INTERNAL MEDICINE

## 2020-07-08 PROCEDURE — 93970 EXTREMITY STUDY: CPT

## 2020-07-08 PROCEDURE — 99024 POSTOP FOLLOW-UP VISIT: CPT | Performed by: THORACIC SURGERY (CARDIOTHORACIC VASCULAR SURGERY)

## 2020-07-08 PROCEDURE — 92507 TX SP LANG VOICE COMM INDIV: CPT

## 2020-07-08 PROCEDURE — 83880 ASSAY OF NATRIURETIC PEPTIDE: CPT | Performed by: INTERNAL MEDICINE

## 2020-07-08 PROCEDURE — 97530 THERAPEUTIC ACTIVITIES: CPT

## 2020-07-08 PROCEDURE — 92526 ORAL FUNCTION THERAPY: CPT

## 2020-07-08 PROCEDURE — 74018 RADEX ABDOMEN 1 VIEW: CPT

## 2020-07-08 PROCEDURE — 02HV33Z INSERTION OF INFUSION DEVICE INTO SUPERIOR VENA CAVA, PERCUTANEOUS APPROACH: ICD-10-PCS | Performed by: THORACIC SURGERY (CARDIOTHORACIC VASCULAR SURGERY)

## 2020-07-08 PROCEDURE — 63710000001 INSULIN REGULAR HUMAN PER 5 UNITS: Performed by: NURSE PRACTITIONER

## 2020-07-08 PROCEDURE — 99233 SBSQ HOSP IP/OBS HIGH 50: CPT | Performed by: INTERNAL MEDICINE

## 2020-07-08 PROCEDURE — 80048 BASIC METABOLIC PNL TOTAL CA: CPT | Performed by: INTERNAL MEDICINE

## 2020-07-08 RX ORDER — SODIUM CHLORIDE 0.9 % (FLUSH) 0.9 %
10 SYRINGE (ML) INJECTION EVERY 12 HOURS SCHEDULED
Status: DISCONTINUED | OUTPATIENT
Start: 2020-07-08 | End: 2020-07-20 | Stop reason: HOSPADM

## 2020-07-08 RX ORDER — VERAPAMIL HYDROCHLORIDE 80 MG/1
40 TABLET ORAL EVERY 8 HOURS SCHEDULED
Status: DISCONTINUED | OUTPATIENT
Start: 2020-07-08 | End: 2020-07-14

## 2020-07-08 RX ORDER — METHYLPHENIDATE HYDROCHLORIDE 5 MG/1
10 TABLET ORAL ONCE
Status: COMPLETED | OUTPATIENT
Start: 2020-07-08 | End: 2020-07-08

## 2020-07-08 RX ORDER — SODIUM CHLORIDE 0.9 % (FLUSH) 0.9 %
10 SYRINGE (ML) INJECTION AS NEEDED
Status: DISCONTINUED | OUTPATIENT
Start: 2020-07-08 | End: 2020-07-20 | Stop reason: HOSPADM

## 2020-07-08 RX ADMIN — CARVEDILOL 3.12 MG: 3.12 TABLET, FILM COATED ORAL at 08:05

## 2020-07-08 RX ADMIN — Medication 30 ML: at 21:09

## 2020-07-08 RX ADMIN — HEPARIN SODIUM 5000 UNITS: 5000 INJECTION, SOLUTION INTRAVENOUS; SUBCUTANEOUS at 14:24

## 2020-07-08 RX ADMIN — SENNOSIDES 10 ML: 8.8 LIQUID ORAL at 08:05

## 2020-07-08 RX ADMIN — CLOPIDOGREL BISULFATE 75 MG: 75 TABLET ORAL at 08:05

## 2020-07-08 RX ADMIN — INSULIN DETEMIR 20 UNITS: 100 INJECTION, SOLUTION SUBCUTANEOUS at 08:09

## 2020-07-08 RX ADMIN — VERAPAMIL HYDROCHLORIDE 40 MG: 80 TABLET, FILM COATED ORAL at 14:25

## 2020-07-08 RX ADMIN — CETIRIZINE HYDROCHLORIDE 10 MG: 10 TABLET, FILM COATED ORAL at 08:06

## 2020-07-08 RX ADMIN — SODIUM CHLORIDE, PRESERVATIVE FREE 10 ML: 5 INJECTION INTRAVENOUS at 08:06

## 2020-07-08 RX ADMIN — LOSARTAN POTASSIUM 100 MG: 50 TABLET, FILM COATED ORAL at 08:09

## 2020-07-08 RX ADMIN — SODIUM CHLORIDE, PRESERVATIVE FREE 10 ML: 5 INJECTION INTRAVENOUS at 14:26

## 2020-07-08 RX ADMIN — DOCUSATE SODIUM 100 MG: 50 LIQUID ORAL at 08:05

## 2020-07-08 RX ADMIN — SODIUM CHLORIDE, PRESERVATIVE FREE 10 ML: 5 INJECTION INTRAVENOUS at 21:10

## 2020-07-08 RX ADMIN — INSULIN HUMAN 2 UNITS: 100 INJECTION, SOLUTION PARENTERAL at 14:25

## 2020-07-08 RX ADMIN — HEPARIN SODIUM 5000 UNITS: 5000 INJECTION, SOLUTION INTRAVENOUS; SUBCUTANEOUS at 21:09

## 2020-07-08 RX ADMIN — METHYLPHENIDATE HYDROCHLORIDE 10 MG: 5 TABLET ORAL at 10:32

## 2020-07-08 RX ADMIN — CARVEDILOL 3.12 MG: 3.12 TABLET, FILM COATED ORAL at 21:09

## 2020-07-08 RX ADMIN — VERAPAMIL HYDROCHLORIDE 40 MG: 80 TABLET, FILM COATED ORAL at 21:09

## 2020-07-08 RX ADMIN — Medication 30 ML: at 08:09

## 2020-07-08 RX ADMIN — ASPIRIN 81 MG: 81 TABLET, CHEWABLE ORAL at 08:05

## 2020-07-08 RX ADMIN — ATORVASTATIN CALCIUM 80 MG: 40 TABLET, FILM COATED ORAL at 21:09

## 2020-07-08 RX ADMIN — LANSOPRAZOLE 30 MG: KIT at 05:11

## 2020-07-08 RX ADMIN — HEPARIN SODIUM 5000 UNITS: 5000 INJECTION, SOLUTION INTRAVENOUS; SUBCUTANEOUS at 05:11

## 2020-07-09 ENCOUNTER — APPOINTMENT (OUTPATIENT)
Dept: GENERAL RADIOLOGY | Facility: HOSPITAL | Age: 75
End: 2020-07-09

## 2020-07-09 LAB
ALBUMIN SERPL-MCNC: 3.2 G/DL (ref 3.5–5.2)
ANION GAP SERPL CALCULATED.3IONS-SCNC: 10 MMOL/L (ref 5–15)
BASOPHILS # BLD AUTO: 0.06 10*3/MM3 (ref 0–0.2)
BASOPHILS NFR BLD AUTO: 0.6 % (ref 0–1.5)
BUN SERPL-MCNC: 47 MG/DL (ref 8–23)
BUN/CREAT SERPL: 54 (ref 7–25)
CALCIUM SPEC-SCNC: 8.4 MG/DL (ref 8.6–10.5)
CHLORIDE SERPL-SCNC: 101 MMOL/L (ref 98–107)
CO2 SERPL-SCNC: 29 MMOL/L (ref 22–29)
CREAT SERPL-MCNC: 0.87 MG/DL (ref 0.57–1)
DEPRECATED RDW RBC AUTO: 56 FL (ref 37–54)
EOSINOPHIL # BLD AUTO: 0.52 10*3/MM3 (ref 0–0.4)
EOSINOPHIL NFR BLD AUTO: 5.4 % (ref 0.3–6.2)
ERYTHROCYTE [DISTWIDTH] IN BLOOD BY AUTOMATED COUNT: 15.3 % (ref 12.3–15.4)
GFR SERPL CREATININE-BSD FRML MDRD: 63 ML/MIN/1.73
GLUCOSE BLDC GLUCOMTR-MCNC: 123 MG/DL (ref 70–130)
GLUCOSE BLDC GLUCOMTR-MCNC: 165 MG/DL (ref 70–130)
GLUCOSE BLDC GLUCOMTR-MCNC: 176 MG/DL (ref 70–130)
GLUCOSE SERPL-MCNC: 129 MG/DL (ref 65–99)
HCT VFR BLD AUTO: 31.4 % (ref 34–46.6)
HGB BLD-MCNC: 9.6 G/DL (ref 12–15.9)
IMM GRANULOCYTES # BLD AUTO: 0.04 10*3/MM3 (ref 0–0.05)
IMM GRANULOCYTES NFR BLD AUTO: 0.4 % (ref 0–0.5)
LYMPHOCYTES # BLD AUTO: 1.78 10*3/MM3 (ref 0.7–3.1)
LYMPHOCYTES NFR BLD AUTO: 18.4 % (ref 19.6–45.3)
MCH RBC QN AUTO: 30.8 PG (ref 26.6–33)
MCHC RBC AUTO-ENTMCNC: 30.6 G/DL (ref 31.5–35.7)
MCV RBC AUTO: 100.6 FL (ref 79–97)
MONOCYTES # BLD AUTO: 0.69 10*3/MM3 (ref 0.1–0.9)
MONOCYTES NFR BLD AUTO: 7.1 % (ref 5–12)
NEUTROPHILS NFR BLD AUTO: 6.57 10*3/MM3 (ref 1.7–7)
NEUTROPHILS NFR BLD AUTO: 68.1 % (ref 42.7–76)
NRBC BLD AUTO-RTO: 0 /100 WBC (ref 0–0.2)
PHOSPHATE SERPL-MCNC: 3.2 MG/DL (ref 2.5–4.5)
PLATELET # BLD AUTO: 243 10*3/MM3 (ref 140–450)
PMV BLD AUTO: 11.3 FL (ref 6–12)
POTASSIUM SERPL-SCNC: 4 MMOL/L (ref 3.5–5.2)
RBC # BLD AUTO: 3.12 10*6/MM3 (ref 3.77–5.28)
SODIUM SERPL-SCNC: 140 MMOL/L (ref 136–145)
WBC # BLD AUTO: 9.66 10*3/MM3 (ref 3.4–10.8)

## 2020-07-09 PROCEDURE — 85025 COMPLETE CBC W/AUTO DIFF WBC: CPT | Performed by: INTERNAL MEDICINE

## 2020-07-09 PROCEDURE — 25010000002 HEPARIN (PORCINE) PER 1000 UNITS: Performed by: INTERNAL MEDICINE

## 2020-07-09 PROCEDURE — 92526 ORAL FUNCTION THERAPY: CPT

## 2020-07-09 PROCEDURE — 99024 POSTOP FOLLOW-UP VISIT: CPT | Performed by: THORACIC SURGERY (CARDIOTHORACIC VASCULAR SURGERY)

## 2020-07-09 PROCEDURE — 80069 RENAL FUNCTION PANEL: CPT | Performed by: INTERNAL MEDICINE

## 2020-07-09 PROCEDURE — 63710000001 INSULIN DETEMIR PER 5 UNITS: Performed by: INTERNAL MEDICINE

## 2020-07-09 PROCEDURE — 97530 THERAPEUTIC ACTIVITIES: CPT

## 2020-07-09 PROCEDURE — 97110 THERAPEUTIC EXERCISES: CPT

## 2020-07-09 PROCEDURE — 63710000001 INSULIN REGULAR HUMAN PER 5 UNITS: Performed by: NURSE PRACTITIONER

## 2020-07-09 PROCEDURE — 71045 X-RAY EXAM CHEST 1 VIEW: CPT

## 2020-07-09 PROCEDURE — 82962 GLUCOSE BLOOD TEST: CPT

## 2020-07-09 PROCEDURE — 92507 TX SP LANG VOICE COMM INDIV: CPT

## 2020-07-09 PROCEDURE — 94668 MNPJ CHEST WALL SBSQ: CPT

## 2020-07-09 PROCEDURE — 97535 SELF CARE MNGMENT TRAINING: CPT

## 2020-07-09 PROCEDURE — 99233 SBSQ HOSP IP/OBS HIGH 50: CPT | Performed by: INTERNAL MEDICINE

## 2020-07-09 RX ORDER — HYDRALAZINE HYDROCHLORIDE 20 MG/ML
10 INJECTION INTRAMUSCULAR; INTRAVENOUS EVERY 6 HOURS PRN
Status: DISCONTINUED | OUTPATIENT
Start: 2020-07-09 | End: 2020-07-20 | Stop reason: HOSPADM

## 2020-07-09 RX ADMIN — HEPARIN SODIUM 5000 UNITS: 5000 INJECTION, SOLUTION INTRAVENOUS; SUBCUTANEOUS at 14:21

## 2020-07-09 RX ADMIN — VERAPAMIL HYDROCHLORIDE 40 MG: 80 TABLET, FILM COATED ORAL at 06:08

## 2020-07-09 RX ADMIN — SODIUM CHLORIDE, PRESERVATIVE FREE 10 ML: 5 INJECTION INTRAVENOUS at 20:37

## 2020-07-09 RX ADMIN — INSULIN HUMAN 2 UNITS: 100 INJECTION, SOLUTION PARENTERAL at 13:37

## 2020-07-09 RX ADMIN — HEPARIN SODIUM 5000 UNITS: 5000 INJECTION, SOLUTION INTRAVENOUS; SUBCUTANEOUS at 06:08

## 2020-07-09 RX ADMIN — CETIRIZINE HYDROCHLORIDE 10 MG: 10 TABLET, FILM COATED ORAL at 08:37

## 2020-07-09 RX ADMIN — VERAPAMIL HYDROCHLORIDE 40 MG: 80 TABLET, FILM COATED ORAL at 14:22

## 2020-07-09 RX ADMIN — LANSOPRAZOLE 30 MG: KIT at 06:08

## 2020-07-09 RX ADMIN — INSULIN DETEMIR 20 UNITS: 100 INJECTION, SOLUTION SUBCUTANEOUS at 08:37

## 2020-07-09 RX ADMIN — LOSARTAN POTASSIUM 100 MG: 50 TABLET, FILM COATED ORAL at 08:36

## 2020-07-09 RX ADMIN — SODIUM CHLORIDE, PRESERVATIVE FREE 10 ML: 5 INJECTION INTRAVENOUS at 13:38

## 2020-07-09 RX ADMIN — HEPARIN SODIUM 5000 UNITS: 5000 INJECTION, SOLUTION INTRAVENOUS; SUBCUTANEOUS at 21:24

## 2020-07-09 RX ADMIN — ASPIRIN 81 MG: 81 TABLET, CHEWABLE ORAL at 08:37

## 2020-07-09 RX ADMIN — INSULIN HUMAN 2 UNITS: 100 INJECTION, SOLUTION PARENTERAL at 18:27

## 2020-07-09 RX ADMIN — CLOPIDOGREL BISULFATE 75 MG: 75 TABLET ORAL at 08:37

## 2020-07-09 RX ADMIN — CARVEDILOL 3.12 MG: 3.12 TABLET, FILM COATED ORAL at 08:37

## 2020-07-10 ENCOUNTER — APPOINTMENT (OUTPATIENT)
Dept: GENERAL RADIOLOGY | Facility: HOSPITAL | Age: 75
End: 2020-07-10

## 2020-07-10 LAB
ALBUMIN SERPL-MCNC: 3.2 G/DL (ref 3.5–5.2)
ALBUMIN SERPL-MCNC: 3.2 G/DL (ref 3.5–5.2)
ALP SERPL-CCNC: 89 U/L (ref 39–117)
ALT SERPL W P-5'-P-CCNC: 12 U/L (ref 1–33)
ANION GAP SERPL CALCULATED.3IONS-SCNC: 12 MMOL/L (ref 5–15)
ANION GAP SERPL CALCULATED.3IONS-SCNC: 12 MMOL/L (ref 5–15)
AST SERPL-CCNC: 24 U/L (ref 1–32)
BASOPHILS # BLD AUTO: 0.08 10*3/MM3 (ref 0–0.2)
BASOPHILS NFR BLD AUTO: 0.8 % (ref 0–1.5)
BILIRUB SERPL-MCNC: 1 MG/DL (ref 0–1.2)
BUN SERPL-MCNC: 44 MG/DL (ref 8–23)
BUN SERPL-MCNC: 44 MG/DL (ref 8–23)
BUN/CREAT SERPL: 61.1 (ref 7–25)
CALCIUM SPEC-SCNC: 8.8 MG/DL (ref 8.6–10.5)
CALCIUM SPEC-SCNC: 8.8 MG/DL (ref 8.6–10.5)
CHLORIDE SERPL-SCNC: 102 MMOL/L (ref 98–107)
CHLORIDE SERPL-SCNC: 102 MMOL/L (ref 98–107)
CHOLEST SERPL-MCNC: 96 MG/DL (ref 0–200)
CO2 SERPL-SCNC: 27 MMOL/L (ref 22–29)
CO2 SERPL-SCNC: 27 MMOL/L (ref 22–29)
CREAT SERPL-MCNC: 0.72 MG/DL (ref 0.57–1)
CREAT SERPL-MCNC: 0.72 MG/DL (ref 0.57–1)
CRP SERPL-MCNC: 4.01 MG/DL (ref 0–0.5)
DEPRECATED RDW RBC AUTO: 53.9 FL (ref 37–54)
EOSINOPHIL # BLD AUTO: 0.54 10*3/MM3 (ref 0–0.4)
EOSINOPHIL NFR BLD AUTO: 5.3 % (ref 0.3–6.2)
ERYTHROCYTE [DISTWIDTH] IN BLOOD BY AUTOMATED COUNT: 15.2 % (ref 12.3–15.4)
GFR SERPL CREATININE-BSD FRML MDRD: 79 ML/MIN/1.73
GLUCOSE BLDC GLUCOMTR-MCNC: 142 MG/DL (ref 70–130)
GLUCOSE BLDC GLUCOMTR-MCNC: 170 MG/DL (ref 70–130)
GLUCOSE BLDC GLUCOMTR-MCNC: 175 MG/DL (ref 70–130)
GLUCOSE BLDC GLUCOMTR-MCNC: 98 MG/DL (ref 70–130)
GLUCOSE SERPL-MCNC: 114 MG/DL (ref 65–99)
GLUCOSE SERPL-MCNC: 114 MG/DL (ref 65–99)
HCT VFR BLD AUTO: 30 % (ref 34–46.6)
HGB BLD-MCNC: 9.5 G/DL (ref 12–15.9)
IMM GRANULOCYTES # BLD AUTO: 0.03 10*3/MM3 (ref 0–0.05)
IMM GRANULOCYTES NFR BLD AUTO: 0.3 % (ref 0–0.5)
LYMPHOCYTES # BLD AUTO: 1.67 10*3/MM3 (ref 0.7–3.1)
LYMPHOCYTES NFR BLD AUTO: 16.4 % (ref 19.6–45.3)
MAGNESIUM SERPL-MCNC: 2.3 MG/DL (ref 1.6–2.4)
MCH RBC QN AUTO: 30.9 PG (ref 26.6–33)
MCHC RBC AUTO-ENTMCNC: 31.7 G/DL (ref 31.5–35.7)
MCV RBC AUTO: 97.7 FL (ref 79–97)
MONOCYTES # BLD AUTO: 0.76 10*3/MM3 (ref 0.1–0.9)
MONOCYTES NFR BLD AUTO: 7.5 % (ref 5–12)
NEUTROPHILS NFR BLD AUTO: 69.7 % (ref 42.7–76)
NEUTROPHILS NFR BLD AUTO: 7.1 10*3/MM3 (ref 1.7–7)
NRBC BLD AUTO-RTO: 0 /100 WBC (ref 0–0.2)
PHOSPHATE SERPL-MCNC: 3.4 MG/DL (ref 2.5–4.5)
PHOSPHATE SERPL-MCNC: 3.4 MG/DL (ref 2.5–4.5)
PLATELET # BLD AUTO: 245 10*3/MM3 (ref 140–450)
PMV BLD AUTO: 11.2 FL (ref 6–12)
POTASSIUM SERPL-SCNC: 3.8 MMOL/L (ref 3.5–5.2)
POTASSIUM SERPL-SCNC: 3.8 MMOL/L (ref 3.5–5.2)
PREALB SERPL-MCNC: 13.4 MG/DL (ref 20–40)
PROT SERPL-MCNC: 6.3 G/DL (ref 6–8.5)
RBC # BLD AUTO: 3.07 10*6/MM3 (ref 3.77–5.28)
SODIUM SERPL-SCNC: 141 MMOL/L (ref 136–145)
SODIUM SERPL-SCNC: 141 MMOL/L (ref 136–145)
SODIUM UR-SCNC: 27 MMOL/L
TRIGL SERPL-MCNC: 92 MG/DL (ref 0–150)
WBC # BLD AUTO: 10.18 10*3/MM3 (ref 3.4–10.8)

## 2020-07-10 PROCEDURE — 84134 ASSAY OF PREALBUMIN: CPT | Performed by: INTERNAL MEDICINE

## 2020-07-10 PROCEDURE — 84478 ASSAY OF TRIGLYCERIDES: CPT | Performed by: INTERNAL MEDICINE

## 2020-07-10 PROCEDURE — 25010000002 HEPARIN (PORCINE) PER 1000 UNITS: Performed by: INTERNAL MEDICINE

## 2020-07-10 PROCEDURE — 80053 COMPREHEN METABOLIC PANEL: CPT | Performed by: INTERNAL MEDICINE

## 2020-07-10 PROCEDURE — 82465 ASSAY BLD/SERUM CHOLESTEROL: CPT | Performed by: INTERNAL MEDICINE

## 2020-07-10 PROCEDURE — 94799 UNLISTED PULMONARY SVC/PX: CPT

## 2020-07-10 PROCEDURE — 83735 ASSAY OF MAGNESIUM: CPT | Performed by: INTERNAL MEDICINE

## 2020-07-10 PROCEDURE — 25010000002 HEPARIN (PORCINE) PER 1000 UNITS: Performed by: PHYSICIAN ASSISTANT

## 2020-07-10 PROCEDURE — 86140 C-REACTIVE PROTEIN: CPT | Performed by: INTERNAL MEDICINE

## 2020-07-10 PROCEDURE — 85025 COMPLETE CBC W/AUTO DIFF WBC: CPT | Performed by: INTERNAL MEDICINE

## 2020-07-10 PROCEDURE — 80069 RENAL FUNCTION PANEL: CPT | Performed by: INTERNAL MEDICINE

## 2020-07-10 PROCEDURE — 84100 ASSAY OF PHOSPHORUS: CPT | Performed by: INTERNAL MEDICINE

## 2020-07-10 PROCEDURE — 97530 THERAPEUTIC ACTIVITIES: CPT

## 2020-07-10 PROCEDURE — 63710000001 INSULIN REGULAR HUMAN PER 5 UNITS: Performed by: PHYSICIAN ASSISTANT

## 2020-07-10 PROCEDURE — 74018 RADEX ABDOMEN 1 VIEW: CPT

## 2020-07-10 PROCEDURE — 99024 POSTOP FOLLOW-UP VISIT: CPT | Performed by: THORACIC SURGERY (CARDIOTHORACIC VASCULAR SURGERY)

## 2020-07-10 PROCEDURE — 71045 X-RAY EXAM CHEST 1 VIEW: CPT

## 2020-07-10 PROCEDURE — 63710000001 INSULIN DETEMIR PER 5 UNITS: Performed by: INTERNAL MEDICINE

## 2020-07-10 PROCEDURE — 83036 HEMOGLOBIN GLYCOSYLATED A1C: CPT | Performed by: NURSE PRACTITIONER

## 2020-07-10 PROCEDURE — 82962 GLUCOSE BLOOD TEST: CPT

## 2020-07-10 PROCEDURE — 84300 ASSAY OF URINE SODIUM: CPT | Performed by: INTERNAL MEDICINE

## 2020-07-10 PROCEDURE — 63710000001 INSULIN REGULAR HUMAN PER 5 UNITS: Performed by: NURSE PRACTITIONER

## 2020-07-10 RX ORDER — SODIUM CHLORIDE 0.9 % (FLUSH) 0.9 %
10 SYRINGE (ML) INJECTION EVERY 12 HOURS SCHEDULED
Status: DISCONTINUED | OUTPATIENT
Start: 2020-07-10 | End: 2020-07-13 | Stop reason: SDUPTHER

## 2020-07-10 RX ORDER — SODIUM CHLORIDE 0.9 % (FLUSH) 0.9 %
10 SYRINGE (ML) INJECTION EVERY 8 HOURS SCHEDULED
Status: DISCONTINUED | OUTPATIENT
Start: 2020-07-10 | End: 2020-07-13 | Stop reason: SDUPTHER

## 2020-07-10 RX ADMIN — LANSOPRAZOLE 30 MG: KIT at 05:37

## 2020-07-10 RX ADMIN — ATORVASTATIN CALCIUM 80 MG: 40 TABLET, FILM COATED ORAL at 22:15

## 2020-07-10 RX ADMIN — VERAPAMIL HYDROCHLORIDE 40 MG: 80 TABLET, FILM COATED ORAL at 13:23

## 2020-07-10 RX ADMIN — LOSARTAN POTASSIUM 100 MG: 50 TABLET, FILM COATED ORAL at 09:44

## 2020-07-10 RX ADMIN — CARVEDILOL 3.12 MG: 3.12 TABLET, FILM COATED ORAL at 22:15

## 2020-07-10 RX ADMIN — SODIUM CHLORIDE, PRESERVATIVE FREE 10 ML: 5 INJECTION INTRAVENOUS at 22:17

## 2020-07-10 RX ADMIN — VERAPAMIL HYDROCHLORIDE 40 MG: 80 TABLET, FILM COATED ORAL at 22:14

## 2020-07-10 RX ADMIN — HEPARIN SODIUM 5000 UNITS: 5000 INJECTION, SOLUTION INTRAVENOUS; SUBCUTANEOUS at 22:15

## 2020-07-10 RX ADMIN — SODIUM CHLORIDE, PRESERVATIVE FREE 10 ML: 5 INJECTION INTRAVENOUS at 22:15

## 2020-07-10 RX ADMIN — HEPARIN SODIUM 5000 UNITS: 5000 INJECTION, SOLUTION INTRAVENOUS; SUBCUTANEOUS at 05:36

## 2020-07-10 RX ADMIN — CETIRIZINE HYDROCHLORIDE 10 MG: 10 TABLET, FILM COATED ORAL at 09:44

## 2020-07-10 RX ADMIN — VERAPAMIL HYDROCHLORIDE 40 MG: 80 TABLET, FILM COATED ORAL at 05:36

## 2020-07-10 RX ADMIN — SODIUM CHLORIDE, PRESERVATIVE FREE 10 ML: 5 INJECTION INTRAVENOUS at 09:45

## 2020-07-10 RX ADMIN — CARVEDILOL 3.12 MG: 3.12 TABLET, FILM COATED ORAL at 09:44

## 2020-07-10 RX ADMIN — INSULIN HUMAN 2 UNITS: 100 INJECTION, SOLUTION PARENTERAL at 12:27

## 2020-07-10 RX ADMIN — INSULIN DETEMIR 20 UNITS: 100 INJECTION, SOLUTION SUBCUTANEOUS at 09:45

## 2020-07-10 RX ADMIN — HEPARIN SODIUM 5000 UNITS: 5000 INJECTION, SOLUTION INTRAVENOUS; SUBCUTANEOUS at 13:23

## 2020-07-10 RX ADMIN — ASPIRIN 81 MG: 81 TABLET, CHEWABLE ORAL at 12:26

## 2020-07-10 RX ADMIN — INSULIN HUMAN 2 UNITS: 100 INJECTION, SOLUTION PARENTERAL at 18:19

## 2020-07-10 RX ADMIN — CLOPIDOGREL BISULFATE 75 MG: 75 TABLET ORAL at 09:44

## 2020-07-11 LAB
ANION GAP SERPL CALCULATED.3IONS-SCNC: 19 MMOL/L (ref 5–15)
BUN SERPL-MCNC: 44 MG/DL (ref 8–23)
BUN/CREAT SERPL: 58.7 (ref 7–25)
CALCIUM SPEC-SCNC: 8.4 MG/DL (ref 8.6–10.5)
CHLORIDE SERPL-SCNC: 96 MMOL/L (ref 98–107)
CO2 SERPL-SCNC: 20 MMOL/L (ref 22–29)
CREAT SERPL-MCNC: 0.75 MG/DL (ref 0.57–1)
GFR SERPL CREATININE-BSD FRML MDRD: 75 ML/MIN/1.73
GLUCOSE BLDC GLUCOMTR-MCNC: 159 MG/DL (ref 70–130)
GLUCOSE BLDC GLUCOMTR-MCNC: 189 MG/DL (ref 70–130)
GLUCOSE BLDC GLUCOMTR-MCNC: 192 MG/DL (ref 70–130)
GLUCOSE BLDC GLUCOMTR-MCNC: 197 MG/DL (ref 70–130)
GLUCOSE BLDC GLUCOMTR-MCNC: 223 MG/DL (ref 70–130)
GLUCOSE SERPL-MCNC: 152 MG/DL (ref 65–99)
HBA1C MFR BLD: 6.2 % (ref 4.8–5.6)
HCT VFR BLD AUTO: 30.5 % (ref 34–46.6)
HGB BLD-MCNC: 9.9 G/DL (ref 12–15.9)
POTASSIUM SERPL-SCNC: 5.1 MMOL/L (ref 3.5–5.2)
SODIUM SERPL-SCNC: 135 MMOL/L (ref 136–145)

## 2020-07-11 PROCEDURE — 92526 ORAL FUNCTION THERAPY: CPT

## 2020-07-11 PROCEDURE — 99222 1ST HOSP IP/OBS MODERATE 55: CPT | Performed by: NURSE PRACTITIONER

## 2020-07-11 PROCEDURE — 63710000001 INSULIN REGULAR HUMAN PER 5 UNITS: Performed by: PHYSICIAN ASSISTANT

## 2020-07-11 PROCEDURE — 63710000001 INSULIN DETEMIR PER 5 UNITS: Performed by: PHYSICIAN ASSISTANT

## 2020-07-11 PROCEDURE — 80048 BASIC METABOLIC PNL TOTAL CA: CPT | Performed by: PHYSICIAN ASSISTANT

## 2020-07-11 PROCEDURE — 92507 TX SP LANG VOICE COMM INDIV: CPT

## 2020-07-11 PROCEDURE — 85014 HEMATOCRIT: CPT | Performed by: PHYSICIAN ASSISTANT

## 2020-07-11 PROCEDURE — 85018 HEMOGLOBIN: CPT | Performed by: PHYSICIAN ASSISTANT

## 2020-07-11 PROCEDURE — 25010000002 HEPARIN (PORCINE) PER 1000 UNITS: Performed by: PHYSICIAN ASSISTANT

## 2020-07-11 PROCEDURE — 82962 GLUCOSE BLOOD TEST: CPT

## 2020-07-11 RX ADMIN — SODIUM CHLORIDE, PRESERVATIVE FREE 10 ML: 5 INJECTION INTRAVENOUS at 08:43

## 2020-07-11 RX ADMIN — SODIUM CHLORIDE, PRESERVATIVE FREE 10 ML: 5 INJECTION INTRAVENOUS at 14:11

## 2020-07-11 RX ADMIN — ASPIRIN 81 MG: 81 TABLET, CHEWABLE ORAL at 08:43

## 2020-07-11 RX ADMIN — LANSOPRAZOLE 30 MG: KIT at 06:08

## 2020-07-11 RX ADMIN — SODIUM CHLORIDE, PRESERVATIVE FREE 10 ML: 5 INJECTION INTRAVENOUS at 06:08

## 2020-07-11 RX ADMIN — LOSARTAN POTASSIUM 100 MG: 50 TABLET, FILM COATED ORAL at 10:18

## 2020-07-11 RX ADMIN — INSULIN HUMAN 2 UNITS: 100 INJECTION, SOLUTION PARENTERAL at 18:16

## 2020-07-11 RX ADMIN — VERAPAMIL HYDROCHLORIDE 40 MG: 80 TABLET, FILM COATED ORAL at 14:09

## 2020-07-11 RX ADMIN — INSULIN HUMAN 3 UNITS: 100 INJECTION, SOLUTION PARENTERAL at 12:49

## 2020-07-11 RX ADMIN — ATORVASTATIN CALCIUM 80 MG: 40 TABLET, FILM COATED ORAL at 20:22

## 2020-07-11 RX ADMIN — SODIUM CHLORIDE, PRESERVATIVE FREE 10 ML: 5 INJECTION INTRAVENOUS at 20:23

## 2020-07-11 RX ADMIN — INSULIN DETEMIR 20 UNITS: 100 INJECTION, SOLUTION SUBCUTANEOUS at 08:44

## 2020-07-11 RX ADMIN — SODIUM CHLORIDE, PRESERVATIVE FREE 10 ML: 5 INJECTION INTRAVENOUS at 20:28

## 2020-07-11 RX ADMIN — CARVEDILOL 3.12 MG: 3.12 TABLET, FILM COATED ORAL at 20:22

## 2020-07-11 RX ADMIN — HEPARIN SODIUM 5000 UNITS: 5000 INJECTION, SOLUTION INTRAVENOUS; SUBCUTANEOUS at 14:07

## 2020-07-11 RX ADMIN — CLOPIDOGREL BISULFATE 75 MG: 75 TABLET ORAL at 08:43

## 2020-07-11 RX ADMIN — HEPARIN SODIUM 5000 UNITS: 5000 INJECTION, SOLUTION INTRAVENOUS; SUBCUTANEOUS at 20:22

## 2020-07-11 RX ADMIN — CARVEDILOL 3.12 MG: 3.12 TABLET, FILM COATED ORAL at 08:43

## 2020-07-11 RX ADMIN — ACETAMINOPHEN 650 MG: 325 TABLET, FILM COATED ORAL at 15:27

## 2020-07-11 RX ADMIN — CETIRIZINE HYDROCHLORIDE 10 MG: 10 TABLET, FILM COATED ORAL at 08:44

## 2020-07-11 RX ADMIN — INSULIN HUMAN 2 UNITS: 100 INJECTION, SOLUTION PARENTERAL at 06:08

## 2020-07-11 RX ADMIN — SODIUM CHLORIDE, PRESERVATIVE FREE 10 ML: 5 INJECTION INTRAVENOUS at 10:21

## 2020-07-11 RX ADMIN — VERAPAMIL HYDROCHLORIDE 40 MG: 80 TABLET, FILM COATED ORAL at 20:22

## 2020-07-11 RX ADMIN — INSULIN HUMAN 2 UNITS: 100 INJECTION, SOLUTION PARENTERAL at 23:33

## 2020-07-11 RX ADMIN — HEPARIN SODIUM 5000 UNITS: 5000 INJECTION, SOLUTION INTRAVENOUS; SUBCUTANEOUS at 06:09

## 2020-07-11 RX ADMIN — VERAPAMIL HYDROCHLORIDE 40 MG: 80 TABLET, FILM COATED ORAL at 06:09

## 2020-07-11 RX ADMIN — INSULIN HUMAN 2 UNITS: 100 INJECTION, SOLUTION PARENTERAL at 00:11

## 2020-07-12 ENCOUNTER — APPOINTMENT (OUTPATIENT)
Dept: CT IMAGING | Facility: HOSPITAL | Age: 75
End: 2020-07-12

## 2020-07-12 ENCOUNTER — APPOINTMENT (OUTPATIENT)
Dept: GENERAL RADIOLOGY | Facility: HOSPITAL | Age: 75
End: 2020-07-12

## 2020-07-12 LAB
ANION GAP SERPL CALCULATED.3IONS-SCNC: 10 MMOL/L (ref 5–15)
ARTERIAL PATENCY WRIST A: ABNORMAL
ATMOSPHERIC PRESS: ABNORMAL MM[HG]
BASE EXCESS BLDA CALC-SCNC: -1.2 MMOL/L (ref 0–2)
BASOPHILS # BLD AUTO: 0.07 10*3/MM3 (ref 0–0.2)
BASOPHILS NFR BLD AUTO: 0.5 % (ref 0–1.5)
BDY SITE: ABNORMAL
BODY TEMPERATURE: 37 C
BUN SERPL-MCNC: 39 MG/DL (ref 8–23)
BUN/CREAT SERPL: 56.5 (ref 7–25)
CALCIUM SPEC-SCNC: 8.8 MG/DL (ref 8.6–10.5)
CHLORIDE SERPL-SCNC: 100 MMOL/L (ref 98–107)
CO2 BLDA-SCNC: 25.4 MMOL/L (ref 22–33)
CO2 SERPL-SCNC: 26 MMOL/L (ref 22–29)
COHGB MFR BLD: 1.8 % (ref 0–2)
CREAT SERPL-MCNC: 0.69 MG/DL (ref 0.57–1)
DEPRECATED RDW RBC AUTO: 54.8 FL (ref 37–54)
EOSINOPHIL # BLD AUTO: 0.31 10*3/MM3 (ref 0–0.4)
EOSINOPHIL NFR BLD AUTO: 2.1 % (ref 0.3–6.2)
ERYTHROCYTE [DISTWIDTH] IN BLOOD BY AUTOMATED COUNT: 15.6 % (ref 12.3–15.4)
GFR SERPL CREATININE-BSD FRML MDRD: 83 ML/MIN/1.73
GLUCOSE BLDC GLUCOMTR-MCNC: 168 MG/DL (ref 70–130)
GLUCOSE BLDC GLUCOMTR-MCNC: 176 MG/DL (ref 70–130)
GLUCOSE BLDC GLUCOMTR-MCNC: 183 MG/DL (ref 70–130)
GLUCOSE BLDC GLUCOMTR-MCNC: 266 MG/DL (ref 70–130)
GLUCOSE BLDC GLUCOMTR-MCNC: 276 MG/DL (ref 70–130)
GLUCOSE BLDC GLUCOMTR-MCNC: 293 MG/DL (ref 70–130)
GLUCOSE SERPL-MCNC: 196 MG/DL (ref 65–99)
HCO3 BLDA-SCNC: 24.1 MMOL/L (ref 20–26)
HCT VFR BLD AUTO: 29.7 % (ref 34–46.6)
HCT VFR BLD CALC: 31.3 %
HGB BLD-MCNC: 9.2 G/DL (ref 12–15.9)
HGB BLDA-MCNC: 10.2 G/DL (ref 14–18)
IMM GRANULOCYTES # BLD AUTO: 0.07 10*3/MM3 (ref 0–0.05)
IMM GRANULOCYTES NFR BLD AUTO: 0.5 % (ref 0–0.5)
INHALED O2 CONCENTRATION: 80 %
LYMPHOCYTES # BLD AUTO: 1.48 10*3/MM3 (ref 0.7–3.1)
LYMPHOCYTES NFR BLD AUTO: 10 % (ref 19.6–45.3)
MCH RBC QN AUTO: 30.5 PG (ref 26.6–33)
MCHC RBC AUTO-ENTMCNC: 31 G/DL (ref 31.5–35.7)
MCV RBC AUTO: 98.3 FL (ref 79–97)
METHGB BLD QL: 0.6 % (ref 0–1.5)
MODALITY: ABNORMAL
MONOCYTES # BLD AUTO: 0.85 10*3/MM3 (ref 0.1–0.9)
MONOCYTES NFR BLD AUTO: 5.7 % (ref 5–12)
NEUTROPHILS NFR BLD AUTO: 12.05 10*3/MM3 (ref 1.7–7)
NEUTROPHILS NFR BLD AUTO: 81.2 % (ref 42.7–76)
NOTE: ABNORMAL
NRBC BLD AUTO-RTO: 0 /100 WBC (ref 0–0.2)
NT-PROBNP SERPL-MCNC: ABNORMAL PG/ML (ref 0–1800)
OXYHGB MFR BLDV: 96.8 % (ref 94–99)
PCO2 BLDA: 42.1 MM HG (ref 35–45)
PCO2 TEMP ADJ BLD: 42.1 MM HG (ref 35–45)
PH BLDA: 7.37 PH UNITS (ref 7.35–7.45)
PH, TEMP CORRECTED: 7.37 PH UNITS
PLATELET # BLD AUTO: 285 10*3/MM3 (ref 140–450)
PMV BLD AUTO: 10.8 FL (ref 6–12)
PO2 BLDA: 137 MM HG (ref 83–108)
PO2 TEMP ADJ BLD: 137 MM HG (ref 83–108)
POTASSIUM SERPL-SCNC: 4.1 MMOL/L (ref 3.5–5.2)
RBC # BLD AUTO: 3.02 10*6/MM3 (ref 3.77–5.28)
SODIUM SERPL-SCNC: 136 MMOL/L (ref 136–145)
TROPONIN T SERPL-MCNC: 0.13 NG/ML (ref 0–0.03)
TROPONIN T SERPL-MCNC: 0.13 NG/ML (ref 0–0.03)
VENTILATOR MODE: ABNORMAL
WBC # BLD AUTO: 14.83 10*3/MM3 (ref 3.4–10.8)

## 2020-07-12 PROCEDURE — 84484 ASSAY OF TROPONIN QUANT: CPT | Performed by: NURSE PRACTITIONER

## 2020-07-12 PROCEDURE — 93005 ELECTROCARDIOGRAM TRACING: CPT | Performed by: NURSE PRACTITIONER

## 2020-07-12 PROCEDURE — 70450 CT HEAD/BRAIN W/O DYE: CPT

## 2020-07-12 PROCEDURE — 82962 GLUCOSE BLOOD TEST: CPT

## 2020-07-12 PROCEDURE — 63710000001 INSULIN DETEMIR PER 5 UNITS: Performed by: PHYSICIAN ASSISTANT

## 2020-07-12 PROCEDURE — 25010000002 FUROSEMIDE PER 20 MG: Performed by: NURSE PRACTITIONER

## 2020-07-12 PROCEDURE — 99232 SBSQ HOSP IP/OBS MODERATE 35: CPT | Performed by: PSYCHIATRY & NEUROLOGY

## 2020-07-12 PROCEDURE — 94660 CPAP INITIATION&MGMT: CPT

## 2020-07-12 PROCEDURE — 83880 ASSAY OF NATRIURETIC PEPTIDE: CPT | Performed by: NURSE PRACTITIONER

## 2020-07-12 PROCEDURE — 99231 SBSQ HOSP IP/OBS SF/LOW 25: CPT | Performed by: NURSE PRACTITIONER

## 2020-07-12 PROCEDURE — 71045 X-RAY EXAM CHEST 1 VIEW: CPT

## 2020-07-12 PROCEDURE — 82805 BLOOD GASES W/O2 SATURATION: CPT

## 2020-07-12 PROCEDURE — 99291 CRITICAL CARE FIRST HOUR: CPT | Performed by: INTERNAL MEDICINE

## 2020-07-12 PROCEDURE — 71250 CT THORAX DX C-: CPT

## 2020-07-12 PROCEDURE — 63710000001 INSULIN REGULAR HUMAN PER 5 UNITS: Performed by: PHYSICIAN ASSISTANT

## 2020-07-12 PROCEDURE — 25010000002 HEPARIN (PORCINE) PER 1000 UNITS: Performed by: PHYSICIAN ASSISTANT

## 2020-07-12 PROCEDURE — 36600 WITHDRAWAL OF ARTERIAL BLOOD: CPT

## 2020-07-12 PROCEDURE — 94799 UNLISTED PULMONARY SVC/PX: CPT

## 2020-07-12 PROCEDURE — 25010000002 HYDRALAZINE PER 20 MG: Performed by: NURSE PRACTITIONER

## 2020-07-12 PROCEDURE — 25010000002 FUROSEMIDE PER 20 MG: Performed by: INTERNAL MEDICINE

## 2020-07-12 PROCEDURE — 80048 BASIC METABOLIC PNL TOTAL CA: CPT | Performed by: PHYSICIAN ASSISTANT

## 2020-07-12 PROCEDURE — 85025 COMPLETE CBC W/AUTO DIFF WBC: CPT | Performed by: NURSE PRACTITIONER

## 2020-07-12 PROCEDURE — 93010 ELECTROCARDIOGRAM REPORT: CPT | Performed by: INTERNAL MEDICINE

## 2020-07-12 RX ORDER — FUROSEMIDE 10 MG/ML
40 INJECTION INTRAMUSCULAR; INTRAVENOUS EVERY 12 HOURS
Status: COMPLETED | OUTPATIENT
Start: 2020-07-12 | End: 2020-07-14

## 2020-07-12 RX ORDER — LABETALOL HYDROCHLORIDE 5 MG/ML
10 INJECTION, SOLUTION INTRAVENOUS ONCE
Status: COMPLETED | OUTPATIENT
Start: 2020-07-12 | End: 2020-07-12

## 2020-07-12 RX ORDER — FUROSEMIDE 10 MG/ML
40 INJECTION INTRAMUSCULAR; INTRAVENOUS ONCE
Status: COMPLETED | OUTPATIENT
Start: 2020-07-12 | End: 2020-07-12

## 2020-07-12 RX ADMIN — HEPARIN SODIUM 5000 UNITS: 5000 INJECTION, SOLUTION INTRAVENOUS; SUBCUTANEOUS at 22:08

## 2020-07-12 RX ADMIN — CARVEDILOL 3.12 MG: 3.12 TABLET, FILM COATED ORAL at 20:04

## 2020-07-12 RX ADMIN — SODIUM CHLORIDE, PRESERVATIVE FREE 10 ML: 5 INJECTION INTRAVENOUS at 22:09

## 2020-07-12 RX ADMIN — LABETALOL 20 MG/4 ML (5 MG/ML) INTRAVENOUS SYRINGE 10 MG: at 05:59

## 2020-07-12 RX ADMIN — HEPARIN SODIUM 5000 UNITS: 5000 INJECTION, SOLUTION INTRAVENOUS; SUBCUTANEOUS at 05:14

## 2020-07-12 RX ADMIN — CETIRIZINE HYDROCHLORIDE 10 MG: 10 TABLET, FILM COATED ORAL at 10:17

## 2020-07-12 RX ADMIN — CLOPIDOGREL BISULFATE 75 MG: 75 TABLET ORAL at 10:18

## 2020-07-12 RX ADMIN — SODIUM CHLORIDE, PRESERVATIVE FREE 10 ML: 5 INJECTION INTRAVENOUS at 22:08

## 2020-07-12 RX ADMIN — FUROSEMIDE 40 MG: 10 INJECTION, SOLUTION INTRAMUSCULAR; INTRAVENOUS at 07:14

## 2020-07-12 RX ADMIN — ASPIRIN 81 MG: 81 TABLET, CHEWABLE ORAL at 10:17

## 2020-07-12 RX ADMIN — INSULIN HUMAN 2 UNITS: 100 INJECTION, SOLUTION PARENTERAL at 18:16

## 2020-07-12 RX ADMIN — LANSOPRAZOLE 30 MG: KIT at 05:19

## 2020-07-12 RX ADMIN — VERAPAMIL HYDROCHLORIDE 40 MG: 80 TABLET, FILM COATED ORAL at 22:08

## 2020-07-12 RX ADMIN — FUROSEMIDE 40 MG: 10 INJECTION, SOLUTION INTRAMUSCULAR; INTRAVENOUS at 18:16

## 2020-07-12 RX ADMIN — HEPARIN SODIUM 5000 UNITS: 5000 INJECTION, SOLUTION INTRAVENOUS; SUBCUTANEOUS at 13:50

## 2020-07-12 RX ADMIN — HYDRALAZINE HYDROCHLORIDE 10 MG: 20 INJECTION INTRAMUSCULAR; INTRAVENOUS at 04:35

## 2020-07-12 RX ADMIN — INSULIN DETEMIR 20 UNITS: 100 INJECTION, SOLUTION SUBCUTANEOUS at 11:41

## 2020-07-12 RX ADMIN — INSULIN HUMAN 2 UNITS: 100 INJECTION, SOLUTION PARENTERAL at 11:41

## 2020-07-12 RX ADMIN — INSULIN HUMAN 4 UNITS: 100 INJECTION, SOLUTION PARENTERAL at 05:16

## 2020-07-12 RX ADMIN — SODIUM CHLORIDE, PRESERVATIVE FREE 10 ML: 5 INJECTION INTRAVENOUS at 05:15

## 2020-07-12 RX ADMIN — VERAPAMIL HYDROCHLORIDE 40 MG: 80 TABLET, FILM COATED ORAL at 05:14

## 2020-07-12 RX ADMIN — INSULIN HUMAN 4 UNITS: 100 INJECTION, SOLUTION PARENTERAL at 08:47

## 2020-07-12 RX ADMIN — ATORVASTATIN CALCIUM 80 MG: 40 TABLET, FILM COATED ORAL at 20:04

## 2020-07-13 ENCOUNTER — APPOINTMENT (OUTPATIENT)
Dept: GENERAL RADIOLOGY | Facility: HOSPITAL | Age: 75
End: 2020-07-13

## 2020-07-13 ENCOUNTER — APPOINTMENT (OUTPATIENT)
Dept: CT IMAGING | Facility: HOSPITAL | Age: 75
End: 2020-07-13

## 2020-07-13 LAB
ALBUMIN SERPL-MCNC: 2.8 G/DL (ref 3.5–5.2)
ALBUMIN/GLOB SERPL: 0.8 G/DL
ALP SERPL-CCNC: 88 U/L (ref 39–117)
ALT SERPL W P-5'-P-CCNC: 13 U/L (ref 1–33)
ANION GAP SERPL CALCULATED.3IONS-SCNC: 14 MMOL/L (ref 5–15)
APPEARANCE FLD: ABNORMAL
APTT PPP: 58.8 SECONDS (ref 24–37)
AST SERPL-CCNC: 32 U/L (ref 1–32)
BASOPHILS # BLD AUTO: 0.07 10*3/MM3 (ref 0–0.2)
BASOPHILS NFR BLD AUTO: 0.5 % (ref 0–1.5)
BILIRUB SERPL-MCNC: 0.8 MG/DL (ref 0–1.2)
BUN SERPL-MCNC: 45 MG/DL (ref 8–23)
BUN/CREAT SERPL: 61.6 (ref 7–25)
CALCIUM SPEC-SCNC: 8.9 MG/DL (ref 8.6–10.5)
CHLORIDE SERPL-SCNC: 100 MMOL/L (ref 98–107)
CO2 SERPL-SCNC: 22 MMOL/L (ref 22–29)
COLOR FLD: ABNORMAL
CREAT SERPL-MCNC: 0.73 MG/DL (ref 0.57–1)
DEPRECATED RDW RBC AUTO: 56 FL (ref 37–54)
EOSINOPHIL # BLD AUTO: 0.42 10*3/MM3 (ref 0–0.4)
EOSINOPHIL NFR BLD AUTO: 3.2 % (ref 0.3–6.2)
ERYTHROCYTE [DISTWIDTH] IN BLOOD BY AUTOMATED COUNT: 15.9 % (ref 12.3–15.4)
GFR SERPL CREATININE-BSD FRML MDRD: 78 ML/MIN/1.73
GLOBULIN UR ELPH-MCNC: 3.5 GM/DL
GLUCOSE BLDC GLUCOMTR-MCNC: 143 MG/DL (ref 70–130)
GLUCOSE BLDC GLUCOMTR-MCNC: 174 MG/DL (ref 70–130)
GLUCOSE BLDC GLUCOMTR-MCNC: 191 MG/DL (ref 70–130)
GLUCOSE FLD-MCNC: 174 MG/DL
GLUCOSE SERPL-MCNC: 173 MG/DL (ref 65–99)
HCT VFR BLD AUTO: 29.2 % (ref 34–46.6)
HGB BLD-MCNC: 8.9 G/DL (ref 12–15.9)
IMM GRANULOCYTES # BLD AUTO: 0.07 10*3/MM3 (ref 0–0.05)
IMM GRANULOCYTES NFR BLD AUTO: 0.5 % (ref 0–0.5)
INR PPP: 1.41 (ref 0.85–1.16)
LDH FLD-CCNC: 314 U/L
LYMPHOCYTES # BLD AUTO: 1.5 10*3/MM3 (ref 0.7–3.1)
LYMPHOCYTES NFR BLD AUTO: 11.4 % (ref 19.6–45.3)
LYMPHOCYTES NFR FLD MANUAL: 28 %
MACROPHAGE FLUID: 15 %
MAGNESIUM SERPL-MCNC: 2.4 MG/DL (ref 1.6–2.4)
MCH RBC QN AUTO: 30.8 PG (ref 26.6–33)
MCHC RBC AUTO-ENTMCNC: 30.5 G/DL (ref 31.5–35.7)
MCV RBC AUTO: 101 FL (ref 79–97)
MESOTHL CELL NFR FLD MANUAL: 10 %
MONOCYTES # BLD AUTO: 0.7 10*3/MM3 (ref 0.1–0.9)
MONOCYTES NFR BLD AUTO: 5.3 % (ref 5–12)
MONOCYTES NFR FLD: 37 %
NEUTROPHILS NFR BLD AUTO: 10.45 10*3/MM3 (ref 1.7–7)
NEUTROPHILS NFR BLD AUTO: 79.1 % (ref 42.7–76)
NEUTROPHILS NFR FLD MANUAL: 10 %
NRBC BLD AUTO-RTO: 0 /100 WBC (ref 0–0.2)
PH FLD: 7.92 [PH]
PHOSPHATE SERPL-MCNC: 2.8 MG/DL (ref 2.5–4.5)
PLATELET # BLD AUTO: 277 10*3/MM3 (ref 140–450)
PMV BLD AUTO: 11 FL (ref 6–12)
POTASSIUM SERPL-SCNC: 4.1 MMOL/L (ref 3.5–5.2)
PROT FLD-MCNC: 2.9 G/DL
PROT SERPL-MCNC: 6.3 G/DL (ref 6–8.5)
PROTHROMBIN TIME: 16.9 SECONDS (ref 11.5–14)
RBC # BLD AUTO: 2.89 10*6/MM3 (ref 3.77–5.28)
RBC # FLD AUTO: ABNORMAL /MM3
SODIUM SERPL-SCNC: 136 MMOL/L (ref 136–145)
WBC # BLD AUTO: 13.21 10*3/MM3 (ref 3.4–10.8)
WBC # FLD AUTO: 2160 /MM3

## 2020-07-13 PROCEDURE — 0W9930Z DRAINAGE OF RIGHT PLEURAL CAVITY WITH DRAINAGE DEVICE, PERCUTANEOUS APPROACH: ICD-10-PCS | Performed by: THORACIC SURGERY (CARDIOTHORACIC VASCULAR SURGERY)

## 2020-07-13 PROCEDURE — 94660 CPAP INITIATION&MGMT: CPT

## 2020-07-13 PROCEDURE — 87205 SMEAR GRAM STAIN: CPT | Performed by: INTERNAL MEDICINE

## 2020-07-13 PROCEDURE — 85730 THROMBOPLASTIN TIME PARTIAL: CPT | Performed by: RADIOLOGY

## 2020-07-13 PROCEDURE — 25010000002 FUROSEMIDE PER 20 MG: Performed by: INTERNAL MEDICINE

## 2020-07-13 PROCEDURE — 71045 X-RAY EXAM CHEST 1 VIEW: CPT

## 2020-07-13 PROCEDURE — 87070 CULTURE OTHR SPECIMN AEROBIC: CPT | Performed by: INTERNAL MEDICINE

## 2020-07-13 PROCEDURE — 82945 GLUCOSE OTHER FLUID: CPT | Performed by: INTERNAL MEDICINE

## 2020-07-13 PROCEDURE — 87206 SMEAR FLUORESCENT/ACID STAI: CPT | Performed by: INTERNAL MEDICINE

## 2020-07-13 PROCEDURE — 82962 GLUCOSE BLOOD TEST: CPT

## 2020-07-13 PROCEDURE — 94799 UNLISTED PULMONARY SVC/PX: CPT

## 2020-07-13 PROCEDURE — 63710000001 INSULIN DETEMIR PER 5 UNITS: Performed by: PHYSICIAN ASSISTANT

## 2020-07-13 PROCEDURE — 87075 CULTR BACTERIA EXCEPT BLOOD: CPT | Performed by: INTERNAL MEDICINE

## 2020-07-13 PROCEDURE — 87015 SPECIMEN INFECT AGNT CONCNTJ: CPT | Performed by: INTERNAL MEDICINE

## 2020-07-13 PROCEDURE — 83615 LACTATE (LD) (LDH) ENZYME: CPT | Performed by: INTERNAL MEDICINE

## 2020-07-13 PROCEDURE — 92610 EVALUATE SWALLOWING FUNCTION: CPT

## 2020-07-13 PROCEDURE — 83986 ASSAY PH BODY FLUID NOS: CPT | Performed by: INTERNAL MEDICINE

## 2020-07-13 PROCEDURE — 99024 POSTOP FOLLOW-UP VISIT: CPT | Performed by: PHYSICIAN ASSISTANT

## 2020-07-13 PROCEDURE — 25010000002 HEPARIN (PORCINE) PER 1000 UNITS: Performed by: PHYSICIAN ASSISTANT

## 2020-07-13 PROCEDURE — 88112 CYTOPATH CELL ENHANCE TECH: CPT | Performed by: INTERNAL MEDICINE

## 2020-07-13 PROCEDURE — 83735 ASSAY OF MAGNESIUM: CPT | Performed by: INTERNAL MEDICINE

## 2020-07-13 PROCEDURE — 99233 SBSQ HOSP IP/OBS HIGH 50: CPT | Performed by: PSYCHIATRY & NEUROLOGY

## 2020-07-13 PROCEDURE — 75989 ABSCESS DRAINAGE UNDER X-RAY: CPT

## 2020-07-13 PROCEDURE — 85025 COMPLETE CBC W/AUTO DIFF WBC: CPT | Performed by: INTERNAL MEDICINE

## 2020-07-13 PROCEDURE — 92507 TX SP LANG VOICE COMM INDIV: CPT

## 2020-07-13 PROCEDURE — 87102 FUNGUS ISOLATION CULTURE: CPT | Performed by: INTERNAL MEDICINE

## 2020-07-13 PROCEDURE — 25010000003 LIDOCAINE 1 % SOLUTION: Performed by: RADIOLOGY

## 2020-07-13 PROCEDURE — 80053 COMPREHEN METABOLIC PANEL: CPT | Performed by: INTERNAL MEDICINE

## 2020-07-13 PROCEDURE — 84157 ASSAY OF PROTEIN OTHER: CPT | Performed by: INTERNAL MEDICINE

## 2020-07-13 PROCEDURE — 84100 ASSAY OF PHOSPHORUS: CPT | Performed by: INTERNAL MEDICINE

## 2020-07-13 PROCEDURE — 88305 TISSUE EXAM BY PATHOLOGIST: CPT | Performed by: INTERNAL MEDICINE

## 2020-07-13 PROCEDURE — 99291 CRITICAL CARE FIRST HOUR: CPT | Performed by: INTERNAL MEDICINE

## 2020-07-13 PROCEDURE — 89051 BODY FLUID CELL COUNT: CPT | Performed by: INTERNAL MEDICINE

## 2020-07-13 PROCEDURE — C1729 CATH, DRAINAGE: HCPCS

## 2020-07-13 PROCEDURE — 63710000001 INSULIN REGULAR HUMAN PER 5 UNITS: Performed by: PHYSICIAN ASSISTANT

## 2020-07-13 PROCEDURE — 87116 MYCOBACTERIA CULTURE: CPT | Performed by: INTERNAL MEDICINE

## 2020-07-13 PROCEDURE — 85610 PROTHROMBIN TIME: CPT | Performed by: RADIOLOGY

## 2020-07-13 RX ORDER — LIDOCAINE HYDROCHLORIDE 10 MG/ML
20 INJECTION, SOLUTION INFILTRATION; PERINEURAL ONCE
Status: COMPLETED | OUTPATIENT
Start: 2020-07-13 | End: 2020-07-13

## 2020-07-13 RX ORDER — MODAFINIL 100 MG/1
100 TABLET ORAL
Status: DISCONTINUED | OUTPATIENT
Start: 2020-07-13 | End: 2020-07-16

## 2020-07-13 RX ADMIN — INSULIN HUMAN 2 UNITS: 100 INJECTION, SOLUTION PARENTERAL at 06:39

## 2020-07-13 RX ADMIN — VERAPAMIL HYDROCHLORIDE 40 MG: 80 TABLET, FILM COATED ORAL at 16:23

## 2020-07-13 RX ADMIN — LOSARTAN POTASSIUM 100 MG: 50 TABLET, FILM COATED ORAL at 08:33

## 2020-07-13 RX ADMIN — FUROSEMIDE 40 MG: 10 INJECTION, SOLUTION INTRAMUSCULAR; INTRAVENOUS at 17:56

## 2020-07-13 RX ADMIN — LIDOCAINE HYDROCHLORIDE 20 ML: 10 INJECTION, SOLUTION INFILTRATION; PERINEURAL at 10:03

## 2020-07-13 RX ADMIN — VERAPAMIL HYDROCHLORIDE 40 MG: 80 TABLET, FILM COATED ORAL at 22:01

## 2020-07-13 RX ADMIN — CETIRIZINE HYDROCHLORIDE 10 MG: 10 TABLET, FILM COATED ORAL at 08:33

## 2020-07-13 RX ADMIN — ATORVASTATIN CALCIUM 80 MG: 40 TABLET, FILM COATED ORAL at 20:00

## 2020-07-13 RX ADMIN — CARVEDILOL 3.12 MG: 3.12 TABLET, FILM COATED ORAL at 20:00

## 2020-07-13 RX ADMIN — SODIUM CHLORIDE, PRESERVATIVE FREE 10 ML: 5 INJECTION INTRAVENOUS at 20:01

## 2020-07-13 RX ADMIN — MODAFINIL 100 MG: 100 TABLET ORAL at 16:22

## 2020-07-13 RX ADMIN — CLOPIDOGREL BISULFATE 75 MG: 75 TABLET ORAL at 08:33

## 2020-07-13 RX ADMIN — FUROSEMIDE 40 MG: 10 INJECTION, SOLUTION INTRAMUSCULAR; INTRAVENOUS at 06:38

## 2020-07-13 RX ADMIN — HEPARIN SODIUM 5000 UNITS: 5000 INJECTION, SOLUTION INTRAVENOUS; SUBCUTANEOUS at 06:38

## 2020-07-13 RX ADMIN — CARVEDILOL 3.12 MG: 3.12 TABLET, FILM COATED ORAL at 08:33

## 2020-07-13 RX ADMIN — ASPIRIN 81 MG: 81 TABLET, CHEWABLE ORAL at 08:32

## 2020-07-13 RX ADMIN — INSULIN HUMAN 2 UNITS: 100 INJECTION, SOLUTION PARENTERAL at 00:51

## 2020-07-13 RX ADMIN — HEPARIN SODIUM 5000 UNITS: 5000 INJECTION, SOLUTION INTRAVENOUS; SUBCUTANEOUS at 22:01

## 2020-07-13 RX ADMIN — SODIUM CHLORIDE, PRESERVATIVE FREE 10 ML: 5 INJECTION INTRAVENOUS at 06:39

## 2020-07-13 RX ADMIN — VERAPAMIL HYDROCHLORIDE 40 MG: 80 TABLET, FILM COATED ORAL at 06:39

## 2020-07-13 RX ADMIN — INSULIN HUMAN 2 UNITS: 100 INJECTION, SOLUTION PARENTERAL at 12:35

## 2020-07-13 RX ADMIN — INSULIN DETEMIR 20 UNITS: 100 INJECTION, SOLUTION SUBCUTANEOUS at 08:34

## 2020-07-13 RX ADMIN — LANSOPRAZOLE 30 MG: KIT at 06:39

## 2020-07-14 ENCOUNTER — APPOINTMENT (OUTPATIENT)
Dept: CT IMAGING | Facility: HOSPITAL | Age: 75
End: 2020-07-14

## 2020-07-14 ENCOUNTER — ANCILLARY PROCEDURE (OUTPATIENT)
Dept: SPEECH THERAPY | Facility: HOSPITAL | Age: 75
End: 2020-07-14

## 2020-07-14 ENCOUNTER — APPOINTMENT (OUTPATIENT)
Dept: GENERAL RADIOLOGY | Facility: HOSPITAL | Age: 75
End: 2020-07-14

## 2020-07-14 LAB
ALBUMIN SERPL-MCNC: 2.7 G/DL (ref 3.5–5.2)
ALBUMIN/GLOB SERPL: 0.8 G/DL
ALP SERPL-CCNC: 90 U/L (ref 39–117)
ALT SERPL W P-5'-P-CCNC: 18 U/L (ref 1–33)
ANION GAP SERPL CALCULATED.3IONS-SCNC: 11 MMOL/L (ref 5–15)
AST SERPL-CCNC: 38 U/L (ref 1–32)
BASOPHILS # BLD AUTO: 0.07 10*3/MM3 (ref 0–0.2)
BASOPHILS NFR BLD AUTO: 0.6 % (ref 0–1.5)
BILIRUB SERPL-MCNC: 0.8 MG/DL (ref 0–1.2)
BUN SERPL-MCNC: 47 MG/DL (ref 8–23)
BUN/CREAT SERPL: 61 (ref 7–25)
CALCIUM SPEC-SCNC: 9 MG/DL (ref 8.6–10.5)
CHLORIDE SERPL-SCNC: 100 MMOL/L (ref 98–107)
CO2 SERPL-SCNC: 26 MMOL/L (ref 22–29)
CREAT SERPL-MCNC: 0.77 MG/DL (ref 0.57–1)
DEPRECATED RDW RBC AUTO: 55.8 FL (ref 37–54)
EOSINOPHIL # BLD AUTO: 0.49 10*3/MM3 (ref 0–0.4)
EOSINOPHIL NFR BLD AUTO: 3.9 % (ref 0.3–6.2)
ERYTHROCYTE [DISTWIDTH] IN BLOOD BY AUTOMATED COUNT: 16.6 % (ref 12.3–15.4)
GFR SERPL CREATININE-BSD FRML MDRD: 73 ML/MIN/1.73
GLOBULIN UR ELPH-MCNC: 3.5 GM/DL
GLUCOSE BLDC GLUCOMTR-MCNC: 186 MG/DL (ref 70–130)
GLUCOSE BLDC GLUCOMTR-MCNC: 187 MG/DL (ref 70–130)
GLUCOSE BLDC GLUCOMTR-MCNC: 198 MG/DL (ref 70–130)
GLUCOSE BLDC GLUCOMTR-MCNC: 199 MG/DL (ref 70–130)
GLUCOSE BLDC GLUCOMTR-MCNC: 201 MG/DL (ref 70–130)
GLUCOSE SERPL-MCNC: 172 MG/DL (ref 65–99)
HCT VFR BLD AUTO: 30.1 % (ref 34–46.6)
HGB BLD-MCNC: 9.2 G/DL (ref 12–15.9)
IMM GRANULOCYTES # BLD AUTO: 0.05 10*3/MM3 (ref 0–0.05)
IMM GRANULOCYTES NFR BLD AUTO: 0.4 % (ref 0–0.5)
LYMPHOCYTES # BLD AUTO: 1.53 10*3/MM3 (ref 0.7–3.1)
LYMPHOCYTES NFR BLD AUTO: 12.2 % (ref 19.6–45.3)
MAGNESIUM SERPL-MCNC: 2.1 MG/DL (ref 1.6–2.4)
MCH RBC QN AUTO: 30.5 PG (ref 26.6–33)
MCHC RBC AUTO-ENTMCNC: 30.6 G/DL (ref 31.5–35.7)
MCV RBC AUTO: 99.7 FL (ref 79–97)
MONOCYTES # BLD AUTO: 0.73 10*3/MM3 (ref 0.1–0.9)
MONOCYTES NFR BLD AUTO: 5.8 % (ref 5–12)
NEUTROPHILS NFR BLD AUTO: 77.1 % (ref 42.7–76)
NEUTROPHILS NFR BLD AUTO: 9.62 10*3/MM3 (ref 1.7–7)
NRBC BLD AUTO-RTO: 0 /100 WBC (ref 0–0.2)
NT-PROBNP SERPL-MCNC: 5137 PG/ML (ref 0–1800)
PHOSPHATE SERPL-MCNC: 2.7 MG/DL (ref 2.5–4.5)
PLATELET # BLD AUTO: 336 10*3/MM3 (ref 140–450)
PMV BLD AUTO: 10.7 FL (ref 6–12)
POTASSIUM SERPL-SCNC: 3.7 MMOL/L (ref 3.5–5.2)
PROCALCITONIN SERPL-MCNC: 0.21 NG/ML (ref 0–0.25)
PROT SERPL-MCNC: 6.2 G/DL (ref 6–8.5)
RBC # BLD AUTO: 3.02 10*6/MM3 (ref 3.77–5.28)
SODIUM SERPL-SCNC: 137 MMOL/L (ref 136–145)
WBC # BLD AUTO: 12.49 10*3/MM3 (ref 3.4–10.8)

## 2020-07-14 PROCEDURE — 97164 PT RE-EVAL EST PLAN CARE: CPT

## 2020-07-14 PROCEDURE — 84100 ASSAY OF PHOSPHORUS: CPT | Performed by: INTERNAL MEDICINE

## 2020-07-14 PROCEDURE — 97530 THERAPEUTIC ACTIVITIES: CPT

## 2020-07-14 PROCEDURE — 25010000002 HEPARIN (PORCINE) PER 1000 UNITS: Performed by: PHYSICIAN ASSISTANT

## 2020-07-14 PROCEDURE — 84145 PROCALCITONIN (PCT): CPT | Performed by: INTERNAL MEDICINE

## 2020-07-14 PROCEDURE — 99233 SBSQ HOSP IP/OBS HIGH 50: CPT | Performed by: INTERNAL MEDICINE

## 2020-07-14 PROCEDURE — 71045 X-RAY EXAM CHEST 1 VIEW: CPT

## 2020-07-14 PROCEDURE — 97168 OT RE-EVAL EST PLAN CARE: CPT

## 2020-07-14 PROCEDURE — 63710000001 INSULIN REGULAR HUMAN PER 5 UNITS: Performed by: PHYSICIAN ASSISTANT

## 2020-07-14 PROCEDURE — 25010000002 FUROSEMIDE PER 20 MG: Performed by: INTERNAL MEDICINE

## 2020-07-14 PROCEDURE — 63710000001 INSULIN DETEMIR PER 5 UNITS: Performed by: PHYSICIAN ASSISTANT

## 2020-07-14 PROCEDURE — 80053 COMPREHEN METABOLIC PANEL: CPT | Performed by: INTERNAL MEDICINE

## 2020-07-14 PROCEDURE — 99024 POSTOP FOLLOW-UP VISIT: CPT | Performed by: THORACIC SURGERY (CARDIOTHORACIC VASCULAR SURGERY)

## 2020-07-14 PROCEDURE — 99232 SBSQ HOSP IP/OBS MODERATE 35: CPT | Performed by: CLINICAL NURSE SPECIALIST

## 2020-07-14 PROCEDURE — 92507 TX SP LANG VOICE COMM INDIV: CPT

## 2020-07-14 PROCEDURE — 83880 ASSAY OF NATRIURETIC PEPTIDE: CPT | Performed by: INTERNAL MEDICINE

## 2020-07-14 PROCEDURE — 82962 GLUCOSE BLOOD TEST: CPT

## 2020-07-14 PROCEDURE — 70450 CT HEAD/BRAIN W/O DYE: CPT

## 2020-07-14 PROCEDURE — 85025 COMPLETE CBC W/AUTO DIFF WBC: CPT | Performed by: INTERNAL MEDICINE

## 2020-07-14 PROCEDURE — 92526 ORAL FUNCTION THERAPY: CPT

## 2020-07-14 PROCEDURE — 83735 ASSAY OF MAGNESIUM: CPT | Performed by: INTERNAL MEDICINE

## 2020-07-14 RX ORDER — DEXMEDETOMIDINE HYDROCHLORIDE 4 UG/ML
.2-1.5 INJECTION, SOLUTION INTRAVENOUS
Status: DISCONTINUED | OUTPATIENT
Start: 2020-07-14 | End: 2020-07-15

## 2020-07-14 RX ORDER — CARVEDILOL 12.5 MG/1
12.5 TABLET ORAL 2 TIMES DAILY
Status: DISCONTINUED | OUTPATIENT
Start: 2020-07-14 | End: 2020-07-20 | Stop reason: HOSPADM

## 2020-07-14 RX ORDER — FUROSEMIDE 10 MG/ML
40 INJECTION INTRAMUSCULAR; INTRAVENOUS 2 TIMES DAILY
Status: COMPLETED | OUTPATIENT
Start: 2020-07-14 | End: 2020-07-15

## 2020-07-14 RX ORDER — POTASSIUM CHLORIDE 1.5 G/1.77G
40 POWDER, FOR SOLUTION ORAL 2 TIMES DAILY
Status: COMPLETED | OUTPATIENT
Start: 2020-07-14 | End: 2020-07-15

## 2020-07-14 RX ADMIN — FUROSEMIDE 40 MG: 10 INJECTION, SOLUTION INTRAMUSCULAR; INTRAVENOUS at 17:52

## 2020-07-14 RX ADMIN — APIXABAN 5 MG: 5 TABLET, FILM COATED ORAL at 15:53

## 2020-07-14 RX ADMIN — VERAPAMIL HYDROCHLORIDE 40 MG: 80 TABLET, FILM COATED ORAL at 06:08

## 2020-07-14 RX ADMIN — SODIUM CHLORIDE, PRESERVATIVE FREE 10 ML: 5 INJECTION INTRAVENOUS at 21:05

## 2020-07-14 RX ADMIN — CARVEDILOL 3.12 MG: 3.12 TABLET, FILM COATED ORAL at 08:19

## 2020-07-14 RX ADMIN — CLOPIDOGREL BISULFATE 75 MG: 75 TABLET ORAL at 08:20

## 2020-07-14 RX ADMIN — ATORVASTATIN CALCIUM 80 MG: 40 TABLET, FILM COATED ORAL at 21:05

## 2020-07-14 RX ADMIN — INSULIN HUMAN 3 UNITS: 100 INJECTION, SOLUTION PARENTERAL at 00:15

## 2020-07-14 RX ADMIN — INSULIN HUMAN 2 UNITS: 100 INJECTION, SOLUTION PARENTERAL at 17:53

## 2020-07-14 RX ADMIN — HEPARIN SODIUM 5000 UNITS: 5000 INJECTION, SOLUTION INTRAVENOUS; SUBCUTANEOUS at 06:08

## 2020-07-14 RX ADMIN — MODAFINIL 100 MG: 100 TABLET ORAL at 08:19

## 2020-07-14 RX ADMIN — INSULIN DETEMIR 20 UNITS: 100 INJECTION, SOLUTION SUBCUTANEOUS at 08:23

## 2020-07-14 RX ADMIN — DEXMEDETOMIDINE HYDROCHLORIDE 0.2 MCG/KG/HR: 400 INJECTION INTRAVENOUS at 17:53

## 2020-07-14 RX ADMIN — POTASSIUM CHLORIDE 40 MEQ: 1.5 POWDER, FOR SOLUTION ORAL at 17:52

## 2020-07-14 RX ADMIN — LOSARTAN POTASSIUM 100 MG: 50 TABLET, FILM COATED ORAL at 08:19

## 2020-07-14 RX ADMIN — ASPIRIN 81 MG: 81 TABLET, CHEWABLE ORAL at 08:23

## 2020-07-14 RX ADMIN — INSULIN HUMAN 2 UNITS: 100 INJECTION, SOLUTION PARENTERAL at 06:08

## 2020-07-14 RX ADMIN — APIXABAN 5 MG: 5 TABLET, FILM COATED ORAL at 21:05

## 2020-07-14 RX ADMIN — INSULIN HUMAN 2 UNITS: 100 INJECTION, SOLUTION PARENTERAL at 11:58

## 2020-07-14 RX ADMIN — INSULIN HUMAN 2 UNITS: 100 INJECTION, SOLUTION PARENTERAL at 23:38

## 2020-07-14 RX ADMIN — CARVEDILOL 12.5 MG: 12.5 TABLET, FILM COATED ORAL at 21:05

## 2020-07-14 RX ADMIN — FUROSEMIDE 40 MG: 10 INJECTION, SOLUTION INTRAMUSCULAR; INTRAVENOUS at 06:08

## 2020-07-14 RX ADMIN — MODAFINIL 100 MG: 100 TABLET ORAL at 11:58

## 2020-07-14 RX ADMIN — SACUBITRIL AND VALSARTAN 1 TABLET: 24; 26 TABLET, FILM COATED ORAL at 21:05

## 2020-07-14 RX ADMIN — LANSOPRAZOLE 30 MG: KIT at 06:08

## 2020-07-15 ENCOUNTER — APPOINTMENT (OUTPATIENT)
Dept: GENERAL RADIOLOGY | Facility: HOSPITAL | Age: 75
End: 2020-07-15

## 2020-07-15 LAB
ALBUMIN SERPL-MCNC: 2.8 G/DL (ref 3.5–5.2)
ALBUMIN/GLOB SERPL: 0.8 G/DL
ALP SERPL-CCNC: 94 U/L (ref 39–117)
ALT SERPL W P-5'-P-CCNC: 21 U/L (ref 1–33)
ANION GAP SERPL CALCULATED.3IONS-SCNC: 8 MMOL/L (ref 5–15)
AST SERPL-CCNC: 42 U/L (ref 1–32)
BASOPHILS # BLD AUTO: 0.06 10*3/MM3 (ref 0–0.2)
BASOPHILS NFR BLD AUTO: 0.6 % (ref 0–1.5)
BILIRUB SERPL-MCNC: 0.7 MG/DL (ref 0–1.2)
BUN SERPL-MCNC: 43 MG/DL (ref 8–23)
BUN/CREAT SERPL: 58.1 (ref 7–25)
CALCIUM SPEC-SCNC: 8.9 MG/DL (ref 8.6–10.5)
CHLORIDE SERPL-SCNC: 102 MMOL/L (ref 98–107)
CO2 SERPL-SCNC: 25 MMOL/L (ref 22–29)
CREAT SERPL-MCNC: 0.74 MG/DL (ref 0.57–1)
DEPRECATED RDW RBC AUTO: 57.2 FL (ref 37–54)
EOSINOPHIL # BLD AUTO: 0.41 10*3/MM3 (ref 0–0.4)
EOSINOPHIL NFR BLD AUTO: 4.1 % (ref 0.3–6.2)
ERYTHROCYTE [DISTWIDTH] IN BLOOD BY AUTOMATED COUNT: 16.6 % (ref 12.3–15.4)
GFR SERPL CREATININE-BSD FRML MDRD: 77 ML/MIN/1.73
GIE STN SPEC: NORMAL
GLOBULIN UR ELPH-MCNC: 3.6 GM/DL
GLUCOSE BLDC GLUCOMTR-MCNC: 186 MG/DL (ref 70–130)
GLUCOSE BLDC GLUCOMTR-MCNC: 214 MG/DL (ref 70–130)
GLUCOSE BLDC GLUCOMTR-MCNC: 240 MG/DL (ref 70–130)
GLUCOSE BLDC GLUCOMTR-MCNC: 247 MG/DL (ref 70–130)
GLUCOSE SERPL-MCNC: 215 MG/DL (ref 65–99)
HCT VFR BLD AUTO: 30 % (ref 34–46.6)
HGB BLD-MCNC: 9 G/DL (ref 12–15.9)
IMM GRANULOCYTES # BLD AUTO: 0.04 10*3/MM3 (ref 0–0.05)
IMM GRANULOCYTES NFR BLD AUTO: 0.4 % (ref 0–0.5)
LAB AP CASE REPORT: NORMAL
LYMPHOCYTES # BLD AUTO: 1.28 10*3/MM3 (ref 0.7–3.1)
LYMPHOCYTES NFR BLD AUTO: 12.8 % (ref 19.6–45.3)
MAGNESIUM SERPL-MCNC: 2.2 MG/DL (ref 1.6–2.4)
MCH RBC QN AUTO: 30.2 PG (ref 26.6–33)
MCHC RBC AUTO-ENTMCNC: 30 G/DL (ref 31.5–35.7)
MCV RBC AUTO: 100.7 FL (ref 79–97)
MONOCYTES # BLD AUTO: 0.61 10*3/MM3 (ref 0.1–0.9)
MONOCYTES NFR BLD AUTO: 6.1 % (ref 5–12)
NEUTROPHILS NFR BLD AUTO: 7.61 10*3/MM3 (ref 1.7–7)
NEUTROPHILS NFR BLD AUTO: 76 % (ref 42.7–76)
NRBC BLD AUTO-RTO: 0 /100 WBC (ref 0–0.2)
PATH REPORT.FINAL DX SPEC: NORMAL
PHOSPHATE SERPL-MCNC: 3.3 MG/DL (ref 2.5–4.5)
PLATELET # BLD AUTO: 323 10*3/MM3 (ref 140–450)
PMV BLD AUTO: 10.7 FL (ref 6–12)
POTASSIUM SERPL-SCNC: 4.2 MMOL/L (ref 3.5–5.2)
PROT SERPL-MCNC: 6.4 G/DL (ref 6–8.5)
RBC # BLD AUTO: 2.98 10*6/MM3 (ref 3.77–5.28)
SODIUM SERPL-SCNC: 135 MMOL/L (ref 136–145)
WBC # BLD AUTO: 10.01 10*3/MM3 (ref 3.4–10.8)

## 2020-07-15 PROCEDURE — 84100 ASSAY OF PHOSPHORUS: CPT | Performed by: INTERNAL MEDICINE

## 2020-07-15 PROCEDURE — 82962 GLUCOSE BLOOD TEST: CPT

## 2020-07-15 PROCEDURE — 63710000001 INSULIN DETEMIR PER 5 UNITS: Performed by: PHYSICIAN ASSISTANT

## 2020-07-15 PROCEDURE — 97530 THERAPEUTIC ACTIVITIES: CPT

## 2020-07-15 PROCEDURE — 99024 POSTOP FOLLOW-UP VISIT: CPT | Performed by: PHYSICIAN ASSISTANT

## 2020-07-15 PROCEDURE — 25010000002 FUROSEMIDE PER 20 MG: Performed by: INTERNAL MEDICINE

## 2020-07-15 PROCEDURE — 80053 COMPREHEN METABOLIC PANEL: CPT | Performed by: INTERNAL MEDICINE

## 2020-07-15 PROCEDURE — 99232 SBSQ HOSP IP/OBS MODERATE 35: CPT | Performed by: CLINICAL NURSE SPECIALIST

## 2020-07-15 PROCEDURE — 83735 ASSAY OF MAGNESIUM: CPT | Performed by: INTERNAL MEDICINE

## 2020-07-15 PROCEDURE — 74230 X-RAY XM SWLNG FUNCJ C+: CPT

## 2020-07-15 PROCEDURE — 92611 MOTION FLUOROSCOPY/SWALLOW: CPT

## 2020-07-15 PROCEDURE — 85025 COMPLETE CBC W/AUTO DIFF WBC: CPT | Performed by: INTERNAL MEDICINE

## 2020-07-15 PROCEDURE — 94799 UNLISTED PULMONARY SVC/PX: CPT

## 2020-07-15 PROCEDURE — 71045 X-RAY EXAM CHEST 1 VIEW: CPT

## 2020-07-15 PROCEDURE — 99233 SBSQ HOSP IP/OBS HIGH 50: CPT | Performed by: INTERNAL MEDICINE

## 2020-07-15 PROCEDURE — 63710000001 INSULIN REGULAR HUMAN PER 5 UNITS: Performed by: PHYSICIAN ASSISTANT

## 2020-07-15 PROCEDURE — 63710000001 INSULIN LISPRO (HUMAN) PER 5 UNITS: Performed by: INTERNAL MEDICINE

## 2020-07-15 PROCEDURE — 99024 POSTOP FOLLOW-UP VISIT: CPT | Performed by: THORACIC SURGERY (CARDIOTHORACIC VASCULAR SURGERY)

## 2020-07-15 RX ORDER — BISACODYL 10 MG
10 SUPPOSITORY, RECTAL RECTAL DAILY PRN
Status: DISCONTINUED | OUTPATIENT
Start: 2020-07-15 | End: 2020-07-20 | Stop reason: HOSPADM

## 2020-07-15 RX ORDER — FUROSEMIDE 10 MG/ML
40 INJECTION INTRAMUSCULAR; INTRAVENOUS ONCE
Status: COMPLETED | OUTPATIENT
Start: 2020-07-15 | End: 2020-07-15

## 2020-07-15 RX ORDER — AMOXICILLIN 250 MG
2 CAPSULE ORAL 2 TIMES DAILY
Status: DISCONTINUED | OUTPATIENT
Start: 2020-07-15 | End: 2020-07-20 | Stop reason: HOSPADM

## 2020-07-15 RX ORDER — CHOLECALCIFEROL (VITAMIN D3) 125 MCG
5 CAPSULE ORAL NIGHTLY
Status: DISCONTINUED | OUTPATIENT
Start: 2020-07-15 | End: 2020-07-16

## 2020-07-15 RX ORDER — DEXTROSE MONOHYDRATE 25 G/50ML
25 INJECTION, SOLUTION INTRAVENOUS
Status: DISCONTINUED | OUTPATIENT
Start: 2020-07-15 | End: 2020-07-20 | Stop reason: HOSPADM

## 2020-07-15 RX ORDER — QUETIAPINE FUMARATE 25 MG/1
50 TABLET, FILM COATED ORAL NIGHTLY
Status: DISCONTINUED | OUTPATIENT
Start: 2020-07-15 | End: 2020-07-16

## 2020-07-15 RX ORDER — NICOTINE POLACRILEX 4 MG
15 LOZENGE BUCCAL
Status: DISCONTINUED | OUTPATIENT
Start: 2020-07-15 | End: 2020-07-20 | Stop reason: HOSPADM

## 2020-07-15 RX ORDER — POTASSIUM CHLORIDE 1.5 G/1.77G
40 POWDER, FOR SOLUTION ORAL ONCE
Status: COMPLETED | OUTPATIENT
Start: 2020-07-15 | End: 2020-07-15

## 2020-07-15 RX ADMIN — INSULIN HUMAN 3 UNITS: 100 INJECTION, SOLUTION PARENTERAL at 05:36

## 2020-07-15 RX ADMIN — ATORVASTATIN CALCIUM 80 MG: 40 TABLET, FILM COATED ORAL at 21:12

## 2020-07-15 RX ADMIN — SACUBITRIL AND VALSARTAN 1 TABLET: 24; 26 TABLET, FILM COATED ORAL at 08:30

## 2020-07-15 RX ADMIN — SODIUM CHLORIDE, PRESERVATIVE FREE 10 ML: 5 INJECTION INTRAVENOUS at 08:30

## 2020-07-15 RX ADMIN — APIXABAN 5 MG: 5 TABLET, FILM COATED ORAL at 21:12

## 2020-07-15 RX ADMIN — ASPIRIN 81 MG: 81 TABLET, CHEWABLE ORAL at 08:29

## 2020-07-15 RX ADMIN — FUROSEMIDE 40 MG: 10 INJECTION, SOLUTION INTRAMUSCULAR; INTRAVENOUS at 08:30

## 2020-07-15 RX ADMIN — CARVEDILOL 12.5 MG: 12.5 TABLET, FILM COATED ORAL at 21:13

## 2020-07-15 RX ADMIN — MODAFINIL 100 MG: 100 TABLET ORAL at 08:44

## 2020-07-15 RX ADMIN — SACUBITRIL AND VALSARTAN 1 TABLET: 24; 26 TABLET, FILM COATED ORAL at 21:12

## 2020-07-15 RX ADMIN — FUROSEMIDE 40 MG: 10 INJECTION, SOLUTION INTRAMUSCULAR; INTRAVENOUS at 14:16

## 2020-07-15 RX ADMIN — QUETIAPINE FUMARATE 50 MG: 25 TABLET ORAL at 21:12

## 2020-07-15 RX ADMIN — SODIUM CHLORIDE, PRESERVATIVE FREE 10 ML: 5 INJECTION INTRAVENOUS at 21:13

## 2020-07-15 RX ADMIN — POTASSIUM CHLORIDE 40 MEQ: 1.5 POWDER, FOR SOLUTION ORAL at 14:16

## 2020-07-15 RX ADMIN — APIXABAN 5 MG: 5 TABLET, FILM COATED ORAL at 08:30

## 2020-07-15 RX ADMIN — DOCUSATE SODIUM 50MG AND SENNOSIDES 8.6MG 2 TABLET: 8.6; 5 TABLET, FILM COATED ORAL at 21:13

## 2020-07-15 RX ADMIN — INSULIN HUMAN 3 UNITS: 100 INJECTION, SOLUTION PARENTERAL at 12:12

## 2020-07-15 RX ADMIN — INSULIN LISPRO 3 UNITS: 100 INJECTION, SOLUTION INTRAVENOUS; SUBCUTANEOUS at 17:05

## 2020-07-15 RX ADMIN — CARVEDILOL 12.5 MG: 12.5 TABLET, FILM COATED ORAL at 08:30

## 2020-07-15 RX ADMIN — INSULIN DETEMIR 20 UNITS: 100 INJECTION, SOLUTION SUBCUTANEOUS at 08:44

## 2020-07-15 RX ADMIN — Medication 5 MG: at 21:12

## 2020-07-15 RX ADMIN — POTASSIUM CHLORIDE 40 MEQ: 1.5 POWDER, FOR SOLUTION ORAL at 08:30

## 2020-07-15 RX ADMIN — BARIUM SULFATE 20 ML: 400 PASTE ORAL at 12:55

## 2020-07-15 RX ADMIN — LANSOPRAZOLE 30 MG: KIT at 05:36

## 2020-07-15 RX ADMIN — BARIUM SULFATE 100 ML: 0.81 POWDER, FOR SUSPENSION ORAL at 12:55

## 2020-07-16 ENCOUNTER — APPOINTMENT (OUTPATIENT)
Dept: GENERAL RADIOLOGY | Facility: HOSPITAL | Age: 75
End: 2020-07-16

## 2020-07-16 LAB
ALBUMIN SERPL-MCNC: 2.9 G/DL (ref 3.5–5.2)
ALBUMIN/GLOB SERPL: 0.8 G/DL
ALP SERPL-CCNC: 96 U/L (ref 39–117)
ALT SERPL W P-5'-P-CCNC: 18 U/L (ref 1–33)
ANION GAP SERPL CALCULATED.3IONS-SCNC: 9 MMOL/L (ref 5–15)
AST SERPL-CCNC: 28 U/L (ref 1–32)
BASOPHILS # BLD AUTO: 0.08 10*3/MM3 (ref 0–0.2)
BASOPHILS NFR BLD AUTO: 0.7 % (ref 0–1.5)
BILIRUB SERPL-MCNC: 1 MG/DL (ref 0–1.2)
BUN SERPL-MCNC: 37 MG/DL (ref 8–23)
BUN/CREAT SERPL: 49.3 (ref 7–25)
CALCIUM SPEC-SCNC: 8.8 MG/DL (ref 8.6–10.5)
CHLORIDE SERPL-SCNC: 106 MMOL/L (ref 98–107)
CO2 SERPL-SCNC: 27 MMOL/L (ref 22–29)
CREAT SERPL-MCNC: 0.75 MG/DL (ref 0.57–1)
DEPRECATED RDW RBC AUTO: 59.9 FL (ref 37–54)
EOSINOPHIL # BLD AUTO: 0.56 10*3/MM3 (ref 0–0.4)
EOSINOPHIL NFR BLD AUTO: 5.2 % (ref 0.3–6.2)
ERYTHROCYTE [DISTWIDTH] IN BLOOD BY AUTOMATED COUNT: 17.1 % (ref 12.3–15.4)
GFR SERPL CREATININE-BSD FRML MDRD: 75 ML/MIN/1.73
GLOBULIN UR ELPH-MCNC: 3.5 GM/DL
GLUCOSE BLDC GLUCOMTR-MCNC: 108 MG/DL (ref 70–130)
GLUCOSE BLDC GLUCOMTR-MCNC: 135 MG/DL (ref 70–130)
GLUCOSE BLDC GLUCOMTR-MCNC: 150 MG/DL (ref 70–130)
GLUCOSE BLDC GLUCOMTR-MCNC: 153 MG/DL (ref 70–130)
GLUCOSE SERPL-MCNC: 134 MG/DL (ref 65–99)
HCT VFR BLD AUTO: 31.5 % (ref 34–46.6)
HGB BLD-MCNC: 9.6 G/DL (ref 12–15.9)
IMM GRANULOCYTES # BLD AUTO: 0.04 10*3/MM3 (ref 0–0.05)
IMM GRANULOCYTES NFR BLD AUTO: 0.4 % (ref 0–0.5)
LYMPHOCYTES # BLD AUTO: 1.56 10*3/MM3 (ref 0.7–3.1)
LYMPHOCYTES NFR BLD AUTO: 14.5 % (ref 19.6–45.3)
MAGNESIUM SERPL-MCNC: 2.1 MG/DL (ref 1.6–2.4)
MCH RBC QN AUTO: 30.8 PG (ref 26.6–33)
MCHC RBC AUTO-ENTMCNC: 30.5 G/DL (ref 31.5–35.7)
MCV RBC AUTO: 101 FL (ref 79–97)
MONOCYTES # BLD AUTO: 0.74 10*3/MM3 (ref 0.1–0.9)
MONOCYTES NFR BLD AUTO: 6.9 % (ref 5–12)
NEUTROPHILS NFR BLD AUTO: 7.81 10*3/MM3 (ref 1.7–7)
NEUTROPHILS NFR BLD AUTO: 72.3 % (ref 42.7–76)
NRBC BLD AUTO-RTO: 0 /100 WBC (ref 0–0.2)
PHOSPHATE SERPL-MCNC: 3.9 MG/DL (ref 2.5–4.5)
PLATELET # BLD AUTO: 363 10*3/MM3 (ref 140–450)
PMV BLD AUTO: 10.4 FL (ref 6–12)
POTASSIUM SERPL-SCNC: 4 MMOL/L (ref 3.5–5.2)
PROT SERPL-MCNC: 6.4 G/DL (ref 6–8.5)
RBC # BLD AUTO: 3.12 10*6/MM3 (ref 3.77–5.28)
SODIUM SERPL-SCNC: 142 MMOL/L (ref 136–145)
WBC # BLD AUTO: 10.79 10*3/MM3 (ref 3.4–10.8)

## 2020-07-16 PROCEDURE — 71045 X-RAY EXAM CHEST 1 VIEW: CPT

## 2020-07-16 PROCEDURE — 99233 SBSQ HOSP IP/OBS HIGH 50: CPT | Performed by: INTERNAL MEDICINE

## 2020-07-16 PROCEDURE — 63710000001 INSULIN LISPRO (HUMAN) PER 5 UNITS: Performed by: INTERNAL MEDICINE

## 2020-07-16 PROCEDURE — 63710000001 INSULIN DETEMIR PER 5 UNITS: Performed by: PHYSICIAN ASSISTANT

## 2020-07-16 PROCEDURE — 97530 THERAPEUTIC ACTIVITIES: CPT

## 2020-07-16 PROCEDURE — 82962 GLUCOSE BLOOD TEST: CPT

## 2020-07-16 PROCEDURE — 99024 POSTOP FOLLOW-UP VISIT: CPT | Performed by: PHYSICIAN ASSISTANT

## 2020-07-16 PROCEDURE — 84100 ASSAY OF PHOSPHORUS: CPT | Performed by: INTERNAL MEDICINE

## 2020-07-16 PROCEDURE — 80053 COMPREHEN METABOLIC PANEL: CPT | Performed by: INTERNAL MEDICINE

## 2020-07-16 PROCEDURE — 83735 ASSAY OF MAGNESIUM: CPT | Performed by: INTERNAL MEDICINE

## 2020-07-16 PROCEDURE — 85025 COMPLETE CBC W/AUTO DIFF WBC: CPT | Performed by: INTERNAL MEDICINE

## 2020-07-16 RX ORDER — CHOLECALCIFEROL (VITAMIN D3) 125 MCG
2.5 CAPSULE ORAL NIGHTLY
Status: DISCONTINUED | OUTPATIENT
Start: 2020-07-16 | End: 2020-07-20 | Stop reason: HOSPADM

## 2020-07-16 RX ORDER — FUROSEMIDE 40 MG/1
40 TABLET ORAL DAILY
Status: DISCONTINUED | OUTPATIENT
Start: 2020-07-16 | End: 2020-07-20 | Stop reason: HOSPADM

## 2020-07-16 RX ORDER — QUETIAPINE FUMARATE 25 MG/1
25 TABLET, FILM COATED ORAL NIGHTLY
Status: DISCONTINUED | OUTPATIENT
Start: 2020-07-16 | End: 2020-07-17

## 2020-07-16 RX ORDER — MODAFINIL 100 MG/1
100 TABLET ORAL
Status: DISCONTINUED | OUTPATIENT
Start: 2020-07-17 | End: 2020-07-20 | Stop reason: HOSPADM

## 2020-07-16 RX ADMIN — APIXABAN 5 MG: 5 TABLET, FILM COATED ORAL at 22:10

## 2020-07-16 RX ADMIN — FUROSEMIDE 40 MG: 40 TABLET ORAL at 09:36

## 2020-07-16 RX ADMIN — LANSOPRAZOLE 30 MG: KIT at 05:50

## 2020-07-16 RX ADMIN — QUETIAPINE FUMARATE 25 MG: 25 TABLET ORAL at 22:10

## 2020-07-16 RX ADMIN — INSULIN LISPRO 2 UNITS: 100 INJECTION, SOLUTION INTRAVENOUS; SUBCUTANEOUS at 08:13

## 2020-07-16 RX ADMIN — APIXABAN 5 MG: 5 TABLET, FILM COATED ORAL at 08:13

## 2020-07-16 RX ADMIN — INSULIN DETEMIR 20 UNITS: 100 INJECTION, SOLUTION SUBCUTANEOUS at 08:13

## 2020-07-16 RX ADMIN — DOCUSATE SODIUM 50MG AND SENNOSIDES 8.6MG 2 TABLET: 8.6; 5 TABLET, FILM COATED ORAL at 08:13

## 2020-07-16 RX ADMIN — SACUBITRIL AND VALSARTAN 1 TABLET: 24; 26 TABLET, FILM COATED ORAL at 08:13

## 2020-07-16 RX ADMIN — MELATONIN TAB 5 MG 2.5 MG: 5 TAB at 22:09

## 2020-07-16 RX ADMIN — CARVEDILOL 12.5 MG: 12.5 TABLET, FILM COATED ORAL at 08:13

## 2020-07-16 RX ADMIN — SACUBITRIL AND VALSARTAN 1 TABLET: 24; 26 TABLET, FILM COATED ORAL at 22:10

## 2020-07-16 RX ADMIN — DOCUSATE SODIUM 50MG AND SENNOSIDES 8.6MG 2 TABLET: 8.6; 5 TABLET, FILM COATED ORAL at 22:10

## 2020-07-16 RX ADMIN — ATORVASTATIN CALCIUM 80 MG: 40 TABLET, FILM COATED ORAL at 22:10

## 2020-07-16 RX ADMIN — MODAFINIL 100 MG: 100 TABLET ORAL at 08:13

## 2020-07-16 RX ADMIN — SODIUM CHLORIDE, PRESERVATIVE FREE 10 ML: 5 INJECTION INTRAVENOUS at 08:14

## 2020-07-16 RX ADMIN — CARVEDILOL 12.5 MG: 12.5 TABLET, FILM COATED ORAL at 22:09

## 2020-07-16 RX ADMIN — ASPIRIN 81 MG: 81 TABLET, CHEWABLE ORAL at 08:13

## 2020-07-16 RX ADMIN — INSULIN LISPRO 2 UNITS: 100 INJECTION, SOLUTION INTRAVENOUS; SUBCUTANEOUS at 16:57

## 2020-07-17 LAB
ALBUMIN SERPL-MCNC: 3 G/DL (ref 3.5–5.2)
ALP SERPL-CCNC: 103 U/L (ref 39–117)
ALT SERPL W P-5'-P-CCNC: 19 U/L (ref 1–33)
ANION GAP SERPL CALCULATED.3IONS-SCNC: 12 MMOL/L (ref 5–15)
AST SERPL-CCNC: 35 U/L (ref 1–32)
BACTERIA FLD CULT: NORMAL
BASOPHILS # BLD AUTO: 0.09 10*3/MM3 (ref 0–0.2)
BASOPHILS NFR BLD AUTO: 0.9 % (ref 0–1.5)
BILIRUB SERPL-MCNC: 1.1 MG/DL (ref 0–1.2)
BUN SERPL-MCNC: 32 MG/DL (ref 8–23)
CALCIUM SPEC-SCNC: 8.9 MG/DL (ref 8.6–10.5)
CHLORIDE SERPL-SCNC: 106 MMOL/L (ref 98–107)
CHOLEST SERPL-MCNC: 111 MG/DL (ref 0–200)
CO2 SERPL-SCNC: 26 MMOL/L (ref 22–29)
CREAT SERPL-MCNC: 0.78 MG/DL (ref 0.57–1)
CRP SERPL-MCNC: 7.47 MG/DL (ref 0–0.5)
DEPRECATED RDW RBC AUTO: 61.9 FL (ref 37–54)
EOSINOPHIL # BLD AUTO: 0.6 10*3/MM3 (ref 0–0.4)
EOSINOPHIL NFR BLD AUTO: 5.7 % (ref 0.3–6.2)
ERYTHROCYTE [DISTWIDTH] IN BLOOD BY AUTOMATED COUNT: 17.2 % (ref 12.3–15.4)
GLUCOSE BLDC GLUCOMTR-MCNC: 107 MG/DL (ref 70–130)
GLUCOSE BLDC GLUCOMTR-MCNC: 117 MG/DL (ref 70–130)
GLUCOSE BLDC GLUCOMTR-MCNC: 171 MG/DL (ref 70–130)
GLUCOSE BLDC GLUCOMTR-MCNC: 97 MG/DL (ref 70–130)
GLUCOSE SERPL-MCNC: 81 MG/DL (ref 65–99)
GRAM STN SPEC: NORMAL
HCT VFR BLD AUTO: 32.9 % (ref 34–46.6)
HGB BLD-MCNC: 10 G/DL (ref 12–15.9)
IMM GRANULOCYTES # BLD AUTO: 0.03 10*3/MM3 (ref 0–0.05)
IMM GRANULOCYTES NFR BLD AUTO: 0.3 % (ref 0–0.5)
LYMPHOCYTES # BLD AUTO: 1.87 10*3/MM3 (ref 0.7–3.1)
LYMPHOCYTES NFR BLD AUTO: 17.7 % (ref 19.6–45.3)
MAGNESIUM SERPL-MCNC: 2.2 MG/DL (ref 1.6–2.4)
MCH RBC QN AUTO: 30.7 PG (ref 26.6–33)
MCHC RBC AUTO-ENTMCNC: 30.4 G/DL (ref 31.5–35.7)
MCV RBC AUTO: 100.9 FL (ref 79–97)
MONOCYTES # BLD AUTO: 0.74 10*3/MM3 (ref 0.1–0.9)
MONOCYTES NFR BLD AUTO: 7 % (ref 5–12)
NEUTROPHILS NFR BLD AUTO: 68.4 % (ref 42.7–76)
NEUTROPHILS NFR BLD AUTO: 7.23 10*3/MM3 (ref 1.7–7)
NRBC BLD AUTO-RTO: 0 /100 WBC (ref 0–0.2)
PHOSPHATE SERPL-MCNC: 3.9 MG/DL (ref 2.5–4.5)
PLATELET # BLD AUTO: 379 10*3/MM3 (ref 140–450)
PMV BLD AUTO: 10.3 FL (ref 6–12)
POTASSIUM SERPL-SCNC: 3.9 MMOL/L (ref 3.5–5.2)
PREALB SERPL-MCNC: 15.1 MG/DL (ref 20–40)
PROT SERPL-MCNC: 6.7 G/DL (ref 6–8.5)
RBC # BLD AUTO: 3.26 10*6/MM3 (ref 3.77–5.28)
SODIUM SERPL-SCNC: 144 MMOL/L (ref 136–145)
TRIGL SERPL-MCNC: 81 MG/DL (ref 0–150)
WBC # BLD AUTO: 10.56 10*3/MM3 (ref 3.4–10.8)

## 2020-07-17 PROCEDURE — 63710000001 INSULIN DETEMIR PER 5 UNITS: Performed by: PHYSICIAN ASSISTANT

## 2020-07-17 PROCEDURE — 80053 COMPREHEN METABOLIC PANEL: CPT | Performed by: PHYSICIAN ASSISTANT

## 2020-07-17 PROCEDURE — 63710000001 INSULIN LISPRO (HUMAN) PER 5 UNITS: Performed by: INTERNAL MEDICINE

## 2020-07-17 PROCEDURE — 99232 SBSQ HOSP IP/OBS MODERATE 35: CPT | Performed by: INTERNAL MEDICINE

## 2020-07-17 PROCEDURE — 85025 COMPLETE CBC W/AUTO DIFF WBC: CPT | Performed by: INTERNAL MEDICINE

## 2020-07-17 PROCEDURE — 97535 SELF CARE MNGMENT TRAINING: CPT

## 2020-07-17 PROCEDURE — 99024 POSTOP FOLLOW-UP VISIT: CPT | Performed by: PHYSICIAN ASSISTANT

## 2020-07-17 PROCEDURE — 83735 ASSAY OF MAGNESIUM: CPT | Performed by: PHYSICIAN ASSISTANT

## 2020-07-17 PROCEDURE — 84134 ASSAY OF PREALBUMIN: CPT | Performed by: PHYSICIAN ASSISTANT

## 2020-07-17 PROCEDURE — 82465 ASSAY BLD/SERUM CHOLESTEROL: CPT | Performed by: PHYSICIAN ASSISTANT

## 2020-07-17 PROCEDURE — 82962 GLUCOSE BLOOD TEST: CPT

## 2020-07-17 PROCEDURE — 86140 C-REACTIVE PROTEIN: CPT | Performed by: PHYSICIAN ASSISTANT

## 2020-07-17 PROCEDURE — 84478 ASSAY OF TRIGLYCERIDES: CPT | Performed by: PHYSICIAN ASSISTANT

## 2020-07-17 PROCEDURE — 84100 ASSAY OF PHOSPHORUS: CPT | Performed by: PHYSICIAN ASSISTANT

## 2020-07-17 RX ORDER — QUETIAPINE FUMARATE 25 MG/1
12.5 TABLET, FILM COATED ORAL NIGHTLY
Status: DISCONTINUED | OUTPATIENT
Start: 2020-07-17 | End: 2020-07-20 | Stop reason: HOSPADM

## 2020-07-17 RX ADMIN — LANSOPRAZOLE 30 MG: KIT at 05:27

## 2020-07-17 RX ADMIN — SODIUM CHLORIDE, PRESERVATIVE FREE 10 ML: 5 INJECTION INTRAVENOUS at 12:30

## 2020-07-17 RX ADMIN — ASPIRIN 81 MG: 81 TABLET, CHEWABLE ORAL at 09:40

## 2020-07-17 RX ADMIN — CARVEDILOL 12.5 MG: 12.5 TABLET, FILM COATED ORAL at 09:41

## 2020-07-17 RX ADMIN — SACUBITRIL AND VALSARTAN 1 TABLET: 24; 26 TABLET, FILM COATED ORAL at 09:41

## 2020-07-17 RX ADMIN — MODAFINIL 100 MG: 100 TABLET ORAL at 09:41

## 2020-07-17 RX ADMIN — DOCUSATE SODIUM 50MG AND SENNOSIDES 8.6MG 2 TABLET: 8.6; 5 TABLET, FILM COATED ORAL at 09:41

## 2020-07-17 RX ADMIN — INSULIN DETEMIR 20 UNITS: 100 INJECTION, SOLUTION SUBCUTANEOUS at 09:49

## 2020-07-17 RX ADMIN — FUROSEMIDE 40 MG: 40 TABLET ORAL at 09:40

## 2020-07-17 RX ADMIN — APIXABAN 5 MG: 5 TABLET, FILM COATED ORAL at 09:40

## 2020-07-17 RX ADMIN — SODIUM CHLORIDE, PRESERVATIVE FREE 10 ML: 5 INJECTION INTRAVENOUS at 20:38

## 2020-07-17 RX ADMIN — INSULIN LISPRO 2 UNITS: 100 INJECTION, SOLUTION INTRAVENOUS; SUBCUTANEOUS at 12:30

## 2020-07-18 LAB
ANION GAP SERPL CALCULATED.3IONS-SCNC: 10 MMOL/L (ref 5–15)
BACTERIA SPEC ANAEROBE CULT: NORMAL
BUN SERPL-MCNC: 28 MG/DL (ref 8–23)
BUN/CREAT SERPL: 35.9 (ref 7–25)
CALCIUM SPEC-SCNC: 9 MG/DL (ref 8.6–10.5)
CHLORIDE SERPL-SCNC: 105 MMOL/L (ref 98–107)
CO2 SERPL-SCNC: 27 MMOL/L (ref 22–29)
CREAT SERPL-MCNC: 0.78 MG/DL (ref 0.57–1)
DEPRECATED RDW RBC AUTO: 63.8 FL (ref 37–54)
ERYTHROCYTE [DISTWIDTH] IN BLOOD BY AUTOMATED COUNT: 17 % (ref 12.3–15.4)
GFR SERPL CREATININE-BSD FRML MDRD: 72 ML/MIN/1.73
GLUCOSE BLDC GLUCOMTR-MCNC: 160 MG/DL (ref 70–130)
GLUCOSE BLDC GLUCOMTR-MCNC: 201 MG/DL (ref 70–130)
GLUCOSE BLDC GLUCOMTR-MCNC: 87 MG/DL (ref 70–130)
GLUCOSE BLDC GLUCOMTR-MCNC: 93 MG/DL (ref 70–130)
GLUCOSE SERPL-MCNC: 204 MG/DL (ref 65–99)
HCT VFR BLD AUTO: 36.3 % (ref 34–46.6)
HGB BLD-MCNC: 10.7 G/DL (ref 12–15.9)
MCH RBC QN AUTO: 30.9 PG (ref 26.6–33)
MCHC RBC AUTO-ENTMCNC: 29.5 G/DL (ref 31.5–35.7)
MCV RBC AUTO: 104.9 FL (ref 79–97)
PLATELET # BLD AUTO: 376 10*3/MM3 (ref 140–450)
PMV BLD AUTO: 10.4 FL (ref 6–12)
POTASSIUM SERPL-SCNC: 4.3 MMOL/L (ref 3.5–5.2)
RBC # BLD AUTO: 3.46 10*6/MM3 (ref 3.77–5.28)
SODIUM SERPL-SCNC: 142 MMOL/L (ref 136–145)
WBC # BLD AUTO: 9.77 10*3/MM3 (ref 3.4–10.8)

## 2020-07-18 PROCEDURE — 80048 BASIC METABOLIC PNL TOTAL CA: CPT | Performed by: INTERNAL MEDICINE

## 2020-07-18 PROCEDURE — 85027 COMPLETE CBC AUTOMATED: CPT | Performed by: INTERNAL MEDICINE

## 2020-07-18 PROCEDURE — 99232 SBSQ HOSP IP/OBS MODERATE 35: CPT | Performed by: INTERNAL MEDICINE

## 2020-07-18 PROCEDURE — 63710000001 INSULIN LISPRO (HUMAN) PER 5 UNITS: Performed by: INTERNAL MEDICINE

## 2020-07-18 PROCEDURE — 63710000001 INSULIN DETEMIR PER 5 UNITS: Performed by: PHYSICIAN ASSISTANT

## 2020-07-18 PROCEDURE — 82962 GLUCOSE BLOOD TEST: CPT

## 2020-07-18 RX ADMIN — DOCUSATE SODIUM 50MG AND SENNOSIDES 8.6MG 2 TABLET: 8.6; 5 TABLET, FILM COATED ORAL at 20:20

## 2020-07-18 RX ADMIN — MELATONIN TAB 5 MG 2.5 MG: 5 TAB at 20:19

## 2020-07-18 RX ADMIN — INSULIN LISPRO 2 UNITS: 100 INJECTION, SOLUTION INTRAVENOUS; SUBCUTANEOUS at 17:22

## 2020-07-18 RX ADMIN — INSULIN DETEMIR 20 UNITS: 100 INJECTION, SOLUTION SUBCUTANEOUS at 09:25

## 2020-07-18 RX ADMIN — APIXABAN 5 MG: 5 TABLET, FILM COATED ORAL at 20:19

## 2020-07-18 RX ADMIN — CARVEDILOL 12.5 MG: 12.5 TABLET, FILM COATED ORAL at 20:19

## 2020-07-18 RX ADMIN — APIXABAN 5 MG: 5 TABLET, FILM COATED ORAL at 09:31

## 2020-07-18 RX ADMIN — DOCUSATE SODIUM 50MG AND SENNOSIDES 8.6MG 2 TABLET: 8.6; 5 TABLET, FILM COATED ORAL at 09:31

## 2020-07-18 RX ADMIN — QUETIAPINE FUMARATE 12.5 MG: 25 TABLET ORAL at 20:19

## 2020-07-18 RX ADMIN — CARVEDILOL 12.5 MG: 12.5 TABLET, FILM COATED ORAL at 09:32

## 2020-07-18 RX ADMIN — SACUBITRIL AND VALSARTAN 1 TABLET: 24; 26 TABLET, FILM COATED ORAL at 09:31

## 2020-07-18 RX ADMIN — FUROSEMIDE 40 MG: 40 TABLET ORAL at 09:31

## 2020-07-18 RX ADMIN — INSULIN LISPRO 3 UNITS: 100 INJECTION, SOLUTION INTRAVENOUS; SUBCUTANEOUS at 13:43

## 2020-07-18 RX ADMIN — SACUBITRIL AND VALSARTAN 1 TABLET: 24; 26 TABLET, FILM COATED ORAL at 20:20

## 2020-07-18 RX ADMIN — ATORVASTATIN CALCIUM 80 MG: 40 TABLET, FILM COATED ORAL at 20:20

## 2020-07-18 RX ADMIN — ASPIRIN 81 MG: 81 TABLET, CHEWABLE ORAL at 09:31

## 2020-07-19 LAB
GLUCOSE BLDC GLUCOMTR-MCNC: 107 MG/DL (ref 70–130)
GLUCOSE BLDC GLUCOMTR-MCNC: 152 MG/DL (ref 70–130)
GLUCOSE BLDC GLUCOMTR-MCNC: 192 MG/DL (ref 70–130)
GLUCOSE BLDC GLUCOMTR-MCNC: 82 MG/DL (ref 70–130)
SARS-COV-2 RNA RESP QL NAA+PROBE: NOT DETECTED

## 2020-07-19 PROCEDURE — 63710000001 INSULIN LISPRO (HUMAN) PER 5 UNITS: Performed by: INTERNAL MEDICINE

## 2020-07-19 PROCEDURE — 82962 GLUCOSE BLOOD TEST: CPT

## 2020-07-19 PROCEDURE — 87635 SARS-COV-2 COVID-19 AMP PRB: CPT | Performed by: INTERNAL MEDICINE

## 2020-07-19 PROCEDURE — 99024 POSTOP FOLLOW-UP VISIT: CPT | Performed by: PHYSICIAN ASSISTANT

## 2020-07-19 PROCEDURE — 63710000001 INSULIN DETEMIR PER 5 UNITS: Performed by: PHYSICIAN ASSISTANT

## 2020-07-19 PROCEDURE — 99232 SBSQ HOSP IP/OBS MODERATE 35: CPT | Performed by: INTERNAL MEDICINE

## 2020-07-19 RX ADMIN — DOCUSATE SODIUM 50MG AND SENNOSIDES 8.6MG 2 TABLET: 8.6; 5 TABLET, FILM COATED ORAL at 21:32

## 2020-07-19 RX ADMIN — INSULIN LISPRO 2 UNITS: 100 INJECTION, SOLUTION INTRAVENOUS; SUBCUTANEOUS at 17:25

## 2020-07-19 RX ADMIN — MELATONIN TAB 5 MG 2.5 MG: 5 TAB at 21:33

## 2020-07-19 RX ADMIN — MODAFINIL 100 MG: 100 TABLET ORAL at 09:49

## 2020-07-19 RX ADMIN — CARVEDILOL 12.5 MG: 12.5 TABLET, FILM COATED ORAL at 09:49

## 2020-07-19 RX ADMIN — INSULIN DETEMIR 20 UNITS: 100 INJECTION, SOLUTION SUBCUTANEOUS at 09:50

## 2020-07-19 RX ADMIN — APIXABAN 5 MG: 5 TABLET, FILM COATED ORAL at 09:49

## 2020-07-19 RX ADMIN — QUETIAPINE FUMARATE 12.5 MG: 25 TABLET ORAL at 21:32

## 2020-07-19 RX ADMIN — DOCUSATE SODIUM 50MG AND SENNOSIDES 8.6MG 2 TABLET: 8.6; 5 TABLET, FILM COATED ORAL at 09:49

## 2020-07-19 RX ADMIN — ATORVASTATIN CALCIUM 80 MG: 40 TABLET, FILM COATED ORAL at 21:32

## 2020-07-19 RX ADMIN — CARVEDILOL 12.5 MG: 12.5 TABLET, FILM COATED ORAL at 21:32

## 2020-07-19 RX ADMIN — FUROSEMIDE 40 MG: 40 TABLET ORAL at 09:49

## 2020-07-19 RX ADMIN — APIXABAN 5 MG: 5 TABLET, FILM COATED ORAL at 21:32

## 2020-07-19 RX ADMIN — ASPIRIN 81 MG: 81 TABLET, CHEWABLE ORAL at 09:49

## 2020-07-19 RX ADMIN — SODIUM CHLORIDE, PRESERVATIVE FREE 10 ML: 5 INJECTION INTRAVENOUS at 09:49

## 2020-07-19 RX ADMIN — LANSOPRAZOLE 30 MG: KIT at 05:12

## 2020-07-19 RX ADMIN — SODIUM CHLORIDE, PRESERVATIVE FREE 10 ML: 5 INJECTION INTRAVENOUS at 21:33

## 2020-07-19 RX ADMIN — SACUBITRIL AND VALSARTAN 1 TABLET: 24; 26 TABLET, FILM COATED ORAL at 09:49

## 2020-07-19 RX ADMIN — SACUBITRIL AND VALSARTAN 1 TABLET: 24; 26 TABLET, FILM COATED ORAL at 21:35

## 2020-07-19 RX ADMIN — INSULIN LISPRO 2 UNITS: 100 INJECTION, SOLUTION INTRAVENOUS; SUBCUTANEOUS at 12:36

## 2020-07-20 VITALS
TEMPERATURE: 97.7 F | HEIGHT: 60 IN | HEART RATE: 69 BPM | BODY MASS INDEX: 29.45 KG/M2 | SYSTOLIC BLOOD PRESSURE: 169 MMHG | DIASTOLIC BLOOD PRESSURE: 72 MMHG | RESPIRATION RATE: 18 BRPM | WEIGHT: 150 LBS | OXYGEN SATURATION: 96 %

## 2020-07-20 LAB
GLUCOSE BLDC GLUCOMTR-MCNC: 202 MG/DL (ref 70–130)
GLUCOSE BLDC GLUCOMTR-MCNC: 89 MG/DL (ref 70–130)

## 2020-07-20 PROCEDURE — 63710000001 INSULIN DETEMIR PER 5 UNITS: Performed by: PHYSICIAN ASSISTANT

## 2020-07-20 PROCEDURE — 82962 GLUCOSE BLOOD TEST: CPT

## 2020-07-20 PROCEDURE — 99024 POSTOP FOLLOW-UP VISIT: CPT | Performed by: THORACIC SURGERY (CARDIOTHORACIC VASCULAR SURGERY)

## 2020-07-20 PROCEDURE — 63710000001 INSULIN LISPRO (HUMAN) PER 5 UNITS: Performed by: INTERNAL MEDICINE

## 2020-07-20 PROCEDURE — 97110 THERAPEUTIC EXERCISES: CPT

## 2020-07-20 PROCEDURE — 99232 SBSQ HOSP IP/OBS MODERATE 35: CPT | Performed by: PHYSICIAN ASSISTANT

## 2020-07-20 RX ORDER — ACETAMINOPHEN 325 MG/1
650 TABLET ORAL EVERY 6 HOURS PRN
Start: 2020-07-20 | End: 2022-03-15

## 2020-07-20 RX ORDER — AMOXICILLIN 250 MG
2 CAPSULE ORAL 2 TIMES DAILY PRN
Start: 2020-07-20 | End: 2020-07-31

## 2020-07-20 RX ORDER — MODAFINIL 100 MG/1
100 TABLET ORAL
Start: 2020-07-21 | End: 2020-08-20

## 2020-07-20 RX ORDER — CARVEDILOL 12.5 MG/1
12.5 TABLET ORAL 2 TIMES DAILY
Start: 2020-07-20 | End: 2020-08-18 | Stop reason: SDUPTHER

## 2020-07-20 RX ORDER — CHOLECALCIFEROL (VITAMIN D3) 125 MCG
2.5 CAPSULE ORAL NIGHTLY
Start: 2020-07-20 | End: 2020-07-31

## 2020-07-20 RX ORDER — FUROSEMIDE 40 MG/1
40 TABLET ORAL DAILY
Start: 2020-07-21 | End: 2020-08-18 | Stop reason: SDUPTHER

## 2020-07-20 RX ORDER — ASPIRIN 81 MG/1
81 TABLET, CHEWABLE ORAL DAILY
Start: 2020-07-21 | End: 2020-08-18 | Stop reason: SDUPTHER

## 2020-07-20 RX ADMIN — CARVEDILOL 12.5 MG: 12.5 TABLET, FILM COATED ORAL at 09:18

## 2020-07-20 RX ADMIN — SACUBITRIL AND VALSARTAN 1 TABLET: 24; 26 TABLET, FILM COATED ORAL at 09:18

## 2020-07-20 RX ADMIN — SODIUM CHLORIDE, PRESERVATIVE FREE 10 ML: 5 INJECTION INTRAVENOUS at 09:19

## 2020-07-20 RX ADMIN — INSULIN LISPRO 3 UNITS: 100 INJECTION, SOLUTION INTRAVENOUS; SUBCUTANEOUS at 13:30

## 2020-07-20 RX ADMIN — MODAFINIL 100 MG: 100 TABLET ORAL at 09:18

## 2020-07-20 RX ADMIN — LANSOPRAZOLE 30 MG: KIT at 09:35

## 2020-07-20 RX ADMIN — INSULIN DETEMIR 20 UNITS: 100 INJECTION, SOLUTION SUBCUTANEOUS at 09:25

## 2020-07-20 RX ADMIN — ASPIRIN 81 MG: 81 TABLET, CHEWABLE ORAL at 09:18

## 2020-07-20 RX ADMIN — FUROSEMIDE 40 MG: 40 TABLET ORAL at 09:18

## 2020-07-20 RX ADMIN — APIXABAN 5 MG: 5 TABLET, FILM COATED ORAL at 09:18

## 2020-07-21 ENCOUNTER — TELEPHONE (OUTPATIENT)
Dept: CARDIOLOGY | Facility: HOSPITAL | Age: 75
End: 2020-07-21

## 2020-07-21 NOTE — TELEPHONE ENCOUNTER
Heart and Valve Discharge f/u call    Patient Name:  Beryl Montoya  :  1945  DOS:  20    Patient Active Problem List   Diagnosis   • History of L MCA CVA   • Essential hypertension   • Type 2 diabetes mellitus with complication, with long-term current use of insulin (CMS/[facility])   • GERD (gastroesophageal reflux disease)   • Bilateral carotid artery stenosis   • Abnormal stress test   • Severe 3 vessel CAD with angina pectoris (CMS/[facility])   • Ischemic cardiomyopathy. LVEF 30%   • S/P CABG x 3 on 20   • Pericarditis noted at CABG. Etiology questionable   • Acute postop embolic left occipital CVA 20. Extensive in L PCA distribution but additional areas in R PCA distribution (CMS/[facility])   • Bilateral carotid atherosclerosis with moderate left common carotid artery stenosis (50-60%)         Consult received while patient was inpatient. Patient called to evaluate symptoms post discharge.  Pt currently in rehab at [facility]. Unable to speak with patient.     Education provided. Pt with spouse and reviewed:  Role of Heart and Valve Center and when to call    Education time 10 min.  Patient has 1 week f/u, but due to rehab and COVID protocols, pt f/u will be rescheduled to 2 weeks or after d/c from rehab.  Encouraged to call with any questions or concerns.  Telehealth visits may be utilized if needed.

## 2020-07-23 ENCOUNTER — APPOINTMENT (OUTPATIENT)
Dept: CARDIOLOGY | Facility: HOSPITAL | Age: 75
End: 2020-07-23

## 2020-07-28 ENCOUNTER — TRANSITIONAL CARE MANAGEMENT TELEPHONE ENCOUNTER (OUTPATIENT)
Dept: CALL CENTER | Facility: HOSPITAL | Age: 75
End: 2020-07-28

## 2020-07-28 ENCOUNTER — READMISSION MANAGEMENT (OUTPATIENT)
Dept: CALL CENTER | Facility: HOSPITAL | Age: 75
End: 2020-07-28

## 2020-07-28 NOTE — OUTREACH NOTE
Prep Survey      Responses   [facility] facility patient discharged from?  Non-BH   Is LACE score < 7 ?  Non-BH Discharge   Eligibility  [facility]   Date of Discharge  07/24/20   Discharge diagnosis  unknown   Does the patient have one of the following disease processes/diagnoses(primary or secondary)?  Other   Prep survey completed?  Yes          Kasia Escudero RN

## 2020-07-28 NOTE — OUTREACH NOTE
Call Center TCM Note      Responses   [facility] patient discharged from?  Non-BH   Does the patient have one of the following disease processes/diagnoses(primary or secondary)?  Other   TCM attempt successful?  No   Unsuccessful attempts  Attempt 1          Hafsa High RN    7/28/2020, 14:16

## 2020-07-28 NOTE — OUTREACH NOTE
Call Center TCM Note      Responses   [facility] patient discharged from?  Non-BH   Does the patient have one of the following disease processes/diagnoses(primary or secondary)?  Other   TCM attempt successful?  No   Unsuccessful attempts  Attempt 2          Hafsa High RN    7/28/2020, 14:19

## 2020-07-29 ENCOUNTER — TRANSITIONAL CARE MANAGEMENT TELEPHONE ENCOUNTER (OUTPATIENT)
Dept: CALL CENTER | Facility: HOSPITAL | Age: 75
End: 2020-07-29

## 2020-07-29 NOTE — OUTREACH NOTE
Call Center TCM Note      Responses   [facility] patient discharged from?  Non-   COVID-19 Test Status  Negative [Test done 7/19]   Does the patient have one of the following disease processes/diagnoses(primary or secondary)?  Other [Previous CABG 6/30, CVA]   TCM attempt successful?  Yes   Call start time  0910   Call end time  0920   Discharge diagnosis  CABG, CVA   Is patient permission given to speak with other caregiver?  Yes   List who call center can speak with  Jean Montoya,    Person spoke with today (if not patient) and relationship  Lindsay Pena,    Meds reviewed with patient/caregiver?  No [ states that they have list brought home from [facility] and patient taking all meds as prescribed. Denies any medication needs or questions today. ]   Is the patient having any side effects they believe may be caused by any medication additions or changes?  No   Does the patient have all medications ordered at discharge?  Yes   Is the patient taking all medications as directed (includes completed medication regime)?  Yes   Does the patient have a primary care provider?   Yes   Does the patient have an appointment with their PCP within 7 days of discharge?  Yes   Comments regarding PCP  PCP Dr Argelia Gamez. Patient has appt on Friday 7/31/20 1045am.  confirmed patient will be keeping appt.    Has the patient kept scheduled appointments due by today?  N/A   What is the Home health agency?    states that patient has HH for nursing, PT and OT.   Has home health visited the patient within 72 hours of discharge?  Yes   DME comments   reports that only needed DME would be a walker that has a seat.    Pulse Ox monitoring  Intermittent   Pulse Ox device source  Other [HH]   Psychosocial issues?  No   Did the patient receive a copy of their discharge instructions?  Yes   Nursing interventions  -- [Advised  to bring [facility] discharge papers to appt on  Friday. ]   What is the patient's perception of their health status since discharge?  Improving   Is the patient/caregiver able to teach back signs and symptoms related to disease process for when to call PCP?  Yes   Is the patient/caregiver able to teach back signs and symptoms related to disease process for when to call 911?  Yes   Is the patient/caregiver able to teach back the hierarchy of who to call/visit for symptoms/problems? PCP, Specialist, Home health nurse, Urgent Care, ED, 911  Yes   TCM call completed?  Yes   Wrap up additional comments   denies any needs or questions today. He reports that patient is doing well, eating a small amount, and that he has seen improvement in her in the time since she has been home.           Anjelica Chowdary RN    7/29/2020, 09:21

## 2020-07-31 ENCOUNTER — OFFICE VISIT (OUTPATIENT)
Dept: INTERNAL MEDICINE | Facility: CLINIC | Age: 75
End: 2020-07-31

## 2020-07-31 VITALS
OXYGEN SATURATION: 96 % | WEIGHT: 147 LBS | HEART RATE: 65 BPM | BODY MASS INDEX: 28.86 KG/M2 | HEIGHT: 60 IN | DIASTOLIC BLOOD PRESSURE: 64 MMHG | SYSTOLIC BLOOD PRESSURE: 126 MMHG | RESPIRATION RATE: 16 BRPM | TEMPERATURE: 97.8 F

## 2020-07-31 DIAGNOSIS — E11.8 TYPE 2 DIABETES MELLITUS WITH COMPLICATION, WITH LONG-TERM CURRENT USE OF INSULIN (HCC): Chronic | ICD-10-CM

## 2020-07-31 DIAGNOSIS — Z95.1 S/P CABG X 3: ICD-10-CM

## 2020-07-31 DIAGNOSIS — I63.9 ACUTE CVA (CEREBROVASCULAR ACCIDENT) (HCC): Primary | ICD-10-CM

## 2020-07-31 DIAGNOSIS — R63.0 ANOREXIA: ICD-10-CM

## 2020-07-31 DIAGNOSIS — Z79.4 TYPE 2 DIABETES MELLITUS WITH COMPLICATION, WITH LONG-TERM CURRENT USE OF INSULIN (HCC): Chronic | ICD-10-CM

## 2020-07-31 DIAGNOSIS — I25.119 CORONARY ARTERY DISEASE INVOLVING NATIVE CORONARY ARTERY OF NATIVE HEART WITH ANGINA PECTORIS (HCC): ICD-10-CM

## 2020-07-31 PROCEDURE — 99495 TRANSJ CARE MGMT MOD F2F 14D: CPT | Performed by: INTERNAL MEDICINE

## 2020-07-31 RX ORDER — PANTOPRAZOLE SODIUM 40 MG/1
40 TABLET, DELAYED RELEASE ORAL DAILY
COMMUNITY
End: 2020-08-18 | Stop reason: SDUPTHER

## 2020-07-31 RX ORDER — POLYETHYLENE GLYCOL 3350 17 G/17G
17 POWDER, FOR SOLUTION ORAL DAILY
COMMUNITY

## 2020-07-31 RX ORDER — MIRTAZAPINE 7.5 MG/1
7.5 TABLET, FILM COATED ORAL NIGHTLY
Qty: 30 TABLET | Refills: 2 | Status: SHIPPED | OUTPATIENT
Start: 2020-07-31 | End: 2020-08-20

## 2020-07-31 RX ORDER — POTASSIUM CHLORIDE 20 MEQ/1
20 TABLET, EXTENDED RELEASE ORAL DAILY
COMMUNITY
End: 2020-08-05 | Stop reason: SDUPTHER

## 2020-07-31 NOTE — PROGRESS NOTES
"Heike Montoya is a 75 y.o. female here for follow-up      I have reviewed the following portions of the patient's history and confirmed they are accurate: past family history     I have personally completed the patient's review of systems.    Review of Systems:  General: negative  CV: negative  Respiratory: negative  Neuro: negative  Psych: negative    Objective   /64   Pulse 65   Temp 97.8 °F (36.6 °C) (Temporal)   Resp 16   Ht 152.4 cm (60\")   Wt 66.7 kg (147 lb)   SpO2 96%   BMI 28.71 kg/m²     Physical Exam    Assessment/Plan   There are no diagnoses linked to this encounter.         Gege Mratinez MA    Please note that portions of this note were completed with a voice recognition program. Efforts were made to edit the dictations, but occasionally words are mistranscribed.  "

## 2020-08-05 ENCOUNTER — TELEPHONE (OUTPATIENT)
Dept: INTERNAL MEDICINE | Facility: CLINIC | Age: 75
End: 2020-08-05

## 2020-08-05 RX ORDER — POTASSIUM CHLORIDE 20 MEQ/1
20 TABLET, EXTENDED RELEASE ORAL DAILY
Qty: 30 TABLET | Refills: 2 | Status: SHIPPED | OUTPATIENT
Start: 2020-08-05 | End: 2020-08-31

## 2020-08-05 NOTE — TELEPHONE ENCOUNTER
Frances from [facility] [address] [place] [address] JOHNSON CINTHIA - [phone]  - [phone] FX     Requesting a prescription for:  potassium chloride (EFFER K) 20 MEQ TABLET    She stated it was last filled at [facility] but it did not have refills and it needs to be sent to [facility] in [place].

## 2020-08-06 ENCOUNTER — TELEPHONE (OUTPATIENT)
Dept: INTERNAL MEDICINE | Facility: CLINIC | Age: 75
End: 2020-08-06

## 2020-08-06 NOTE — TELEPHONE ENCOUNTER
STATES PT WAS PUT ON insulin detemir (LEVEMIR) 100 UNIT/ML injection WHEN PT WAS IN THE HOSPITAL.     STATES THAT THE MEDICATION IS NOT WORKING.     PT'S SUGAR IS STAYING HIGH [phone]     IS REQUESTING THAT PT GOES BACK TO  OLD MEDICATION HUMALOG 75/25.      PLEASE ADVISE  [phone]

## 2020-08-07 NOTE — TELEPHONE ENCOUNTER
Is she eating better? If not then the 75/25 wouldn't be safe as it has immediate acting in it and would cause a sugar crash

## 2020-08-18 RX ORDER — ASPIRIN 81 MG/1
81 TABLET, CHEWABLE ORAL DAILY
Qty: 30 TABLET | Refills: 2 | Status: SHIPPED | OUTPATIENT
Start: 2020-08-18 | End: 2020-11-23

## 2020-08-18 RX ORDER — PANTOPRAZOLE SODIUM 40 MG/1
40 TABLET, DELAYED RELEASE ORAL DAILY
Qty: 30 TABLET | Refills: 2 | Status: SHIPPED | OUTPATIENT
Start: 2020-08-18 | End: 2020-11-23

## 2020-08-18 RX ORDER — CARVEDILOL 12.5 MG/1
12.5 TABLET ORAL 2 TIMES DAILY
Qty: 60 TABLET | Refills: 2 | Status: SHIPPED | OUTPATIENT
Start: 2020-08-18 | End: 2020-12-02

## 2020-08-18 RX ORDER — FUROSEMIDE 40 MG/1
40 TABLET ORAL DAILY
Qty: 30 TABLET | Refills: 2 | Status: SHIPPED | OUTPATIENT
Start: 2020-08-18

## 2020-08-18 NOTE — TELEPHONE ENCOUNTER
Patients , Jean, called and stated that the patient would like the liquid form of her potassium chloride (K-DUR,KLOR-CON) 20 MEQ CR tablet sent into the pharmacy for her. Jean states the pill is just to big for the patient and she has a hard time swallowing this. He spoke with the pharmacy and was told this could be done as long a prescription was sent in for the patient. Please advise.     Patient call back [phone]

## 2020-08-18 NOTE — TELEPHONE ENCOUNTER
Caller: Jean Montoya    Relationship: Emergency Contact    Best call back number: [phone]    Medication needed:   Requested Prescriptions     Pending Prescriptions Disp Refills   • sacubitril-valsartan (ENTRESTO) 24-26 MG tablet 60 tablet      Si tablet by Nasogastric route Every 12 (Twelve) Hours.   • furosemide (LASIX) 40 MG tablet       Sig: Take 1 tablet by mouth Daily.   • pantoprazole (PROTONIX) 40 MG EC tablet       Sig: Take 1 tablet by mouth Daily.   • aspirin 81 MG chewable tablet       Sig: Chew 1 tablet Daily.   • carvedilol (COREG) 12.5 MG tablet       Sig: Take 1 tablet by mouth 2 (Two) Times a Day.       When do you need the refill by: 2020    What details did the patient provide when requesting the medication: Patient has a week supply left of the medication. The patient is needing all the medications transferred to [facility] from [facility].     Does the patient have less than a 3 day supply:  [] Yes  [x] No    What is the patient's preferred pharmacy: [facility] [address] [place] [address] [phone] [facility] [phone] FX

## 2020-08-20 ENCOUNTER — OFFICE VISIT (OUTPATIENT)
Dept: CARDIAC SURGERY | Facility: CLINIC | Age: 75
End: 2020-08-20

## 2020-08-20 ENCOUNTER — OFFICE VISIT (OUTPATIENT)
Dept: CARDIOLOGY | Facility: HOSPITAL | Age: 75
End: 2020-08-20

## 2020-08-20 VITALS
OXYGEN SATURATION: 98 % | BODY MASS INDEX: 25.95 KG/M2 | SYSTOLIC BLOOD PRESSURE: 150 MMHG | HEART RATE: 62 BPM | WEIGHT: 152 LBS | DIASTOLIC BLOOD PRESSURE: 67 MMHG | TEMPERATURE: 98.2 F | RESPIRATION RATE: 18 BRPM | HEIGHT: 64 IN

## 2020-08-20 VITALS
SYSTOLIC BLOOD PRESSURE: 136 MMHG | DIASTOLIC BLOOD PRESSURE: 80 MMHG | OXYGEN SATURATION: 98 % | WEIGHT: 152 LBS | BODY MASS INDEX: 25.95 KG/M2 | TEMPERATURE: 98.2 F | HEART RATE: 64 BPM | HEIGHT: 64 IN

## 2020-08-20 DIAGNOSIS — I10 ESSENTIAL HYPERTENSION: ICD-10-CM

## 2020-08-20 DIAGNOSIS — Z86.73 HISTORY OF CVA (CEREBROVASCULAR ACCIDENT): ICD-10-CM

## 2020-08-20 DIAGNOSIS — Z95.1 S/P CABG X 3: ICD-10-CM

## 2020-08-20 DIAGNOSIS — Z95.1 S/P CABG X 3: Primary | ICD-10-CM

## 2020-08-20 DIAGNOSIS — I25.5 ISCHEMIC CARDIOMYOPATHY: ICD-10-CM

## 2020-08-20 PROCEDURE — 71046 X-RAY EXAM CHEST 2 VIEWS: CPT | Performed by: NURSE PRACTITIONER

## 2020-08-20 PROCEDURE — 99214 OFFICE O/P EST MOD 30 MIN: CPT | Performed by: NURSE PRACTITIONER

## 2020-08-20 PROCEDURE — 99024 POSTOP FOLLOW-UP VISIT: CPT | Performed by: NURSE PRACTITIONER

## 2020-08-20 NOTE — PROGRESS NOTES
[facility] Cardiothoracic Surgery Follow-Up Note    Name:  Beryl Montoya  MRN Number:  [id number]  Date of Encounter:  08/20/2020    Referred By:  No ref. provider found  PCP:  Argelia Gamez MD    Chief Complaint:    Chief Complaint   Patient presents with   • Post-op Follow-up     Hosp F/u s/p CABGx3 6/30/2020. Pt states that she is feeling fine since her surgery.        History of Present Illness:    Ms. Beryl Montoya is a pleasant 75 y.o. female with a history of GERD, hypertension, hyperlipidemia, diabetes mellitus, stroke and coronary artery disease s/p CABG x3 with EVH 6/30/2020 per Dr. Lozano who returns the office today for postoperative exam.  The patient denies incisional pain, shortness of breath or difficulty with ambulation.  The patient does continue to have some communication issues from her recent stroke and is currently working with PT/OT/home health.  The patient was recently seen by her cardiologist, Dr. Faulkner, who felt the patient was doing well.      Review of Systems:  Review of Systems   Constitution: Negative for chills, fever and malaise/fatigue.   Eyes: Negative for visual disturbance.   Cardiovascular: Negative for chest pain, dyspnea on exertion and leg swelling.   Respiratory: Negative for hemoptysis and shortness of breath.    Skin: Negative for poor wound healing.   Gastrointestinal: Negative for abdominal pain.   Neurological: Negative for weakness.   Psychiatric/Behavioral: Negative for altered mental status.       Past Medical History:    Past Medical History:   Diagnosis Date   • CHF (congestive heart failure) (CMS/HCC)    • Coronary artery disease involving native coronary artery of native heart with angina pectoris (CMS/HCC) 6/25/2020   • Diabetes mellitus (CMS/HCC)    • GERD (gastroesophageal reflux disease)    • High cholesterol    • Hyperlipidemia    • Hypertension    • Ischemic cardiomyopathy    • Stroke (CMS/HCC)     2013 speech and  short term memory    • TIA (transient ischemic attack)        Past Surgical History:    Past Surgical History:   Procedure Laterality Date   • CARDIAC CATHETERIZATION N/A 2020    Procedure: Left Heart Cath 76924 C19 @ [facility];  Surgeon: David Faulkner MD;  Location: [facility] CATH INVASIVE LOCATION;  Service: Cardiovascular;  Laterality: N/A;   • CATARACT EXTRACTION Bilateral    • COLONOSCOPY     • CORONARY ARTERY BYPASS GRAFT N/A 2020    Procedure: SHERI PER ANESTHESIA, MEDIAN STERNOTOMY, CORONARY ARTERY BYPASS GRAFTING X 3 , UTILIZING THE LEFT INTERNAL MAMMARY ARTERY AND EVH OF RIGHT GREATER SAPHENOUS VEIN;  Surgeon: Jerry Lozano MD;  Location: [facility] OR;  Service: Cardiothoracic;  Laterality: N/A;  LEG START - 0736  VEIN OUT - 0840  VEIN READY - 0850   • EYE SURGERY     • HYSTERECTOMY      total   • INTERVENTIONAL RADIOLOGY PROCEDURE Bilateral 2017    Procedure: Carotid Cerebral Angiogram;  Surgeon: Maldonado Casas MD;  Location: [facility] CATH INVASIVE LOCATION;  Service:        Patient Active Problem List   Diagnosis   • History of L MCA CVA   • Essential hypertension   • Type 2 diabetes mellitus with complication, with long-term current use of insulin (CMS/[facility])   • GERD (gastroesophageal reflux disease)   • Bilateral carotid artery stenosis   • Abnormal stress test   • Severe 3 vessel CAD with angina pectoris (CMS/HCC)   • Ischemic cardiomyopathy. LVEF 30%   • S/P CABG x 3 on 20   • Pericarditis noted at CABG. Etiology questionable   • Acute postop embolic left occipital CVA 20. Extensive in L PCA distribution but additional areas in R PCA distribution (CMS/HCC)   • Bilateral carotid atherosclerosis with moderate left common carotid artery stenosis (50-60%)     Social History     Tobacco Use   • Smoking status: Former Smoker     Packs/day: 0.25     Years: 4.00     Pack years: 1.00     Types: Cigarettes     Last attempt to quit: 2005     Years since quittin.9    • Smokeless tobacco: Never Used   Substance Use Topics   • Alcohol use: No   • Drug use: No     Family History   Problem Relation Age of Onset   • Diabetes Mother    • Cancer Mother    • Hypertension Mother    • Diabetes Father    • Heart disease Father        Medications:      Current Outpatient Medications:   •  acetaminophen (TYLENOL) 325 MG tablet, Take 2 tablets by mouth Every 6 (Six) Hours As Needed for Mild Pain ., Disp: , Rfl:   •  aspirin 81 MG chewable tablet, Chew 1 tablet Daily., Disp: 30 tablet, Rfl: 2  •  atorvastatin (LIPITOR) 80 MG tablet, Take 1 tablet by mouth Daily., Disp: 90 tablet, Rfl: 2  •  carvedilol (COREG) 12.5 MG tablet, Take 1 tablet by mouth 2 (Two) Times a Day., Disp: 60 tablet, Rfl: 2  •  furosemide (LASIX) 40 MG tablet, Take 1 tablet by mouth Daily., Disp: 30 tablet, Rfl: 2  •  insulin lispro protamine-insulin lispro (humaLOG 75-25) (75-25) 100 UNIT/ML suspension injection, Inject  under the skin into the appropriate area as directed 2 (Two) Times a Day With Meals., Disp: , Rfl:   •  mirtazapine (REMERON) 7.5 MG tablet, Take 1 tablet by mouth Every Night., Disp: 30 tablet, Rfl: 2  •  modafinil (PROVIGIL) 100 MG tablet, Take 1 tablet by mouth Daily With Breakfast., Disp: , Rfl:   •  pantoprazole (PROTONIX) 40 MG EC tablet, Take 1 tablet by mouth Daily., Disp: 30 tablet, Rfl: 2  •  polyethylene glycol (MIRALAX) packet, Take 17 g by mouth Daily., Disp: , Rfl:   •  potassium chloride (K-DUR,KLOR-CON) 20 MEQ CR tablet, Take 1 tablet by mouth Daily., Disp: 30 tablet, Rfl: 2  •  sacubitril-valsartan (ENTRESTO) 24-26 MG tablet, 1 tablet by Nasogastric route Every 12 (Twelve) Hours., Disp: 60 tablet, Rfl: 2  •  apixaban (ELIQUIS) 5 MG tablet tablet, 1 tablet by Nasogastric route Every 12 (Twelve) Hours. Indications: Atrial Fibrillation, Disp: 60 tablet, Rfl:   •  insulin detemir (LEVEMIR) 100 UNIT/ML injection, Inject 20 Units under the skin into the appropriate area as directed Daily.,  "Disp: , Rfl: 12    Allergies:  No Known Allergies    Physical Exam:  Vital Signs:    Vitals:    08/20/20 1106   BP: 136/80   Pulse: 64   Temp: 98.2 °F (36.8 °C)   TempSrc: Temporal   SpO2: 98%   Weight: 68.9 kg (152 lb)   Height: 162.6 cm (64\")       Physical Exam   Gen- NAD, pleasant, cooperative  CV- Regular rate and rhythm, no murmur, gallop or rub  Pulm- Clear to auscultation bilateral without wheeze or rhonchi  GI- Soft, normoactive bowel sounds, non-tender  Ext- Without edema   Incision- Well approximated midsternal incision, MT sites and EVH sites with no erythema, drainage or dehiscence noted.  Neuro- CN II- XII grossly intact, tongue midline, voice normal.    Labs/Imaging:  Chest x-ray, [facility], 8/20/2020  Impression:  Status post median sternotomy and  CABG with persistent  cardiomegaly however improved/normalized pulmonary vascularity without  overt edema or acute parenchymal process  I personally reviewed these images in the office today.    Assessment / Plan:  Ms. Beryl Montoya is a pleasant 75 y.o. female with a history of GERD, hypertension, hyperlipidemia, diabetes mellitus, stroke and coronary artery disease s/p CABG x3 with EVH 6/30/2020 per Dr. Lozano who returns the office today for postoperative exam.  The patient denies incisional pain, shortness of breath or difficulty with ambulation.  The patient does continue to have some communication issues from her recent stroke and is currently working with PT/OT/home health.  The patient was recently seen by her cardiologist, Dr. Faulkner, who felt the patient was doing well.  From a surgical standpoint, the patient is doing well.  Her incisions are healing well, her sternum is stable and chest x-ray obtained in the office today is satisfactory.  I have outlined the physical activity suitable for each benchmark between 6 weeks, 3 months in 1 year.  At this time, we will plan to see the patient back on an as-needed basis.  " Medical management will be resumed by her cardiologist, Dr. Faulkner.    Please note, this document was produced using voice recognition software.    MARIE Hernandes  [facility] Cardiothoracic Surgery

## 2020-08-21 NOTE — PROGRESS NOTES
[facility]  [facility]      08/20/2020         Beryl Montoya  [address] [place] [address]  [unfilled]    1945    Argelia Gamez MD    Beryl Montoya is a 75 y.o. female.      Subjective:     Chief Complaint:  Post-op Open Heart Surgery and Establish Care       HPI   75-year-old female presents the office today for ongoing evaluation of her ischemic cardiomyopathy.  She underwent a CABG x3 on 6/30/2020.  Postoperatively she experienced an embolic left occipital CVA 7/4/2020. Ef 35% preoperatively. Patient is a poor historian but patient's  is in exam room.  He reports that she is quite active at home and does deny chest pain, dyspnea, dizziness, presyncope or pedal edema. Patient's  notes that Dr Faulkner recently stopped her eliquis.     Patient Active Problem List   Diagnosis   • History of L MCA CVA   • Essential hypertension   • Type 2 diabetes mellitus with complication, with long-term current use of insulin (CMS/HCC)   • GERD (gastroesophageal reflux disease)   • Bilateral carotid artery stenosis   • Abnormal stress test   • Severe 3 vessel CAD with angina pectoris (CMS/HCC)   • Ischemic cardiomyopathy. LVEF 30%   • S/P CABG x 3 on 6/30/20   • Pericarditis noted at CABG. Etiology questionable   • Acute postop embolic left occipital CVA 7/4/20. Extensive in L PCA distribution but additional areas in R PCA distribution (CMS/HCC)   • Bilateral carotid atherosclerosis with moderate left common carotid artery stenosis (50-60%)       Past Medical History:   Diagnosis Date   • CHF (congestive heart failure) (CMS/HCC)    • Coronary artery disease involving native coronary artery of native heart with angina pectoris (CMS/HCC) 6/25/2020   • Diabetes mellitus (CMS/HCC)    • GERD (gastroesophageal reflux disease)    • High cholesterol    • Hyperlipidemia    • Hypertension    • Ischemic cardiomyopathy    • Stroke (CMS/HCC)     2013 speech and short  term memory    • TIA (transient ischemic attack)        Past Surgical History:   Procedure Laterality Date   • CARDIAC CATHETERIZATION N/A 2020    Procedure: Left Heart Cath 99738 C19 @  [facility];  Surgeon: David Faulkner MD;  Location:  [facility] CATH INVASIVE LOCATION;  Service: Cardiovascular;  Laterality: N/A;   • CATARACT EXTRACTION Bilateral    • COLONOSCOPY     • CORONARY ARTERY BYPASS GRAFT N/A 2020    Procedure: SHERI PER ANESTHESIA, MEDIAN STERNOTOMY, CORONARY ARTERY BYPASS GRAFTING X 3 , UTILIZING THE LEFT INTERNAL MAMMARY ARTERY AND EVH OF RIGHT GREATER SAPHENOUS VEIN;  Surgeon: Jerry Lozano MD;  Location:  [facility] OR;  Service: Cardiothoracic;  Laterality: N/A;  LEG START - 0736  VEIN OUT - 0840  VEIN READY - 0850   • EYE SURGERY     • HYSTERECTOMY      total   • INTERVENTIONAL RADIOLOGY PROCEDURE Bilateral 2017    Procedure: Carotid Cerebral Angiogram;  Surgeon: Maldonado Casas MD;  Location:  [facility] CATH INVASIVE LOCATION;  Service:        Family History   Problem Relation Age of Onset   • Diabetes Mother    • Cancer Mother    • Hypertension Mother    • Diabetes Father    • Heart disease Father    • Breast cancer Sister    • Diabetes Maternal Grandmother    • No Known Problems Maternal Grandfather    • No Known Problems Paternal Grandmother    • No Known Problems Paternal Grandfather        Social History     Socioeconomic History   • Marital status:      Spouse name: Not on file   • Number of children: Not on file   • Years of education: Not on file   • Highest education level: Not on file   Tobacco Use   • Smoking status: Former Smoker     Packs/day: 0.25     Years: 4.00     Pack years: 1.00     Types: Cigarettes     Last attempt to quit: 2005     Years since quittin.9   • Smokeless tobacco: Never Used   Substance and Sexual Activity   • Alcohol use: No   • Drug use: No   • Sexual activity: Defer   Social History Narrative    L[facility] in [facility]     Caffeine: 2 cups coffee daily       No Known Allergies      Current Outpatient Medications:   •  acetaminophen (TYLENOL) 325 MG tablet, Take 2 tablets by mouth Every 6 (Six) Hours As Needed for Mild Pain ., Disp: , Rfl:   •  aspirin 81 MG chewable tablet, Chew 1 tablet Daily., Disp: 30 tablet, Rfl: 2  •  atorvastatin (LIPITOR) 80 MG tablet, Take 1 tablet by mouth Daily., Disp: 90 tablet, Rfl: 2  •  carvedilol (COREG) 12.5 MG tablet, Take 1 tablet by mouth 2 (Two) Times a Day., Disp: 60 tablet, Rfl: 2  •  furosemide (LASIX) 40 MG tablet, Take 1 tablet by mouth Daily., Disp: 30 tablet, Rfl: 2  •  insulin detemir (LEVEMIR) 100 UNIT/ML injection, Inject 20 Units under the skin into the appropriate area as directed Daily., Disp: , Rfl: 12  •  insulin lispro protamine-insulin lispro (humaLOG 75-25) (75-25) 100 UNIT/ML suspension injection, Inject  under the skin into the appropriate area as directed 2 (Two) Times a Day With Meals., Disp: , Rfl:   •  pantoprazole (PROTONIX) 40 MG EC tablet, Take 1 tablet by mouth Daily., Disp: 30 tablet, Rfl: 2  •  polyethylene glycol (MIRALAX) packet, Take 17 g by mouth Daily., Disp: , Rfl:   •  potassium chloride (K-DUR,KLOR-CON) 20 MEQ CR tablet, Take 1 tablet by mouth Daily., Disp: 30 tablet, Rfl: 2  •  sacubitril-valsartan (ENTRESTO) 24-26 MG tablet, 1 tablet by Nasogastric route Every 12 (Twelve) Hours., Disp: 60 tablet, Rfl: 2    The following portions of the patient's history were reviewed today and updated as appropriate: allergies, current medications, past family history, past medical history, past social history, past surgical history and problem list     Review of Systems   Constitution: Positive for malaise/fatigue. Negative for chills, decreased appetite, diaphoresis, fever, night sweats, weight gain and weight loss.   HENT: Negative for congestion, hearing loss, hoarse voice and nosebleeds.    Eyes: Negative for blurred vision, visual disturbance and visual halos.  "  Cardiovascular: Negative for chest pain, claudication, cyanosis, dyspnea on exertion, irregular heartbeat, leg swelling, near-syncope, orthopnea, palpitations, paroxysmal nocturnal dyspnea and syncope.   Respiratory: Negative for cough, hemoptysis, shortness of breath, sleep disturbances due to breathing, snoring, sputum production and wheezing.    Hematologic/Lymphatic: Negative for bleeding problem. Does not bruise/bleed easily.   Skin: Negative for dry skin, itching and rash.   Musculoskeletal: Negative for arthritis, falls, joint pain, joint swelling and myalgias.   Gastrointestinal: Negative for bloating, abdominal pain, constipation, diarrhea, flatus, heartburn, hematemesis, hematochezia, melena, nausea and vomiting.   Genitourinary: Negative for dysuria, frequency, hematuria, nocturia and urgency.   Neurological: Negative for excessive daytime sleepiness, dizziness, headaches, light-headedness, loss of balance and weakness.   Psychiatric/Behavioral: Positive for altered mental status. Negative for depression. The patient does not have insomnia and is not nervous/anxious.        Objective:     Vitals:    08/20/20 1241 08/20/20 1242 08/20/20 1243   BP: 155/67 153/67 150/67   BP Location: Left arm Left arm Right arm   Patient Position: Sitting Standing Sitting   Cuff Size: Adult Adult Adult   Pulse: 62 62 62   Resp:   18   Temp:   98.2 °F (36.8 °C)   TempSrc:   Temporal   SpO2: 97% 98% 98%   Weight:   68.9 kg (152 lb)   Height:   162.6 cm (64\")       Body mass index is 26.09 kg/m².    Physical Exam   Constitutional: She is oriented to person, place, and time. She appears well-developed and well-nourished. She is active and cooperative. No distress.   HENT:   Head: Normocephalic and atraumatic.   Mouth/Throat: Oropharynx is clear and moist.   Eyes: Pupils are equal, round, and reactive to light. Conjunctivae and EOM are normal.   Neck: Normal range of motion. Neck supple. No JVD present. No tracheal deviation " present. No thyromegaly present.   Cardiovascular: Normal rate, regular rhythm, normal heart sounds and intact distal pulses.   Pulmonary/Chest: Effort normal and breath sounds normal.   Abdominal: Soft. Bowel sounds are normal. She exhibits no distension. There is no tenderness.   Musculoskeletal: Normal range of motion.   Neurological: She is alert and oriented to person, place, and time.   Skin: Skin is warm, dry and intact.   midsternal well healed   Psychiatric: She has a normal mood and affect. Her behavior is normal.   Nursing note and vitals reviewed.      Lab and Diagnostic Review:  Results for orders placed or performed during the hospital encounter of 06/30/20   AFB Culture - Body Fluid, Pleural Cavity   Result Value Ref Range    AFB Culture No AFB isolated at 5 weeks     AFB Stain No acid fast bacilli seen on concentrated smear    Body Fluid Culture - Body Fluid, Pleural Cavity   Result Value Ref Range    Body Fluid Culture No growth at 3 days     Gram Stain No WBCs or organisms seen    Anaerobic Culture - Pleural Fluid, Pleural Cavity   Result Value Ref Range    Anaerobic Culture No anaerobes isolated at 5 days    Fungus Culture - Body Fluid, Pleural Cavity   Result Value Ref Range    Fungus Culture No fungus isolated at 5 weeks    Fungus Smear - Body Fluid, Pleural Cavity   Result Value Ref Range    Fungal Stain No fungal elements seen    COVID-19,[facility] IN-HOUSE, NP SWAB IN TRANSPORT MEDIA 8-12 HR TAT - Swab, Nasopharynx   Result Value Ref Range    COVID19 Not Detected Not Detected - Ref. Range   Protime-INR   Result Value Ref Range    Protime 23.5 (H) 11.5 - 14.0 Seconds    INR 2.13 (H) 0.85 - 1.16   aPTT   Result Value Ref Range    PTT 38.6 (H) 24.0 - 37.0 seconds   Calcium, Ionized   Result Value Ref Range    Ionized Calcium 1.33 (H) 1.12 - 1.32 mmol/L   CBC (No Diff)   Result Value Ref Range    WBC 5.63 3.40 - 10.80 10*3/mm3    RBC 2.84 (L) 3.77 - 5.28 10*6/mm3    Hemoglobin 9.1 (L) 12.0 - 15.9  g/dL    Hematocrit 26.8 (L) 34.0 - 46.6 %    MCV 94.4 79.0 - 97.0 fL    MCH 32.0 26.6 - 33.0 pg    MCHC 34.0 31.5 - 35.7 g/dL    RDW 15.8 (H) 12.3 - 15.4 %    RDW-SD 54.4 (H) 37.0 - 54.0 fl    MPV 9.9 6.0 - 12.0 fL    Platelets 195 140 - 450 10*3/mm3   CBC (No Diff)   Result Value Ref Range    WBC 7.72 3.40 - 10.80 10*3/mm3    RBC 2.97 (L) 3.77 - 5.28 10*6/mm3    Hemoglobin 9.2 (L) 12.0 - 15.9 g/dL    Hematocrit 28.7 (L) 34.0 - 46.6 %    MCV 96.6 79.0 - 97.0 fL    MCH 31.0 26.6 - 33.0 pg    MCHC 32.1 31.5 - 35.7 g/dL    RDW 16.2 (H) 12.3 - 15.4 %    RDW-SD 57.4 (H) 37.0 - 54.0 fl    MPV 9.7 6.0 - 12.0 fL    Platelets 205 140 - 450 10*3/mm3   Renal Function Panel   Result Value Ref Range    Glucose 79 65 - 99 mg/dL    BUN 11 8 - 23 mg/dL    Creatinine 0.88 0.57 - 1.00 mg/dL    Sodium 145 136 - 145 mmol/L    Potassium 3.3 (L) 3.5 - 5.2 mmol/L    Chloride 107 98 - 107 mmol/L    CO2 24.0 22.0 - 29.0 mmol/L    Calcium 9.3 8.6 - 10.5 mg/dL    Albumin 3.00 (L) 3.50 - 5.20 g/dL    Phosphorus 2.7 2.5 - 4.5 mg/dL    Anion Gap 14.0 5.0 - 15.0 mmol/L    BUN/Creatinine Ratio 12.5 7.0 - 25.0    eGFR Non African Amer 63 >60 mL/min/1.73   Renal Function Panel   Result Value Ref Range    Glucose 118 (H) 65 - 99 mg/dL    BUN 12 8 - 23 mg/dL    Creatinine 0.99 0.57 - 1.00 mg/dL    Sodium 145 136 - 145 mmol/L    Potassium 3.2 (L) 3.5 - 5.2 mmol/L    Chloride 107 98 - 107 mmol/L    CO2 25.0 22.0 - 29.0 mmol/L    Calcium 9.1 8.6 - 10.5 mg/dL    Albumin 3.30 (L) 3.50 - 5.20 g/dL    Phosphorus 2.3 (L) 2.5 - 4.5 mg/dL    Anion Gap 13.0 5.0 - 15.0 mmol/L    BUN/Creatinine Ratio 12.1 7.0 - 25.0    eGFR Non African Amer 55 (L) >60 mL/min/1.73   Magnesium   Result Value Ref Range    Magnesium 3.1 (H) 1.6 - 2.4 mg/dL   Magnesium   Result Value Ref Range    Magnesium 2.6 (H) 1.6 - 2.4 mg/dL   Blood Gas, Arterial With Co-Ox   Result Value Ref Range    Site Arterial Line     Jean's Test N/A     pH, Arterial 7.512 (H) 7.350 - 7.450 pH units     pCO2, Arterial 31.1 (L) 35.0 - 45.0 mm Hg    pO2, Arterial 467.0 (H) 83.0 - 108.0 mm Hg    HCO3, Arterial 24.9 20.0 - 26.0 mmol/L    Base Excess, Arterial 2.1 (H) 0.0 - 2.0 mmol/L    Hemoglobin, Blood Gas 9.3 (L) 14 - 18 g/dL    Hematocrit, Blood Gas 28.4 %    Oxyhemoglobin 98.5 94 - 99 %    Methemoglobin 1.10 0.00 - 1.50 %    Carboxyhemoglobin 0.8 0 - 2 %    CO2 Content 25.8 22 - 33 mmol/L    Temperature 37.0 C    Barometric Pressure for Blood Gas      Modality Ventilator     FIO2 100 %    Set Tidal Volume 500     Set Mech Resp Rate 14.0     PEEP 5.0     PSV 10.0 cmH2O    Note      pH, Temp Corrected 7.512 pH Units    pCO2, Temperature Corrected 31.1 (L) 35 - 45 mm Hg    pO2, Temperature Corrected 467 (H) 83 - 108 mm Hg   Blood Gas, Arterial With Co-Ox   Result Value Ref Range    Site Arterial Line     Jean's Test N/A     pH, Arterial 7.366 7.350 - 7.450 pH units    pCO2, Arterial 45.7 (H) 35.0 - 45.0 mm Hg    pO2, Arterial 92.6 83.0 - 108.0 mm Hg    HCO3, Arterial 26.2 (H) 20.0 - 26.0 mmol/L    Base Excess, Arterial 0.6 0.0 - 2.0 mmol/L    Hemoglobin, Blood Gas 8.9 (L) 14 - 18 g/dL    Hematocrit, Blood Gas 27.3 %    Oxyhemoglobin 94.7 94 - 99 %    Methemoglobin 1.20 0.00 - 1.50 %    Carboxyhemoglobin 1.4 0 - 2 %    CO2 Content 27.6 22 - 33 mmol/L    Temperature 37.0 C    Barometric Pressure for Blood Gas      Modality Ventilator     FIO2 40 %    Ventilator Mode PS     PEEP 5.0     PSV 10.0 cmH2O    Note      pH, Temp Corrected 7.366 pH Units    pCO2, Temperature Corrected 45.7 (H) 35 - 45 mm Hg    pO2, Temperature Corrected 92.6 83 - 108 mm Hg   Potassium   Result Value Ref Range    Potassium 4.7 3.5 - 5.2 mmol/L   Renal Function Panel   Result Value Ref Range    Glucose 119 (H) 65 - 99 mg/dL    BUN 14 8 - 23 mg/dL    Creatinine 1.38 (H) 0.57 - 1.00 mg/dL    Sodium 141 136 - 145 mmol/L    Potassium 4.1 3.5 - 5.2 mmol/L    Chloride 103 98 - 107 mmol/L    CO2 26.0 22.0 - 29.0 mmol/L    Calcium 8.7 8.6 - 10.5 mg/dL     Albumin 4.30 3.50 - 5.20 g/dL    Phosphorus 2.3 (L) 2.5 - 4.5 mg/dL    Anion Gap 12.0 5.0 - 15.0 mmol/L    BUN/Creatinine Ratio 10.1 7.0 - 25.0    eGFR Non African Amer 37 (L) >60 mL/min/1.73   Magnesium   Result Value Ref Range    Magnesium 2.4 1.6 - 2.4 mg/dL   Protime-INR   Result Value Ref Range    Protime 20.0 (H) 11.5 - 14.0 Seconds    INR 1.73 (H) 0.85 - 1.16   CBC Auto Differential   Result Value Ref Range    WBC 9.67 3.40 - 10.80 10*3/mm3    RBC 2.59 (L) 3.77 - 5.28 10*6/mm3    Hemoglobin 8.0 (L) 12.0 - 15.9 g/dL    Hematocrit 25.3 (L) 34.0 - 46.6 %    MCV 97.7 (H) 79.0 - 97.0 fL    MCH 30.9 26.6 - 33.0 pg    MCHC 31.6 31.5 - 35.7 g/dL    RDW 16.1 (H) 12.3 - 15.4 %    RDW-SD 57.4 (H) 37.0 - 54.0 fl    MPV 10.2 6.0 - 12.0 fL    Platelets 170 140 - 450 10*3/mm3    Neutrophil % 86.7 (H) 42.7 - 76.0 %    Lymphocyte % 6.2 (L) 19.6 - 45.3 %    Monocyte % 6.1 5.0 - 12.0 %    Eosinophil % 0.3 0.3 - 6.2 %    Basophil % 0.4 0.0 - 1.5 %    Immature Grans % 0.3 0.0 - 0.5 %    Neutrophils, Absolute 8.38 (H) 1.70 - 7.00 10*3/mm3    Lymphocytes, Absolute 0.60 (L) 0.70 - 3.10 10*3/mm3    Monocytes, Absolute 0.59 0.10 - 0.90 10*3/mm3    Eosinophils, Absolute 0.03 0.00 - 0.40 10*3/mm3    Basophils, Absolute 0.04 0.00 - 0.20 10*3/mm3    Immature Grans, Absolute 0.03 0.00 - 0.05 10*3/mm3    nRBC 0.0 0.0 - 0.2 /100 WBC   Blood Gas, Arterial With Co-Ox   Result Value Ref Range    Site Arterial Line     Jean's Test N/A     pH, Arterial 7.381 7.350 - 7.450 pH units    pCO2, Arterial 41.7 35.0 - 45.0 mm Hg    pO2, Arterial 144.0 (H) 83.0 - 108.0 mm Hg    HCO3, Arterial 24.7 20.0 - 26.0 mmol/L    Base Excess, Arterial -0.5 (L) 0.0 - 2.0 mmol/L    Hemoglobin, Blood Gas 8.7 (L) 14 - 18 g/dL    Hematocrit, Blood Gas 26.7 %    Oxyhemoglobin 97.2 94 - 99 %    Methemoglobin 0.80 0.00 - 1.50 %    Carboxyhemoglobin 1.3 0 - 2 %    CO2 Content 26.0 22 - 33 mmol/L    Temperature 37.0 C    Barometric Pressure for Blood Gas      Modality  Ventilator     FIO2 40 %    Ventilator Mode SIMV/VC+     PEEP 5.0     Note      pH, Temp Corrected 7.381 pH Units    pCO2, Temperature Corrected 41.7 35 - 45 mm Hg    pO2, Temperature Corrected 144 (H) 83 - 108 mm Hg   Renal Function Panel   Result Value Ref Range    Glucose 192 (H) 65 - 99 mg/dL    BUN 13 8 - 23 mg/dL    Creatinine 1.22 (H) 0.57 - 1.00 mg/dL    Sodium 141 136 - 145 mmol/L    Potassium 4.5 3.5 - 5.2 mmol/L    Chloride 105 98 - 107 mmol/L    CO2 23.0 22.0 - 29.0 mmol/L    Calcium 8.7 8.6 - 10.5 mg/dL    Albumin 3.70 3.50 - 5.20 g/dL    Phosphorus 3.0 2.5 - 4.5 mg/dL    Anion Gap 13.0 5.0 - 15.0 mmol/L    BUN/Creatinine Ratio 10.7 7.0 - 25.0    eGFR Non African Amer 43 (L) >60 mL/min/1.73   CBC (No Diff)   Result Value Ref Range    WBC 8.32 3.40 - 10.80 10*3/mm3    RBC 2.58 (L) 3.77 - 5.28 10*6/mm3    Hemoglobin 8.0 (L) 12.0 - 15.9 g/dL    Hematocrit 25.4 (L) 34.0 - 46.6 %    MCV 98.4 (H) 79.0 - 97.0 fL    MCH 31.0 26.6 - 33.0 pg    MCHC 31.5 31.5 - 35.7 g/dL    RDW 16.6 (H) 12.3 - 15.4 %    RDW-SD 59.1 (H) 37.0 - 54.0 fl    MPV 9.8 6.0 - 12.0 fL    Platelets 167 140 - 450 10*3/mm3   Magnesium   Result Value Ref Range    Magnesium 2.5 (H) 1.6 - 2.4 mg/dL   Blood Gas, Arterial With Co-Ox   Result Value Ref Range    Site Arterial Line     Jean's Test N/A     pH, Arterial 7.417 7.350 - 7.450 pH units    pCO2, Arterial 40.1 35.0 - 45.0 mm Hg    pO2, Arterial 173.0 (H) 83.0 - 108.0 mm Hg    HCO3, Arterial 25.8 20.0 - 26.0 mmol/L    Base Excess, Arterial 1.2 0.0 - 2.0 mmol/L    Hemoglobin, Blood Gas 9.6 (L) 14 - 18 g/dL    Hematocrit, Blood Gas 29.3 %    Oxyhemoglobin 97.9 94 - 99 %    Methemoglobin 1.00 0.00 - 1.50 %    Carboxyhemoglobin 1.0 0 - 2 %    CO2 Content 27.0 22 - 33 mmol/L    Temperature 37.0 C    Barometric Pressure for Blood Gas      Modality Ventilator     FIO2 40 %    Ventilator Mode PS     Rate 0 Breaths/minute    PEEP 5.0     PSV 10.0 cmH2O    PIP 0 cmH2O    IPAP 0     EPAP 0     Note       pH, Temp Corrected 7.417 pH Units    pCO2, Temperature Corrected 40.1 35 - 45 mm Hg    pO2, Temperature Corrected 173 (H) 83 - 108 mm Hg   Basic Metabolic Panel   Result Value Ref Range    Glucose 107 (H) 65 - 99 mg/dL    BUN 16 8 - 23 mg/dL    Creatinine 1.47 (H) 0.57 - 1.00 mg/dL    Sodium 139 136 - 145 mmol/L    Potassium 4.1 3.5 - 5.2 mmol/L    Chloride 103 98 - 107 mmol/L    CO2 27.0 22.0 - 29.0 mmol/L    Calcium 8.5 (L) 8.6 - 10.5 mg/dL    eGFR Non African Amer 35 (L) >60 mL/min/1.73    BUN/Creatinine Ratio 10.9 7.0 - 25.0    Anion Gap 9.0 5.0 - 15.0 mmol/L   Phosphorus   Result Value Ref Range    Phosphorus 3.1 2.5 - 4.5 mg/dL   Hemoglobin & Hematocrit, Blood   Result Value Ref Range    Hemoglobin 9.8 (L) 12.0 - 15.9 g/dL    Hematocrit 29.6 (L) 34.0 - 46.6 %   CBC (No Diff)   Result Value Ref Range    WBC 13.49 (H) 3.40 - 10.80 10*3/mm3    RBC 3.07 (L) 3.77 - 5.28 10*6/mm3    Hemoglobin 9.5 (L) 12.0 - 15.9 g/dL    Hematocrit 29.2 (L) 34.0 - 46.6 %    MCV 95.1 79.0 - 97.0 fL    MCH 30.9 26.6 - 33.0 pg    MCHC 32.5 31.5 - 35.7 g/dL    RDW 15.9 (H) 12.3 - 15.4 %    RDW-SD 54.4 (H) 37.0 - 54.0 fl    MPV 10.6 6.0 - 12.0 fL    Platelets 149 140 - 450 10*3/mm3   Basic Metabolic Panel   Result Value Ref Range    Glucose 117 (H) 65 - 99 mg/dL    BUN 16 8 - 23 mg/dL    Creatinine 1.16 (H) 0.57 - 1.00 mg/dL    Sodium 140 136 - 145 mmol/L    Potassium 3.7 3.5 - 5.2 mmol/L    Chloride 102 98 - 107 mmol/L    CO2 25.0 22.0 - 29.0 mmol/L    Calcium 8.5 (L) 8.6 - 10.5 mg/dL    eGFR Non African Amer 46 (L) >60 mL/min/1.73    BUN/Creatinine Ratio 13.8 7.0 - 25.0    Anion Gap 13.0 5.0 - 15.0 mmol/L   Magnesium   Result Value Ref Range    Magnesium 2.0 1.6 - 2.4 mg/dL   Phosphorus   Result Value Ref Range    Phosphorus 3.4 2.5 - 4.5 mg/dL   Nutrition Panel   Result Value Ref Range    Glucose 175 (H) 65 - 99 mg/dL    BUN 25 (H) 8 - 23 mg/dL    Creatinine 1.02 (H) 0.57 - 1.00 mg/dL    Sodium 139 136 - 145 mmol/L    Potassium 3.6  3.5 - 5.2 mmol/L    Chloride 102 98 - 107 mmol/L    CO2 27.0 22.0 - 29.0 mmol/L    Calcium 8.6 8.6 - 10.5 mg/dL    Alkaline Phosphatase 70 39 - 117 U/L    ALT (SGPT) 9 1 - 33 U/L    AST (SGOT) 20 1 - 32 U/L    Total Cholesterol 55 0 - 200 mg/dL    Triglycerides 58 0 - 150 mg/dL    Total Protein 5.7 (L) 6.0 - 8.5 g/dL    Albumin 3.70 3.50 - 5.20 g/dL    Phosphorus 2.2 (L) 2.5 - 4.5 mg/dL    Total Bilirubin 1.2 0.2 - 1.2 mg/dL    Magnesium 2.2 1.6 - 2.4 mg/dL    Anion Gap 10.0 5.0 - 15.0 mmol/L   Prealbumin   Result Value Ref Range    Prealbumin 7.4 (L) 20.0 - 40.0 mg/dL   C-reactive Protein   Result Value Ref Range    C-Reactive Protein 13.18 (H) 0.00 - 0.50 mg/dL   CBC Auto Differential   Result Value Ref Range    WBC 7.62 3.40 - 10.80 10*3/mm3    RBC 3.07 (L) 3.77 - 5.28 10*6/mm3    Hemoglobin 9.4 (L) 12.0 - 15.9 g/dL    Hematocrit 29.2 (L) 34.0 - 46.6 %    MCV 95.1 79.0 - 97.0 fL    MCH 30.6 26.6 - 33.0 pg    MCHC 32.2 31.5 - 35.7 g/dL    RDW 15.6 (H) 12.3 - 15.4 %    RDW-SD 54.2 (H) 37.0 - 54.0 fl    MPV 11.0 6.0 - 12.0 fL    Platelets 119 (L) 140 - 450 10*3/mm3    Neutrophil % 73.5 42.7 - 76.0 %    Lymphocyte % 15.6 (L) 19.6 - 45.3 %    Monocyte % 7.0 5.0 - 12.0 %    Eosinophil % 2.9 0.3 - 6.2 %    Basophil % 0.5 0.0 - 1.5 %    Immature Grans % 0.5 0.0 - 0.5 %    Neutrophils, Absolute 5.60 1.70 - 7.00 10*3/mm3    Lymphocytes, Absolute 1.19 0.70 - 3.10 10*3/mm3    Monocytes, Absolute 0.53 0.10 - 0.90 10*3/mm3    Eosinophils, Absolute 0.22 0.00 - 0.40 10*3/mm3    Basophils, Absolute 0.04 0.00 - 0.20 10*3/mm3    Immature Grans, Absolute 0.04 0.00 - 0.05 10*3/mm3    nRBC 0.0 0.0 - 0.2 /100 WBC   Basic Metabolic Panel   Result Value Ref Range    Glucose 170 (H) 65 - 99 mg/dL    BUN 34 (H) 8 - 23 mg/dL    Creatinine 1.10 (H) 0.57 - 1.00 mg/dL    Sodium 140 136 - 145 mmol/L    Potassium 4.0 3.5 - 5.2 mmol/L    Chloride 103 98 - 107 mmol/L    CO2 28.0 22.0 - 29.0 mmol/L    Calcium 8.6 8.6 - 10.5 mg/dL    eGFR Non   Amer 48 (L) >60 mL/min/1.73    BUN/Creatinine Ratio 30.9 (H) 7.0 - 25.0    Anion Gap 9.0 5.0 - 15.0 mmol/L   CBC (No Diff)   Result Value Ref Range    WBC 9.96 3.40 - 10.80 10*3/mm3    RBC 3.30 (L) 3.77 - 5.28 10*6/mm3    Hemoglobin 10.3 (L) 12.0 - 15.9 g/dL    Hematocrit 31.9 (L) 34.0 - 46.6 %    MCV 96.7 79.0 - 97.0 fL    MCH 31.2 26.6 - 33.0 pg    MCHC 32.3 31.5 - 35.7 g/dL    RDW 15.3 12.3 - 15.4 %    RDW-SD 54.2 (H) 37.0 - 54.0 fl    MPV 11.1 6.0 - 12.0 fL    Platelets 136 (L) 140 - 450 10*3/mm3   Magnesium   Result Value Ref Range    Magnesium 2.2 1.6 - 2.4 mg/dL   Phosphorus   Result Value Ref Range    Phosphorus 2.1 (L) 2.5 - 4.5 mg/dL   Phosphorus   Result Value Ref Range    Phosphorus 2.9 2.5 - 4.5 mg/dL   Comprehensive Metabolic Panel   Result Value Ref Range    Glucose 228 (H) 65 - 99 mg/dL    BUN 33 (H) 8 - 23 mg/dL    Creatinine 0.97 0.57 - 1.00 mg/dL    Sodium 139 136 - 145 mmol/L    Potassium 4.2 3.5 - 5.2 mmol/L    Chloride 102 98 - 107 mmol/L    CO2 26.0 22.0 - 29.0 mmol/L    Calcium 8.5 (L) 8.6 - 10.5 mg/dL    Total Protein 6.1 6.0 - 8.5 g/dL    Albumin 3.60 3.50 - 5.20 g/dL    ALT (SGPT) 14 1 - 33 U/L    AST (SGOT) 29 1 - 32 U/L    Alkaline Phosphatase 95 39 - 117 U/L    Total Bilirubin 1.3 (H) 0.2 - 1.2 mg/dL    eGFR Non African Amer 56 (L) >60 mL/min/1.73    Globulin 2.5 gm/dL    A/G Ratio 1.4 g/dL    BUN/Creatinine Ratio 34.0 (H) 7.0 - 25.0    Anion Gap 11.0 5.0 - 15.0 mmol/L   Magnesium   Result Value Ref Range    Magnesium 2.2 1.6 - 2.4 mg/dL   Lipid Panel   Result Value Ref Range    Total Cholesterol 68 0 - 200 mg/dL    Triglycerides 90 0 - 150 mg/dL    HDL Cholesterol 18 (L) 40 - 60 mg/dL    LDL Cholesterol  32 0 - 100 mg/dL    VLDL Cholesterol 18 mg/dL    LDL/HDL Ratio 1.78    CBC Auto Differential   Result Value Ref Range    WBC 9.96 3.40 - 10.80 10*3/mm3    RBC 3.55 (L) 3.77 - 5.28 10*6/mm3    Hemoglobin 10.8 (L) 12.0 - 15.9 g/dL    Hematocrit 35.4 34.0 - 46.6 %    MCV 99.7 (H)  79.0 - 97.0 fL    MCH 30.4 26.6 - 33.0 pg    MCHC 30.5 (L) 31.5 - 35.7 g/dL    RDW 15.1 12.3 - 15.4 %    RDW-SD 54.7 (H) 37.0 - 54.0 fl    MPV 11.3 6.0 - 12.0 fL    Platelets 162 140 - 450 10*3/mm3    Neutrophil % 69.6 42.7 - 76.0 %    Lymphocyte % 14.5 (L) 19.6 - 45.3 %    Monocyte % 10.6 5.0 - 12.0 %    Eosinophil % 4.1 0.3 - 6.2 %    Basophil % 0.7 0.0 - 1.5 %    Immature Grans % 0.5 0.0 - 0.5 %    Neutrophils, Absolute 6.93 1.70 - 7.00 10*3/mm3    Lymphocytes, Absolute 1.44 0.70 - 3.10 10*3/mm3    Monocytes, Absolute 1.06 (H) 0.10 - 0.90 10*3/mm3    Eosinophils, Absolute 0.41 (H) 0.00 - 0.40 10*3/mm3    Basophils, Absolute 0.07 0.00 - 0.20 10*3/mm3    Immature Grans, Absolute 0.05 0.00 - 0.05 10*3/mm3    nRBC 0.0 0.0 - 0.2 /100 WBC   Basic Metabolic Panel   Result Value Ref Range    Glucose 190 (H) 65 - 99 mg/dL    BUN 31 (H) 8 - 23 mg/dL    Creatinine 0.83 0.57 - 1.00 mg/dL    Sodium 141 136 - 145 mmol/L    Potassium 4.1 3.5 - 5.2 mmol/L    Chloride 104 98 - 107 mmol/L    CO2 27.0 22.0 - 29.0 mmol/L    Calcium 8.6 8.6 - 10.5 mg/dL    eGFR Non African Amer 67 >60 mL/min/1.73    BUN/Creatinine Ratio 37.3 (H) 7.0 - 25.0    Anion Gap 10.0 5.0 - 15.0 mmol/L   Phosphorus   Result Value Ref Range    Phosphorus 2.4 (L) 2.5 - 4.5 mg/dL   Sedimentation Rate   Result Value Ref Range    Sed Rate 21 0 - 30 mm/hr   CBC Auto Differential   Result Value Ref Range    WBC 12.71 (H) 3.40 - 10.80 10*3/mm3    RBC 3.44 (L) 3.77 - 5.28 10*6/mm3    Hemoglobin 10.8 (L) 12.0 - 15.9 g/dL    Hematocrit 32.9 (L) 34.0 - 46.6 %    MCV 95.6 79.0 - 97.0 fL    MCH 31.4 26.6 - 33.0 pg    MCHC 32.8 31.5 - 35.7 g/dL    RDW 14.7 12.3 - 15.4 %    RDW-SD 50.6 37.0 - 54.0 fl    MPV 10.9 6.0 - 12.0 fL    Platelets 191 140 - 450 10*3/mm3    Neutrophil % 74.1 42.7 - 76.0 %    Lymphocyte % 12.4 (L) 19.6 - 45.3 %    Monocyte % 9.0 5.0 - 12.0 %    Eosinophil % 3.2 0.3 - 6.2 %    Basophil % 0.8 0.0 - 1.5 %    Immature Grans % 0.5 0.0 - 0.5 %    Neutrophils,  Absolute 9.42 (H) 1.70 - 7.00 10*3/mm3    Lymphocytes, Absolute 1.57 0.70 - 3.10 10*3/mm3    Monocytes, Absolute 1.15 (H) 0.10 - 0.90 10*3/mm3    Eosinophils, Absolute 0.41 (H) 0.00 - 0.40 10*3/mm3    Basophils, Absolute 0.10 0.00 - 0.20 10*3/mm3    Immature Grans, Absolute 0.06 (H) 0.00 - 0.05 10*3/mm3    nRBC 0.0 0.0 - 0.2 /100 WBC   proBNP   Result Value Ref Range    proBNP 17,734.0 (H) 0.0-1,800.0 pg/mL   Procalcitonin   Result Value Ref Range    Procalcitonin 0.36 (H) 0.00 - 0.25 ng/mL   Renal Function Panel   Result Value Ref Range    Glucose 142 (H) 65 - 99 mg/dL    BUN 30 (H) 8 - 23 mg/dL    Creatinine 0.82 0.57 - 1.00 mg/dL    Sodium 143 136 - 145 mmol/L    Potassium 3.4 (L) 3.5 - 5.2 mmol/L    Chloride 103 98 - 107 mmol/L    CO2 29.0 22.0 - 29.0 mmol/L    Calcium 8.6 8.6 - 10.5 mg/dL    Albumin 3.30 (L) 3.50 - 5.20 g/dL    Phosphorus 3.4 2.5 - 4.5 mg/dL    Anion Gap 11.0 5.0 - 15.0 mmol/L    BUN/Creatinine Ratio 36.6 (H) 7.0 - 25.0    eGFR Non African Amer 68 >60 mL/min/1.73   Magnesium   Result Value Ref Range    Magnesium 2.1 1.6 - 2.4 mg/dL   CBC Auto Differential   Result Value Ref Range    WBC 12.00 (H) 3.40 - 10.80 10*3/mm3    RBC 3.51 (L) 3.77 - 5.28 10*6/mm3    Hemoglobin 10.7 (L) 12.0 - 15.9 g/dL    Hematocrit 33.7 (L) 34.0 - 46.6 %    MCV 96.0 79.0 - 97.0 fL    MCH 30.5 26.6 - 33.0 pg    MCHC 31.8 31.5 - 35.7 g/dL    RDW 15.0 12.3 - 15.4 %    RDW-SD 51.9 37.0 - 54.0 fl    MPV 11.2 6.0 - 12.0 fL    Platelets 202 140 - 450 10*3/mm3    Neutrophil % 70.2 42.7 - 76.0 %    Lymphocyte % 15.1 (L) 19.6 - 45.3 %    Monocyte % 9.4 5.0 - 12.0 %    Eosinophil % 4.0 0.3 - 6.2 %    Basophil % 0.8 0.0 - 1.5 %    Immature Grans % 0.5 0.0 - 0.5 %    Neutrophils, Absolute 8.42 (H) 1.70 - 7.00 10*3/mm3    Lymphocytes, Absolute 1.81 0.70 - 3.10 10*3/mm3    Monocytes, Absolute 1.13 (H) 0.10 - 0.90 10*3/mm3    Eosinophils, Absolute 0.48 (H) 0.00 - 0.40 10*3/mm3    Basophils, Absolute 0.10 0.00 - 0.20 10*3/mm3     Immature Grans, Absolute 0.06 (H) 0.00 - 0.05 10*3/mm3    nRBC 0.0 0.0 - 0.2 /100 WBC   Basic Metabolic Panel   Result Value Ref Range    Glucose 103 (H) 65 - 99 mg/dL    BUN 32 (H) 8 - 23 mg/dL    Creatinine 0.89 0.57 - 1.00 mg/dL    Sodium 144 136 - 145 mmol/L    Potassium 3.7 3.5 - 5.2 mmol/L    Chloride 103 98 - 107 mmol/L    CO2 31.0 (H) 22.0 - 29.0 mmol/L    Calcium 8.7 8.6 - 10.5 mg/dL    eGFR Non African Amer 62 >60 mL/min/1.73    BUN/Creatinine Ratio 36.0 (H) 7.0 - 25.0    Anion Gap 10.0 5.0 - 15.0 mmol/L   CBC Auto Differential   Result Value Ref Range    WBC 11.27 (H) 3.40 - 10.80 10*3/mm3    RBC 3.28 (L) 3.77 - 5.28 10*6/mm3    Hemoglobin 10.0 (L) 12.0 - 15.9 g/dL    Hematocrit 32.1 (L) 34.0 - 46.6 %    MCV 97.9 (H) 79.0 - 97.0 fL    MCH 30.5 26.6 - 33.0 pg    MCHC 31.2 (L) 31.5 - 35.7 g/dL    RDW 15.3 12.3 - 15.4 %    RDW-SD 54.4 (H) 37.0 - 54.0 fl    MPV 11.2 6.0 - 12.0 fL    Platelets 224 140 - 450 10*3/mm3    Neutrophil % 68.7 42.7 - 76.0 %    Lymphocyte % 16.1 (L) 19.6 - 45.3 %    Monocyte % 9.6 5.0 - 12.0 %    Eosinophil % 4.2 0.3 - 6.2 %    Basophil % 0.8 0.0 - 1.5 %    Immature Grans % 0.6 (H) 0.0 - 0.5 %    Neutrophils, Absolute 7.75 (H) 1.70 - 7.00 10*3/mm3    Lymphocytes, Absolute 1.81 0.70 - 3.10 10*3/mm3    Monocytes, Absolute 1.08 (H) 0.10 - 0.90 10*3/mm3    Eosinophils, Absolute 0.47 (H) 0.00 - 0.40 10*3/mm3    Basophils, Absolute 0.09 0.00 - 0.20 10*3/mm3    Immature Grans, Absolute 0.07 (H) 0.00 - 0.05 10*3/mm3    nRBC 0.0 0.0 - 0.2 /100 WBC   proBNP   Result Value Ref Range    proBNP 11,926.0 (H) 0.0-1,800.0 pg/mL   Renal Function Panel   Result Value Ref Range    Glucose 129 (H) 65 - 99 mg/dL    BUN 47 (H) 8 - 23 mg/dL    Creatinine 0.87 0.57 - 1.00 mg/dL    Sodium 140 136 - 145 mmol/L    Potassium 4.0 3.5 - 5.2 mmol/L    Chloride 101 98 - 107 mmol/L    CO2 29.0 22.0 - 29.0 mmol/L    Calcium 8.4 (L) 8.6 - 10.5 mg/dL    Albumin 3.20 (L) 3.50 - 5.20 g/dL    Phosphorus 3.2 2.5 - 4.5 mg/dL     Anion Gap 10.0 5.0 - 15.0 mmol/L    BUN/Creatinine Ratio 54.0 (H) 7.0 - 25.0    eGFR Non African Amer 63 >60 mL/min/1.73   CBC Auto Differential   Result Value Ref Range    WBC 9.66 3.40 - 10.80 10*3/mm3    RBC 3.12 (L) 3.77 - 5.28 10*6/mm3    Hemoglobin 9.6 (L) 12.0 - 15.9 g/dL    Hematocrit 31.4 (L) 34.0 - 46.6 %    .6 (H) 79.0 - 97.0 fL    MCH 30.8 26.6 - 33.0 pg    MCHC 30.6 (L) 31.5 - 35.7 g/dL    RDW 15.3 12.3 - 15.4 %    RDW-SD 56.0 (H) 37.0 - 54.0 fl    MPV 11.3 6.0 - 12.0 fL    Platelets 243 140 - 450 10*3/mm3    Neutrophil % 68.1 42.7 - 76.0 %    Lymphocyte % 18.4 (L) 19.6 - 45.3 %    Monocyte % 7.1 5.0 - 12.0 %    Eosinophil % 5.4 0.3 - 6.2 %    Basophil % 0.6 0.0 - 1.5 %    Immature Grans % 0.4 0.0 - 0.5 %    Neutrophils, Absolute 6.57 1.70 - 7.00 10*3/mm3    Lymphocytes, Absolute 1.78 0.70 - 3.10 10*3/mm3    Monocytes, Absolute 0.69 0.10 - 0.90 10*3/mm3    Eosinophils, Absolute 0.52 (H) 0.00 - 0.40 10*3/mm3    Basophils, Absolute 0.06 0.00 - 0.20 10*3/mm3    Immature Grans, Absolute 0.04 0.00 - 0.05 10*3/mm3    nRBC 0.0 0.0 - 0.2 /100 WBC   Sodium, Urine, Random - Urine, Clean Catch   Result Value Ref Range    Sodium, Urine 27 mmol/L   Nutrition Panel   Result Value Ref Range    Glucose 114 (H) 65 - 99 mg/dL    BUN 44 (H) 8 - 23 mg/dL    Creatinine 0.72 0.57 - 1.00 mg/dL    Sodium 141 136 - 145 mmol/L    Potassium 3.8 3.5 - 5.2 mmol/L    Chloride 102 98 - 107 mmol/L    CO2 27.0 22.0 - 29.0 mmol/L    Calcium 8.8 8.6 - 10.5 mg/dL    Alkaline Phosphatase 89 39 - 117 U/L    ALT (SGPT) 12 1 - 33 U/L    AST (SGOT) 24 1 - 32 U/L    Total Cholesterol 96 0 - 200 mg/dL    Triglycerides 92 0 - 150 mg/dL    Total Protein 6.3 6.0 - 8.5 g/dL    Albumin 3.20 (L) 3.50 - 5.20 g/dL    Phosphorus 3.4 2.5 - 4.5 mg/dL    Total Bilirubin 1.0 0.0 - 1.2 mg/dL    Magnesium 2.3 1.6 - 2.4 mg/dL    Anion Gap 12.0 5.0 - 15.0 mmol/L   Prealbumin   Result Value Ref Range    Prealbumin 13.4 (L) 20.0 - 40.0 mg/dL   C-reactive  Protein   Result Value Ref Range    C-Reactive Protein 4.01 (H) 0.00 - 0.50 mg/dL   Renal Function Panel   Result Value Ref Range    Glucose 114 (H) 65 - 99 mg/dL    BUN 44 (H) 8 - 23 mg/dL    Creatinine 0.72 0.57 - 1.00 mg/dL    Sodium 141 136 - 145 mmol/L    Potassium 3.8 3.5 - 5.2 mmol/L    Chloride 102 98 - 107 mmol/L    CO2 27.0 22.0 - 29.0 mmol/L    Calcium 8.8 8.6 - 10.5 mg/dL    Albumin 3.20 (L) 3.50 - 5.20 g/dL    Phosphorus 3.4 2.5 - 4.5 mg/dL    Anion Gap 12.0 5.0 - 15.0 mmol/L    BUN/Creatinine Ratio 61.1 (H) 7.0 - 25.0    eGFR Non African Amer 79 >60 mL/min/1.73   CBC Auto Differential   Result Value Ref Range    WBC 10.18 3.40 - 10.80 10*3/mm3    RBC 3.07 (L) 3.77 - 5.28 10*6/mm3    Hemoglobin 9.5 (L) 12.0 - 15.9 g/dL    Hematocrit 30.0 (L) 34.0 - 46.6 %    MCV 97.7 (H) 79.0 - 97.0 fL    MCH 30.9 26.6 - 33.0 pg    MCHC 31.7 31.5 - 35.7 g/dL    RDW 15.2 12.3 - 15.4 %    RDW-SD 53.9 37.0 - 54.0 fl    MPV 11.2 6.0 - 12.0 fL    Platelets 245 140 - 450 10*3/mm3    Neutrophil % 69.7 42.7 - 76.0 %    Lymphocyte % 16.4 (L) 19.6 - 45.3 %    Monocyte % 7.5 5.0 - 12.0 %    Eosinophil % 5.3 0.3 - 6.2 %    Basophil % 0.8 0.0 - 1.5 %    Immature Grans % 0.3 0.0 - 0.5 %    Neutrophils, Absolute 7.10 (H) 1.70 - 7.00 10*3/mm3    Lymphocytes, Absolute 1.67 0.70 - 3.10 10*3/mm3    Monocytes, Absolute 0.76 0.10 - 0.90 10*3/mm3    Eosinophils, Absolute 0.54 (H) 0.00 - 0.40 10*3/mm3    Basophils, Absolute 0.08 0.00 - 0.20 10*3/mm3    Immature Grans, Absolute 0.03 0.00 - 0.05 10*3/mm3    nRBC 0.0 0.0 - 0.2 /100 WBC   Hemoglobin & Hematocrit, Blood   Result Value Ref Range    Hemoglobin 9.9 (L) 12.0 - 15.9 g/dL    Hematocrit 30.5 (L) 34.0 - 46.6 %   Basic Metabolic Panel   Result Value Ref Range    Glucose 152 (H) 65 - 99 mg/dL    BUN 44 (H) 8 - 23 mg/dL    Creatinine 0.75 0.57 - 1.00 mg/dL    Sodium 135 (L) 136 - 145 mmol/L    Potassium 5.1 3.5 - 5.2 mmol/L    Chloride 96 (L) 98 - 107 mmol/L    CO2 20.0 (L) 22.0 - 29.0  mmol/L    Calcium 8.4 (L) 8.6 - 10.5 mg/dL    eGFR Non African Amer 75 >60 mL/min/1.73    BUN/Creatinine Ratio 58.7 (H) 7.0 - 25.0    Anion Gap 19.0 (H) 5.0 - 15.0 mmol/L   Hemoglobin A1c   Result Value Ref Range    Hemoglobin A1C 6.20 (H) 4.80 - 5.60 %   Basic Metabolic Panel   Result Value Ref Range    Glucose 196 (H) 65 - 99 mg/dL    BUN 39 (H) 8 - 23 mg/dL    Creatinine 0.69 0.57 - 1.00 mg/dL    Sodium 136 136 - 145 mmol/L    Potassium 4.1 3.5 - 5.2 mmol/L    Chloride 100 98 - 107 mmol/L    CO2 26.0 22.0 - 29.0 mmol/L    Calcium 8.8 8.6 - 10.5 mg/dL    eGFR Non African Amer 83 >60 mL/min/1.73    BUN/Creatinine Ratio 56.5 (H) 7.0 - 25.0    Anion Gap 10.0 5.0 - 15.0 mmol/L   CBC Auto Differential   Result Value Ref Range    WBC 14.83 (H) 3.40 - 10.80 10*3/mm3    RBC 3.02 (L) 3.77 - 5.28 10*6/mm3    Hemoglobin 9.2 (L) 12.0 - 15.9 g/dL    Hematocrit 29.7 (L) 34.0 - 46.6 %    MCV 98.3 (H) 79.0 - 97.0 fL    MCH 30.5 26.6 - 33.0 pg    MCHC 31.0 (L) 31.5 - 35.7 g/dL    RDW 15.6 (H) 12.3 - 15.4 %    RDW-SD 54.8 (H) 37.0 - 54.0 fl    MPV 10.8 6.0 - 12.0 fL    Platelets 285 140 - 450 10*3/mm3    Neutrophil % 81.2 (H) 42.7 - 76.0 %    Lymphocyte % 10.0 (L) 19.6 - 45.3 %    Monocyte % 5.7 5.0 - 12.0 %    Eosinophil % 2.1 0.3 - 6.2 %    Basophil % 0.5 0.0 - 1.5 %    Immature Grans % 0.5 0.0 - 0.5 %    Neutrophils, Absolute 12.05 (H) 1.70 - 7.00 10*3/mm3    Lymphocytes, Absolute 1.48 0.70 - 3.10 10*3/mm3    Monocytes, Absolute 0.85 0.10 - 0.90 10*3/mm3    Eosinophils, Absolute 0.31 0.00 - 0.40 10*3/mm3    Basophils, Absolute 0.07 0.00 - 0.20 10*3/mm3    Immature Grans, Absolute 0.07 (H) 0.00 - 0.05 10*3/mm3    nRBC 0.0 0.0 - 0.2 /100 WBC   Troponin   Result Value Ref Range    Troponin T 0.131 (C) 0.000 - 0.030 ng/mL   Troponin   Result Value Ref Range    Troponin T 0.131 (C) 0.000 - 0.030 ng/mL   proBNP   Result Value Ref Range    proBNP 10,067.0 (H) 0.0-1,800.0 pg/mL   Blood Gas, Arterial With Co-Ox   Result Value Ref Range     Site Left Radial     Jean's Test N/A     pH, Arterial 7.366 7.350 - 7.450 pH units    pCO2, Arterial 42.1 35.0 - 45.0 mm Hg    pO2, Arterial 137.0 (H) 83.0 - 108.0 mm Hg    HCO3, Arterial 24.1 20.0 - 26.0 mmol/L    Base Excess, Arterial -1.2 (L) 0.0 - 2.0 mmol/L    Hemoglobin, Blood Gas 10.2 (L) 14 - 18 g/dL    Hematocrit, Blood Gas 31.3 %    Oxyhemoglobin 96.8 94 - 99 %    Methemoglobin 0.60 0.00 - 1.50 %    Carboxyhemoglobin 1.8 0 - 2 %    CO2 Content 25.4 22 - 33 mmol/L    Temperature 37.0 C    Barometric Pressure for Blood Gas      Modality PRB     FIO2 80 %    Ventilator Mode       Note      pH, Temp Corrected 7.366 pH Units    pCO2, Temperature Corrected 42.1 35 - 45 mm Hg    pO2, Temperature Corrected 137 (H) 83 - 108 mm Hg   Comprehensive Metabolic Panel   Result Value Ref Range    Glucose 173 (H) 65 - 99 mg/dL    BUN 45 (H) 8 - 23 mg/dL    Creatinine 0.73 0.57 - 1.00 mg/dL    Sodium 136 136 - 145 mmol/L    Potassium 4.1 3.5 - 5.2 mmol/L    Chloride 100 98 - 107 mmol/L    CO2 22.0 22.0 - 29.0 mmol/L    Calcium 8.9 8.6 - 10.5 mg/dL    Total Protein 6.3 6.0 - 8.5 g/dL    Albumin 2.80 (L) 3.50 - 5.20 g/dL    ALT (SGPT) 13 1 - 33 U/L    AST (SGOT) 32 1 - 32 U/L    Alkaline Phosphatase 88 39 - 117 U/L    Total Bilirubin 0.8 0.0 - 1.2 mg/dL    eGFR Non African Amer 78 >60 mL/min/1.73    Globulin 3.5 gm/dL    A/G Ratio 0.8 g/dL    BUN/Creatinine Ratio 61.6 (H) 7.0 - 25.0    Anion Gap 14.0 5.0 - 15.0 mmol/L   Magnesium   Result Value Ref Range    Magnesium 2.4 1.6 - 2.4 mg/dL   Phosphorus   Result Value Ref Range    Phosphorus 2.8 2.5 - 4.5 mg/dL   CBC Auto Differential   Result Value Ref Range    WBC 13.21 (H) 3.40 - 10.80 10*3/mm3    RBC 2.89 (L) 3.77 - 5.28 10*6/mm3    Hemoglobin 8.9 (L) 12.0 - 15.9 g/dL    Hematocrit 29.2 (L) 34.0 - 46.6 %    .0 (H) 79.0 - 97.0 fL    MCH 30.8 26.6 - 33.0 pg    MCHC 30.5 (L) 31.5 - 35.7 g/dL    RDW 15.9 (H) 12.3 - 15.4 %    RDW-SD 56.0 (H) 37.0 - 54.0 fl    MPV 11.0  6.0 - 12.0 fL    Platelets 277 140 - 450 10*3/mm3    Neutrophil % 79.1 (H) 42.7 - 76.0 %    Lymphocyte % 11.4 (L) 19.6 - 45.3 %    Monocyte % 5.3 5.0 - 12.0 %    Eosinophil % 3.2 0.3 - 6.2 %    Basophil % 0.5 0.0 - 1.5 %    Immature Grans % 0.5 0.0 - 0.5 %    Neutrophils, Absolute 10.45 (H) 1.70 - 7.00 10*3/mm3    Lymphocytes, Absolute 1.50 0.70 - 3.10 10*3/mm3    Monocytes, Absolute 0.70 0.10 - 0.90 10*3/mm3    Eosinophils, Absolute 0.42 (H) 0.00 - 0.40 10*3/mm3    Basophils, Absolute 0.07 0.00 - 0.20 10*3/mm3    Immature Grans, Absolute 0.07 (H) 0.00 - 0.05 10*3/mm3    nRBC 0.0 0.0 - 0.2 /100 WBC   Protime-INR   Result Value Ref Range    Protime 16.9 (H) 11.5 - 14.0 Seconds    INR 1.41 (H) 0.85 - 1.16   aPTT   Result Value Ref Range    PTT 58.8 (H) 24.0 - 37.0 seconds   pH, Body Fluid - Pleural Fluid, Pleural Cavity   Result Value Ref Range    pH, Fluid 7.92    Protein, Body Fluid - Pleural Fluid, Pleural Cavity   Result Value Ref Range    Protein, Total, Fluid 2.9 g/dL   Lactate Dehydrogenase, Body Fluid - Pleural Fluid, Pleural Cavity   Result Value Ref Range    Lactate Dehydrogenase (LD), Fluid 314 U/L   Glucose, Body Fluid - Pleural Fluid, Pleural Cavity   Result Value Ref Range    Glucose, Fluid 174 mg/dL   Body fluid cell count - Pleural Fluid, Pleural Cavity   Result Value Ref Range    Color, Fluid Red     Appearance, Fluid Bloody (A) Clear    WBC, Fluid 2,160 /mm3    RBC, Fluid 103,000   /mm3   Body fluid differential - Pleural Fluid, Pleural Cavity   Result Value Ref Range    Neutrophils, Fluid 10 %    Lymphocytes, Fluid 28 %    Monocytes, Fluid 37 %    Mesothelial Cells, Fluid 10 %    Macrophage, Fluid 15 %   Comprehensive Metabolic Panel   Result Value Ref Range    Glucose 172 (H) 65 - 99 mg/dL    BUN 47 (H) 8 - 23 mg/dL    Creatinine 0.77 0.57 - 1.00 mg/dL    Sodium 137 136 - 145 mmol/L    Potassium 3.7 3.5 - 5.2 mmol/L    Chloride 100 98 - 107 mmol/L    CO2 26.0 22.0 - 29.0 mmol/L    Calcium 9.0 8.6 -  10.5 mg/dL    Total Protein 6.2 6.0 - 8.5 g/dL    Albumin 2.70 (L) 3.50 - 5.20 g/dL    ALT (SGPT) 18 1 - 33 U/L    AST (SGOT) 38 (H) 1 - 32 U/L    Alkaline Phosphatase 90 39 - 117 U/L    Total Bilirubin 0.8 0.0 - 1.2 mg/dL    eGFR Non African Amer 73 >60 mL/min/1.73    Globulin 3.5 gm/dL    A/G Ratio 0.8 g/dL    BUN/Creatinine Ratio 61.0 (H) 7.0 - 25.0    Anion Gap 11.0 5.0 - 15.0 mmol/L   Magnesium   Result Value Ref Range    Magnesium 2.1 1.6 - 2.4 mg/dL   Phosphorus   Result Value Ref Range    Phosphorus 2.7 2.5 - 4.5 mg/dL   Procalcitonin   Result Value Ref Range    Procalcitonin 0.21 0.00 - 0.25 ng/mL   BNP   Result Value Ref Range    proBNP 5,137.0 (H) 0.0-1,800.0 pg/mL   CBC Auto Differential   Result Value Ref Range    WBC 12.49 (H) 3.40 - 10.80 10*3/mm3    RBC 3.02 (L) 3.77 - 5.28 10*6/mm3    Hemoglobin 9.2 (L) 12.0 - 15.9 g/dL    Hematocrit 30.1 (L) 34.0 - 46.6 %    MCV 99.7 (H) 79.0 - 97.0 fL    MCH 30.5 26.6 - 33.0 pg    MCHC 30.6 (L) 31.5 - 35.7 g/dL    RDW 16.6 (H) 12.3 - 15.4 %    RDW-SD 55.8 (H) 37.0 - 54.0 fl    MPV 10.7 6.0 - 12.0 fL    Platelets 336 140 - 450 10*3/mm3    Neutrophil % 77.1 (H) 42.7 - 76.0 %    Lymphocyte % 12.2 (L) 19.6 - 45.3 %    Monocyte % 5.8 5.0 - 12.0 %    Eosinophil % 3.9 0.3 - 6.2 %    Basophil % 0.6 0.0 - 1.5 %    Immature Grans % 0.4 0.0 - 0.5 %    Neutrophils, Absolute 9.62 (H) 1.70 - 7.00 10*3/mm3    Lymphocytes, Absolute 1.53 0.70 - 3.10 10*3/mm3    Monocytes, Absolute 0.73 0.10 - 0.90 10*3/mm3    Eosinophils, Absolute 0.49 (H) 0.00 - 0.40 10*3/mm3    Basophils, Absolute 0.07 0.00 - 0.20 10*3/mm3    Immature Grans, Absolute 0.05 0.00 - 0.05 10*3/mm3    nRBC 0.0 0.0 - 0.2 /100 WBC   Comprehensive Metabolic Panel   Result Value Ref Range    Glucose 215 (H) 65 - 99 mg/dL    BUN 43 (H) 8 - 23 mg/dL    Creatinine 0.74 0.57 - 1.00 mg/dL    Sodium 135 (L) 136 - 145 mmol/L    Potassium 4.2 3.5 - 5.2 mmol/L    Chloride 102 98 - 107 mmol/L    CO2 25.0 22.0 - 29.0 mmol/L     Calcium 8.9 8.6 - 10.5 mg/dL    Total Protein 6.4 6.0 - 8.5 g/dL    Albumin 2.80 (L) 3.50 - 5.20 g/dL    ALT (SGPT) 21 1 - 33 U/L    AST (SGOT) 42 (H) 1 - 32 U/L    Alkaline Phosphatase 94 39 - 117 U/L    Total Bilirubin 0.7 0.0 - 1.2 mg/dL    eGFR Non African Amer 77 >60 mL/min/1.73    Globulin 3.6 gm/dL    A/G Ratio 0.8 g/dL    BUN/Creatinine Ratio 58.1 (H) 7.0 - 25.0    Anion Gap 8.0 5.0 - 15.0 mmol/L   Magnesium   Result Value Ref Range    Magnesium 2.2 1.6 - 2.4 mg/dL   Phosphorus   Result Value Ref Range    Phosphorus 3.3 2.5 - 4.5 mg/dL   CBC Auto Differential   Result Value Ref Range    WBC 10.01 3.40 - 10.80 10*3/mm3    RBC 2.98 (L) 3.77 - 5.28 10*6/mm3    Hemoglobin 9.0 (L) 12.0 - 15.9 g/dL    Hematocrit 30.0 (L) 34.0 - 46.6 %    .7 (H) 79.0 - 97.0 fL    MCH 30.2 26.6 - 33.0 pg    MCHC 30.0 (L) 31.5 - 35.7 g/dL    RDW 16.6 (H) 12.3 - 15.4 %    RDW-SD 57.2 (H) 37.0 - 54.0 fl    MPV 10.7 6.0 - 12.0 fL    Platelets 323 140 - 450 10*3/mm3    Neutrophil % 76.0 42.7 - 76.0 %    Lymphocyte % 12.8 (L) 19.6 - 45.3 %    Monocyte % 6.1 5.0 - 12.0 %    Eosinophil % 4.1 0.3 - 6.2 %    Basophil % 0.6 0.0 - 1.5 %    Immature Grans % 0.4 0.0 - 0.5 %    Neutrophils, Absolute 7.61 (H) 1.70 - 7.00 10*3/mm3    Lymphocytes, Absolute 1.28 0.70 - 3.10 10*3/mm3    Monocytes, Absolute 0.61 0.10 - 0.90 10*3/mm3    Eosinophils, Absolute 0.41 (H) 0.00 - 0.40 10*3/mm3    Basophils, Absolute 0.06 0.00 - 0.20 10*3/mm3    Immature Grans, Absolute 0.04 0.00 - 0.05 10*3/mm3    nRBC 0.0 0.0 - 0.2 /100 WBC   Comprehensive Metabolic Panel   Result Value Ref Range    Glucose 134 (H) 65 - 99 mg/dL    BUN 37 (H) 8 - 23 mg/dL    Creatinine 0.75 0.57 - 1.00 mg/dL    Sodium 142 136 - 145 mmol/L    Potassium 4.0 3.5 - 5.2 mmol/L    Chloride 106 98 - 107 mmol/L    CO2 27.0 22.0 - 29.0 mmol/L    Calcium 8.8 8.6 - 10.5 mg/dL    Total Protein 6.4 6.0 - 8.5 g/dL    Albumin 2.90 (L) 3.50 - 5.20 g/dL    ALT (SGPT) 18 1 - 33 U/L    AST (SGOT) 28 1 -  32 U/L    Alkaline Phosphatase 96 39 - 117 U/L    Total Bilirubin 1.0 0.0 - 1.2 mg/dL    eGFR Non African Amer 75 >60 mL/min/1.73    Globulin 3.5 gm/dL    A/G Ratio 0.8 g/dL    BUN/Creatinine Ratio 49.3 (H) 7.0 - 25.0    Anion Gap 9.0 5.0 - 15.0 mmol/L   Magnesium   Result Value Ref Range    Magnesium 2.1 1.6 - 2.4 mg/dL   Phosphorus   Result Value Ref Range    Phosphorus 3.9 2.5 - 4.5 mg/dL   CBC Auto Differential   Result Value Ref Range    WBC 10.79 3.40 - 10.80 10*3/mm3    RBC 3.12 (L) 3.77 - 5.28 10*6/mm3    Hemoglobin 9.6 (L) 12.0 - 15.9 g/dL    Hematocrit 31.5 (L) 34.0 - 46.6 %    .0 (H) 79.0 - 97.0 fL    MCH 30.8 26.6 - 33.0 pg    MCHC 30.5 (L) 31.5 - 35.7 g/dL    RDW 17.1 (H) 12.3 - 15.4 %    RDW-SD 59.9 (H) 37.0 - 54.0 fl    MPV 10.4 6.0 - 12.0 fL    Platelets 363 140 - 450 10*3/mm3    Neutrophil % 72.3 42.7 - 76.0 %    Lymphocyte % 14.5 (L) 19.6 - 45.3 %    Monocyte % 6.9 5.0 - 12.0 %    Eosinophil % 5.2 0.3 - 6.2 %    Basophil % 0.7 0.0 - 1.5 %    Immature Grans % 0.4 0.0 - 0.5 %    Neutrophils, Absolute 7.81 (H) 1.70 - 7.00 10*3/mm3    Lymphocytes, Absolute 1.56 0.70 - 3.10 10*3/mm3    Monocytes, Absolute 0.74 0.10 - 0.90 10*3/mm3    Eosinophils, Absolute 0.56 (H) 0.00 - 0.40 10*3/mm3    Basophils, Absolute 0.08 0.00 - 0.20 10*3/mm3    Immature Grans, Absolute 0.04 0.00 - 0.05 10*3/mm3    nRBC 0.0 0.0 - 0.2 /100 WBC   Nutrition Panel   Result Value Ref Range    Glucose 81 65 - 99 mg/dL    BUN 32 (H) 8 - 23 mg/dL    Creatinine 0.78 0.57 - 1.00 mg/dL    Sodium 144 136 - 145 mmol/L    Potassium 3.9 3.5 - 5.2 mmol/L    Chloride 106 98 - 107 mmol/L    CO2 26.0 22.0 - 29.0 mmol/L    Calcium 8.9 8.6 - 10.5 mg/dL    Alkaline Phosphatase 103 39 - 117 U/L    ALT (SGPT) 19 1 - 33 U/L    AST (SGOT) 35 (H) 1 - 32 U/L    Total Cholesterol 111 0 - 200 mg/dL    Triglycerides 81 0 - 150 mg/dL    Total Protein 6.7 6.0 - 8.5 g/dL    Albumin 3.00 (L) 3.50 - 5.20 g/dL    Phosphorus 3.9 2.5 - 4.5 mg/dL    Total  Bilirubin 1.1 0.0 - 1.2 mg/dL    Magnesium 2.2 1.6 - 2.4 mg/dL    Anion Gap 12.0 5.0 - 15.0 mmol/L   Prealbumin   Result Value Ref Range    Prealbumin 15.1 (L) 20.0 - 40.0 mg/dL   C-reactive Protein   Result Value Ref Range    C-Reactive Protein 7.47 (H) 0.00 - 0.50 mg/dL   CBC Auto Differential   Result Value Ref Range    WBC 10.56 3.40 - 10.80 10*3/mm3    RBC 3.26 (L) 3.77 - 5.28 10*6/mm3    Hemoglobin 10.0 (L) 12.0 - 15.9 g/dL    Hematocrit 32.9 (L) 34.0 - 46.6 %    .9 (H) 79.0 - 97.0 fL    MCH 30.7 26.6 - 33.0 pg    MCHC 30.4 (L) 31.5 - 35.7 g/dL    RDW 17.2 (H) 12.3 - 15.4 %    RDW-SD 61.9 (H) 37.0 - 54.0 fl    MPV 10.3 6.0 - 12.0 fL    Platelets 379 140 - 450 10*3/mm3    Neutrophil % 68.4 42.7 - 76.0 %    Lymphocyte % 17.7 (L) 19.6 - 45.3 %    Monocyte % 7.0 5.0 - 12.0 %    Eosinophil % 5.7 0.3 - 6.2 %    Basophil % 0.9 0.0 - 1.5 %    Immature Grans % 0.3 0.0 - 0.5 %    Neutrophils, Absolute 7.23 (H) 1.70 - 7.00 10*3/mm3    Lymphocytes, Absolute 1.87 0.70 - 3.10 10*3/mm3    Monocytes, Absolute 0.74 0.10 - 0.90 10*3/mm3    Eosinophils, Absolute 0.60 (H) 0.00 - 0.40 10*3/mm3    Basophils, Absolute 0.09 0.00 - 0.20 10*3/mm3    Immature Grans, Absolute 0.03 0.00 - 0.05 10*3/mm3    nRBC 0.0 0.0 - 0.2 /100 WBC   CBC (No Diff)   Result Value Ref Range    WBC 9.77 3.40 - 10.80 10*3/mm3    RBC 3.46 (L) 3.77 - 5.28 10*6/mm3    Hemoglobin 10.7 (L) 12.0 - 15.9 g/dL    Hematocrit 36.3 34.0 - 46.6 %    .9 (H) 79.0 - 97.0 fL    MCH 30.9 26.6 - 33.0 pg    MCHC 29.5 (L) 31.5 - 35.7 g/dL    RDW 17.0 (H) 12.3 - 15.4 %    RDW-SD 63.8 (H) 37.0 - 54.0 fl    MPV 10.4 6.0 - 12.0 fL    Platelets 376 140 - 450 10*3/mm3   Basic Metabolic Panel   Result Value Ref Range    Glucose 204 (H) 65 - 99 mg/dL    BUN 28 (H) 8 - 23 mg/dL    Creatinine 0.78 0.57 - 1.00 mg/dL    Sodium 142 136 - 145 mmol/L    Potassium 4.3 3.5 - 5.2 mmol/L    Chloride 105 98 - 107 mmol/L    CO2 27.0 22.0 - 29.0 mmol/L    Calcium 9.0 8.6 - 10.5 mg/dL     eGFR Non African Amer 72 >60 mL/min/1.73    BUN/Creatinine Ratio 35.9 (H) 7.0 - 25.0    Anion Gap 10.0 5.0 - 15.0 mmol/L   POC Glucose Once   Result Value Ref Range    Glucose 65 (L) 70 - 130 mg/dL   POC Glucose Once   Result Value Ref Range    Glucose 103 70 - 130 mg/dL   POC Activated Clotting Time   Result Value Ref Range    Activated Clotting Time  956 (H) 82 - 152 Seconds   POC Activated Clotting Time   Result Value Ref Range    Activated Clotting Time  874 (H) 82 - 152 Seconds   POC Activated Clotting Time   Result Value Ref Range    Activated Clotting Time  153 (H) 82 - 152 Seconds   POC Surgery Labs   Result Value Ref Range    Ionized Calcium 1.21 1.20 - 1.32 mmol/L    POC Potassium 3.3 (L) 3.5 - 4.9 mmol/L    Sodium 144 138 - 146 mmol/L    Total CO2 27 24 - 29 mmol/L    Hemoglobin 12.6 12.0 - 17.0 g/dL    Hematocrit 37 (L) 38 - 51 %    pCO2, Arterial 41.0 35 - 45 mm Hg    pO2, Arterial 63 (L) 80 - 105 mmHg    Base Excess 1.0000 -5 - 5 mmol/L    O2 Saturation, Arterial 92 (L) 95 - 98 %    pH, Arterial 7.40 7.35 - 7.6 pH units    HCO3, Arterial 25.5 22 - 26 mmol/L   POC Surgery Labs   Result Value Ref Range    Ionized Calcium 1.17 (L) 1.20 - 1.32 mmol/L    POC Potassium 2.9 (L) 3.5 - 4.9 mmol/L    Sodium 145 138 - 146 mmol/L    Total CO2 30 (H) 24 - 29 mmol/L    Hemoglobin 10.5 (L) 12.0 - 17.0 g/dL    Hematocrit 31 (L) 38 - 51 %    pCO2, Arterial 56.0 (H) 35 - 45 mm Hg    pO2, Arterial 422 (H) 80 - 105 mmHg    Base Excess 2.0000 -5 - 5 mmol/L    O2 Saturation, Arterial 100 (H) 95 - 98 %    pH, Arterial 7.31 (L) 7.35 - 7.6 pH units    HCO3, Arterial 28.5 (H) 22 - 26 mmol/L   POC Surgery Labs   Result Value Ref Range    Ionized Calcium 0.93 (L) 1.20 - 1.32 mmol/L    POC Potassium 4.2 3.5 - 4.9 mmol/L    Sodium 141 138 - 146 mmol/L    Total CO2 32 (H) 24 - 29 mmol/L    Hemoglobin 9.5 (L) 12.0 - 17.0 g/dL    Hematocrit 28 (L) 38 - 51 %    pCO2, Arterial 40.1 35 - 45 mm Hg    pO2, Arterial 436 (H) 80 - 105 mmHg     Base Excess 7.0000 (H) -5 - 5 mmol/L    O2 Saturation, Arterial 100 (H) 95 - 98 %    pH, Arterial 7.49 7.35 - 7.6 pH units    HCO3, Arterial 30.6 (H) 22 - 26 mmol/L   POC Activated Clotting Time   Result Value Ref Range    Activated Clotting Time  775 (H) 82 - 152 Seconds   POC Activated Clotting Time   Result Value Ref Range    Activated Clotting Time  142 82 - 152 Seconds   POC Activated Clotting Time   Result Value Ref Range    Activated Clotting Time  588 (H) 82 - 152 Seconds   POC Surgery Labs   Result Value Ref Range    Ionized Calcium 0.96 (L) 1.20 - 1.32 mmol/L    POC Potassium 4.2 3.5 - 4.9 mmol/L    Sodium 141 138 - 146 mmol/L    Total CO2 28 24 - 29 mmol/L    Hemoglobin 8.2 (L) 12.0 - 17.0 g/dL    Hematocrit 24 (L) 38 - 51 %    pCO2, Arterial 40.4 35 - 45 mm Hg    pO2, Arterial 55 (L) 80 - 105 mmHg    Base Excess 3.0000 -5 - 5 mmol/L    O2 Saturation, Arterial 89 (L) 95 - 98 %    pH, Arterial 7.44 7.35 - 7.6 pH units    HCO3, Arterial 27.2 (H) 22 - 26 mmol/L   POC Surgery Labs   Result Value Ref Range    Ionized Calcium 0.97 (L) 1.20 - 1.32 mmol/L    POC Potassium 4.1 3.5 - 4.9 mmol/L    Sodium 141 138 - 146 mmol/L    Total CO2 33 (H) 24 - 29 mmol/L    Hemoglobin 10.2 (L) 12.0 - 17.0 g/dL    Hematocrit 30 (L) 38 - 51 %    pCO2, Arterial 45.2 (H) 35 - 45 mm Hg    pO2, Arterial 383 (H) 80 - 105 mmHg    Base Excess 7.0000 (H) -5 - 5 mmol/L    O2 Saturation, Arterial 100 (H) 95 - 98 %    pH, Arterial 7.45 7.35 - 7.6 pH units    HCO3, Arterial 31.2 (H) 22 - 26 mmol/L     *Note: Due to a large number of results and/or encounters for the requested time period, some results have not been displayed. A complete set of results can be found in Results Review.         Assessment and Plan:   1. S/P CABG x 3 on 6/30/20  stable    2. Ischemic cardiomyopathy. LVEF 30%  Continue entresto,coreg    3. Essential hypertension  Well controlled on entresto, coreg     4. History of L MCA CVA  Continue pt and ot       .      It  has been a pleasure to participate in the care of this patient.  Patient was instructed to call the [facility] with any questions, concerns, or worsening symptoms.    *Please note that portions of this note were completed with a voice recognition program. Efforts were made to edit the dictations, but occasionally words are mistranscribed.

## 2020-08-24 ENCOUNTER — EPISODE CHANGES (OUTPATIENT)
Dept: CASE MANAGEMENT | Facility: OTHER | Age: 75
End: 2020-08-24

## 2020-08-24 LAB
FUNGUS WND CULT: NORMAL
MYCOBACTERIUM SPEC CULT: NORMAL
NIGHT BLUE STAIN TISS: NORMAL

## 2020-08-31 ENCOUNTER — OFFICE VISIT (OUTPATIENT)
Dept: INTERNAL MEDICINE | Facility: CLINIC | Age: 75
End: 2020-08-31

## 2020-08-31 VITALS
TEMPERATURE: 96.8 F | WEIGHT: 149 LBS | SYSTOLIC BLOOD PRESSURE: 142 MMHG | DIASTOLIC BLOOD PRESSURE: 70 MMHG | BODY MASS INDEX: 25.44 KG/M2 | HEIGHT: 64 IN

## 2020-08-31 DIAGNOSIS — Z79.4 TYPE 2 DIABETES MELLITUS WITH COMPLICATION, WITH LONG-TERM CURRENT USE OF INSULIN (HCC): Primary | Chronic | ICD-10-CM

## 2020-08-31 DIAGNOSIS — I69.320 APHASIA FOLLOWING CEREBRAL INFARCTION: ICD-10-CM

## 2020-08-31 DIAGNOSIS — E11.8 TYPE 2 DIABETES MELLITUS WITH COMPLICATION, WITH LONG-TERM CURRENT USE OF INSULIN (HCC): Primary | Chronic | ICD-10-CM

## 2020-08-31 PROCEDURE — 99213 OFFICE O/P EST LOW 20 MIN: CPT | Performed by: INTERNAL MEDICINE

## 2020-08-31 RX ORDER — POTASSIUM CHLORIDE 750 MG/1
10 TABLET, FILM COATED, EXTENDED RELEASE ORAL 2 TIMES DAILY
Qty: 60 TABLET | Refills: 5 | Status: SHIPPED | OUTPATIENT
Start: 2020-08-31 | End: 2021-03-04

## 2020-08-31 NOTE — PROGRESS NOTES
"Subjective   Beryl Montoya is a 75 y.o. female here for follow-up DMII and post stroke.  DMII: Patient back on 75/25 insulin and has been doing well.  Patient's  monitors glucose throughout the day and it has been 150 or less.  No hyperglycemia or hypoglycemia.  Patient personality is coming back following her recent stroke.  She still struggles with short-term memory.   says that she recognizes people's faces but does not know the name.  She cannot read or write anymore.  She is still working with speech therapy but he is a bit disappointed that they are coming out more frequently.  Following her first stroke, she saw speech therapy and he like to get better so he is considering taking her back there.  He does not note any new needs.    I have reviewed the following portions of the patient's history and confirmed they are accurate: current medications, past medical history, past social history and problem list     I have personally completed the patient's review of systems.    Review of Systems:  General: negative  Endo: Negative  Neuro: See HPI  Psych: negative    Objective   /70 (BP Location: Left arm, Patient Position: Sitting, Cuff Size: Adult)   Temp 96.8 °F (36 °C) (Temporal)   Ht 162.6 cm (64\")   Wt 67.6 kg (149 lb)   BMI 25.58 kg/m²     Physical Exam   Constitutional: She appears well-developed and well-nourished.   Pulmonary/Chest: Effort normal.   Neurological: She is alert.   Has simple responses but seems to understand   Skin: Skin is warm and dry.   Psychiatric: She has a normal mood and affect. Her behavior is normal. Judgment and thought content normal.   Vitals reviewed.      Assessment/Plan   Beryl was seen today for follow-up.    Diagnoses and all orders for this visit:    Type 2 diabetes mellitus with complication, with long-term current use of insulin (CMS/[facility])  -glucose stable, continue 75/25 at present dose    Aphasia following cerebral infarction  -Somewhat " improving, patient still working with speech therapy.  May consider speech therapy at a different facility, patient's  to let me know    Other orders  -     potassium chloride (K-DUR) 10 MEQ CR tablet; Take 1 tablet by mouth 2 (Two) Times a Day.             Whitney Aquino MA    Please note that portions of this note were completed with a voice recognition program. Efforts were made to edit the dictations, but occasionally words are mistranscribed.

## 2020-09-14 ENCOUNTER — OUTSIDE FACILITY SERVICE (OUTPATIENT)
Dept: INTERNAL MEDICINE | Facility: CLINIC | Age: 75
End: 2020-09-14

## 2020-09-14 PROCEDURE — G0180 MD CERTIFICATION HHA PATIENT: HCPCS | Performed by: INTERNAL MEDICINE

## 2020-09-17 DIAGNOSIS — E78.5 HYPERLIPIDEMIA, UNSPECIFIED HYPERLIPIDEMIA TYPE: ICD-10-CM

## 2020-09-17 RX ORDER — ATORVASTATIN CALCIUM 80 MG/1
80 TABLET, FILM COATED ORAL DAILY
Qty: 90 TABLET | Refills: 2 | Status: SHIPPED | OUTPATIENT
Start: 2020-09-17 | End: 2021-06-29

## 2020-09-18 ENCOUNTER — PATIENT OUTREACH (OUTPATIENT)
Dept: CASE MANAGEMENT | Facility: OTHER | Age: 75
End: 2020-09-18

## 2020-09-18 NOTE — OUTREACH NOTE
Care Plan Note      Responses   Care Gaps Addressed  Colon Cancer Screening, Diabetic A1C, Diabetic Eye Exam, Pneumonia Vaccine   Colon Cancer Screening Type  Colonoscopy   Colonoscopy Status  -- [2014]   HbA1c Status  -- [6.2  7/10/20]   Diabetic Eye Exam Status  Up to Date   Diabetic Eye Exam Completion at [facility] or Other  Other   Other Location  Dr. Maria in [place]   8/31/2020   Pneumonia Vaccine Status  Up to Date   Specific Disease Process Teaching  Diabetes, Hypertension   Other Patient Education/Resources   24/7 [facility] Healthcare Nurse Call Line   24/7 Nurse Call Line Education Method  Verbal   Does patient have depression diagnosis?  No   Advanced Directives:  Patient Has   Ed Visits past 12 months:  None   Hospitalizations past 12 months  2 or 3   Goal Progress  Making Progress Toward Goal(s)        The main concerns and/or symptoms the patient would like to address are:  Contacting pt regarding any case management needs after hospitalization, [facility], and then home health with PT and OT.   answered phone.  States she is doing good physically.  Ambulates without any assistive device and denies any recent falls.  She can shower by herself, but he is there to assist her in and out and if needed.  States they have released her from PT.  He checks her blood glucose and blood pressure at home, but not regularly.  Both has been good when he checks.  He manages her medicines, her medical appts, is her transportation and does the cooking.  States she has decreased vision since stroke and although can carry on a conversation with him, she struggles with words.  States recognizes people, but cannot come up with their names.  He denies any transportation issues or food insecurities.  Care gaps discussed.  Active on [employer] and she does have Living Will.  Denies any needs at present and voiced his appreciation for the call.     Education/instruction provided by Care Coordinator:  Role of   explained and contact number given and encouraged to call for any future needs.   [facility] 24/7 Nurse line explained and contact number provided.      Follow Up Outreach Due:  As needed     Terese Ocampo RN  Ambulatory     9/18/2020, 16:33 EDT

## 2020-09-18 NOTE — OUTREACH NOTE
Care Coordination Assessment    Documented/Reviewed By: Terese Ocampo RN Date/time: 9/18/2020  4:27 PM   Assessment completed with: spouse or significant other  Living arrangement: spouse  Support system: spouse  Type of residence: private residence  Equipment used at home:  (Comment: bp monitor    blood glucose monitor)  Bed or wheelchair confined: No  Inadequate nutrition: No  Medication adherence problem: No  Experiencing side effects from current medications: No  History of fall(s) in last 6 months: No  Difficulty keeping appointments: No  Family aware of the patient's advance care planning wishes: Yes (Comment: Has Living Will )

## 2020-10-21 DIAGNOSIS — Z79.4 TYPE 2 DIABETES MELLITUS WITH COMPLICATION, WITH LONG-TERM CURRENT USE OF INSULIN (HCC): Chronic | ICD-10-CM

## 2020-10-21 DIAGNOSIS — E11.8 TYPE 2 DIABETES MELLITUS WITH COMPLICATION, WITH LONG-TERM CURRENT USE OF INSULIN (HCC): Chronic | ICD-10-CM

## 2020-10-22 RX ORDER — BLOOD-GLUCOSE METER
KIT MISCELLANEOUS
Qty: 300 EACH | Refills: 0 | Status: SHIPPED | OUTPATIENT
Start: 2020-10-22 | End: 2021-06-21

## 2020-11-18 ENCOUNTER — TELEPHONE (OUTPATIENT)
Dept: INTERNAL MEDICINE | Facility: CLINIC | Age: 75
End: 2020-11-18

## 2020-11-18 NOTE — TELEPHONE ENCOUNTER
ISAAC, PHYSICAL THERAPIST CALLED FROM [facility] AND NEEDING VERBAL ORDER FOR PHY THERAPY    PT 1 WEEK 1  2 WEEK 3    ISAAC: [phone]

## 2020-11-19 ENCOUNTER — TELEPHONE (OUTPATIENT)
Dept: INTERNAL MEDICINE | Facility: CLINIC | Age: 75
End: 2020-11-19

## 2020-11-23 RX ORDER — FLASH GLUCOSE SENSOR
KIT MISCELLANEOUS
Qty: 2 EACH | Refills: 11 | Status: SHIPPED | OUTPATIENT
Start: 2020-11-23 | End: 2021-12-01

## 2020-11-23 RX ORDER — PANTOPRAZOLE SODIUM 40 MG/1
TABLET, DELAYED RELEASE ORAL
Qty: 90 TABLET | Refills: 3 | Status: SHIPPED | OUTPATIENT
Start: 2020-11-23 | End: 2021-12-13

## 2020-11-25 ENCOUNTER — OUTSIDE FACILITY SERVICE (OUTPATIENT)
Dept: INTERNAL MEDICINE | Facility: CLINIC | Age: 75
End: 2020-11-25

## 2020-11-25 PROCEDURE — G0180 MD CERTIFICATION HHA PATIENT: HCPCS | Performed by: INTERNAL MEDICINE

## 2020-11-30 ENCOUNTER — LAB (OUTPATIENT)
Dept: LAB | Facility: HOSPITAL | Age: 75
End: 2020-11-30

## 2020-11-30 ENCOUNTER — OFFICE VISIT (OUTPATIENT)
Dept: INTERNAL MEDICINE | Facility: CLINIC | Age: 75
End: 2020-11-30

## 2020-11-30 VITALS
DIASTOLIC BLOOD PRESSURE: 80 MMHG | SYSTOLIC BLOOD PRESSURE: 142 MMHG | TEMPERATURE: 96.9 F | HEIGHT: 64 IN | BODY MASS INDEX: 27.01 KG/M2 | WEIGHT: 158.2 LBS

## 2020-11-30 DIAGNOSIS — Z79.4 TYPE 2 DIABETES MELLITUS WITH COMPLICATION, WITH LONG-TERM CURRENT USE OF INSULIN (HCC): ICD-10-CM

## 2020-11-30 DIAGNOSIS — R56.9 NEW ONSET SEIZURE (HCC): Primary | ICD-10-CM

## 2020-11-30 DIAGNOSIS — E11.8 TYPE 2 DIABETES MELLITUS WITH COMPLICATION, WITH LONG-TERM CURRENT USE OF INSULIN (HCC): ICD-10-CM

## 2020-11-30 DIAGNOSIS — I10 ESSENTIAL HYPERTENSION: Chronic | ICD-10-CM

## 2020-11-30 DIAGNOSIS — I69.320 APHASIA FOLLOWING CEREBRAL INFARCTION: ICD-10-CM

## 2020-11-30 LAB
BASOPHILS # BLD AUTO: 0.1 10*3/MM3 (ref 0–0.2)
BASOPHILS NFR BLD AUTO: 1.5 % (ref 0–1.5)
DEPRECATED RDW RBC AUTO: 43.8 FL (ref 37–54)
EOSINOPHIL # BLD AUTO: 0.32 10*3/MM3 (ref 0–0.4)
EOSINOPHIL NFR BLD AUTO: 4.9 % (ref 0.3–6.2)
ERYTHROCYTE [DISTWIDTH] IN BLOOD BY AUTOMATED COUNT: 12.9 % (ref 12.3–15.4)
HCT VFR BLD AUTO: 38.6 % (ref 34–46.6)
HGB BLD-MCNC: 12.3 G/DL (ref 12–15.9)
IMM GRANULOCYTES # BLD AUTO: 0.03 10*3/MM3 (ref 0–0.05)
IMM GRANULOCYTES NFR BLD AUTO: 0.5 % (ref 0–0.5)
LYMPHOCYTES # BLD AUTO: 1.76 10*3/MM3 (ref 0.7–3.1)
LYMPHOCYTES NFR BLD AUTO: 27.2 % (ref 19.6–45.3)
MCH RBC QN AUTO: 29.9 PG (ref 26.6–33)
MCHC RBC AUTO-ENTMCNC: 31.9 G/DL (ref 31.5–35.7)
MCV RBC AUTO: 93.9 FL (ref 79–97)
MONOCYTES # BLD AUTO: 0.44 10*3/MM3 (ref 0.1–0.9)
MONOCYTES NFR BLD AUTO: 6.8 % (ref 5–12)
NEUTROPHILS NFR BLD AUTO: 3.82 10*3/MM3 (ref 1.7–7)
NEUTROPHILS NFR BLD AUTO: 59.1 % (ref 42.7–76)
NRBC BLD AUTO-RTO: 0 /100 WBC (ref 0–0.2)
PLATELET # BLD AUTO: 280 10*3/MM3 (ref 140–450)
PMV BLD AUTO: 10.8 FL (ref 6–12)
RBC # BLD AUTO: 4.11 10*6/MM3 (ref 3.77–5.28)
WBC # BLD AUTO: 6.47 10*3/MM3 (ref 3.4–10.8)

## 2020-11-30 PROCEDURE — 99214 OFFICE O/P EST MOD 30 MIN: CPT | Performed by: INTERNAL MEDICINE

## 2020-11-30 PROCEDURE — 83036 HEMOGLOBIN GLYCOSYLATED A1C: CPT | Performed by: INTERNAL MEDICINE

## 2020-11-30 PROCEDURE — 85025 COMPLETE CBC W/AUTO DIFF WBC: CPT | Performed by: INTERNAL MEDICINE

## 2020-11-30 PROCEDURE — 80053 COMPREHEN METABOLIC PANEL: CPT | Performed by: INTERNAL MEDICINE

## 2020-11-30 RX ORDER — RIVAROXABAN 15 MG-20MG
KIT ORAL
COMMUNITY
Start: 2020-11-13 | End: 2020-11-30

## 2020-11-30 RX ORDER — ISOSORBIDE MONONITRATE 30 MG/1
30 TABLET, EXTENDED RELEASE ORAL DAILY
COMMUNITY
Start: 2020-11-13 | End: 2020-11-30 | Stop reason: SDUPTHER

## 2020-11-30 RX ORDER — ISOSORBIDE MONONITRATE 30 MG/1
30 TABLET, EXTENDED RELEASE ORAL DAILY
Qty: 90 TABLET | Refills: 3 | Status: SHIPPED | OUTPATIENT
Start: 2020-11-30 | End: 2021-12-06

## 2020-11-30 RX ORDER — LEVETIRACETAM 1000 MG/1
500 TABLET ORAL 2 TIMES DAILY
COMMUNITY
Start: 2020-11-13 | End: 2021-01-05 | Stop reason: SDUPTHER

## 2020-11-30 NOTE — PROGRESS NOTES
"Subjective   Beryl Montoya is a 75 y.o. female here for follow-up recent hospitalization at [facility] 11/9-11/13 for vfib arrest/torsades due to new onset sz. She was intubated due to acute respiratory failure and did well and was extubated. She is newly on keppra and xarelto. She is on 15mg BID xarelto but does have the plan to go to 20mg QD. She was not able to tolerate the keppra 1000mg BID and is now on 500mg BID.  notes that on the 1000mg BID she was \"completely out of it\" and slept all the time. She has done well on 500mg BID and no sz like activity. She is still working with PT and speech with HH due to expressive aphasia and weakness following embolic CVA.  says she is improving slowly but she is improving. Getting appetite back where she had been eating little. Drinking ok. DMII: no lows, those meds haven't changed recently. She is also newly on imdur for HTN which she has tolerated well. She sees Dr. Faulkner with cardio. She had seen neuro but not actively seeing them.  didn't see a benefit to return to neuro in the past.  She doesn't drive, needs help with all ADLs so doesn't bathe alone.    I have reviewed the following portions of the patient's history and confirmed they are accurate: current medications, past medical history, past social history and problem list     I have personally completed the patient's review of systems.    Review of Systems:  General: negative  CV: negative  Respiratory: negative  Neuro: see hpi  Psych: negative  Heme: negative    Objective   /80   Temp 96.9 °F (36.1 °C) (Temporal)   Ht 162.6 cm (64\")   Wt 71.8 kg (158 lb 3.2 oz)   BMI 27.15 kg/m²     Physical Exam  Vitals signs reviewed.   Constitutional:       Appearance: Normal appearance. She is well-developed and normal weight.   HENT:      Head: Normocephalic and atraumatic.   Eyes:      Conjunctiva/sclera: Conjunctivae normal.   Pulmonary:      Effort: Pulmonary effort is normal. "   Skin:     General: Skin is warm and dry.   Neurological:      Mental Status: She is alert.      Cranial Nerves: Dysarthria present.      Motor: Weakness present.      Gait: Gait abnormal.      Comments: Expressive aphasia         Assessment/Plan   Diagnoses and all orders for this visit:    1. New onset seizure (CMS/HCC) (Primary)  -     CBC & Differential  -     Comprehensive Metabolic Panel  -     CBC Auto Differential  -likely 2/2 CVA. Pt only tolerating keppra 500mg BID, will continue that but will ultimately need neuro f/u    2. Type 2 diabetes mellitus with complication, with long-term current use of insulin (CMS/HCC)  -     Hemoglobin A1c  -chronic, labs for maintenance    3. Aphasia following cerebral infarction  -improving, still in     4. Essential hypertension  -a bit elevated today though does have cardio f/u scheduled  -     isosorbide mononitrate (IMDUR) 30 MG 24 hr tablet; Take 1 tablet by mouth Daily.  Dispense: 90 tablet; Refill: 3             Whitney Aquino MA    Please note that portions of this note were completed with a voice recognition program. Efforts were made to edit the dictations, but occasionally words are mistranscribed.

## 2020-12-01 DIAGNOSIS — R56.9 NEW ONSET SEIZURE (HCC): Primary | ICD-10-CM

## 2020-12-01 DIAGNOSIS — I63.9 ACUTE CVA (CEREBROVASCULAR ACCIDENT) (HCC): ICD-10-CM

## 2020-12-01 LAB
ALBUMIN SERPL-MCNC: 3.8 G/DL (ref 3.5–5.2)
ALBUMIN/GLOB SERPL: 1.1 G/DL
ALP SERPL-CCNC: 128 U/L (ref 39–117)
ALT SERPL W P-5'-P-CCNC: 6 U/L (ref 1–33)
ANION GAP SERPL CALCULATED.3IONS-SCNC: 10 MMOL/L (ref 5–15)
AST SERPL-CCNC: 15 U/L (ref 1–32)
BILIRUB SERPL-MCNC: 0.7 MG/DL (ref 0–1.2)
BUN SERPL-MCNC: 9 MG/DL (ref 8–23)
BUN/CREAT SERPL: 9.1 (ref 7–25)
CALCIUM SPEC-SCNC: 9.2 MG/DL (ref 8.6–10.5)
CHLORIDE SERPL-SCNC: 103 MMOL/L (ref 98–107)
CO2 SERPL-SCNC: 25 MMOL/L (ref 22–29)
CREAT SERPL-MCNC: 0.99 MG/DL (ref 0.57–1)
GFR SERPL CREATININE-BSD FRML MDRD: 55 ML/MIN/1.73
GLOBULIN UR ELPH-MCNC: 3.5 GM/DL
GLUCOSE SERPL-MCNC: 254 MG/DL (ref 65–99)
HBA1C MFR BLD: 7.3 % (ref 4.8–5.6)
POTASSIUM SERPL-SCNC: 4.5 MMOL/L (ref 3.5–5.2)
PROT SERPL-MCNC: 7.3 G/DL (ref 6–8.5)
SODIUM SERPL-SCNC: 138 MMOL/L (ref 136–145)

## 2020-12-02 RX ORDER — CARVEDILOL 12.5 MG/1
TABLET ORAL
Qty: 180 TABLET | Refills: 3 | Status: SHIPPED | OUTPATIENT
Start: 2020-12-02 | End: 2022-05-09

## 2020-12-04 ENCOUNTER — TELEPHONE (OUTPATIENT)
Dept: INTERNAL MEDICINE | Facility: CLINIC | Age: 75
End: 2020-12-04

## 2020-12-04 NOTE — TELEPHONE ENCOUNTER
----- Message from Argelia Gamez MD sent at 12/4/2020  9:19 AM EST -----  Please call the patient regarding her abnormal result. Tell  A1c has gone up to 7.3%. I don't want to change meds just yet as she is just getting her appetite back. Will monitor closely.

## 2020-12-11 ENCOUNTER — TELEPHONE (OUTPATIENT)
Dept: INTERNAL MEDICINE | Facility: CLINIC | Age: 75
End: 2020-12-11

## 2020-12-11 NOTE — TELEPHONE ENCOUNTER
Jolly @ [facility] home palliative care needs the 2 last office notes to be faxed to [phone]. Is Dr Jimenez agreeable to home palliative care. Per [facility] states patient is experiencing more pain, weakness and a history of stroke and is declining. Please call Jolly if any questions or concerns.

## 2021-01-05 RX ORDER — LEVETIRACETAM 1000 MG/1
500 TABLET ORAL 2 TIMES DAILY
Qty: 60 TABLET | Refills: 6 | Status: SHIPPED | OUTPATIENT
Start: 2021-01-05 | End: 2022-03-21

## 2021-01-07 ENCOUNTER — OFFICE VISIT (OUTPATIENT)
Dept: NEUROLOGY | Facility: CLINIC | Age: 76
End: 2021-01-07

## 2021-01-07 VITALS
WEIGHT: 158 LBS | TEMPERATURE: 97.3 F | BODY MASS INDEX: 26.98 KG/M2 | OXYGEN SATURATION: 98 % | HEART RATE: 94 BPM | HEIGHT: 64 IN | DIASTOLIC BLOOD PRESSURE: 86 MMHG | SYSTOLIC BLOOD PRESSURE: 144 MMHG

## 2021-01-07 DIAGNOSIS — R56.9 SEIZURE (HCC): ICD-10-CM

## 2021-01-07 DIAGNOSIS — I63.9 ACUTE CVA (CEREBROVASCULAR ACCIDENT) (HCC): Primary | ICD-10-CM

## 2021-01-07 PROCEDURE — 99213 OFFICE O/P EST LOW 20 MIN: CPT | Performed by: PSYCHIATRY & NEUROLOGY

## 2021-01-07 NOTE — PROGRESS NOTES
"Chief Complaint  Acute Cerebrovascular Accident and New Onset Seizure    Subjective          Beryl Montoya presents to [facility] for seizures.    History of Present Illness    75 y.o. female referred by Argelia Augustin for new onset seizure, stroke.     Previous pt of mine last seen on 8/15/2017 for L MCA infarct.  A1C 13.3    Started on Keppra in [facility].        Reviewed medical records:    Admitted [facility] 11/9/20 - 11/13/20 for ARF, AMS, VF.  Pt found in cardiac arrest with V fib and had post arrest encephalopathy.  EF 30 - 35%.      MRI Brain, encephalomalacia left temporal and occipital lobes, sub acute L occipital infarct    Admitted 6/30/20 - 7/20/20 for severe 3 vessel CAD, s/p CABG x 3, pericarditis, post op embolic L occipital and R PCA infarcts.  L carotid stenosis 50 -60%    HCT, my review of films, 7/14/20 L MCA and PCA infarcts    Objective   Vital Signs:   /86   Pulse 94   Temp 97.3 °F (36.3 °C)   Ht 162.6 cm (64\")   Wt 71.7 kg (158 lb)   SpO2 98%   BMI 27.12 kg/m²     Physical Exam  Eyes:      Extraocular Movements: EOM normal.        Neurologic Exam     Mental Status   Oriented to person.   Disoriented to year and date. Oriented to month.   Attention: decreased. Concentration: decreased.   Knowledge: poor.   Abnormal comprehension.     Cranial Nerves     CN III, IV, VI   Extraocular motions are normal.     CN V   Facial sensation intact.     CN VII   Facial expression full, symmetric.     CN VIII   CN VIII normal.     Gait, Coordination, and Reflexes     Gait  Gait: wide-basedGait veers to right         Result Review :   The following data was reviewed by: Justice Gonzalez MD on 01/07/2021:  Common labs    Common Labsle 7/17/20 7/17/20 7/18/20 7/18/20 11/30/20 11/30/20 11/30/20    0536 0536 0534 1048 1053 1053 1053   BUN  32 (A)  28 (A)   9   Creatinine  0.78  0.78   0.99   eGFR Non African Am    72   55 (A)   Sodium  144  142   138   Potassium  3.9  4.3 "   4.5   Chloride  106  105   103   Calcium  8.9  9.0   9.2   Albumin  3.00 (A)     3.80   Total Bilirubin  1.1     0.7   Alkaline Phosphatase  103     128 (A)   AST (SGOT)  35 (A)     15   ALT (SGPT)  19     6   WBC 10.56  9.77  6.47     Hemoglobin 10.0 (A)  10.7 (A)  12.3     Hematocrit 32.9 (A)  36.3  38.6     Platelets 379  376  280     Triglycerides  81        Hemoglobin A1C      7.30 (A)    (A) Abnormal value       Comments are available for some flowsheets but are not being displayed.           CMP    CMP 7/17/20 7/18/20 11/30/20   BUN 32 (A) 28 (A) 9   Creatinine 0.78 0.78 0.99   eGFR Non African Am  72 55 (A)   Sodium 144 142 138   Potassium 3.9 4.3 4.5   Chloride 106 105 103   Calcium 8.9 9.0 9.2   Albumin 3.00 (A)  3.80   Total Bilirubin 1.1  0.7   Alkaline Phosphatase 103  128 (A)   AST (SGOT) 35 (A)  15   ALT (SGPT) 19  6   (A) Abnormal value       Comments are available for some flowsheets but are not being displayed.           CBC    CBC 7/17/20 7/18/20 11/30/20   WBC 10.56 9.77 6.47   RBC 3.26 (A) 3.46 (A) 4.11   Hemoglobin 10.0 (A) 10.7 (A) 12.3   Hematocrit 32.9 (A) 36.3 38.6   .9 (A) 104.9 (A) 93.9   MCH 30.7 30.9 29.9   MCHC 30.4 (A) 29.5 (A) 31.9   RDW 17.2 (A) 17.0 (A) 12.9   Platelets 379 376 280   (A) Abnormal value                Most Recent A1C    HGBA1C Most Recent 11/30/20   Hemoglobin A1C 7.30 (A)   (A) Abnormal value            Data reviewed: Radiologic studies MRI Brain, HCT and cardiology studies Echo          Assessment and Plan    Problem List Items Addressed This Visit        Other    Acute postop embolic left occipital CVA 7/4/20. Extensive in L PCA distribution but additional areas in R PCA distribution (CMS/HCC) - Primary (Chronic)    Current Assessment & Plan     No new or worsening sx     Continue Xarelto, statin          Seizure (CMS/HCC) (Chronic)    Current Assessment & Plan     No further sz activity     Continue Keppra 500 mg BID               Follow Up   No  follow-ups on file.  Patient was given instructions and counseling regarding her condition or for health maintenance advice. Please see specific information pulled into the AVS if appropriate.

## 2021-01-12 ENCOUNTER — OFFICE VISIT (OUTPATIENT)
Dept: INTERNAL MEDICINE | Facility: CLINIC | Age: 76
End: 2021-01-12

## 2021-01-12 VITALS
HEART RATE: 62 BPM | DIASTOLIC BLOOD PRESSURE: 74 MMHG | OXYGEN SATURATION: 97 % | SYSTOLIC BLOOD PRESSURE: 162 MMHG | BODY MASS INDEX: 26.05 KG/M2 | HEIGHT: 64 IN | WEIGHT: 152.6 LBS | TEMPERATURE: 96.9 F

## 2021-01-12 DIAGNOSIS — Z00.00 MEDICARE ANNUAL WELLNESS VISIT, SUBSEQUENT: Primary | ICD-10-CM

## 2021-01-12 PROCEDURE — G0439 PPPS, SUBSEQ VISIT: HCPCS | Performed by: INTERNAL MEDICINE

## 2021-01-12 NOTE — PROGRESS NOTES
The ABCs of the Annual Wellness Visit  Subsequent Medicare Wellness Visit    Chief Complaint   Patient presents with   • Annual Exam       Subjective   History of Present Illness:  Beryl Montoya is a 75 y.o. female who presents for a Subsequent Medicare Wellness Visit.  reports she is eating a bit better. Picking at her chin almost obsessively. Pt still with persistent expressive aphasia.    HEALTH RISK ASSESSMENT    Recent Hospitalizations:  Recently treated at the following:  Other: [facility]    Current Medical Providers:  Patient Care Team:  Argelia Gamez MD as PCP - General (Internal Medicine)  David Faulkner MD as Consulting Physician (Cardiology)  Terese Ocampo RN as Ambulatory  (Population Health)    Smoking Status:  Social History     Tobacco Use   Smoking Status Former Smoker   • Packs/day: 0.25   • Years: 4.00   • Pack years: 1.00   • Types: Cigarettes   • Quit date: 9/22/2005   • Years since quitting: 15.3   Smokeless Tobacco Never Used       Alcohol Consumption:  Social History     Substance and Sexual Activity   Alcohol Use No       Depression Screen:   PHQ-2/PHQ-9 Depression Screening 1/12/2021   Little interest or pleasure in doing things 0   Feeling down, depressed, or hopeless 0   Trouble falling or staying asleep, or sleeping too much 0   Feeling tired or having little energy 3   Poor appetite or overeating 3   Feeling bad about yourself - or that you are a failure or have let yourself or your family down 0   Trouble concentrating on things, such as reading the newspaper or watching television 3   Moving or speaking so slowly that other people could have noticed. Or the opposite - being so fidgety or restless that you have been moving around a lot more than usual 3   Thoughts that you would be better off dead, or of hurting yourself in some way 3   Total Score 15   If you checked off any problems, how difficult have these problems made it  for you to do your work, take care of things at home, or get along with other people? Somewhat difficult       Fall Risk Screen:  AMANDA Fall Risk Assessment has not been completed.    Health Habits and Functional and Cognitive Screening:  Functional & Cognitive Status 1/12/2021   Do you have difficulty preparing food and eating? Yes   Do you have difficulty bathing yourself, getting dressed or grooming yourself? Yes   Do you have difficulty using the toilet? Yes   Do you have difficulty moving around from place to place? Yes   Do you have trouble with steps or getting out of a bed or a chair? Yes   Current Diet Well Balanced Diet   Dental Exam -   Eye Exam -   Exercise (times per week) 0 times per week   Current Exercises Include No Regular Exercise   Current Exercise Activities Include -   Do you need help using the phone?  Yes   Are you deaf or do you have serious difficulty hearing?  No   Do you need help with transportation? Yes   Do you need help shopping? Yes   Do you need help preparing meals?  Yes   Do you need help with housework?  Yes   Do you need help with laundry? Yes   Do you need help taking your medications? Yes   Do you need help managing money? Yes   Do you ever drive or ride in a car without wearing a seat belt? No   Have you felt unusual stress, anger or loneliness in the last month? Yes   Who do you live with? Spouse   If you need help, do you have trouble finding someone available to you? No   Have you been bothered in the last four weeks by sexual problems? No   Do you have difficulty concentrating, remembering or making decisions? Yes         Does the patient have evidence of cognitive impairment? Yes    Asprin use counseling:on xarelto    Age-appropriate Screening Schedule:  Refer to the list below for future screening recommendations based on patient's age, sex and/or medical conditions. Orders for these recommended tests are listed in the plan section. The patient has been provided with a  written plan.    Health Maintenance   Topic Date Due   • TDAP/TD VACCINES (1 - Tdap) 06/01/1964   • DIABETIC FOOT EXAM  04/23/2020   • HEMOGLOBIN A1C  05/30/2021   • URINE MICROALBUMIN  06/03/2021   • LIPID PANEL  07/05/2021   • DIABETIC EYE EXAM  08/31/2021   • COLONOSCOPY  02/27/2024   • INFLUENZA VACCINE  Completed   • MAMMOGRAM  Discontinued   • ZOSTER VACCINE  Discontinued          The following portions of the patient's history were reviewed and updated as appropriate: allergies, current medications, past family history, past medical history, past social history, past surgical history and problem list.    Outpatient Medications Prior to Visit   Medication Sig Dispense Refill   • acetaminophen (TYLENOL) 325 MG tablet Take 2 tablets by mouth Every 6 (Six) Hours As Needed for Mild Pain .     • atorvastatin (LIPITOR) 80 MG tablet Take 1 tablet by mouth Daily. 90 tablet 2   • carvedilol (COREG) 12.5 MG tablet Take 1 tablet by mouth twice daily 180 tablet 3   • Continuous Blood Gluc Sensor (FreeStyle Flakito 14 Day Sensor) misc USE TO CHECK GLUCOSE 6 TIMES DAILY AS DIRECTED 2 each 11   • EQ Childrens Aspirin 81 MG chewable tablet CHEW AND SWALLOW 1 TABLET BY MOUTH ONCE DAILY 90 tablet 3   • FREESTYLE LITE test strip USE ONE STRIP TO CHECK GLUCOSE 6 TIMES DAILY 300 each 0   • furosemide (LASIX) 40 MG tablet Take 1 tablet by mouth Daily. 30 tablet 2   • insulin lispro protamine-insulin lispro (humaLOG 75-25) (75-25) 100 UNIT/ML suspension injection Inject  under the skin into the appropriate area as directed 2 (Two) Times a Day With Meals.     • isosorbide mononitrate (IMDUR) 30 MG 24 hr tablet Take 1 tablet by mouth Daily. 90 tablet 3   • levETIRAcetam (KEPPRA) 1000 MG tablet Take 0.5 tablets by mouth 2 (Two) Times a Day. 60 tablet 6   • pantoprazole (PROTONIX) 40 MG EC tablet Take 1 tablet by mouth once daily 90 tablet 3   • polyethylene glycol (MIRALAX) packet Take 17 g by mouth Daily.     • potassium chloride  "(K-DUR) 10 MEQ CR tablet Take 1 tablet by mouth 2 (Two) Times a Day. 60 tablet 5   • rivaroxaban (Xarelto) 20 MG tablet Take 1 tablet by mouth Daily. 30 tablet 11   • sacubitril-valsartan (ENTRESTO) 24-26 MG tablet 1 tablet by Nasogastric route Every 12 (Twelve) Hours. 60 tablet 2     No facility-administered medications prior to visit.        Patient Active Problem List   Diagnosis   • History of L MCA CVA   • Essential hypertension   • Type 2 diabetes mellitus with complication, with long-term current use of insulin (CMS/[facility])   • GERD (gastroesophageal reflux disease)   • Bilateral carotid artery stenosis   • Abnormal stress test   • Severe 3 vessel CAD with angina pectoris (CMS/HCC)   • Ischemic cardiomyopathy. LVEF 30%   • S/P CABG x 3 on 6/30/20   • Pericarditis noted at CABG. Etiology questionable   • Acute postop embolic left occipital CVA 7/4/20. Extensive in L PCA distribution but additional areas in R PCA distribution (CMS/HCC)   • Bilateral carotid atherosclerosis with moderate left common carotid artery stenosis (50-60%)   • Seizure (CMS/[facility])       Advanced Care Planning:  ACP discussion was held with the patient during this visit. Patient has an advance directive in EMR which is still valid.     Review of Systems   Unable to perform ROS: Mental status change (pt with aphasia following CVA)       Reviewed chart for potential of high risk medication in the elderly: yes  Reviewed chart for potential of harmful drug interactions in the elderly:yes    Objective         Vitals:    01/12/21 1305   BP: 162/74   Pulse: 62   Temp: 96.9 °F (36.1 °C)   TempSrc: Temporal   SpO2: 97%   Weight: 69.2 kg (152 lb 9.6 oz)   Height: 162.6 cm (64\")   PainSc: 0-No pain       Body mass index is 26.19 kg/m².  Discussed the patient's BMI with her. The BMI is in the acceptable range.    Physical Exam  Vitals signs reviewed.   Constitutional:       General: She is not in acute distress.     Appearance: She is well-developed. "   HENT:      Head: Normocephalic and atraumatic.      Right Ear: Tympanic membrane, ear canal and external ear normal.      Left Ear: Tympanic membrane, ear canal and external ear normal.      Nose: Nose normal.   Eyes:      General: Lids are normal. No scleral icterus.     Conjunctiva/sclera: Conjunctivae normal.      Pupils: Pupils are equal, round, and reactive to light.   Neck:      Musculoskeletal: Neck supple.      Thyroid: No thyroid mass or thyromegaly.      Vascular: No carotid bruit.   Cardiovascular:      Rate and Rhythm: Normal rate and regular rhythm.      Heart sounds: Normal heart sounds. No murmur. No friction rub. No gallop. No S3 or S4 sounds.    Pulmonary:      Effort: Pulmonary effort is normal. No respiratory distress.      Breath sounds: Normal breath sounds. No wheezing, rhonchi or rales.   Abdominal:      General: Bowel sounds are normal. There is no distension.      Palpations: Abdomen is soft. There is no mass.      Tenderness: There is no abdominal tenderness.   Musculoskeletal: Normal range of motion.   Lymphadenopathy:      Cervical: No cervical adenopathy.   Skin:     General: Skin is warm and dry.      Coloration: Skin is not pale.      Findings: No erythema or rash.   Neurological:      Mental Status: She is alert. Mental status is at baseline.      Cranial Nerves: No cranial nerve deficit.      Sensory: No sensory deficit.      Comments: Expressive aphasia   Psychiatric:         Mood and Affect: Mood normal.         Speech: Speech normal.         Behavior: Behavior normal.         Lab Results   Component Value Date    HGBA1C 7.30 (H) 11/30/2020        Assessment/Plan   Medicare Risks and Personalized Health Plan  CMS Preventative Services Quick Reference  Dementia/Memory   Immunizations Discussed/Encouraged (specific immunizations; adacel Tdap, Influenza, Pneumococcal 23, Prevnar and Shingrix )    The above risks/problems have been discussed with the patient.  Pertinent information  has been shared with the patient in the After Visit Summary.  Follow up plans and orders are seen below in the Assessment/Plan Section.    Diagnoses and all orders for this visit:    1. Medicare annual wellness visit, subsequent (Primary)  Reviewed the following with the patient: advised patient of need for:  immunizations discussed, influenza vaccine, Prevnar vaccine, Pneumovax vaccine, Adacel-Tdap vaccine and shingrix, covid.  -defer tdap unless has bite/injury. Not around children 2 or less    Follow Up:  No follow-ups on file.     An After Visit Summary and PPPS were given to the patient.

## 2021-03-04 RX ORDER — POTASSIUM CHLORIDE 750 MG/1
TABLET, FILM COATED, EXTENDED RELEASE ORAL
Qty: 180 TABLET | Refills: 2 | Status: SHIPPED | OUTPATIENT
Start: 2021-03-04 | End: 2022-02-21

## 2021-04-13 ENCOUNTER — TELEPHONE (OUTPATIENT)
Dept: INTERNAL MEDICINE | Facility: CLINIC | Age: 76
End: 2021-04-13

## 2021-04-13 NOTE — TELEPHONE ENCOUNTER
I sent in myrbetriq. Tell him to let us know of any neuro or mental status changes with her as that's possible with this med.

## 2021-04-13 NOTE — TELEPHONE ENCOUNTER
Patients  says she is having some OAB. He says she is going about every 20 mins and this has been going since she got out of the hospital. He says she isn't taking her water pill anymore and is still going. He says it doesn't hurt her to go, and she has a steady stream. Asking if something can be called in for her.

## 2021-04-13 NOTE — TELEPHONE ENCOUNTER
Caller: Jean Montoya    Relationship: Emergency Contact    Best call back number: [phone]     What was the call regarding: PATIENT'S  CALLED WANTING TO SPEAK WITH SOMEONE ABOUT HIS WIFE'S BLADDER ISSUES.    Do you require a callback: YES

## 2021-05-25 ENCOUNTER — PRIOR AUTHORIZATION (OUTPATIENT)
Dept: INTERNAL MEDICINE | Facility: CLINIC | Age: 76
End: 2021-05-25

## 2021-05-25 NOTE — TELEPHONE ENCOUNTER
Prior Authorization for Pantoprazole Sodium 40mg tablets completed and sent to plan via Covermymeds. Status pending;   KEY:[id number]  PA Case ID:[id number]

## 2021-05-26 RX ORDER — SACUBITRIL AND VALSARTAN 24; 26 MG/1; MG/1
TABLET, FILM COATED ORAL
Qty: 60 TABLET | Refills: 0 | Status: SHIPPED | OUTPATIENT
Start: 2021-05-26 | End: 2021-06-29

## 2021-05-27 ENCOUNTER — PRIOR AUTHORIZATION (OUTPATIENT)
Dept: INTERNAL MEDICINE | Facility: CLINIC | Age: 76
End: 2021-05-27

## 2021-05-31 NOTE — TELEPHONE ENCOUNTER
Error- This is a duplicate request. For more info see PA encounter from 05/25/21, Thanks   HPI:  This is a 69 F with PMH of MS, HTN, self-catherterizes who came in c/o weakness x 2 days.  Unable to urinate (patient cath's herself secondary to MS and couldn't pass the catheter today). Has urge to urinate. No fever. No n/v/d. No cough. +appetite, ate normally yesterday. no travel/sick contacts. + Chills. Patient was admitted a couple of weeks ago with SBO (which resolved without surgery). At that time she was discharged home on levaquin for possible PNA. She was also started on two anti-hypertensives. Visiting RN this week had also noted low BP and advised her to stop taking one of them. She stopped only for 1 day and then resumed both meds. (27 May 2017 16:12)    On thorough review currently patient reports no clear localization, no cough, no diarrhea, no abd pain, no urinary symptoms      PAST MEDICAL & SURGICAL HISTORY:  Multiple sclerosis  S/P mastectomy, right  Breast CA  Osteoporosis  MS (mitral stenosis)  History of bowel resection  H/O breast surgery      Antimicrobials  piperacillin/tazobactam IVPB. 3.375Gram(s) IV Intermittent every 8 hours      Immunological      Other  baclofen 10milliGRAM(s) Oral daily  latanoprost 0.005% Ophthalmic Solution 1Drop(s) Both EYES at bedtime  oxybutynin 5milliGRAM(s) Oral two times a day  sodium chloride 0.9%. 1000milliLiter(s) IV Continuous <Continuous>  acetaminophen   Tablet 650milliGRAM(s) Oral every 6 hours PRN  acetaminophen   Tablet. 650milliGRAM(s) Oral every 6 hours PRN  enoxaparin Injectable 40milliGRAM(s) SubCutaneous daily      Allergies    Sudafed (Other)    Intolerances      SOCIAL HISTORY: no tobacco    FAMILY HISTORY:  No pertinent family history in first degree relatives      ROS:    EYES:  Negative  blurry vision or double vision  GASTROINTESTINAL:  Negative for nausea, vomiting, diarrhea  -otherwise negative except for subjective    Vital Signs Last 24 Hrs  T(C): 36.4, Max: 36.8 (- @ 13:55)  T(F): 97.5, Max: 98.3 (05- @ 13:55)  HR: 89 (87 - 105)  BP: 78/50 (60/34 - 97/46)  BP(mean): --  RR: 19 (14 - 19)  SpO2: 98% (98% - 100%)    PE:  WDWN in no distress  HEENT:  NC, PERRL, sclerae anicteric, conjunctivae clear, EOMI.  Sinuses nontender, no nasal exudate.  No buccal or pharyngeal lesions, erythema or exudate  Neck:  Supple, no adenopathy, JVP is noted even with patient upright   Lungs:  No accessory muscle use, bilaterally clear to auscultation  Cor:  RRR, S1, S2, no murmur appreciated  Abd:  Symmetric, normoactive BS.  Soft, nontender, no masses, guarding or rebound.  Liver and spleen not enlarged  Extrem:  No cyanosis or edema  Skin:  No rashes.  Neuro: diminished significantly in lower extremities  Skin-no sig rashes    LABS:               18.9  )-----------( 508      ( 27 May 2017 14:09 )             32.8         139  |  105  |  27<H>  ----------------------------<  90  3.9   |  23  |  1.00    Ca    8.7      27 May 2017 15:10    TPro  6.2  /  Alb  2.6<L>  /  TBili  0.3  /  DBili  x   /  AST  24  /  ALT  18  /  AlkPhos  59      Lactate, Blood (17 @ 14:09)    Lactate, Blood: 3.4 mmol/L        Urinalysis Basic - ( 27 May 2017 14:12 )    Color: Pale Yellow / Appearance: Slightly Turbid / S.005 / pH: x  Gluc: x / Ketone: Negative  / Bili: Negative / Urobili: Negative   Blood: x / Protein: Negative / Nitrite: Negative   Leuk Esterase: Negative / RBC: x / WBC 0-2   Sq Epi: x / Non Sq Epi: Occasional / Bacteria: x        MICROBIOLOGY:      RADIOLOGY & ADDITIONAL STUDIES:    --EXAM:  PORTABLE CHEST URGENT                            PROCEDURE DATE:  2017        INTERPRETATION:  Clinical information: Weakness for a couple of days. No   other clinical information is provided.    Technique: Portable AP chest film.    Comparison: Prior chest x-ray examination dated 2017.    Findings: Right-sided axillary surgical clips are noted.    The lungs are clear. The heart size is normal. There are mild multilevel   degenerative changes of the thoracic spine.    IMPRESSION: No acute findings.        TY PENA M.D., ATTENDING RADIOLOGIST  This document has been electronically signed. May 27 2017  2:16PM

## 2021-06-20 DIAGNOSIS — E11.8 TYPE 2 DIABETES MELLITUS WITH COMPLICATION, WITH LONG-TERM CURRENT USE OF INSULIN (HCC): Chronic | ICD-10-CM

## 2021-06-20 DIAGNOSIS — Z79.4 TYPE 2 DIABETES MELLITUS WITH COMPLICATION, WITH LONG-TERM CURRENT USE OF INSULIN (HCC): Chronic | ICD-10-CM

## 2021-06-21 RX ORDER — BLOOD-GLUCOSE METER
KIT MISCELLANEOUS
Qty: 300 EACH | Refills: 3 | Status: SHIPPED | OUTPATIENT
Start: 2021-06-21

## 2021-06-29 DIAGNOSIS — E78.5 HYPERLIPIDEMIA, UNSPECIFIED HYPERLIPIDEMIA TYPE: ICD-10-CM

## 2021-06-29 RX ORDER — ATORVASTATIN CALCIUM 80 MG/1
TABLET, FILM COATED ORAL
Qty: 90 TABLET | Refills: 3 | Status: SHIPPED | OUTPATIENT
Start: 2021-06-29 | End: 2022-07-19

## 2021-06-29 RX ORDER — SACUBITRIL AND VALSARTAN 24; 26 MG/1; MG/1
TABLET, FILM COATED ORAL
Qty: 60 TABLET | Refills: 11 | Status: SHIPPED | OUTPATIENT
Start: 2021-06-29 | End: 2023-02-06

## 2021-07-20 ENCOUNTER — OFFICE VISIT (OUTPATIENT)
Dept: INTERNAL MEDICINE | Facility: CLINIC | Age: 76
End: 2021-07-20

## 2021-07-20 VITALS
HEIGHT: 64 IN | BODY MASS INDEX: 29.19 KG/M2 | DIASTOLIC BLOOD PRESSURE: 84 MMHG | WEIGHT: 171 LBS | TEMPERATURE: 97.2 F | SYSTOLIC BLOOD PRESSURE: 144 MMHG

## 2021-07-20 DIAGNOSIS — I10 ESSENTIAL HYPERTENSION: Chronic | ICD-10-CM

## 2021-07-20 DIAGNOSIS — E11.8 TYPE 2 DIABETES MELLITUS WITH COMPLICATION, WITH LONG-TERM CURRENT USE OF INSULIN (HCC): Primary | Chronic | ICD-10-CM

## 2021-07-20 DIAGNOSIS — Z79.4 TYPE 2 DIABETES MELLITUS WITH COMPLICATION, WITH LONG-TERM CURRENT USE OF INSULIN (HCC): Primary | Chronic | ICD-10-CM

## 2021-07-20 DIAGNOSIS — R35.0 URINARY FREQUENCY: ICD-10-CM

## 2021-07-20 LAB — HBA1C MFR BLD: 7.6 %

## 2021-07-20 PROCEDURE — 99214 OFFICE O/P EST MOD 30 MIN: CPT | Performed by: INTERNAL MEDICINE

## 2021-07-20 PROCEDURE — 83036 HEMOGLOBIN GLYCOSYLATED A1C: CPT | Performed by: INTERNAL MEDICINE

## 2021-07-20 RX ORDER — FESOTERODINE FUMARATE 4 MG/1
4 TABLET, FILM COATED, EXTENDED RELEASE ORAL
Qty: 14 TABLET | Refills: 0 | Status: SHIPPED | OUTPATIENT
Start: 2021-07-20

## 2021-07-20 NOTE — PROGRESS NOTES
"Subjective   Beryl Montoya is a 76 y.o. female here for follow-up DMII, urinary frequency, HTN.  DMII: No hypoglycemia, taking insulin as prescribed.  Patient has gained weight, she was surprised that her weight today.   says that they really like to eat ice cream so he thinks that explains it.  He says they do walk up and down the driveway a few times per day.  Urinary frequency: Patient feels like she has to urinate about every 30 minutes and she will go to the bathroom and have a little bit of urine come out.  No incontinence.  They tried Myrbetriq 25 mg for several months with no effect.  They would like to try different medication.  Patient denies any dysuria.  HTN: Does not keep track of BP at home, on Entresto, Imdur, Coreg.    I have reviewed the following portions of the patient's history and confirmed they are accurate: current medications, past medical history, past social history and problem list     I have personally completed the patient's review of systems.    Review of Systems:  General: negative  CV: negative  Neuro: Speech difficulty  Psych: negative  : See HPI    Objective   /84   Temp 97.2 °F (36.2 °C) (Temporal)   Ht 162.6 cm (64\")   Wt 77.6 kg (171 lb)   BMI 29.35 kg/m²     Physical Exam  Vitals reviewed.   Constitutional:       Appearance: She is well-developed.   Pulmonary:      Effort: Pulmonary effort is normal.   Skin:     General: Skin is warm and dry.   Neurological:      Mental Status: She is alert.      Comments: Expressive aphasia though improved from last visit   Psychiatric:         Behavior: Behavior normal.         Thought Content: Thought content normal.         Judgment: Judgment normal.         Assessment/Plan   Diagnoses and all orders for this visit:    1. Type 2 diabetes mellitus with complication, with long-term current use of insulin (CMS/[facility]) (Primary)  -     POC Glycosylated Hemoglobin (Hb A1C): 7.6%,advised  and patient to cut back on ice " cream and get more physical activity to get A1c back to goal  -Continue current insulin regimen    2. Urinary frequency  -     Microalbumin / Creatinine Urine Ratio - Urine, Clean Catch  -     fesoterodine fumarate (Toviaz) 4 MG tablet sustained-release 24 hour tablet; Take 1 tablet by mouth Daily.  Dispense: 14 tablet; Refill: 0  -Failed Myrbetriq, trial of Toviaz given    3. Essential hypertension  -Elevated last visit and today.  Have asked  to take her blood pressure more frequently at home, may have to get a new BP cuff.  He is to report if blood pressure remains elevated           Whitney Aquino MA    Please note that portions of this note were completed with a voice recognition program. Efforts were made to edit the dictations, but occasionally words are mistranscribed.

## 2021-08-18 ENCOUNTER — TELEPHONE (OUTPATIENT)
Dept: INTERNAL MEDICINE | Facility: CLINIC | Age: 76
End: 2021-08-18

## 2021-08-18 NOTE — TELEPHONE ENCOUNTER
Caller: MECCA    Relationship: REPRESENTATIVE FROM [place], [place]    Best call back number:     [phone]    What form or medical record are you requesting:     CALLER REQUESTED PATIENT'S MOST RECENT (AIC) NUMBER    CALLER STATED DR DOE COULD CALL WITH A VERBAL OF PATIENT'S RECENT (AIC) NUMBER OR    DR BEAVERS COULD FAX A PAPER COPY OF PATIENT'S RECENT (AIC) NUMBER TO:    [phone]    Who is requesting this form or medical record from you:    SEE ABOVE    How would you like to receive the form or medical records (pick-up, mail, fax):     SEE ABOVE    Timeframe paperwork needed:    ASAP    Additional notes:     N/A    DR DOE        DELETE AFTER READING TO PATIENT:   “Thank you for sharing this information with me. I will send a message to the team. Please allow 48 hours for the staff to follow up on this request.”     “If this is a medical records request, please allow up to 30 days from the time the signed release form is received to process this request.”

## 2021-09-19 DIAGNOSIS — Z79.4 TYPE 2 DIABETES MELLITUS WITH COMPLICATION, WITH LONG-TERM CURRENT USE OF INSULIN (HCC): Chronic | ICD-10-CM

## 2021-09-19 DIAGNOSIS — E11.8 TYPE 2 DIABETES MELLITUS WITH COMPLICATION, WITH LONG-TERM CURRENT USE OF INSULIN (HCC): Chronic | ICD-10-CM

## 2021-09-20 RX ORDER — INSULIN LISPRO 100 [IU]/ML
INJECTION, SUSPENSION SUBCUTANEOUS
Qty: 60 ML | Refills: 0 | Status: SHIPPED | OUTPATIENT
Start: 2021-09-20 | End: 2022-03-16

## 2021-11-18 RX ORDER — ASPIRIN 81 MG/1
TABLET, CHEWABLE ORAL
Qty: 90 TABLET | Refills: 0 | Status: SHIPPED | OUTPATIENT
Start: 2021-11-18 | End: 2022-02-21

## 2021-11-18 RX ORDER — RIVAROXABAN 20 MG/1
TABLET, FILM COATED ORAL
Qty: 90 TABLET | Refills: 0 | Status: SHIPPED | OUTPATIENT
Start: 2021-11-18 | End: 2022-02-21

## 2021-12-01 RX ORDER — FLASH GLUCOSE SENSOR
KIT MISCELLANEOUS
Qty: 2 EACH | Refills: 0 | Status: SHIPPED | OUTPATIENT
Start: 2021-12-01 | End: 2022-01-03

## 2021-12-06 RX ORDER — ISOSORBIDE MONONITRATE 30 MG/1
TABLET, EXTENDED RELEASE ORAL
Qty: 90 TABLET | Refills: 0 | Status: SHIPPED | OUTPATIENT
Start: 2021-12-06 | End: 2022-03-21

## 2021-12-13 RX ORDER — PANTOPRAZOLE SODIUM 40 MG/1
TABLET, DELAYED RELEASE ORAL
Qty: 90 TABLET | Refills: 0 | Status: SHIPPED | OUTPATIENT
Start: 2021-12-13 | End: 2022-03-21

## 2022-01-03 RX ORDER — FLASH GLUCOSE SENSOR
KIT MISCELLANEOUS
Qty: 2 EACH | Refills: 0 | Status: SHIPPED | OUTPATIENT
Start: 2022-01-03 | End: 2022-01-31

## 2022-01-31 RX ORDER — FLASH GLUCOSE SENSOR
KIT MISCELLANEOUS
Qty: 2 EACH | Refills: 0 | Status: SHIPPED | OUTPATIENT
Start: 2022-01-31 | End: 2022-02-01

## 2022-02-01 RX ORDER — FLASH GLUCOSE SENSOR
KIT MISCELLANEOUS
Qty: 2 EACH | Refills: 0 | Status: SHIPPED | OUTPATIENT
Start: 2022-02-01 | End: 2022-03-30

## 2022-02-21 RX ORDER — POTASSIUM CHLORIDE 750 MG/1
TABLET, FILM COATED, EXTENDED RELEASE ORAL
Qty: 180 TABLET | Refills: 0 | Status: SHIPPED | OUTPATIENT
Start: 2022-02-21 | End: 2022-02-22

## 2022-02-21 RX ORDER — RIVAROXABAN 20 MG/1
TABLET, FILM COATED ORAL
Qty: 90 TABLET | Refills: 0 | Status: SHIPPED | OUTPATIENT
Start: 2022-02-21 | End: 2022-02-22

## 2022-02-21 RX ORDER — ASPIRIN 81 MG/1
TABLET, CHEWABLE ORAL
Qty: 90 TABLET | Refills: 0 | Status: SHIPPED | OUTPATIENT
Start: 2022-02-21 | End: 2022-02-22

## 2022-02-22 RX ORDER — POTASSIUM CHLORIDE 750 MG/1
TABLET, FILM COATED, EXTENDED RELEASE ORAL
Qty: 180 TABLET | Refills: 0 | Status: SHIPPED | OUTPATIENT
Start: 2022-02-22 | End: 2023-02-27

## 2022-02-22 RX ORDER — ASPIRIN 81 MG/1
TABLET, CHEWABLE ORAL
Qty: 90 TABLET | Refills: 0 | Status: SHIPPED | OUTPATIENT
Start: 2022-02-22 | End: 2022-08-15

## 2022-02-22 RX ORDER — RIVAROXABAN 20 MG/1
TABLET, FILM COATED ORAL
Qty: 90 TABLET | Refills: 0 | Status: SHIPPED | OUTPATIENT
Start: 2022-02-22

## 2022-03-15 ENCOUNTER — OFFICE VISIT (OUTPATIENT)
Dept: INTERNAL MEDICINE | Facility: CLINIC | Age: 77
End: 2022-03-15

## 2022-03-15 ENCOUNTER — LAB (OUTPATIENT)
Dept: LAB | Facility: HOSPITAL | Age: 77
End: 2022-03-15

## 2022-03-15 VITALS
WEIGHT: 186.2 LBS | DIASTOLIC BLOOD PRESSURE: 84 MMHG | SYSTOLIC BLOOD PRESSURE: 130 MMHG | TEMPERATURE: 97 F | HEIGHT: 64 IN | BODY MASS INDEX: 31.79 KG/M2

## 2022-03-15 DIAGNOSIS — Z79.4 TYPE 2 DIABETES MELLITUS WITH COMPLICATION, WITH LONG-TERM CURRENT USE OF INSULIN: Primary | ICD-10-CM

## 2022-03-15 DIAGNOSIS — I10 ESSENTIAL HYPERTENSION: ICD-10-CM

## 2022-03-15 DIAGNOSIS — E11.8 TYPE 2 DIABETES MELLITUS WITH COMPLICATION, WITH LONG-TERM CURRENT USE OF INSULIN: Primary | ICD-10-CM

## 2022-03-15 PROBLEM — I31.9 PERICARDITIS: Status: RESOLVED | Noted: 2020-07-05 | Resolved: 2022-03-15

## 2022-03-15 LAB
ALBUMIN SERPL-MCNC: 4 G/DL (ref 3.5–5.2)
ALBUMIN/GLOB SERPL: 1.1 G/DL
ALP SERPL-CCNC: 117 U/L (ref 39–117)
ALT SERPL W P-5'-P-CCNC: 19 U/L (ref 1–33)
ANION GAP SERPL CALCULATED.3IONS-SCNC: 11.6 MMOL/L (ref 5–15)
AST SERPL-CCNC: 24 U/L (ref 1–32)
BILIRUB SERPL-MCNC: 0.7 MG/DL (ref 0–1.2)
BUN SERPL-MCNC: 17 MG/DL (ref 8–23)
BUN/CREAT SERPL: 16.2 (ref 7–25)
CALCIUM SPEC-SCNC: 9.8 MG/DL (ref 8.6–10.5)
CHLORIDE SERPL-SCNC: 103 MMOL/L (ref 98–107)
CHOLEST SERPL-MCNC: 143 MG/DL (ref 0–200)
CO2 SERPL-SCNC: 25.4 MMOL/L (ref 22–29)
CREAT SERPL-MCNC: 1.05 MG/DL (ref 0.57–1)
DEPRECATED RDW RBC AUTO: 45.2 FL (ref 37–54)
EGFRCR SERPLBLD CKD-EPI 2021: 55.2 ML/MIN/1.73
ERYTHROCYTE [DISTWIDTH] IN BLOOD BY AUTOMATED COUNT: 12.1 % (ref 12.3–15.4)
GLOBULIN UR ELPH-MCNC: 3.6 GM/DL
GLUCOSE SERPL-MCNC: 211 MG/DL (ref 65–99)
HBA1C MFR BLD: 8.5 % (ref 4.8–5.6)
HCT VFR BLD AUTO: 45.7 % (ref 34–46.6)
HDLC SERPL-MCNC: 62 MG/DL (ref 40–60)
HGB BLD-MCNC: 15.2 G/DL (ref 12–15.9)
LDLC SERPL CALC-MCNC: 64 MG/DL (ref 0–100)
LDLC/HDLC SERPL: 1.02 {RATIO}
MCH RBC QN AUTO: 33.9 PG (ref 26.6–33)
MCHC RBC AUTO-ENTMCNC: 33.3 G/DL (ref 31.5–35.7)
MCV RBC AUTO: 102 FL (ref 79–97)
PLATELET # BLD AUTO: 237 10*3/MM3 (ref 140–450)
PMV BLD AUTO: 10.3 FL (ref 6–12)
POTASSIUM SERPL-SCNC: 4.5 MMOL/L (ref 3.5–5.2)
PROT SERPL-MCNC: 7.6 G/DL (ref 6–8.5)
RBC # BLD AUTO: 4.48 10*6/MM3 (ref 3.77–5.28)
SODIUM SERPL-SCNC: 140 MMOL/L (ref 136–145)
TRIGL SERPL-MCNC: 89 MG/DL (ref 0–150)
VLDLC SERPL-MCNC: 17 MG/DL (ref 5–40)
WBC NRBC COR # BLD: 8.74 10*3/MM3 (ref 3.4–10.8)

## 2022-03-15 PROCEDURE — 80061 LIPID PANEL: CPT | Performed by: INTERNAL MEDICINE

## 2022-03-15 PROCEDURE — 82570 ASSAY OF URINE CREATININE: CPT | Performed by: INTERNAL MEDICINE

## 2022-03-15 PROCEDURE — 99214 OFFICE O/P EST MOD 30 MIN: CPT | Performed by: INTERNAL MEDICINE

## 2022-03-15 PROCEDURE — 83036 HEMOGLOBIN GLYCOSYLATED A1C: CPT | Performed by: INTERNAL MEDICINE

## 2022-03-15 PROCEDURE — 80053 COMPREHEN METABOLIC PANEL: CPT | Performed by: INTERNAL MEDICINE

## 2022-03-15 PROCEDURE — 82043 UR ALBUMIN QUANTITATIVE: CPT | Performed by: INTERNAL MEDICINE

## 2022-03-15 PROCEDURE — 85027 COMPLETE CBC AUTOMATED: CPT | Performed by: INTERNAL MEDICINE

## 2022-03-15 NOTE — PROGRESS NOTES
"Subjective   Beryl Montoya is a 76 y.o. female here for follow-up HTN and DMII.   gives the history as patient has expressive aphasia 2/2 CVA.  He says she has been doing well.  She has had some elevated glucose readings, can get up to upper 200s or 300s.  He currently gives her between 30 and 40 units of 75/25 twice daily.  He reports no lows.  Blood pressure has been controlled long-term.    I have reviewed the following portions of the patient's history and confirmed they are accurate: current medications, past medical history, past social history and problem list Rx for in the ER    I have personally reviewed and performed the ROS. Argelia Gamez MD     Review of Systems:  General: negative  Neuro: negative  Psych: negative    Objective   /84   Temp 97 °F (36.1 °C) (Temporal)   Ht 162.6 cm (64\")   Wt 84.5 kg (186 lb 3.2 oz)   BMI 31.96 kg/m²     Physical Exam  Vitals reviewed.   Constitutional:       Appearance: She is well-developed.   Pulmonary:      Effort: Pulmonary effort is normal.   Skin:     General: Skin is warm and dry.   Neurological:      Mental Status: She is alert. Mental status is at baseline.      Comments: Expressive aphasia   Psychiatric:         Mood and Affect: Mood normal.         Assessment/Plan   Diagnoses and all orders for this visit:    1. Type 2 diabetes mellitus with complication, with long-term current use of insulin (HCC) (Primary)  -     Hemoglobin A1c  -     Microalbumin / Creatinine Urine Ratio - Urine, Clean Catch  -a1c has been going up slowly as has pt's weight. She currently uses a sliding scale 75/25 so hopefully A1c still similar to what it was previously--if not, will alter insulin regimen    2. Essential hypertension  -     CBC (No Diff)  -     Comprehensive Metabolic Panel  -     Lipid Panel  -chronic stable             Argelia Gamez MD  "

## 2022-03-16 DIAGNOSIS — E11.8 TYPE 2 DIABETES MELLITUS WITH COMPLICATION, WITH LONG-TERM CURRENT USE OF INSULIN: Chronic | ICD-10-CM

## 2022-03-16 DIAGNOSIS — Z79.4 TYPE 2 DIABETES MELLITUS WITH COMPLICATION, WITH LONG-TERM CURRENT USE OF INSULIN: Chronic | ICD-10-CM

## 2022-03-16 LAB
ALBUMIN UR-MCNC: 9.4 MG/DL
CREAT UR-MCNC: 129.4 MG/DL
MICROALBUMIN/CREAT UR: 72.6 MG/G

## 2022-03-16 RX ORDER — INSULIN LISPRO 100 [IU]/ML
INJECTION, SUSPENSION SUBCUTANEOUS
Qty: 60 ML | Refills: 0 | Status: SHIPPED | OUTPATIENT
Start: 2022-03-16 | End: 2022-09-14

## 2022-03-21 RX ORDER — LEVETIRACETAM 1000 MG/1
TABLET ORAL
Qty: 60 TABLET | Refills: 11 | Status: SHIPPED | OUTPATIENT
Start: 2022-03-21 | End: 2022-03-24 | Stop reason: SDUPTHER

## 2022-03-21 RX ORDER — ISOSORBIDE MONONITRATE 30 MG/1
TABLET, EXTENDED RELEASE ORAL
Qty: 90 TABLET | Refills: 3 | Status: SHIPPED | OUTPATIENT
Start: 2022-03-21 | End: 2022-03-24 | Stop reason: SDUPTHER

## 2022-03-21 RX ORDER — PANTOPRAZOLE SODIUM 40 MG/1
TABLET, DELAYED RELEASE ORAL
Qty: 90 TABLET | Refills: 3 | Status: SHIPPED | OUTPATIENT
Start: 2022-03-21 | End: 2022-03-24 | Stop reason: SDUPTHER

## 2022-03-23 ENCOUNTER — TELEPHONE (OUTPATIENT)
Dept: INTERNAL MEDICINE | Facility: CLINIC | Age: 77
End: 2022-03-23

## 2022-03-23 RX ORDER — GLIMEPIRIDE 1 MG/1
1 TABLET ORAL
Qty: 30 TABLET | Refills: 2 | Status: SHIPPED | OUTPATIENT
Start: 2022-03-23 | End: 2022-07-19

## 2022-03-23 NOTE — TELEPHONE ENCOUNTER
----- Message from Argelia Gamez MD sent at 3/23/2022 12:47 PM EDT -----  Let's try glimepiride. Take with breakfast.    ----- Message -----  From: Whitney Aquino MA  Sent: 3/23/2022  10:00 AM EDT  To: Argelia Gamez MD    He says he doesn't think she has ever taken glimepiride before but metformin doesn't agree with her.   ----- Message -----  From: Argelia Gamez MD  Sent: 3/22/2022   1:58 PM EDT  To: Whitney Aquino MA    We could try an oral med. Does he recall her taking metformin or glimepiride?     ----- Message -----  From: Whitney Aquino MA  Sent: 3/21/2022   4:35 PM EDT  To: Argelia Gamez MD    He says she won't eat much of a variety of foods. He says she doesn't eat much so he guesses it whats she is eating. He says she doesn't eat much of what she does eat.  He doesn't know what to do but he doesn't think seeing a dietician with help because she won't eat much of a variety,   ----- Message -----  From: Argelia Gamez MD  Sent: 3/21/2022   7:46 AM EDT  To: Whitney Aquino MA    Please call the patient's  regarding her abnormal result. Tell him diabetes is out of control, A1c at 8.5%. She has gained quite a bit of weight so that is contributing to the A1c and the protein in the urine. We have to get Dm back under control as it can do damage to her kidneys. Does he think he can get her diet under control?

## 2022-03-24 RX ORDER — ISOSORBIDE MONONITRATE 30 MG/1
30 TABLET, EXTENDED RELEASE ORAL DAILY
Qty: 90 TABLET | Refills: 3 | Status: SHIPPED | OUTPATIENT
Start: 2022-03-24 | End: 2023-03-13

## 2022-03-24 RX ORDER — LEVETIRACETAM 1000 MG/1
500 TABLET ORAL 2 TIMES DAILY
Qty: 60 TABLET | Refills: 11 | Status: SHIPPED | OUTPATIENT
Start: 2022-03-24 | End: 2022-10-12

## 2022-03-24 RX ORDER — PANTOPRAZOLE SODIUM 40 MG/1
40 TABLET, DELAYED RELEASE ORAL DAILY
Qty: 90 TABLET | Refills: 3 | Status: SHIPPED | OUTPATIENT
Start: 2022-03-24 | End: 2023-02-27

## 2022-03-30 RX ORDER — FLASH GLUCOSE SENSOR
KIT MISCELLANEOUS
Qty: 2 EACH | Refills: 3 | Status: SHIPPED | OUTPATIENT
Start: 2022-03-30 | End: 2022-07-29

## 2022-05-09 RX ORDER — PEN NEEDLE, DIABETIC 32GX 5/32"
NEEDLE, DISPOSABLE MISCELLANEOUS
Qty: 300 EACH | Refills: 0 | Status: SHIPPED | OUTPATIENT
Start: 2022-05-09 | End: 2022-07-29

## 2022-05-09 RX ORDER — CARVEDILOL 12.5 MG/1
TABLET ORAL
Qty: 180 TABLET | Refills: 0 | Status: SHIPPED | OUTPATIENT
Start: 2022-05-09 | End: 2022-10-31

## 2022-07-19 DIAGNOSIS — E78.5 HYPERLIPIDEMIA, UNSPECIFIED HYPERLIPIDEMIA TYPE: ICD-10-CM

## 2022-07-19 RX ORDER — ATORVASTATIN CALCIUM 80 MG/1
TABLET, FILM COATED ORAL
Qty: 90 TABLET | Refills: 2 | Status: SHIPPED | OUTPATIENT
Start: 2022-07-19

## 2022-07-19 RX ORDER — GLIMEPIRIDE 1 MG/1
TABLET ORAL
Qty: 90 TABLET | Refills: 2 | Status: SHIPPED | OUTPATIENT
Start: 2022-07-19 | End: 2023-04-03

## 2022-07-29 RX ORDER — FLASH GLUCOSE SENSOR
KIT MISCELLANEOUS
Qty: 2 EACH | Refills: 0 | Status: SHIPPED | OUTPATIENT
Start: 2022-07-29 | End: 2022-08-29

## 2022-07-29 RX ORDER — PEN NEEDLE, DIABETIC 32GX 5/32"
NEEDLE, DISPOSABLE MISCELLANEOUS
Qty: 300 EACH | Refills: 0 | Status: SHIPPED | OUTPATIENT
Start: 2022-07-29

## 2022-08-15 RX ORDER — ASPIRIN 81 MG/1
TABLET, CHEWABLE ORAL
Qty: 90 TABLET | Refills: 0 | Status: SHIPPED | OUTPATIENT
Start: 2022-08-15 | End: 2022-11-14

## 2022-08-29 RX ORDER — FLASH GLUCOSE SENSOR
KIT MISCELLANEOUS
Qty: 2 EACH | Refills: 0 | Status: SHIPPED | OUTPATIENT
Start: 2022-08-29 | End: 2022-09-30

## 2022-09-14 DIAGNOSIS — Z79.4 TYPE 2 DIABETES MELLITUS WITH COMPLICATION, WITH LONG-TERM CURRENT USE OF INSULIN: Chronic | ICD-10-CM

## 2022-09-14 DIAGNOSIS — E11.8 TYPE 2 DIABETES MELLITUS WITH COMPLICATION, WITH LONG-TERM CURRENT USE OF INSULIN: Chronic | ICD-10-CM

## 2022-09-14 RX ORDER — INSULIN LISPRO 100 [IU]/ML
INJECTION, SUSPENSION SUBCUTANEOUS
Qty: 60 ML | Refills: 0 | Status: SHIPPED | OUTPATIENT
Start: 2022-09-14 | End: 2023-02-24

## 2022-09-27 ENCOUNTER — TELEPHONE (OUTPATIENT)
Dept: INTERNAL MEDICINE | Facility: CLINIC | Age: 77
End: 2022-09-27

## 2022-09-27 NOTE — TELEPHONE ENCOUNTER
VALERIA FROM [employer] [facility] [facility] CALLED TO INFORM THAT THEY PERFORMED A PAD TEST FOR THE PT AND IT LOOKS LIKE IT HAS A POSITIVE RESULT.

## 2022-09-30 RX ORDER — FLASH GLUCOSE SENSOR
KIT MISCELLANEOUS
Qty: 2 EACH | Refills: 0 | Status: SHIPPED | OUTPATIENT
Start: 2022-09-30 | End: 2022-10-31

## 2022-10-12 ENCOUNTER — OFFICE VISIT (OUTPATIENT)
Dept: INTERNAL MEDICINE | Facility: CLINIC | Age: 77
End: 2022-10-12

## 2022-10-12 VITALS
HEIGHT: 64 IN | SYSTOLIC BLOOD PRESSURE: 132 MMHG | DIASTOLIC BLOOD PRESSURE: 78 MMHG | WEIGHT: 192 LBS | TEMPERATURE: 97.4 F | BODY MASS INDEX: 32.78 KG/M2

## 2022-10-12 DIAGNOSIS — Z79.4 TYPE 2 DIABETES MELLITUS WITH COMPLICATION, WITH LONG-TERM CURRENT USE OF INSULIN: Primary | Chronic | ICD-10-CM

## 2022-10-12 DIAGNOSIS — Z23 NEED FOR INFLUENZA VACCINATION: ICD-10-CM

## 2022-10-12 DIAGNOSIS — R56.9 SEIZURE: Chronic | ICD-10-CM

## 2022-10-12 DIAGNOSIS — E11.8 TYPE 2 DIABETES MELLITUS WITH COMPLICATION, WITH LONG-TERM CURRENT USE OF INSULIN: Primary | Chronic | ICD-10-CM

## 2022-10-12 DIAGNOSIS — I10 ESSENTIAL HYPERTENSION: Chronic | ICD-10-CM

## 2022-10-12 PROCEDURE — G0008 ADMIN INFLUENZA VIRUS VAC: HCPCS | Performed by: INTERNAL MEDICINE

## 2022-10-12 PROCEDURE — 99214 OFFICE O/P EST MOD 30 MIN: CPT | Performed by: INTERNAL MEDICINE

## 2022-10-12 PROCEDURE — 90662 IIV NO PRSV INCREASED AG IM: CPT | Performed by: INTERNAL MEDICINE

## 2022-10-12 RX ORDER — LEVETIRACETAM 500 MG/1
500 TABLET ORAL 2 TIMES DAILY
Qty: 180 TABLET | Refills: 2 | Status: SHIPPED | OUTPATIENT
Start: 2022-10-12 | End: 2023-01-10

## 2022-10-12 NOTE — PROGRESS NOTES
"Subjective   Beryl Montoya is a 77 y.o. female here for follow-up DMII, history of seizure, HTN.  Most history is given by  as patient has expressive aphasia.  He denies any new issues with patient.  She is taking her medications as prescribed and eating well.  She has gained quite a bit of weight since her CVA.  Blood pressure has been okay.    I have reviewed the following portions of the patient's history and confirmed they are accurate: current medications, past medical history, past social history and problem list     I have personally reviewed and performed the ROS. Argelia Gamez MD     Review of Systems:  General: negative  Neuro: Expressive aphasia  Psych: Negative    Objective   /78   Temp 97.4 °F (36.3 °C) (Temporal)   Ht 162.6 cm (64\")   Wt 87.1 kg (192 lb)   BMI 32.96 kg/m²     Physical Exam  Vitals reviewed.   Constitutional:       Appearance: She is well-developed.   Pulmonary:      Effort: Pulmonary effort is normal.   Skin:     General: Skin is warm and dry.   Neurological:      Mental Status: She is alert and oriented to person, place, and time.   Psychiatric:         Behavior: Behavior normal.         Thought Content: Thought content normal.         Judgment: Judgment normal.         Assessment & Plan   Diagnoses and all orders for this visit:    1. Type 2 diabetes mellitus with complication, with long-term current use of insulin (HCC) (Primary)  -A1c improved to 7.6% (done at home visit). Continue current medications. Pt does continue to gain weight which will ultimately impact her A1c    2. Seizure (HCC)  -     levETIRAcetam (Keppra) 500 MG tablet; Take 1 tablet by mouth 2 (Two) Times a Day for 90 days.  Dispense: 180 tablet; Refill: 2  -No seizure since CVA. Not able to tolerate higher doses    3. Essential hypertension  -Acceptable control    4. Need for influenza vaccination  -     Fluzone High-Dose 65+yrs ([id number])    Reviewed last set of labs 3/2022     "     Argelia Gamez MD

## 2022-10-31 RX ORDER — CARVEDILOL 12.5 MG/1
TABLET ORAL
Qty: 180 TABLET | Refills: 0 | Status: SHIPPED | OUTPATIENT
Start: 2022-10-31

## 2022-10-31 RX ORDER — FLASH GLUCOSE SENSOR
KIT MISCELLANEOUS
Qty: 2 EACH | Refills: 0 | Status: SHIPPED | OUTPATIENT
Start: 2022-10-31 | End: 2022-12-16

## 2022-11-10 ENCOUNTER — TELEPHONE (OUTPATIENT)
Dept: INTERNAL MEDICINE | Facility: CLINIC | Age: 77
End: 2022-11-10

## 2022-11-10 RX ORDER — CLOPIDOGREL BISULFATE 75 MG/1
75 TABLET ORAL DAILY
Qty: 90 TABLET | Refills: 2 | Status: SHIPPED | OUTPATIENT
Start: 2022-11-10

## 2022-11-10 NOTE — TELEPHONE ENCOUNTER
Caller: Jean Montoya    Relationship: Emergency Contact    Best call back number: [phone]    Requested Prescriptions:   PLAVIX  Requested Prescriptions      No prescriptions requested or ordered in this encounter        Pharmacy where request should be sent: [facility] [address] [place] [address] J[place] [place]IL[place] - [phone] [facility] [phone] FX     Additional details provided by patient: HAS 4 PILLS LEFT.  WILL RUN OUT OVER TH WEEKEND    Does the patient have less than a 3 day supply:  [] Yes  [] No    Damian Vasquez Rep   11/10/22 11:38 EST

## 2022-11-14 RX ORDER — ASPIRIN 81 MG/1
TABLET, CHEWABLE ORAL
Qty: 90 TABLET | Refills: 0 | Status: SHIPPED | OUTPATIENT
Start: 2022-11-14 | End: 2023-02-24 | Stop reason: SDUPTHER

## 2022-11-16 DIAGNOSIS — M70.50 PES ANSERINE BURSITIS: ICD-10-CM

## 2022-12-16 RX ORDER — FLASH GLUCOSE SENSOR
KIT MISCELLANEOUS
Qty: 2 EACH | Refills: 0 | Status: SHIPPED | OUTPATIENT
Start: 2022-12-16 | End: 2023-01-16

## 2023-01-16 RX ORDER — FLASH GLUCOSE SENSOR
KIT MISCELLANEOUS
Qty: 2 EACH | Refills: 0 | Status: SHIPPED | OUTPATIENT
Start: 2023-01-16 | End: 2023-02-24

## 2023-02-06 RX ORDER — SACUBITRIL AND VALSARTAN 24; 26 MG/1; MG/1
TABLET, FILM COATED ORAL
Qty: 180 TABLET | Refills: 0 | Status: SHIPPED | OUTPATIENT
Start: 2023-02-06

## 2023-02-23 DIAGNOSIS — Z79.4 TYPE 2 DIABETES MELLITUS WITH COMPLICATION, WITH LONG-TERM CURRENT USE OF INSULIN: Chronic | ICD-10-CM

## 2023-02-23 DIAGNOSIS — E11.8 TYPE 2 DIABETES MELLITUS WITH COMPLICATION, WITH LONG-TERM CURRENT USE OF INSULIN: Chronic | ICD-10-CM

## 2023-02-24 DIAGNOSIS — I10 ESSENTIAL HYPERTENSION: Primary | ICD-10-CM

## 2023-02-24 RX ORDER — FLASH GLUCOSE SENSOR
KIT MISCELLANEOUS
Qty: 2 EACH | Refills: 0 | Status: SHIPPED | OUTPATIENT
Start: 2023-02-24

## 2023-02-24 RX ORDER — INSULIN LISPRO 100 [IU]/ML
INJECTION, SUSPENSION SUBCUTANEOUS
Qty: 60 ML | Refills: 0 | Status: SHIPPED | OUTPATIENT
Start: 2023-02-24

## 2023-02-24 RX ORDER — ASPIRIN 81 MG/1
81 TABLET, CHEWABLE ORAL DAILY
Qty: 90 TABLET | Refills: 5 | Status: SHIPPED | OUTPATIENT
Start: 2023-02-24

## 2023-02-27 RX ORDER — PANTOPRAZOLE SODIUM 40 MG/1
TABLET, DELAYED RELEASE ORAL
Qty: 90 TABLET | Refills: 0 | Status: SHIPPED | OUTPATIENT
Start: 2023-02-27

## 2023-02-27 RX ORDER — POTASSIUM CHLORIDE 750 MG/1
TABLET, FILM COATED, EXTENDED RELEASE ORAL
Qty: 180 TABLET | Refills: 0 | Status: SHIPPED | OUTPATIENT
Start: 2023-02-27

## 2023-03-13 RX ORDER — ISOSORBIDE MONONITRATE 30 MG/1
TABLET, EXTENDED RELEASE ORAL
Qty: 90 TABLET | Refills: 0 | Status: SHIPPED | OUTPATIENT
Start: 2023-03-13

## 2023-04-03 RX ORDER — GLIMEPIRIDE 1 MG/1
TABLET ORAL
Qty: 90 TABLET | Refills: 0 | Status: SHIPPED | OUTPATIENT
Start: 2023-04-03

## 2023-04-11 RX ORDER — FLASH GLUCOSE SENSOR
KIT MISCELLANEOUS
Qty: 2 EACH | Refills: 0 | Status: SHIPPED | OUTPATIENT
Start: 2023-04-11

## 2023-04-14 ENCOUNTER — LAB (OUTPATIENT)
Dept: LAB | Facility: HOSPITAL | Age: 78
End: 2023-04-14
Payer: MEDICARE

## 2023-04-14 ENCOUNTER — OFFICE VISIT (OUTPATIENT)
Dept: INTERNAL MEDICINE | Facility: CLINIC | Age: 78
End: 2023-04-14
Payer: MEDICARE

## 2023-04-14 VITALS
SYSTOLIC BLOOD PRESSURE: 140 MMHG | HEIGHT: 64 IN | TEMPERATURE: 97.1 F | WEIGHT: 191.2 LBS | BODY MASS INDEX: 32.64 KG/M2 | HEART RATE: 78 BPM | DIASTOLIC BLOOD PRESSURE: 76 MMHG | OXYGEN SATURATION: 96 %

## 2023-04-14 DIAGNOSIS — Z78.0 POST-MENOPAUSAL: ICD-10-CM

## 2023-04-14 DIAGNOSIS — Z00.00 MEDICARE ANNUAL WELLNESS VISIT, SUBSEQUENT: Primary | ICD-10-CM

## 2023-04-14 DIAGNOSIS — Z00.00 MEDICARE ANNUAL WELLNESS VISIT, SUBSEQUENT: ICD-10-CM

## 2023-04-14 LAB
BASOPHILS # BLD AUTO: 0.08 10*3/MM3 (ref 0–0.2)
BASOPHILS NFR BLD AUTO: 0.8 % (ref 0–1.5)
DEPRECATED RDW RBC AUTO: 46.5 FL (ref 37–54)
EOSINOPHIL # BLD AUTO: 0.43 10*3/MM3 (ref 0–0.4)
EOSINOPHIL NFR BLD AUTO: 4.5 % (ref 0.3–6.2)
ERYTHROCYTE [DISTWIDTH] IN BLOOD BY AUTOMATED COUNT: 12.6 % (ref 12.3–15.4)
HCT VFR BLD AUTO: 41.1 % (ref 34–46.6)
HGB BLD-MCNC: 14.3 G/DL (ref 12–15.9)
IMM GRANULOCYTES # BLD AUTO: 0.02 10*3/MM3 (ref 0–0.05)
IMM GRANULOCYTES NFR BLD AUTO: 0.2 % (ref 0–0.5)
LYMPHOCYTES # BLD AUTO: 2.13 10*3/MM3 (ref 0.7–3.1)
LYMPHOCYTES NFR BLD AUTO: 22.4 % (ref 19.6–45.3)
MCH RBC QN AUTO: 34.6 PG (ref 26.6–33)
MCHC RBC AUTO-ENTMCNC: 34.8 G/DL (ref 31.5–35.7)
MCV RBC AUTO: 99.5 FL (ref 79–97)
MONOCYTES # BLD AUTO: 0.74 10*3/MM3 (ref 0.1–0.9)
MONOCYTES NFR BLD AUTO: 7.8 % (ref 5–12)
NEUTROPHILS NFR BLD AUTO: 6.11 10*3/MM3 (ref 1.7–7)
NEUTROPHILS NFR BLD AUTO: 64.3 % (ref 42.7–76)
NRBC BLD AUTO-RTO: 0 /100 WBC (ref 0–0.2)
PLATELET # BLD AUTO: 251 10*3/MM3 (ref 140–450)
PMV BLD AUTO: 9.9 FL (ref 6–12)
RBC # BLD AUTO: 4.13 10*6/MM3 (ref 3.77–5.28)
WBC NRBC COR # BLD: 9.51 10*3/MM3 (ref 3.4–10.8)

## 2023-04-14 PROCEDURE — 36415 COLL VENOUS BLD VENIPUNCTURE: CPT | Performed by: PHYSICIAN ASSISTANT

## 2023-04-14 PROCEDURE — 3077F SYST BP >= 140 MM HG: CPT | Performed by: PHYSICIAN ASSISTANT

## 2023-04-14 PROCEDURE — 80053 COMPREHEN METABOLIC PANEL: CPT | Performed by: PHYSICIAN ASSISTANT

## 2023-04-14 PROCEDURE — 3078F DIAST BP <80 MM HG: CPT | Performed by: PHYSICIAN ASSISTANT

## 2023-04-14 PROCEDURE — 84443 ASSAY THYROID STIM HORMONE: CPT

## 2023-04-14 PROCEDURE — 85025 COMPLETE CBC W/AUTO DIFF WBC: CPT

## 2023-04-14 PROCEDURE — 1159F MED LIST DOCD IN RCRD: CPT | Performed by: PHYSICIAN ASSISTANT

## 2023-04-14 PROCEDURE — 1160F RVW MEDS BY RX/DR IN RCRD: CPT | Performed by: PHYSICIAN ASSISTANT

## 2023-04-14 PROCEDURE — 80061 LIPID PANEL: CPT | Performed by: PHYSICIAN ASSISTANT

## 2023-04-14 PROCEDURE — 83036 HEMOGLOBIN GLYCOSYLATED A1C: CPT

## 2023-04-14 PROCEDURE — G0439 PPPS, SUBSEQ VISIT: HCPCS | Performed by: PHYSICIAN ASSISTANT

## 2023-04-14 PROCEDURE — 1170F FXNL STATUS ASSESSED: CPT | Performed by: PHYSICIAN ASSISTANT

## 2023-04-14 NOTE — PROGRESS NOTES
The ABCs of the Annual Wellness Visit  Subsequent Medicare Wellness Visit    Subjective    Beryl Montoya is a 77 y.o. female who presents for a Subsequent Medicare Wellness Visit. Pt overall doing well. Has been having some issues with memory.    The following portions of the patient's history were reviewed and   updated as appropriate: allergies, current medications, past family history, past medical history, past social history, past surgical history and problem list.    Compared to one year ago, the patient feels her physical   health is worse.    Compared to one year ago, the patient feels her mental   health is worse.    Recent Hospitalizations:  She was not admitted to the hospital during the last year.       Current Medical Providers:  Patient Care Team:  Argelia Gamez MD as PCP - General (Internal Medicine)  David Faulkner MD as Consulting Physician (Cardiology)    Outpatient Medications Prior to Visit   Medication Sig Dispense Refill   • aspirin (SM Aspirin Low Dose) 81 MG chewable tablet Chew 1 tablet Daily. chew and swallow. 90 tablet 5   • atorvastatin (LIPITOR) 80 MG tablet Take 1 tablet by mouth once daily 90 tablet 2   • carvedilol (COREG) 12.5 MG tablet Take 1 tablet by mouth twice daily 180 tablet 0   • clopidogrel (Plavix) 75 MG tablet Take 1 tablet by mouth Daily. 90 tablet 2   • Continuous Blood Gluc Sensor (FreeStyle Flakito 14 Day Sensor) [facility] USE TO CHECK BLOOD SUGAR SIX TIMES DAILY AS DIRECTED 2 each 0   • Entresto 24-26 MG tablet TAKE 1 TABLET BY MOUTH EVERY 12 HOURS 180 tablet 0   • FREESTYLE LITE test strip Use one each to check glucose six times daily E11.9 300 each 3   • furosemide (LASIX) 40 MG tablet Take 1 tablet by mouth Daily. 30 tablet 2   • Insulin Lispro Prot & Lispro (HumaLOG Mix 75/25 KwikPen) (75-25) 100 UNIT/ML suspension pen-injector pen INJECT 30 TO 40 UNITS SUBCUTANEOUSLY TWICE DAILY DEPENDING ON BLOOD GLUCOSE LEVEL 60 mL 0   • isosorbide  mononitrate (IMDUR) 30 MG 24 hr tablet Take 1 tablet by mouth once daily 90 tablet 0   • pantoprazole (PROTONIX) 40 MG EC tablet Take 1 tablet by mouth once daily 90 tablet 0   • polyethylene glycol (MIRALAX) packet Take 17 g by mouth Daily.     • potassium chloride 10 MEQ CR tablet Take 1 tablet by mouth twice daily 180 tablet 0   • ReliOn Pen Needles 32G X 4 MM misc USE THREE TIMES DAILY AS DIRECTED 300 each 0   • Xarelto 20 MG tablet Take 1 tablet by mouth once daily 90 tablet 0   • fesoterodine fumarate (Toviaz) 4 MG tablet sustained-release 24 hour tablet Take 1 tablet by mouth Daily. (Patient not taking: Reported on 4/14/2023) 14 tablet 0   • glimepiride (AMARYL) 1 MG tablet TAKE 1 TABLET BY MOUTH ONCE DAILY IN THE MORNING BEFORE BREAKFAST (Patient not taking: Reported on 4/14/2023) 90 tablet 0   • levETIRAcetam (Keppra) 500 MG tablet Take 1 tablet by mouth 2 (Two) Times a Day for 90 days. 180 tablet 2   • Voltaren 1 % gel gel APPLY 4 GRAMS TOPICALLY TO THE APPROPRIATE AREA AS DIRECTED 4 TIMES DAILY (Patient not taking: Reported on 4/14/2023) 300 g 0     No facility-administered medications prior to visit.       No opioid medication identified on active medication list. I have reviewed chart for other potential  high risk medication/s and harmful drug interactions in the elderly.          Aspirin is on active medication list. Aspirin use is indicated based on review of current medical condition/s. Pros and cons of this therapy have been discussed today. Benefits of this medication outweigh potential harm.  Patient has been encouraged to continue taking this medication.  .      Patient Active Problem List   Diagnosis   • History of L MCA CVA   • Essential hypertension   • Type 2 diabetes mellitus with complication, with long-term current use of insulin   • GERD (gastroesophageal reflux disease)   • Bilateral carotid artery stenosis   • Abnormal stress test   • Severe 3 vessel CAD with angina pectoris (CMS/HCC)  "  • Ischemic cardiomyopathy. LVEF 30%   • S/P CABG x 3 on 20   • Acute postop embolic left occipital CVA 20. Extensive in L PCA distribution but additional areas in R PCA distribution (CMS/HCC)   • Bilateral carotid atherosclerosis with moderate left common carotid artery stenosis (50-60%)   • Seizure     Advance Care Planning   Advance Care Planning     Advance Directive is on file.  ACP discussion was declined by the patient. Patient has an advance directive in EMR which is still valid.      Objective    Vitals:    23 1356   BP: 140/76   BP Location: Left arm   Patient Position: Sitting   Cuff Size: Large Adult   Pulse: 78   Temp: 97.1 °F (36.2 °C)   TempSrc: Temporal   SpO2: 96%   Weight: 86.7 kg (191 lb 3.2 oz)   Height: 162.6 cm (64.02\")   PainSc: 0-No pain     Estimated body mass index is 32.8 kg/m² as calculated from the following:    Height as of this encounter: 162.6 cm (64.02\").    Weight as of this encounter: 86.7 kg (191 lb 3.2 oz).    BMI is >= 30 and <35. (Class 1 Obesity). The following options were offered after discussion;: nutrition counseling/recommendations      Does the patient have evidence of cognitive impairment?   No            HEALTH RISK ASSESSMENT    Smoking Status:  Social History     Tobacco Use   Smoking Status Former   • Packs/day: 0.25   • Years: 4.00   • Pack years: 1.00   • Types: Cigarettes   • Quit date: 2005   • Years since quittin.5   Smokeless Tobacco Never     Alcohol Consumption:  Social History     Substance and Sexual Activity   Alcohol Use No     Fall Risk Screen:    STEADI Fall Risk Assessment was completed, and patient is at LOW risk for falls.Assessment completed on:2023    Depression Screenin/14/2023     2:00 PM   PHQ-2/PHQ-9 Depression Screening   Little Interest or Pleasure in Doing Things 0-->not at all   Feeling Down, Depressed or Hopeless 0-->not at all   Trouble Falling or Staying Asleep, or Sleeping Too Much 3-->nearly " every day   Feeling Tired or Having Little Energy 0-->not at all   Poor Appetite or Overeating 2-->more than half the days   Feeling Bad about Yourself - or that You are a Failure or Have Let Yourself or Your Family Down 0-->not at all   Trouble Concentrating on Things, Such as Reading the Newspaper or Watching Television 0-->not at all   Moving or Speaking So Slowly that Other People Could Have Noticed? Or the Opposite - Being So Fidgety 0-->not at all   Thoughts that You Would be Better Off Dead or of Hurting Yourself in Some Way 0-->not at all   PHQ-9: Brief Depression Severity Measure Score 5   If You Checked Off Any Problems, How Difficult Have These Problems Made It For You to Do Your Work, Take Care of Things at Home, or Get Along with Other People? extremely difficult       Health Habits and Functional and Cognitive Screenin/14/2023     2:00 PM   Functional & Cognitive Status   Do you have difficulty preparing food and eating? Yes   Do you have difficulty bathing yourself, getting dressed or grooming yourself? Yes   Do you have difficulty using the toilet? Yes   Do you have difficulty moving around from place to place? Yes   Do you have trouble with steps or getting out of a bed or a chair? Yes   Current Diet Well Balanced Diet   Dental Exam Up to date   Eye Exam Up to date   Exercise (times per week) 0 times per week   Current Exercises Include No Regular Exercise;Walking   Do you need help using the phone?  Yes   Are you deaf or do you have serious difficulty hearing?  No   Do you need help with transportation? Yes   Do you need help shopping? Yes   Do you need help preparing meals?  Yes   Do you need help with housework?  Yes   Do you need help with laundry? Yes   Do you need help taking your medications? Yes   Do you need help managing money? Yes   Do you ever drive or ride in a car without wearing a seat belt? No   Have you felt unusual stress, anger or loneliness in the last month? Yes   Who  do you live with? Spouse   If you need help, do you have trouble finding someone available to you? No   Have you been bothered in the last four weeks by sexual problems? No   Do you have difficulty concentrating, remembering or making decisions? Yes       Age-appropriate Screening Schedule:  Refer to the list below for future screening recommendations based on patient's age, sex and/or medical conditions. Orders for these recommended tests are listed in the plan section. The patient has been provided with a written plan.    Health Maintenance   Topic Date Due   • LIPID PANEL  03/15/2023   • URINE MICROALBUMIN  03/15/2023   • DXA SCAN  04/14/2023 (Originally 1945)   • TDAP/TD VACCINES (1 - Tdap) 04/14/2023 (Originally 6/1/1964)   • COVID-19 Vaccine (4 - Booster for Moderna series) 05/24/2023 (Originally 1/5/2022)   • DIABETIC EYE EXAM  06/02/2023 (Originally 8/31/2021)   • HEMOGLOBIN A1C  04/17/2023   • INFLUENZA VACCINE  08/01/2023   • COLORECTAL CANCER SCREENING  02/27/2024   • ANNUAL WELLNESS VISIT  04/14/2024   • HEPATITIS C SCREENING  Completed   • Pneumococcal Vaccine 65+  Completed   • MAMMOGRAM  Discontinued   • ZOSTER VACCINE  Discontinued                  CMS Preventative Services Quick Reference  Risk Factors Identified During Encounter:    None Identified    The above risks/problems have been discussed with the patient.  Pertinent information has been shared with the patient in the After Visit Summary.    Diagnoses and all orders for this visit:    1. Medicare annual wellness visit, subsequent (Primary)- obtain fasting labs and follow up with these. Memory worse, does not want to see neurology. Advised to follow up with PCP.    2. Post-menopausal- ordered DEXA.    Follow Up:   Next Medicare Wellness visit to be scheduled in 1 year.      An After Visit Summary and PPPS were made available to the patient.

## 2023-04-15 LAB
ALBUMIN SERPL-MCNC: 4 G/DL (ref 3.5–5.2)
ALBUMIN/GLOB SERPL: 1.1 G/DL
ALP SERPL-CCNC: 119 U/L (ref 39–117)
ALT SERPL W P-5'-P-CCNC: 17 U/L (ref 1–33)
ANION GAP SERPL CALCULATED.3IONS-SCNC: 12 MMOL/L (ref 5–15)
AST SERPL-CCNC: 18 U/L (ref 1–32)
BILIRUB SERPL-MCNC: 0.5 MG/DL (ref 0–1.2)
BUN SERPL-MCNC: 16 MG/DL (ref 8–23)
BUN/CREAT SERPL: 13.4 (ref 7–25)
CALCIUM SPEC-SCNC: 9.5 MG/DL (ref 8.6–10.5)
CHLORIDE SERPL-SCNC: 106 MMOL/L (ref 98–107)
CHOLEST SERPL-MCNC: 137 MG/DL (ref 0–200)
CO2 SERPL-SCNC: 26 MMOL/L (ref 22–29)
CREAT SERPL-MCNC: 1.19 MG/DL (ref 0.57–1)
EGFRCR SERPLBLD CKD-EPI 2021: 47.2 ML/MIN/1.73
GLOBULIN UR ELPH-MCNC: 3.5 GM/DL
GLUCOSE SERPL-MCNC: 94 MG/DL (ref 65–99)
HBA1C MFR BLD: 7 % (ref 4.8–5.6)
HDLC SERPL-MCNC: 61 MG/DL (ref 40–60)
LDLC SERPL CALC-MCNC: 56 MG/DL (ref 0–100)
LDLC/HDLC SERPL: 0.88 {RATIO}
POTASSIUM SERPL-SCNC: 4 MMOL/L (ref 3.5–5.2)
PROT SERPL-MCNC: 7.5 G/DL (ref 6–8.5)
SODIUM SERPL-SCNC: 144 MMOL/L (ref 136–145)
TRIGL SERPL-MCNC: 111 MG/DL (ref 0–150)
TSH SERPL DL<=0.05 MIU/L-ACNC: 9.28 UIU/ML (ref 0.27–4.2)
VLDLC SERPL-MCNC: 20 MG/DL (ref 5–40)

## 2023-04-19 ENCOUNTER — TELEPHONE (OUTPATIENT)
Dept: INTERNAL MEDICINE | Facility: CLINIC | Age: 78
End: 2023-04-19
Payer: MEDICARE

## 2023-04-19 DIAGNOSIS — R79.89 ELEVATED SERUM CREATININE: Primary | ICD-10-CM

## 2023-04-19 DIAGNOSIS — E03.9 ACQUIRED HYPOTHYROIDISM: ICD-10-CM

## 2023-04-19 NOTE — TELEPHONE ENCOUNTER
Spoke with patients  to relay the message about her labs. Mr. Montoya verbalized understanding with all instructions given and complied. Mr. Montoya states he have to come in next month and will bring her back in then to have labs drawn. Please place order. Thank you.      ----- Message from Argelia Gamez MD sent at 4/19/2023  7:55 AM EDT -----  Please call the patient's  regarding her abnormal result.  Tell him A1c has gotten better, now at goal of 7.  Blood counts overall pretty normal.  Cholesterol looks good.  Her TSH was elevated.  It looks like she has hypothyroidism.  Can he get her back over here to do a little bit of additional blood work?  We will probably treat this as it will help her weight.  Her kidney function was a bit decreased so I will also recheck that when I do the thyroid test.

## 2023-05-04 ENCOUNTER — TELEPHONE (OUTPATIENT)
Dept: INTERNAL MEDICINE | Facility: CLINIC | Age: 78
End: 2023-05-04
Payer: MEDICARE

## 2023-05-04 DIAGNOSIS — Z78.0 POST-MENOPAUSAL: Primary | ICD-10-CM

## 2023-05-04 NOTE — TELEPHONE ENCOUNTER
" central scheduling stated the bone density   \" /24/23 CLASS NEEDS TO BE HOSPITAL PERFORMED \", FYI.     "

## 2023-05-04 NOTE — TELEPHONE ENCOUNTER
Caller: Jean Montoya    Relationship: Emergency Contact    Best call back number: [phone]    What form or medical record are you requesting: LETTER STATING THE PATIENT CANNOT SIT FOR JURY DUTY DUE TO A HISTORY OF STROKES    Who is requesting this form or medical record from you: [facility]    How would you like to receive the form or medical records (pick-up, mail, fax): MAIL TO HOME ADDRESS    Timeframe paperwork needed: ASAP    Additional notes: PLEASE CALL WHEN THIS IS SENT

## 2023-05-04 NOTE — TELEPHONE ENCOUNTER
Yes she has aphasia from stroke. She is disabled from that. Please write letter to excuse her from jury duty indefinitely.

## 2023-05-17 RX ORDER — FLASH GLUCOSE SENSOR
KIT MISCELLANEOUS
Qty: 2 EACH | Refills: 0 | Status: SHIPPED | OUTPATIENT
Start: 2023-05-17

## 2023-05-17 NOTE — TELEPHONE ENCOUNTER
Rx Refill Note  Requested Prescriptions     Pending Prescriptions Disp Refills   • Continuous Blood Gluc Sensor (FreeStyle Flakito 14 Day Sensor) misc [Pharmacy Med Name: FREESTYLE FLAKITO SENSOR 14D KIT] 2 each 0     Sig: USE TO CHECK BLOOD SUGAR 6 TIMES DAILY AS DIRECTED      Last office visit with prescribing clinician: 10/12/2022   Last telemedicine visit with prescribing clinician: 5/4/2023   Next office visit with prescribing clinician: 6/14/2023                         Byron Kelly MA  05/17/23, 11:17 EDT

## 2023-05-20 DIAGNOSIS — E78.5 HYPERLIPIDEMIA, UNSPECIFIED HYPERLIPIDEMIA TYPE: ICD-10-CM

## 2023-05-20 DIAGNOSIS — R56.9 SEIZURE: Chronic | ICD-10-CM

## 2023-05-22 RX ORDER — ATORVASTATIN CALCIUM 80 MG/1
TABLET, FILM COATED ORAL
Qty: 90 TABLET | Refills: 0 | Status: SHIPPED | OUTPATIENT
Start: 2023-05-22

## 2023-05-22 RX ORDER — LEVETIRACETAM 500 MG/1
TABLET ORAL
Qty: 180 TABLET | Refills: 0 | Status: SHIPPED | OUTPATIENT
Start: 2023-05-22

## 2023-05-22 RX ORDER — CARVEDILOL 12.5 MG/1
TABLET ORAL
Qty: 180 TABLET | Refills: 0 | Status: SHIPPED | OUTPATIENT
Start: 2023-05-22

## 2023-06-14 ENCOUNTER — OFFICE VISIT (OUTPATIENT)
Dept: INTERNAL MEDICINE | Facility: CLINIC | Age: 78
End: 2023-06-14
Payer: MEDICARE

## 2023-06-14 ENCOUNTER — LAB (OUTPATIENT)
Dept: LAB | Facility: HOSPITAL | Age: 78
End: 2023-06-14
Payer: MEDICARE

## 2023-06-14 VITALS
DIASTOLIC BLOOD PRESSURE: 90 MMHG | WEIGHT: 193 LBS | OXYGEN SATURATION: 96 % | BODY MASS INDEX: 32.95 KG/M2 | HEIGHT: 64 IN | HEART RATE: 74 BPM | SYSTOLIC BLOOD PRESSURE: 134 MMHG

## 2023-06-14 DIAGNOSIS — Z79.4 TYPE 2 DIABETES MELLITUS WITH COMPLICATION, WITH LONG-TERM CURRENT USE OF INSULIN: Primary | Chronic | ICD-10-CM

## 2023-06-14 DIAGNOSIS — E03.9 ACQUIRED HYPOTHYROIDISM: ICD-10-CM

## 2023-06-14 DIAGNOSIS — E11.8 TYPE 2 DIABETES MELLITUS WITH COMPLICATION, WITH LONG-TERM CURRENT USE OF INSULIN: Primary | Chronic | ICD-10-CM

## 2023-06-14 DIAGNOSIS — R79.89 ELEVATED SERUM CREATININE: ICD-10-CM

## 2023-06-14 LAB
ALBUMIN UR-MCNC: 12.9 MG/DL
ANION GAP SERPL CALCULATED.3IONS-SCNC: 11 MMOL/L (ref 5–15)
BUN SERPL-MCNC: 14 MG/DL (ref 8–23)
BUN/CREAT SERPL: 12.3 (ref 7–25)
CALCIUM SPEC-SCNC: 9.7 MG/DL (ref 8.6–10.5)
CHLORIDE SERPL-SCNC: 101 MMOL/L (ref 98–107)
CO2 SERPL-SCNC: 24 MMOL/L (ref 22–29)
CREAT SERPL-MCNC: 1.14 MG/DL (ref 0.57–1)
CREAT UR-MCNC: 363.5 MG/DL
EGFRCR SERPLBLD CKD-EPI 2021: 49.4 ML/MIN/1.73
GLUCOSE SERPL-MCNC: 203 MG/DL (ref 65–99)
HBA1C MFR BLD: 8.1 % (ref 4.8–5.6)
MICROALBUMIN/CREAT UR: 35.5 MG/G
POTASSIUM SERPL-SCNC: 4.3 MMOL/L (ref 3.5–5.2)
SODIUM SERPL-SCNC: 136 MMOL/L (ref 136–145)
T4 FREE SERPL-MCNC: 1.3 NG/DL (ref 0.93–1.7)

## 2023-06-14 PROCEDURE — 80048 BASIC METABOLIC PNL TOTAL CA: CPT | Performed by: INTERNAL MEDICINE

## 2023-06-14 PROCEDURE — 36415 COLL VENOUS BLD VENIPUNCTURE: CPT

## 2023-06-14 PROCEDURE — 83036 HEMOGLOBIN GLYCOSYLATED A1C: CPT | Performed by: INTERNAL MEDICINE

## 2023-06-14 PROCEDURE — 84439 ASSAY OF FREE THYROXINE: CPT | Performed by: INTERNAL MEDICINE

## 2023-06-14 PROCEDURE — 3075F SYST BP GE 130 - 139MM HG: CPT | Performed by: INTERNAL MEDICINE

## 2023-06-14 PROCEDURE — 3080F DIAST BP >= 90 MM HG: CPT | Performed by: INTERNAL MEDICINE

## 2023-06-14 PROCEDURE — 82043 UR ALBUMIN QUANTITATIVE: CPT | Performed by: INTERNAL MEDICINE

## 2023-06-14 PROCEDURE — 82570 ASSAY OF URINE CREATININE: CPT | Performed by: INTERNAL MEDICINE

## 2023-06-14 PROCEDURE — 99214 OFFICE O/P EST MOD 30 MIN: CPT | Performed by: INTERNAL MEDICINE

## 2023-06-14 PROCEDURE — 84443 ASSAY THYROID STIM HORMONE: CPT

## 2023-06-14 NOTE — PROGRESS NOTES
"Subjective   Beryl Montoya is a 78 y.o. female here for f/u hypothyroidism and DMII.  provides hx as pt has asphasia following CVA. He denies any acute issues for her. She is due for blood work. Not currently on levothyroxine, last TSH elevated at ~9. She continues to gain weight.  takes care of her full time.    I have reviewed the patient's relevant medical history and confirmed it is accurate.    I have personally reviewed and performed the ROS. Argelia Gamez MD     Objective   /90 (BP Location: Left arm)   Pulse 74   Ht 162.6 cm (64\")   Wt 87.5 kg (193 lb)   SpO2 96%   BMI 33.13 kg/m²     Physical Exam  Vitals reviewed.   Constitutional:       Appearance: She is well-developed.   Pulmonary:      Effort: Pulmonary effort is normal.   Neurological:      Mental Status: She is alert. Mental status is at baseline.      Comments: aphasia         Current Outpatient Medications:     aspirin ( Aspirin Low Dose) 81 MG chewable tablet, Chew 1 tablet Daily. chew and swallow., Disp: 90 tablet, Rfl: 5    atorvastatin (LIPITOR) 80 MG tablet, Take 1 tablet by mouth once daily, Disp: 90 tablet, Rfl: 0    carvedilol (COREG) 12.5 MG tablet, Take 1 tablet by mouth twice daily, Disp: 180 tablet, Rfl: 0    Continuous Blood Gluc Sensor (FreeStyle Flakito 14 Day Sensor) [facility], USE TO CHECK BLOOD SUGAR 6 TIMES DAILY AS DIRECTED, Disp: 2 each, Rfl: 0    Entresto 24-26 MG tablet, TAKE 1 TABLET BY MOUTH EVERY 12 HOURS, Disp: 180 tablet, Rfl: 0    FREESTYLE LITE test strip, Use one each to check glucose six times daily E11.9, Disp: 300 each, Rfl: 3    furosemide (LASIX) 40 MG tablet, Take 1 tablet by mouth Daily., Disp: 30 tablet, Rfl: 2    Insulin Lispro Prot & Lispro (HumaLOG Mix 75/25 KwikPen) (75-25) 100 UNIT/ML suspension pen-injector pen, INJECT 30 TO 40 UNITS SUBCUTANEOUSLY TWICE DAILY DEPENDING ON BLOOD GLUCOSE LEVEL, Disp: 60 mL, Rfl: 0    isosorbide mononitrate (IMDUR) 30 MG 24 hr tablet, " Take 1 tablet by mouth once daily, Disp: 90 tablet, Rfl: 0    levETIRAcetam (KEPPRA) 500 MG tablet, Take 1 tablet by mouth twice daily, Disp: 180 tablet, Rfl: 0    pantoprazole (PROTONIX) 40 MG EC tablet, Take 1 tablet by mouth once daily, Disp: 90 tablet, Rfl: 0    polyethylene glycol (MIRALAX) packet, Take 17 g by mouth Daily., Disp: , Rfl:     potassium chloride 10 MEQ CR tablet, Take 1 tablet by mouth twice daily, Disp: 180 tablet, Rfl: 0    ReliOn Pen Needles 32G X 4 MM misc, USE THREE TIMES DAILY AS DIRECTED, Disp: 300 each, Rfl: 0    Xarelto 20 MG tablet, Take 1 tablet by mouth once daily, Disp: 90 tablet, Rfl: 0    Assessment & Plan   Diagnoses and all orders for this visit:    1. Type 2 diabetes mellitus with complication, with long-term current use of insulin (Primary)  -     Microalbumin / Creatinine Urine Ratio - Urine, Clean Catch  -     Hemoglobin A1c    2. Acquired hypothyroidism  -     TSH; Future  -T4 as well    3. Elevated serum creatinine  -recheck BMP today         Argelia Gamez MD

## 2023-06-15 LAB — TSH SERPL DL<=0.05 MIU/L-ACNC: 9.27 UIU/ML (ref 0.27–4.2)

## 2023-06-16 DIAGNOSIS — E03.9 ACQUIRED HYPOTHYROIDISM: Primary | ICD-10-CM

## 2023-06-16 RX ORDER — LEVOTHYROXINE SODIUM 0.03 MG/1
25 TABLET ORAL
Qty: 30 TABLET | Refills: 1 | Status: SHIPPED | OUTPATIENT
Start: 2023-06-16

## 2023-07-24 RX ORDER — GLIMEPIRIDE 1 MG/1
TABLET ORAL
Qty: 90 TABLET | Refills: 0 | OUTPATIENT
Start: 2023-07-24

## 2023-08-04 DIAGNOSIS — Z79.4 TYPE 2 DIABETES MELLITUS WITH COMPLICATION, WITH LONG-TERM CURRENT USE OF INSULIN: Chronic | ICD-10-CM

## 2023-08-04 DIAGNOSIS — E11.8 TYPE 2 DIABETES MELLITUS WITH COMPLICATION, WITH LONG-TERM CURRENT USE OF INSULIN: Chronic | ICD-10-CM

## 2023-08-04 RX ORDER — INSULIN LISPRO 100 [IU]/ML
INJECTION, SUSPENSION SUBCUTANEOUS
Qty: 60 ML | Refills: 0 | Status: SHIPPED | OUTPATIENT
Start: 2023-08-04

## 2023-08-14 RX ORDER — CLOPIDOGREL BISULFATE 75 MG/1
TABLET ORAL
Qty: 90 TABLET | Refills: 0 | OUTPATIENT
Start: 2023-08-14

## 2023-08-16 DIAGNOSIS — E03.9 ACQUIRED HYPOTHYROIDISM: ICD-10-CM

## 2023-08-16 RX ORDER — LEVOTHYROXINE SODIUM 0.03 MG/1
TABLET ORAL
Qty: 30 TABLET | Refills: 0 | Status: SHIPPED | OUTPATIENT
Start: 2023-08-16

## 2023-08-18 DIAGNOSIS — E78.5 HYPERLIPIDEMIA, UNSPECIFIED HYPERLIPIDEMIA TYPE: ICD-10-CM

## 2023-08-18 RX ORDER — ATORVASTATIN CALCIUM 80 MG/1
TABLET, FILM COATED ORAL
Qty: 90 TABLET | Refills: 0 | Status: SHIPPED | OUTPATIENT
Start: 2023-08-18

## 2023-08-18 RX ORDER — CLOPIDOGREL BISULFATE 75 MG/1
TABLET ORAL
Qty: 90 TABLET | Refills: 0 | OUTPATIENT
Start: 2023-08-18

## 2023-08-18 RX ORDER — POTASSIUM CHLORIDE 750 MG/1
TABLET, FILM COATED, EXTENDED RELEASE ORAL
Qty: 180 TABLET | Refills: 0 | Status: SHIPPED | OUTPATIENT
Start: 2023-08-18

## 2023-08-21 RX ORDER — FLASH GLUCOSE SENSOR
KIT MISCELLANEOUS
Qty: 2 EACH | Refills: 0 | Status: SHIPPED | OUTPATIENT
Start: 2023-08-21

## 2023-09-05 RX ORDER — FLASH GLUCOSE SENSOR
KIT MISCELLANEOUS
Qty: 2 EACH | Refills: 0 | Status: SHIPPED | OUTPATIENT
Start: 2023-09-05

## 2023-09-19 RX ORDER — FLASH GLUCOSE SENSOR
KIT MISCELLANEOUS
Qty: 2 EACH | Refills: 0 | Status: SHIPPED | OUTPATIENT
Start: 2023-09-19

## 2023-11-16 DIAGNOSIS — E78.5 HYPERLIPIDEMIA, UNSPECIFIED HYPERLIPIDEMIA TYPE: ICD-10-CM

## 2023-11-16 RX ORDER — CARVEDILOL 12.5 MG/1
TABLET ORAL
Qty: 180 TABLET | Refills: 0 | Status: SHIPPED | OUTPATIENT
Start: 2023-11-16

## 2023-11-16 RX ORDER — ATORVASTATIN CALCIUM 80 MG/1
TABLET, FILM COATED ORAL
Qty: 90 TABLET | Refills: 0 | Status: SHIPPED | OUTPATIENT
Start: 2023-11-16 | End: 2023-11-20

## 2023-11-16 RX ORDER — SACUBITRIL AND VALSARTAN 24; 26 MG/1; MG/1
TABLET, FILM COATED ORAL
Qty: 180 TABLET | Refills: 0 | Status: SHIPPED | OUTPATIENT
Start: 2023-11-16

## 2023-11-18 DIAGNOSIS — E78.5 HYPERLIPIDEMIA, UNSPECIFIED HYPERLIPIDEMIA TYPE: ICD-10-CM

## 2023-11-20 RX ORDER — ATORVASTATIN CALCIUM 80 MG/1
TABLET, FILM COATED ORAL
Qty: 90 TABLET | Refills: 0 | Status: SHIPPED | OUTPATIENT
Start: 2023-11-20

## 2023-11-22 RX ORDER — FLASH GLUCOSE SENSOR
KIT MISCELLANEOUS
Qty: 2 EACH | Refills: 0 | Status: SHIPPED | OUTPATIENT
Start: 2023-11-22

## 2023-11-28 DIAGNOSIS — R56.9 SEIZURE: Chronic | ICD-10-CM

## 2023-11-28 RX ORDER — LEVETIRACETAM 500 MG/1
TABLET ORAL
Qty: 180 TABLET | Refills: 0 | Status: SHIPPED | OUTPATIENT
Start: 2023-11-28

## 2023-12-04 DIAGNOSIS — R56.9 SEIZURE: Chronic | ICD-10-CM

## 2023-12-04 RX ORDER — LEVETIRACETAM 500 MG/1
TABLET ORAL
Qty: 180 TABLET | Refills: 0 | OUTPATIENT
Start: 2023-12-04

## 2023-12-11 ENCOUNTER — APPOINTMENT (OUTPATIENT)
Dept: CT IMAGING | Facility: HOSPITAL | Age: 78
End: 2023-12-11
Payer: MEDICARE

## 2023-12-11 ENCOUNTER — HOSPITAL ENCOUNTER (INPATIENT)
Facility: HOSPITAL | Age: 78
LOS: 9 days | Discharge: NURSING, REHAB OR LONG-TERM CARE | End: 2023-12-21
Attending: EMERGENCY MEDICINE | Admitting: INTERNAL MEDICINE
Payer: MEDICARE

## 2023-12-11 ENCOUNTER — APPOINTMENT (OUTPATIENT)
Dept: GENERAL RADIOLOGY | Facility: HOSPITAL | Age: 78
End: 2023-12-11
Payer: MEDICARE

## 2023-12-11 DIAGNOSIS — R47.01 EXPRESSIVE APHASIA: ICD-10-CM

## 2023-12-11 DIAGNOSIS — E03.9 ACQUIRED HYPOTHYROIDISM: ICD-10-CM

## 2023-12-11 DIAGNOSIS — I63.9 CEREBROVASCULAR ACCIDENT (CVA), UNSPECIFIED MECHANISM: Primary | ICD-10-CM

## 2023-12-11 DIAGNOSIS — I63.00 CEREBROVASCULAR ACCIDENT (CVA) DUE TO THROMBOSIS OF PRECEREBRAL ARTERY: ICD-10-CM

## 2023-12-11 DIAGNOSIS — R13.10 DYSPHAGIA, UNSPECIFIED TYPE: ICD-10-CM

## 2023-12-11 DIAGNOSIS — R47.1 DYSARTHRIA: ICD-10-CM

## 2023-12-11 DIAGNOSIS — R53.1 GENERALIZED WEAKNESS: ICD-10-CM

## 2023-12-11 DIAGNOSIS — R41.841 COGNITIVE COMMUNICATION DEFICIT: ICD-10-CM

## 2023-12-11 DIAGNOSIS — I10 HYPERTENSION, UNSPECIFIED TYPE: ICD-10-CM

## 2023-12-11 PROBLEM — Z87.898 HISTORY OF SEIZURE: Status: ACTIVE | Noted: 2023-12-11

## 2023-12-11 PROBLEM — N39.0 ACUTE UTI (URINARY TRACT INFECTION): Status: ACTIVE | Noted: 2023-12-11

## 2023-12-11 LAB
ALBUMIN SERPL-MCNC: 3.8 G/DL (ref 3.5–5.2)
ALBUMIN/GLOB SERPL: 1.1 G/DL
ALP SERPL-CCNC: 122 U/L (ref 39–117)
ALT SERPL W P-5'-P-CCNC: 23 U/L (ref 1–33)
ANION GAP SERPL CALCULATED.3IONS-SCNC: 14 MMOL/L (ref 5–15)
AST SERPL-CCNC: 29 U/L (ref 1–32)
BACTERIA UR QL AUTO: ABNORMAL /HPF
BASOPHILS # BLD AUTO: 0.09 10*3/MM3 (ref 0–0.2)
BASOPHILS NFR BLD AUTO: 1.3 % (ref 0–1.5)
BILIRUB SERPL-MCNC: 0.9 MG/DL (ref 0–1.2)
BILIRUB UR QL STRIP: NEGATIVE
BUN SERPL-MCNC: 14 MG/DL (ref 8–23)
BUN/CREAT SERPL: 14.3 (ref 7–25)
CALCIUM SPEC-SCNC: 9.1 MG/DL (ref 8.6–10.5)
CHLORIDE SERPL-SCNC: 103 MMOL/L (ref 98–107)
CLARITY UR: ABNORMAL
CO2 SERPL-SCNC: 24 MMOL/L (ref 22–29)
COLOR UR: YELLOW
CREAT SERPL-MCNC: 0.98 MG/DL (ref 0.57–1)
DEPRECATED RDW RBC AUTO: 47 FL (ref 37–54)
EGFRCR SERPLBLD CKD-EPI 2021: 59.2 ML/MIN/1.73
EOSINOPHIL # BLD AUTO: 0.56 10*3/MM3 (ref 0–0.4)
EOSINOPHIL NFR BLD AUTO: 8.1 % (ref 0.3–6.2)
ERYTHROCYTE [DISTWIDTH] IN BLOOD BY AUTOMATED COUNT: 12.7 % (ref 12.3–15.4)
GLOBULIN UR ELPH-MCNC: 3.4 GM/DL
GLUCOSE BLDC GLUCOMTR-MCNC: 225 MG/DL (ref 70–130)
GLUCOSE BLDC GLUCOMTR-MCNC: 231 MG/DL (ref 70–130)
GLUCOSE SERPL-MCNC: 273 MG/DL (ref 65–99)
GLUCOSE UR STRIP-MCNC: NEGATIVE MG/DL
HCT VFR BLD AUTO: 45.6 % (ref 34–46.6)
HGB BLD-MCNC: 15.3 G/DL (ref 12–15.9)
HGB UR QL STRIP.AUTO: ABNORMAL
HOLD SPECIMEN: NORMAL
HYALINE CASTS UR QL AUTO: ABNORMAL /LPF
IMM GRANULOCYTES # BLD AUTO: 0.02 10*3/MM3 (ref 0–0.05)
IMM GRANULOCYTES NFR BLD AUTO: 0.3 % (ref 0–0.5)
KETONES UR QL STRIP: ABNORMAL
LEUKOCYTE ESTERASE UR QL STRIP.AUTO: ABNORMAL
LYMPHOCYTES # BLD AUTO: 2.13 10*3/MM3 (ref 0.7–3.1)
LYMPHOCYTES NFR BLD AUTO: 30.7 % (ref 19.6–45.3)
MCH RBC QN AUTO: 33.8 PG (ref 26.6–33)
MCHC RBC AUTO-ENTMCNC: 33.6 G/DL (ref 31.5–35.7)
MCV RBC AUTO: 100.9 FL (ref 79–97)
MONOCYTES # BLD AUTO: 0.56 10*3/MM3 (ref 0.1–0.9)
MONOCYTES NFR BLD AUTO: 8.1 % (ref 5–12)
NEUTROPHILS NFR BLD AUTO: 3.58 10*3/MM3 (ref 1.7–7)
NEUTROPHILS NFR BLD AUTO: 51.5 % (ref 42.7–76)
NITRITE UR QL STRIP: POSITIVE
NRBC BLD AUTO-RTO: 0 /100 WBC (ref 0–0.2)
NT-PROBNP SERPL-MCNC: 1308 PG/ML (ref 0–1800)
PH UR STRIP.AUTO: <=5 [PH] (ref 5–8)
PLATELET # BLD AUTO: 268 10*3/MM3 (ref 140–450)
PMV BLD AUTO: 9.9 FL (ref 6–12)
POTASSIUM SERPL-SCNC: 4.6 MMOL/L (ref 3.5–5.2)
PROT SERPL-MCNC: 7.2 G/DL (ref 6–8.5)
PROT UR QL STRIP: ABNORMAL
QT INTERVAL: 554 MS
QTC INTERVAL: 548 MS
RBC # BLD AUTO: 4.52 10*6/MM3 (ref 3.77–5.28)
RBC # UR STRIP: ABNORMAL /HPF
REF LAB TEST METHOD: ABNORMAL
SODIUM SERPL-SCNC: 141 MMOL/L (ref 136–145)
SP GR UR STRIP: 1.02 (ref 1–1.03)
SQUAMOUS #/AREA URNS HPF: ABNORMAL /HPF
TROPONIN T SERPL HS-MCNC: 15 NG/L
TSH SERPL DL<=0.05 MIU/L-ACNC: 4.62 UIU/ML (ref 0.27–4.2)
UROBILINOGEN UR QL STRIP: ABNORMAL
WBC # UR STRIP: ABNORMAL /HPF
WBC NRBC COR # BLD AUTO: 6.94 10*3/MM3 (ref 3.4–10.8)
WHOLE BLOOD HOLD COAG: NORMAL
WHOLE BLOOD HOLD SPECIMEN: NORMAL

## 2023-12-11 PROCEDURE — 87088 URINE BACTERIA CULTURE: CPT | Performed by: INTERNAL MEDICINE

## 2023-12-11 PROCEDURE — P9612 CATHETERIZE FOR URINE SPEC: HCPCS

## 2023-12-11 PROCEDURE — G0378 HOSPITAL OBSERVATION PER HR: HCPCS

## 2023-12-11 PROCEDURE — 81001 URINALYSIS AUTO W/SCOPE: CPT | Performed by: EMERGENCY MEDICINE

## 2023-12-11 PROCEDURE — 25010000002 CEFTRIAXONE PER 250 MG: Performed by: INTERNAL MEDICINE

## 2023-12-11 PROCEDURE — 85025 COMPLETE CBC W/AUTO DIFF WBC: CPT | Performed by: EMERGENCY MEDICINE

## 2023-12-11 PROCEDURE — 87186 SC STD MICRODIL/AGAR DIL: CPT | Performed by: INTERNAL MEDICINE

## 2023-12-11 PROCEDURE — 93005 ELECTROCARDIOGRAM TRACING: CPT | Performed by: EMERGENCY MEDICINE

## 2023-12-11 PROCEDURE — 82948 REAGENT STRIP/BLOOD GLUCOSE: CPT

## 2023-12-11 PROCEDURE — 84484 ASSAY OF TROPONIN QUANT: CPT | Performed by: EMERGENCY MEDICINE

## 2023-12-11 PROCEDURE — 25010000002 LEVETRIRACETAM PER 10 MG: Performed by: NURSE PRACTITIONER

## 2023-12-11 PROCEDURE — 80053 COMPREHEN METABOLIC PANEL: CPT | Performed by: EMERGENCY MEDICINE

## 2023-12-11 PROCEDURE — 25010000002 HYDRALAZINE PER 20 MG: Performed by: INTERNAL MEDICINE

## 2023-12-11 PROCEDURE — 99285 EMERGENCY DEPT VISIT HI MDM: CPT

## 2023-12-11 PROCEDURE — 83880 ASSAY OF NATRIURETIC PEPTIDE: CPT | Performed by: EMERGENCY MEDICINE

## 2023-12-11 PROCEDURE — 87086 URINE CULTURE/COLONY COUNT: CPT | Performed by: INTERNAL MEDICINE

## 2023-12-11 PROCEDURE — 99222 1ST HOSP IP/OBS MODERATE 55: CPT | Performed by: NURSE PRACTITIONER

## 2023-12-11 PROCEDURE — 63710000001 INSULIN DETEMIR PER 5 UNITS: Performed by: INTERNAL MEDICINE

## 2023-12-11 PROCEDURE — 99222 1ST HOSP IP/OBS MODERATE 55: CPT | Performed by: INTERNAL MEDICINE

## 2023-12-11 PROCEDURE — 71045 X-RAY EXAM CHEST 1 VIEW: CPT

## 2023-12-11 PROCEDURE — 92610 EVALUATE SWALLOWING FUNCTION: CPT | Performed by: SPEECH-LANGUAGE PATHOLOGIST

## 2023-12-11 PROCEDURE — 84443 ASSAY THYROID STIM HORMONE: CPT | Performed by: EMERGENCY MEDICINE

## 2023-12-11 PROCEDURE — 70450 CT HEAD/BRAIN W/O DYE: CPT

## 2023-12-11 RX ORDER — SODIUM CHLORIDE 0.9 % (FLUSH) 0.9 %
10 SYRINGE (ML) INJECTION AS NEEDED
Status: DISCONTINUED | OUTPATIENT
Start: 2023-12-11 | End: 2023-12-21 | Stop reason: HOSPADM

## 2023-12-11 RX ORDER — INSULIN LISPRO 100 [IU]/ML
1-200 INJECTION, SOLUTION INTRAVENOUS; SUBCUTANEOUS EVERY 6 HOURS SCHEDULED
Status: DISCONTINUED | OUTPATIENT
Start: 2023-12-11 | End: 2023-12-14

## 2023-12-11 RX ORDER — ACETAMINOPHEN 325 MG/1
650 TABLET ORAL EVERY 4 HOURS PRN
Status: DISCONTINUED | OUTPATIENT
Start: 2023-12-11 | End: 2023-12-21 | Stop reason: HOSPADM

## 2023-12-11 RX ORDER — SODIUM CHLORIDE 9 MG/ML
40 INJECTION, SOLUTION INTRAVENOUS AS NEEDED
Status: DISCONTINUED | OUTPATIENT
Start: 2023-12-11 | End: 2023-12-13

## 2023-12-11 RX ORDER — LABETALOL HYDROCHLORIDE 5 MG/ML
10 INJECTION, SOLUTION INTRAVENOUS EVERY 6 HOURS PRN
Status: DISCONTINUED | OUTPATIENT
Start: 2023-12-11 | End: 2023-12-12

## 2023-12-11 RX ORDER — BISACODYL 10 MG
10 SUPPOSITORY, RECTAL RECTAL DAILY PRN
Status: DISCONTINUED | OUTPATIENT
Start: 2023-12-11 | End: 2023-12-21 | Stop reason: HOSPADM

## 2023-12-11 RX ORDER — ACETAMINOPHEN 160 MG/5ML
650 SOLUTION ORAL EVERY 4 HOURS PRN
Status: DISCONTINUED | OUTPATIENT
Start: 2023-12-11 | End: 2023-12-21 | Stop reason: HOSPADM

## 2023-12-11 RX ORDER — ASPIRIN 300 MG/1
300 SUPPOSITORY RECTAL DAILY
Status: DISCONTINUED | OUTPATIENT
Start: 2023-12-11 | End: 2023-12-21 | Stop reason: HOSPADM

## 2023-12-11 RX ORDER — SODIUM CHLORIDE 0.9 % (FLUSH) 0.9 %
10 SYRINGE (ML) INJECTION AS NEEDED
Status: DISCONTINUED | OUTPATIENT
Start: 2023-12-11 | End: 2023-12-13

## 2023-12-11 RX ORDER — LEVOTHYROXINE SODIUM 0.03 MG/1
25 TABLET ORAL EVERY MORNING
Status: DISCONTINUED | OUTPATIENT
Start: 2023-12-12 | End: 2023-12-21 | Stop reason: HOSPADM

## 2023-12-11 RX ORDER — CARVEDILOL 12.5 MG/1
12.5 TABLET ORAL 2 TIMES DAILY
Status: DISCONTINUED | OUTPATIENT
Start: 2023-12-11 | End: 2023-12-13

## 2023-12-11 RX ORDER — INSULIN LISPRO 100 [IU]/ML
1-200 INJECTION, SOLUTION INTRAVENOUS; SUBCUTANEOUS AS NEEDED
Status: DISCONTINUED | OUTPATIENT
Start: 2023-12-11 | End: 2023-12-14

## 2023-12-11 RX ORDER — DEXTROSE MONOHYDRATE 25 G/50ML
10-50 INJECTION, SOLUTION INTRAVENOUS
Status: DISCONTINUED | OUTPATIENT
Start: 2023-12-11 | End: 2023-12-14

## 2023-12-11 RX ORDER — ISOSORBIDE MONONITRATE 30 MG/1
30 TABLET, EXTENDED RELEASE ORAL DAILY
Status: DISCONTINUED | OUTPATIENT
Start: 2023-12-11 | End: 2023-12-21 | Stop reason: HOSPADM

## 2023-12-11 RX ORDER — ATORVASTATIN CALCIUM 40 MG/1
80 TABLET, FILM COATED ORAL NIGHTLY
Status: DISCONTINUED | OUTPATIENT
Start: 2023-12-11 | End: 2023-12-21 | Stop reason: HOSPADM

## 2023-12-11 RX ORDER — ASPIRIN 81 MG/1
81 TABLET, CHEWABLE ORAL DAILY
Status: DISCONTINUED | OUTPATIENT
Start: 2023-12-11 | End: 2023-12-21 | Stop reason: HOSPADM

## 2023-12-11 RX ORDER — HYDRALAZINE HYDROCHLORIDE 20 MG/ML
10 INJECTION INTRAMUSCULAR; INTRAVENOUS EVERY 6 HOURS PRN
Status: DISCONTINUED | OUTPATIENT
Start: 2023-12-11 | End: 2023-12-12

## 2023-12-11 RX ORDER — SODIUM CHLORIDE 9 MG/ML
40 INJECTION, SOLUTION INTRAVENOUS AS NEEDED
Status: DISCONTINUED | OUTPATIENT
Start: 2023-12-11 | End: 2023-12-21 | Stop reason: HOSPADM

## 2023-12-11 RX ORDER — LEVETIRACETAM 500 MG/5ML
500 INJECTION, SOLUTION, CONCENTRATE INTRAVENOUS EVERY 12 HOURS SCHEDULED
Status: DISCONTINUED | OUTPATIENT
Start: 2023-12-11 | End: 2023-12-12

## 2023-12-11 RX ORDER — NICOTINE POLACRILEX 4 MG
15 LOZENGE BUCCAL
Status: DISCONTINUED | OUTPATIENT
Start: 2023-12-11 | End: 2023-12-14

## 2023-12-11 RX ORDER — FUROSEMIDE 40 MG/1
40 TABLET ORAL DAILY
Status: DISCONTINUED | OUTPATIENT
Start: 2023-12-11 | End: 2023-12-21 | Stop reason: HOSPADM

## 2023-12-11 RX ORDER — ONDANSETRON 4 MG/1
4 TABLET, FILM COATED ORAL EVERY 6 HOURS PRN
Status: DISCONTINUED | OUTPATIENT
Start: 2023-12-11 | End: 2023-12-21 | Stop reason: HOSPADM

## 2023-12-11 RX ORDER — POLYETHYLENE GLYCOL 3350 17 G/17G
17 POWDER, FOR SOLUTION ORAL DAILY PRN
Status: DISCONTINUED | OUTPATIENT
Start: 2023-12-11 | End: 2023-12-21 | Stop reason: HOSPADM

## 2023-12-11 RX ORDER — BISACODYL 5 MG/1
5 TABLET, DELAYED RELEASE ORAL DAILY PRN
Status: DISCONTINUED | OUTPATIENT
Start: 2023-12-11 | End: 2023-12-21 | Stop reason: HOSPADM

## 2023-12-11 RX ORDER — SODIUM CHLORIDE 0.9 % (FLUSH) 0.9 %
10 SYRINGE (ML) INJECTION EVERY 12 HOURS SCHEDULED
Status: DISCONTINUED | OUTPATIENT
Start: 2023-12-11 | End: 2023-12-13

## 2023-12-11 RX ORDER — ACETAMINOPHEN 650 MG/1
650 SUPPOSITORY RECTAL EVERY 4 HOURS PRN
Status: DISCONTINUED | OUTPATIENT
Start: 2023-12-11 | End: 2023-12-21 | Stop reason: HOSPADM

## 2023-12-11 RX ORDER — IBUPROFEN 600 MG/1
1 TABLET ORAL
Status: DISCONTINUED | OUTPATIENT
Start: 2023-12-11 | End: 2023-12-15

## 2023-12-11 RX ORDER — SODIUM CHLORIDE 0.9 % (FLUSH) 0.9 %
10 SYRINGE (ML) INJECTION EVERY 12 HOURS SCHEDULED
Status: DISCONTINUED | OUTPATIENT
Start: 2023-12-11 | End: 2023-12-16

## 2023-12-11 RX ORDER — AMOXICILLIN 250 MG
2 CAPSULE ORAL 2 TIMES DAILY PRN
Status: DISCONTINUED | OUTPATIENT
Start: 2023-12-11 | End: 2023-12-21 | Stop reason: HOSPADM

## 2023-12-11 RX ORDER — ONDANSETRON 2 MG/ML
4 INJECTION INTRAMUSCULAR; INTRAVENOUS EVERY 6 HOURS PRN
Status: DISCONTINUED | OUTPATIENT
Start: 2023-12-11 | End: 2023-12-21 | Stop reason: HOSPADM

## 2023-12-11 RX ADMIN — Medication 10 ML: at 20:08

## 2023-12-11 RX ADMIN — SODIUM CHLORIDE 1000 MG: 900 INJECTION INTRAVENOUS at 17:03

## 2023-12-11 RX ADMIN — ASPIRIN 300 MG: 300 SUPPOSITORY RECTAL at 15:23

## 2023-12-11 RX ADMIN — INSULIN DETEMIR 10 UNITS: 100 INJECTION, SOLUTION SUBCUTANEOUS at 20:26

## 2023-12-11 RX ADMIN — HYDRALAZINE HYDROCHLORIDE 10 MG: 20 INJECTION INTRAMUSCULAR; INTRAVENOUS at 20:06

## 2023-12-11 RX ADMIN — LEVETIRACETAM 500 MG: 100 INJECTION, SOLUTION INTRAVENOUS at 20:07

## 2023-12-11 NOTE — H&P
[facility] Medicine Services  HISTORY AND PHYSICAL    Patient Name: Beryl Montoya  : 1945  MRN: [id number]  Primary Care Physician: Argelia Gamez MD  Date of admission: 2023      Subjective   Subjective     Chief Complaint:  Generalized weakness, speech difficulty    HPI:  Beryl Montoya is a 78 y.o. female with CAD s/p CABG, prior CVA with some residual speech deficits, HTN, DM2, wheelchair bound at baseline but helps with transfers, who was brought to the ED today by her family due to increased weakness and lethargy the past 4 days.  They deny any known fevers or overt signs of infection.  Her affect has been more flat/depressed than normal and she has not been speaking as much as she typically does, only answering yes/no questions.  She has been too weak to assist with transfers.  Son notes possibly some increased weakness in the left arm compared to right.  In the ED, CT head showed new hypodensity in the right karen which may represent age-indeterminate infarct.  Stroke team evaluated and patient will be admitted for further management.  UA was also suggestive of UTI.      Personal History     Past Medical History:   Diagnosis Date    CHF (congestive heart failure)     Coronary artery disease involving native coronary artery of native heart with angina pectoris 2020    Diabetes mellitus     GERD (gastroesophageal reflux disease)     High cholesterol     Hyperlipidemia     Hypertension     Ischemic cardiomyopathy     Stroke      speech and short term memory     TIA (transient ischemic attack)              Past Surgical History:   Procedure Laterality Date    CARDIAC CATHETERIZATION N/A 2020    Procedure: Left Heart Cath [id number] @ [facility];  Surgeon: David Faulkner MD;  Location: [facility] CATH INVASIVE LOCATION;  Service: Cardiovascular;  Laterality: N/A;    CATARACT EXTRACTION Bilateral     COLONOSCOPY      CORONARY ARTERY  BYPASS GRAFT N/A 6/30/2020    Procedure: SHERI PER ANESTHESIA, MEDIAN STERNOTOMY, CORONARY ARTERY BYPASS GRAFTING X 3 , UTILIZING THE LEFT INTERNAL MAMMARY ARTERY AND EVH OF RIGHT GREATER SAPHENOUS VEIN;  Surgeon: Jerry Lozano MD;  Location: [facility] include [facility] beds at the [facility] OR;  Service: Cardiothoracic;  Laterality: N/A;  LEG START - 0736  VEIN OUT - 0840  VEIN READY - 0850    EYE SURGERY      HYSTERECTOMY      total    INTERVENTIONAL RADIOLOGY PROCEDURE Bilateral 9/25/2017    Procedure: Carotid Cerebral Angiogram;  Surgeon: Maldonado Casas MD;  Location:  [facility] CATH INVASIVE LOCATION;  Service:        Family History: family history includes Breast cancer in her sister; Cancer in her mother; Diabetes in her father, maternal grandmother, and mother; Heart disease in her father; Hypertension in her mother; No Known Problems in her maternal grandfather, paternal grandfather, and paternal grandmother.     Social History:  reports that she quit smoking about 18 years ago. Her smoking use included cigarettes. She has a 1.00 pack-year smoking history. She has never used smokeless tobacco. She reports that she does not drink alcohol and does not use drugs.  Social History     Social History Narrative    Lives in [facility]    Caffeine: 2 cups coffee daily       Medications:  Available home medication information reviewed.  Medications Prior to Admission   Medication Sig Dispense Refill Last Dose    aspirin (SM Aspirin Low Dose) 81 MG chewable tablet Chew 1 tablet Daily. chew and swallow. 90 tablet 5     atorvastatin (LIPITOR) 80 MG tablet Take 1 tablet by mouth once daily 90 tablet 0     carvedilol (COREG) 12.5 MG tablet Take 1 tablet by mouth twice daily 180 tablet 0     Continuous Blood Gluc Sensor (FreeStyle Flakito 14 Day Sensor) [facility] USE TO CHECK BLOOD SUGAR SIX TIMES DAILY AS DIRECTED *CHANGE EVERY 14 DAYS* 2 each 0     Entresto 24-26 MG tablet TAKE 1 TABLET BY MOUTH EVERY 12 HOURS 180 tablet 0     FREESTYLE LITE test strip Use  one each to check glucose six times daily E11.9 300 each 3     furosemide (LASIX) 40 MG tablet Take 1 tablet by mouth Daily. 30 tablet 2     Insulin Lispro Prot & Lispro (HumaLOG Mix 75/25 KwikPen) (75-25) 100 UNIT/ML suspension pen-injector pen INJECT 30 TO 40 UNITS SUBCUTANEOUSLY TWICE DAILY DEPENDING ON BLOOD GLUCOSE LEVEL 60 mL 0     isosorbide mononitrate (IMDUR) 30 MG 24 hr tablet Take 1 tablet by mouth once daily 90 tablet 0     levETIRAcetam (KEPPRA) 500 MG tablet Take 1 tablet by mouth twice daily 180 tablet 0     levothyroxine (SYNTHROID, LEVOTHROID) 25 MCG tablet TAKE 1 TABLET BY MOUTH ONCE DAILY IN THE MORNING 30 tablet 0     ondansetron ODT (ZOFRAN-ODT) 4 MG disintegrating tablet Place 1 tablet on the tongue Every 8 (Eight) Hours As Needed for Nausea or Vomiting. 21 tablet 3     pantoprazole (PROTONIX) 40 MG EC tablet Take 1 tablet by mouth once daily 90 tablet 0     polyethylene glycol (MIRALAX) packet Take 17 g by mouth Daily.       potassium chloride 10 MEQ CR tablet Take 1 tablet by mouth twice daily 180 tablet 0     ReliOn Pen Needles 32G X 4 MM misc USE THREE TIMES DAILY AS DIRECTED 300 each 0     Xarelto 20 MG tablet Take 1 tablet by mouth once daily 90 tablet 0        No Known Allergies    Objective   Objective     Vital Signs:   Temp:  [98.6 °F (37 °C)] 98.6 °F (37 °C)  Heart Rate:  [54-64] 59  Resp:  [18] 18  BP: (167-206)/(66-94) 180/84  Total (NIH Stroke Scale): 21    Physical Exam   Constitutional: No acute distress, awake, laying in bed, chronically ill appearing  HENT: NCAT, mucous membranes dry  Respiratory: Clear to auscultation bilaterally, respiratory effort normal   Cardiovascular: RRR, no murmurs, rubs, or gallops  Gastrointestinal: Soft, mild diffuse tenderness  Musculoskeletal: No bilateral ankle edema  Psychiatric: Calm, cooperative  Neurologic: Able to answer yes/no questions.  LUE 4/5, RUE 4+/5.  Skin: No rashes on exposed surfaces.    Result Review:  I have personally reviewed  the results from the time of this admission to 12/11/2023 18:19 EST and agree with these findings:  [x]  Laboratory list / accordion  []  Microbiology  [x]  Radiology  []  EKG/Telemetry   []  Cardiology/Vascular   []  Pathology  [x]  Old records  []  Other:  Most notable findings include:         LAB RESULTS:      Lab 12/11/23  1314   WBC 6.94   HEMOGLOBIN 15.3   HEMATOCRIT 45.6   PLATELETS 268   NEUTROS ABS 3.58   IMMATURE GRANS (ABS) 0.02   LYMPHS ABS 2.13   MONOS ABS 0.56   EOS ABS 0.56*   .9*         Lab 12/11/23  1314   SODIUM 141   POTASSIUM 4.6   CHLORIDE 103   CO2 24.0   ANION GAP 14.0   BUN 14   CREATININE 0.98   EGFR 59.2*   GLUCOSE 273*   CALCIUM 9.1   TSH 4.620*         Lab 12/11/23  1314   TOTAL PROTEIN 7.2   ALBUMIN 3.8   GLOBULIN 3.4   ALT (SGPT) 23   AST (SGOT) 29   BILIRUBIN 0.9   ALK PHOS 122*         Lab 12/11/23  1314   PROBNP 1,308.0   HSTROP T 15*                 UA          12/11/2023    15:23   Urinalysis   Squamous Epithelial Cells, UA 0-2    Specific Gravity, UA 1.024    Ketones, UA 40 mg/dL (2+)    Blood, UA Trace    Leukocytes, UA Moderate (2+)    Nitrite, UA Positive    RBC, UA 3-5    WBC, UA Too Numerous to Count    Bacteria, UA 4+        Microbiology Results (last 10 days)       ** No results found for the last 240 hours. **            CT Head Without Contrast    Result Date: 12/11/2023  CT HEAD WO CONTRAST Date of Exam: 12/11/2023 1:20 PM EST Indication: AMS, history of stroke, on Xarelto. Comparison: 7/14/2020 Technique: Axial CT images were obtained of the head without contrast administration.  Automated exposure control and iterative construction methods were used. Findings: Parenchyma:No acute intraparenchymal hemorrhage. Sequela of old left-sided infarct with encephalomalacia. New hypodensity within the right karen. Moderate parenchymal volume loss. Scattered periventricular and subcortical white matter hypodensities, nonspecific, but most often consistent with small vessel  ischemic changes. No midline shift or herniation. Ventricles and extra axial spaces:Prominent ventricles and sulci secondary to volume loss. No extra axial fluid collection seen. Other:Lens replacements. Scattered paranasal sinus mucosal thickening. Mastoid air cells are clear. Calvarium is intact. Intracranial atherosclerotic calcification is present.     Impression: Impression: New focal hypodensity within the right karen may represent age-indeterminate infarct. Consider MRI to further evaluate. Chronic changes including old left-sided infarct as above. Findings discussed with stroke team by Dr. Eros Dejesus via telephone on 12/11/2023 3:31 PM EST. Electronically Signed: Eros Dejesus MD  12/11/2023 4:02 PM EST  Workstation ID: [id number]    XR Chest 1 View    Result Date: 12/11/2023  XR CHEST 1 VW Date of Exam: 12/11/2023 1:12 PM EST Indication: Stroke Protocol (Onset > 12 Hrs) Comparison: Chest radiograph dated 8/20/2020 Findings: There is borderline cardiomegaly. There are postsurgical changes of prior CABG. Pulmonary vascularity appears normal. There is no acute airspace consolidation, pleural effusion, or pneumothorax. There are degenerative changes of the thoracic spine.     Impression: Impression: 1. Borderline cardiomegaly with postsurgical changes of CABG. No acute consolidation. Electronically Signed: Jimenez Butcher  12/11/2023 2:02 PM EST  Workstation ID: [id number]     Results for orders placed in visit on 06/30/20    Emergent/Open-Heart Anesthesia SHERI    Narrative  Procedure Performed: Emergent/Open-Heart Anesthesia SHERI  Start Time:  End Time:    Preanesthesia Checklist:  Patient identified, IV assessed, risks and benefits discussed, monitors and equipment assessed, procedure being performed at surgeon's request and anesthesia consent obtained.    General Procedure Information  Diagnostic Indications for Echo:  assessment of ascending aorta, assessment of surgical repair and hemodynamic  monitoring  Physician Requesting Echo: Jerry Lozano MD  Location performed:  OR  Intubated  Bite block not placed  Heart visualized  Probe Insertion:  Easy  Probe Type:  Multiplane  Modalities:  Color flow mapping, continuous wave Doppler and pulse wave Doppler    Echocardiographic and Doppler Measurements    Ventricles    Right Ventricle:  Hypertrophy not present.  Thrombus not present.  Global function normal.  Left Ventricle:  Cavity size normal.  Hypertrophy not present.  Thrombus not present.  Global Function mildly impaired.  Ejection Fraction 52%.        Valves    Aortic Valve:  Annulus normal.  Stenosis not present.  Area: 1.8 cm².  Mean Gradient: 4 mean 2 mmHg.  Regurgitation absent.  Leaflets normal.  Leaflet motions normal.    Mitral Valve:  Annulus normal.  Stenosis not present.  Area: 4.7 cm².  Mean Gradient: 3 mean 1 mmHg.  Regurgitation mild.  Leaflets normal.  Leaflet motions normal.    Tricuspid Valve:  Annulus normal.  Stenosis not present.  Regurgitation trace.  Leaflets normal.  Leaflet motions normal.  Pulmonic Valve:  Annulus normal.  Stenosis not present.        Aorta    Ascending Aorta:  Size normal.  Dissection not present.  Plaque thickness greater than 3 mm.  Mobile plaque not present.  Aortic Arch:  Size normal.  Dissection not present.  Plaque thickness greater than 3 mm.  Mobile plaque not present.  Descending Aorta:  Size normal.  Dissection not present.  Plaque thickness greater than 3 mm.  Mobile plaque not present.        Atria    Right Atrium:  Size normal.  Spontaneous echo contrast not present.  Thrombus not present.  Tumor not present.  Device present.    Left Atrium:  Size normal.  Spontaneous echo contrast not present.  Thrombus not present.  Tumor not present.  Left atrial appendage normal.      Septa    Atrial Septum:  Intra-atrial septal morphology normal.    Ventricular Septum:  Intra-ventricular septum morphology normal.    Diastolic Function  Measurements:  Diastolic Dysfunction Grade=  E= ms  A= ms  E/A Ratio= 3.5  DT= ms  S/D= 0.4  IVRT=    Other Findings  Pericardium:  normal  Pleural Effusion:  none  Pulmonary Arteries:  normal  Pulmonary Venous Flow:  normal    Anesthesia Information  Performed Personally  Anesthesiologist:  José Palacio MD      Echocardiogram Comments:  Informed consent was obtained preoperatively.  Ismael probe passed without difficulty. Post CABG:  EF ~ 40 % on inotropes with mid to apex moderately HK.  All findings discussed with surgeon.      Assessment & Plan   Assessment & Plan     Active Hospital Problems    Diagnosis  POA    **Generalized weakness [R53.1]  Yes    Acute UTI (urinary tract infection) [N39.0]  Yes    History of seizure [Z87.898]  Yes    Ischemic cardiomyopathy. LVEF 30% [I25.5]  Yes    Essential hypertension [I10]  Yes    History of L MCA CVA [Z86.73]  Not Applicable    Type 2 diabetes mellitus with complication, with long-term current use of insulin [E11.8, Z79.4]  Not Applicable     Generalized weakness  New right karen lesion, age-indeterminate   -Continue ASA  -Statin when patient able to take PO  -PT/OT/SLP  -Echocardiogram  -MRI brain  -NPO until SLP evaluation  -Stroke team following  -Normal BP goals; may need IV medication if elevated given NPO status  -Was on Xarelto at some point but not clear that she has filled this recently and  is unsure if she is taking this (it is on her home med list on his phone).  I called her pharmacy and she has not filled it there since 9/14/2022.  Will ask our pharmacist to verify her medications tomorrow.    UTI  -Add urine culture  -Start ceftriaxone    Seizure history  -IV Keppra while NPO    CHF/ischemic cardiomyopathy  HTN  CAD  -When able to take PO or enteral access established, will need to un-hold carvedilol, IMDUR, Entresto, lasix    DM2  -A1c pending  -Glucommander SQ        DVT prophylaxis:  SCDs      CODE STATUS:    Code Status and Medical  Interventions:   Ordered at: 12/11/23 1626     Medical Intervention Limits:    NO intubation (DNI)     Level Of Support Discussed With:    Next of Kin (If No Surrogate)     Code Status (Patient has no pulse and is not breathing):    No CPR (Do Not Attempt to Resuscitate)     Medical Interventions (Patient has pulse or is breathing):    Limited Support       Expected Discharge   Expected Discharge Date: 12/15/2023; Expected Discharge Time:      Eboni Nevarez MD  12/11/23

## 2023-12-11 NOTE — CONSULTS
"Stroke Consult Note    Patient Name: Beryl Montoya   MRN: [id number]  Age: 78 y.o.  Sex: female  : 1945    Primary Care Physician: Argelia Gamez MD  Referring Physician: Kyle Ram, DO    TIME STROKE TEAM CALLED: 1306 EST     TIME PATIENT SEEN: 1316 EST    Race:      Chief Complaint/Reason for Consultation: Global Aphasia/Weakness     HPI: Beryl Montoya is a 78 y.o. female with a PMH significant for prior strokes (on Xarelto, we will chair bound at baseline), HTN, HLD, seizure disorder, CHF, CAD s/p CABG who presents to ED via EMS with altered mental status, and general weakness which began approximately 4 days ago. She has limited verbal response and uses a wheelchair with minimal assistance at baseline, but per family report she is less verbal and unable to assist in wheelchair transfer over the last 4 days. She is on Xarelto with previous stroke, and shook her head side to side signaling \"no\" when asked if she had been taking her blood thinners. Family is not present to provide additional history when Neuro Stroke was consulted. ROS  limited due to patient condition. She is not an IV thrombolytic candidate due to last known well 4 days ago.  NIH 15 on arrival.  CT head with no acute findings.  Age-indeterminate hypodensity noted in the right karen.    Last Known Normal Date/Time:  EST     Review of Systems   Reason unable to perform ROS: Patient is non-verbal and unable to provide ROS.      Past Medical History:   Diagnosis Date    CHF (congestive heart failure)     Coronary artery disease involving native coronary artery of native heart with angina pectoris 2020    Diabetes mellitus     GERD (gastroesophageal reflux disease)     High cholesterol     Hyperlipidemia     Hypertension     Ischemic cardiomyopathy     Stroke     2013 speech and short term memory     TIA (transient ischemic attack)      Past Surgical History:   Procedure Laterality Date    CARDIAC " CATHETERIZATION N/A 2020    Procedure: Left Heart Cath 38737 C19 @  [facility];  Surgeon: David Faulkner MD;  Location:  [facility] CATH INVASIVE LOCATION;  Service: Cardiovascular;  Laterality: N/A;    CATARACT EXTRACTION Bilateral     COLONOSCOPY      CORONARY ARTERY BYPASS GRAFT N/A 2020    Procedure: SHERI PER ANESTHESIA, MEDIAN STERNOTOMY, CORONARY ARTERY BYPASS GRAFTING X 3 , UTILIZING THE LEFT INTERNAL MAMMARY ARTERY AND EVH OF RIGHT GREATER SAPHENOUS VEIN;  Surgeon: Jerry Lozano MD;  Location:  [facility] OR;  Service: Cardiothoracic;  Laterality: N/A;  LEG START - 0736  VEIN OUT - 0840  VEIN READY - 0850    EYE SURGERY      HYSTERECTOMY      total    INTERVENTIONAL RADIOLOGY PROCEDURE Bilateral 2017    Procedure: Carotid Cerebral Angiogram;  Surgeon: Maldonado Casas MD;  Location: [facility] CATH INVASIVE LOC[facility];  Service:      Family History   Problem Relation Age of Onset    Diabetes Mother     Cancer Mother     Hypertension Mother     Diabetes Father     Heart disease Father     Breast cancer Sister     Diabetes Maternal Grandmother     No Known Problems Maternal Grandfather     No Known Problems Paternal Grandmother     No Known Problems Paternal Grandfather      Social History     Socioeconomic History    Marital status:    Tobacco Use    Smoking status: Former     Packs/day: 0.25     Years: 4.00     Additional pack years: 0.00     Total pack years: 1.00     Types: Cigarettes     Quit date: 2005     Years since quittin.2    Smokeless tobacco: Never   Vaping Use    Vaping Use: Never used   Substance and Sexual Activity    Alcohol use: No    Drug use: No    Sexual activity: Defer     No Known Allergies  Prior to Admission medications    Medication Sig Start Date End Date Taking? Authorizing Provider   aspirin (SM Aspirin Low Dose) 81 MG chewable tablet Chew 1 tablet Daily. chew and swallow. 23   Argelia Gamez MD   atorvastatin (LIPITOR) 80 MG  tablet Take 1 tablet by mouth once daily 11/20/23   Argelia Gamez MD   carvedilol (COREG) 12.5 MG tablet Take 1 tablet by mouth twice daily 11/16/23   Argelia Gamez MD   Continuous Blood Gluc Sensor (FreeStyle Flakito 14 Day Sensor) [facility] USE TO CHECK BLOOD SUGAR SIX TIMES DAILY AS DIRECTED *CHANGE EVERY 14 DAYS* 11/22/23   Argelai Gamez MD   Entresto 24-26 MG tablet TAKE 1 TABLET BY MOUTH EVERY 12 HOURS 11/16/23   Argelia Gamez MD   FREESTYLE LITE test strip Use one each to check glucose six times daily E11.9 6/21/21   Argelia Gamez MD   furosemide (LASIX) 40 MG tablet Take 1 tablet by mouth Daily. 8/18/20   Argelia Gamez MD   Insulin Lispro Prot & Lispro (HumaLOG Mix 75/25 KwikPen) (75-25) 100 UNIT/ML suspension pen-injector pen INJECT 30 TO 40 UNITS SUBCUTANEOUSLY TWICE DAILY DEPENDING ON BLOOD GLUCOSE LEVEL 8/4/23   Argelia Gamez MD   isosorbide mononitrate (IMDUR) 30 MG 24 hr tablet Take 1 tablet by mouth once daily 7/3/23   Argelia Gamez MD   levETIRAcetam (KEPPRA) 500 MG tablet Take 1 tablet by mouth twice daily 11/28/23   Argelia Gamez MD   levothyroxine (SYNTHROID, LEVOTHROID) 25 MCG tablet TAKE 1 TABLET BY MOUTH ONCE DAILY IN THE MORNING 8/16/23   Argelia Gamez MD   ondansetron ODT (ZOFRAN-ODT) 4 MG disintegrating tablet Place 1 tablet on the tongue Every 8 (Eight) Hours As Needed for Nausea or Vomiting. 6/28/23   Argelia Gamez MD   pantoprazole (PROTONIX) 40 MG EC tablet Take 1 tablet by mouth once daily 2/27/23   Argelia Gamez MD   polyethylene glycol (MIRALAX) packet Take 17 g by mouth Daily.    Provider, MD Berta   potassium chloride 10 MEQ CR tablet Take 1 tablet by mouth twice daily 8/18/23   Argelia Gamez MD   ReliOn Pen Needles 32G X 4 MM misc USE THREE TIMES DAILY AS DIRECTED 7/29/22   Argelia Gamez MD    Xarelto 20 MG tablet Take 1 tablet by mouth once daily 2/22/22   Argelia Gamez MD         Temp:  [98.6 °F (37 °C)] 98.6 °F (37 °C)  Heart Rate:  [57-64] 57  Resp:  [18] 18  BP: (186-206)/(66-75) 186/75  Neurological Exam  Mental Status  Awake and drowsy. Patient is nonverbal. Global aphasia present.    Cranial Nerves  CN III, IV, VI: Extraocular movements intact bilaterally. Normal lids and orbits bilaterally. Pupils equal round and reactive to light bilaterally.  CN XII: Tongue midline without atrophy or fasciculations.    Motor  Decreased muscle bulk throughout. Decreased muscle tone.  1/5 strength LUE and LLE  2/5 strength RUE and RLE   Patient withdraws to noxious Stimuli bilaterally  .    Sensory  Pinprick is normal in upper and lower extremities.     Coordination    Unable to assess due to patient condition. .    Gait    Not observed due to patient condition. .      Physical Exam  Constitutional:       General: She is awake.      Appearance: She is obese. She is ill-appearing.   HENT:      Head: Normocephalic and atraumatic.      Mouth/Throat:      Mouth: Mucous membranes are dry.      Pharynx: Oropharyngeal exudate present.   Eyes:      General: Lids are normal.      Extraocular Movements: Extraocular movements intact.      Pupils: Pupils are equal, round, and reactive to light.   Cardiovascular:      Rate and Rhythm: Normal rate.   Pulmonary:      Breath sounds: Stridor present.   Musculoskeletal:      Cervical back: Neck supple. No rigidity.   Skin:     General: Skin is warm and dry.   Neurological:      Cranial Nerves: No facial asymmetry.      Sensory: No sensory deficit.      Motor: Weakness present.   Psychiatric:         Behavior: Behavior is cooperative.     Functional Status Prior to Current Stroke/[place] Score: 4    NIH Stroke Scale  Time: 14:40 EST  Person Administering Scale: MARIE Pierson    1a  Level of consciousness: 1=not alert but arousable by minor stimulation to  obey, answer or respond   1b. LOC questions:  2=Answers neither task correctly   1c. LOC commands: 0=Performs both tasks correctly   2.  Best Gaze: 0=normal   3.  Visual: 0=No visual loss   4. Facial Palsy: 0=Normal symmetric movement   5a.  Motor left arm: 3=No effort against gravity, limb falls   5b.  Motor right arm: 2=Some effort against gravity, limb cannot get to or maintain (if cured) 90 (or 45) degrees, drifts down to bed, but has some effort against gravity   6a. motor left leg: 3=No effort against gravity, limb falls   6b  Motor right le=Some effort against gravity, limb cannot get to or maintain (if cured) 90 (or 45) degrees, drifts down to bed, but has some effort against gravity   7. Limb Ataxia: 0=Absent   8.  Sensory: 0=Normal; no sensory loss   9. Best Language:  2=Severe aphasia   10. Dysarthria: 0=Normal   11. Extinction and Inattention: 0=No abnormality    Total:   15       Thrombolytic Inclusion / Exclusion Criteria    Time: 1315  Person Administering Scale: MARIE Pierson    Inclusion Criteria  [x]   18 years of age or greater   []   Onset of symptoms < 4.5 hours before beginning treatment (stroke onset = time patient was last seen well or without symptoms).   []   Diagnosis of acute ischemic stroke causing measurable disabling deficit (Complete Hemianopia, Any Aphasia, Visual or Sensory Extinction, Any weakness limiting sustained effort against gravity)   []   Any remaining deficit considered potentially disabling in view of patient and practitioner   Exclusion criteria (Do not proceed with Alteplase if any are checked under exclusion criteria)  [x]   Onset unknown or GREATER than 4.5 hours   []   ICH on CT/MRI   []   CT demonstrates hypodensity representing acute or subacute infarct   []   Significant head trauma or prior stroke in the previous 3 months   []   Symptoms suggestive of subarachnoid hemorrhage   []   History of un-ruptured intracranial aneurysm GREATER than 10 mm    []   Recent intracranial or intraspinal surgery within the last 3 months   []   Arterial puncture at a non-compressible site in the previous 7 days   []   Active internal bleeding   []   Acute bleeding tendency   []   Platelet count LESS than 100,000 for known hematological diseases such as leukemia, thrombocytopenia or chronic cirrhosis   []   Current use of anticoagulant with INR GREATER than 1.7 or PT GREATER than 15 seconds, aPTT GREATER than 40 seconds   []   Heparin received within 48 hours, resulting in abnormally elevated aPTT GREATER than upper limit of normal   [x]   Current use of direct thrombin inhibitors or direct factor Xa inhibitors in the past 48 hours   []   Elevated blood pressure refractory to treatment (systolic GREATER than 185 mm/Hg or diastolic  GREATER than 110 mm/Hg   []   Suspected infective endocarditis and aortic arch dissection   []   Current use of therapeutic treatment dose of low-molecular-weight heparin (LMWH) within the previous 24 hours   []   Structural GI malignancy or bleed   Relative exclusion for all patients  []   Only minor non-disabling symptoms   []   Pregnancy   []   Seizure at onset with postictal residual neurological impairments   []   Major surgery or previous trauma within past 14 days   []   History of previous spontaneous ICH, intracranial neoplasm, or AV malformation   []   Postpartum (within previous 14 days)   []   Recent GI or urinary tract hemorrhage (within previous 21 days)   []   Recent acute MI (within previous 3 months)   []   History of un-ruptured intracranial aneurysm LESS than 10 mm   []   History of ruptured intracranial aneurysm   []   Blood glucose LESS than 50 mg/dL (2.7 mmol/L)   []   Dural puncture within the last 7 days   []   Known GREATER than 10 cerebral microbleeds   Additional exclusions for patients with symptoms onset between 3 and 4.5 hours.  []   Age > 80.   [x]   On any anticoagulants regardless of INR  >>> Warfarin (Coumadin),  "Heparin, Enoxaparin (Lovenox), fondaparinux (Arixtra), bivalirudin (Angiomax), Argatroban, dabigatran (Pradaxa), rivaroxaban (Xarelto), or apixaban (Eliquis)   []   Severe stroke (NIHSS > 25).   [x]   History of BOTH diabetes and previous ischemic stroke.   []   The risks and benefits have been discussed with the patient or family related to the administration of IV thrombolytic therapy for stroke symptoms.   []   I have discussed and reviewed the patient's case and imaging with the attending prior to IV thrombolytic therapy.   N/A Time IV thrombolytic administered     Basic Metabolic Panel    Sodium Sodium   Date Value Ref Range Status   12/11/2023 141 136 - 145 mmol/L Final      Potassium Potassium   Date Value Ref Range Status   12/11/2023 4.6 3.5 - 5.2 mmol/L Final     Comment:     Slight hemolysis detected by analyzer. Result may be falsely elevated.      Chloride Chloride   Date Value Ref Range Status   12/11/2023 103 98 - 107 mmol/L Final      Bicarbonate No results found for: \"PLASMABICARB\"   BUN BUN   Date Value Ref Range Status   12/11/2023 14 8 - 23 mg/dL Final      Creatinine Creatinine   Date Value Ref Range Status   12/11/2023 0.98 0.57 - 1.00 mg/dL Final      Calcium Calcium   Date Value Ref Range Status   12/11/2023 9.1 8.6 - 10.5 mg/dL Final      Glucose      No components found for: \"GLUCOSE.*\"      CBC w/diff          4/14/2023    15:36 12/11/2023    13:14   CBC w/Diff   WBC 9.51  6.94    RBC 4.13  4.52    Hemoglobin 14.3  15.3    Hematocrit 41.1  45.6    MCV 99.5  100.9    MCH 34.6  33.8    MCHC 34.8  33.6    RDW 12.6  12.7    Platelets 251  268    Neutrophil Rel % 64.3  51.5    Immature Granulocyte Rel % 0.2  0.3    Lymphocyte Rel % 22.4  30.7    Monocyte Rel % 7.8  8.1    Eosinophil Rel % 4.5  8.1    Basophil Rel % 0.8  1.3        CT Head w/o Contrast 6/25/20 :   IMPRESSION:  Stable appearance of low attenuation and likely laminar  necrosis throughout the left temporoparietal and occipital " regions as  seen on 07/12/2020 comparison without evidence for hemorrhagic  involvement or progressive/new mass effect.    CTA Head 6/25/20 :   IMPRESSION:  Extensive low density seen suggesting a completed area of  infarction in the left occipital region. There are diminished vessels  identified in the area of the left occipital region in its periphery.  The central intracranial vessels are without significant stenosis.  Extensive atherosclerotic disease at the left carotid bifurcation with  moderate stenosis of the distal common carotid artery on the left of  approximately 50-60%. Both vertebral arteries are small in caliber  however symmetric and patent.     MRI Brain w/o Contrast 6/25/20  IMPRESSION:  1. Findings are consistent with a large early subacute infarct involving the entire left posterior cerebral artery distribution in the remaining left more posterior middle cerebral artery distribution. This is superimposed upon a chronic infarct in the  more anterior left middle cerebral artery distribution. Additionally there are smaller bilateral areas of restricted diffusion. Findings together favor a thromboembolic insult from a central thromboembolus source given involvement of multiple vascular  distributions. There is local mass effect but there is no midline shift or hemorrhagic transformation at this time.  2. Pre-existing extensive probable sequelae of small vessel disease and atrophy.    Glucose: 273  AST: 29  ALT: 23  Creatinine: 0.98  BUN:  14  Platelets: 268  Sodium: 141    Results Reviewed:  I have personally reviewed current lab, radiology, and data.  XR Chest 1 View    Result Date: 12/11/2023  Impression: 1. Borderline cardiomegaly with postsurgical changes of CABG. No acute consolidation. Electronically Signed: Jimenez Hadley  12/11/2023 2:02 PM EST  Workstation ID: [id number]       Assessment/Plan:  78 y.o. female with PMH of prior left MCA and left PCA stroke (on Xarelto), HTN, Seizure disorder,  T2DM, CHF, and CAD s/p CABG in 2020 who presents with altered mental status and general weakness which began approximately 4 days ago.  CT head with an age-indeterminate hypodensity in the right karen new from previous scan in 2020.  Multiple chronic infarcts noted.  No evidence of hemorrhage.  She is not a candidate for TNK or neuro intervention due to LK W >4.5 hrs. she will be admitted for further evaluation.    Antiplatelet PTA: ASA 81 mg qd  Anticoagulant PTA: Xarelto 20 mg qd      AMS, Generalized weakness  -Symptom etiology unclear, question metabolic component.  -New age indeterminate hypodensity in the right karen, chronic LMCA, LPCA territory infarcts, right hemispheric infarcts.    -MRI brain pending to assess for new AIS.  -MRA H/N pending  -TTE pending  -TIA/ischemic stroke order set without thrombolytic therapy  -Okay to continue home aspirin 81 mg and Xarelto 20 mg daily  -High-dose statin  -HTN; SBP <180.  Okay to normalize BP goals per primary team as LKW is 4 to 5 days ago.  -T2DM; hemoglobin A1c in the AM.  Management per primary team  -N.p.o.  -PT/OT/SLP to evaluate patient  -Activity as tolerated  -UA pending    2. Seizure Disorder   -Keppra 500 mg injection q12 hr for seizure prophylaxis  -Seizure precautions  -Consider EEG in the a.m.     Plan of care discussed with Dr. Ram, the patient, and nursing staff. Stroke neurology will continue to follow.  Please call with any questions or concerns.     MARIE Pierson, AGACNP-BC  December 11, 2023  13:56 EST

## 2023-12-11 NOTE — LETTER
EMS Transport Request  For use at [facility], [place], [place], [place], and [place] only   Patient Name: Beryl Montoya : 1945   Weight:90.7 kg (200 lb) Pick-up Location: [facility] BLS/ALS: BLS/ALS: BLS   Insurance: [employer] MEDICARE REPLACEMENT Auth End Date:    Pre-Cert #: D/C Summary complete:    Destination: Other [facility]   Contact Precautions: None   Equipment (O2, Fluids, etc.): None   Arrive By Date/Time: 2023. Enough time to get results from a rapid covid Stretcher/WC: Stretcher   CM Requesting: Katelyn Vaughan RN Ext: [phone]   Notes/Medical Necessity: 2 person assist, weakness, fall risk     ______________________________________________________________________    *Only 2 patient bags OR 1 carry-on size bag are permitted.  Wheelchairs and walkers CANNOT transported with the patient. Acknowledge: Yes

## 2023-12-11 NOTE — THERAPY EVALUATION
Acute Care - Speech Language Pathology   Swallow Initial Evaluation [facility]  Clinical Swallow Evaluation       Patient Name: Beryl Montoya  : 1945  MRN: [id number]  Today's Date: 2023               Admit Date: 2023    Visit Dx:     ICD-10-CM ICD-9-CM   1. Cerebrovascular accident (CVA), unspecified mechanism  I63.9 434.91   2. Generalized weakness  R53.1 780.79   3. Expressive aphasia  R47.01 784.3   4. Hypertension, unspecified type  I10 401.9   5. 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 LVEF 30%    S/P CABG x 3 on 20    Acute postop embolic left occipital CVA 20. Extensive in L PCA distribution but additional areas in R PCA distribution (CMS/HCC)    Bilateral carotid atherosclerosis with moderate left common carotid artery stenosis (50-60%)    Seizure    Generalized weakness     Past Medical History:   Diagnosis Date    CHF (congestive heart failure)     Coronary artery disease involving native coronary artery of native heart with angina pectoris 2020    Diabetes mellitus     GERD (gastroesophageal reflux disease)     High cholesterol     Hyperlipidemia     Hypertension     Ischemic cardiomyopathy     Stroke      speech and short term memory     TIA (transient ischemic attack)      Past Surgical History:   Procedure Laterality Date    CARDIAC CATHETERIZATION N/A 2020    Procedure: Left Heart Cath 85773 C19 @ [facility];  Surgeon: David Faulkner MD;  Location: [facility] CATH INVASIVE LOCATION;  Service: Cardiovascular;  Laterality: N/A;    CATARACT EXTRACTION Bilateral     COLONOSCOPY      CORONARY  ARTERY BYPASS GRAFT N/A 6/30/2020    Procedure: SHERI PER ANESTHESIA, MEDIAN STERNOTOMY, CORONARY ARTERY BYPASS GRAFTING X 3 , UTILIZING THE LEFT INTERNAL MAMMARY ARTERY AND EVH OF RIGHT GREATER SAPHENOUS VEIN;  Surgeon: Jerry Lozano MD;  Location: [facility] OR;  Service: Cardiothoracic;  Laterality: N/A;  LEG START - 0736  VEIN OUT - 0840  VEIN READY - 0850    EYE SURGERY      HYSTERECTOMY      total    INTERVENTIONAL RADIOLOGY PROCEDURE Bilateral 9/25/2017    Procedure: Carotid Cerebral Angiogram;  Surgeon: Maldonado Casas MD;  Location:  [facility] CATH INVASIVE LOCATION;  Service:        SLP Recommendation and Plan  SLP Swallowing Diagnosis: oral dysphagia, suspected pharyngeal dysphagia (12/11/23 1555)  SLP Diet Recommendation: NPO (12/11/23 1555)  Recommended Precautions and Strategies: general aspiration precautions (12/11/23 1555)  SLP Rec. for Method of Medication Administration: meds via alternate route (12/11/23 1555)     Monitor for Signs of Aspiration: yes, notify SLP if any concerns (12/11/23 1555)  Recommended Diagnostics: reassess via VFSS (MBS) (12/12/23) (12/11/23 1555)  Swallow Criteria for Skilled Therapeutic Interventions Met: demonstrates skilled criteria (12/11/23 1555)  Anticipated Discharge Disposition (SLP): skilled nursing facility (12/11/23 1555)  Rehab Potential/Prognosis, Swallowing: adequate, monitor progress closely (12/11/23 1555)     Predicted Duration Therapy Intervention (Days): until discharge (12/11/23 1555)  Oral Care Recommendations: Oral Care BID/PRN, Suction toothbrush (12/11/23 1555)            Oral Care Recommendations: Oral Care BID/PRN, Suction toothbrush (12/11/23 1555)           SWALLOW EVALUATION (last 72 hours)       SLP Adult Swallow Evaluation       Row Name 12/11/23 1555       Rehab Evaluation    Document Type evaluation  -CJ    Subjective Information no complaints  -CJ    Patient Observations alert;cooperative  -CJ    Patient/Family/Caregiver  Comments/Observations family present  -CJ    Patient Effort adequate  -CJ    Symptoms Noted During/After Treatment none  -CJ       General Information    Patient Profile Reviewed yes  -CJ    Pertinent History Of Current Problem Pt adm w/ generalized weakness; sig h/o L MCA, HTN, DM2, GERD, CABG, L occipital CVA in 2020, seizures, cardiomyopathy. Family at bedside reported ongoing dysphagia for last few days  -CJ    Current Method of Nutrition NPO  -CJ    Precautions/Limitations, Vision WFL;for purposes of eval  -CJ    Precautions/Limitations, Hearing WFL;for purposes of eval  -CJ    Prior Level of Function-Communication unknown  -CJ    Prior Level of Function-Swallowing other (see comments)  FEES 7/15/2020 w/ recs for MS, chopped w/ thins  -CJ    Plans/Goals Discussed with patient;family  -CJ    Barriers to Rehab previous functional deficit;medically complex  -CJ    Patient's Goals for Discharge patient could not state  -CJ    Family Goals for Discharge family did not state  -CJ       Pain    Additional Documentation Pain Scale: FACES Pre/Post-Treatment (Group)  -CJ       Pain Scale: FACES Pre/Post-Treatment    Pain: FACES Scale, Pretreatment 0-->no hurt  -CJ    Posttreatment Pain Rating 0-->no hurt  -CJ       Oral Motor Structure and Function    Dentition Assessment natural, present and adequate;poor oral hygiene  -CJ    Secretion Management dried secretions in oral cavity  -CJ    Mucosal Quality dry;sticky  -CJ       Oral Musculature and Cranial Nerve Assessment    Oral Motor General Assessment generalized oral motor weakness;oral labial or buccal impairment;lingual impairment  -CJ       General Eating/Swallowing Observations    Respiratory Support Currently in Use room air  -CJ    Eating/Swallowing Skills fed by SLP;unable to perform self-feeding  -CJ    Positioning During Eating upright in bed  -CJ    Utensils Used spoon;cup;straw  -CJ    Consistencies Trialed pureed;ice chips;thin liquids;nectar/syrup-thick  liquids  -       Clinical Swallow Eval    Oral Prep Phase impaired  -CJ    Oral Transit impaired  -CJ    Oral Residue impaired  -    Pharyngeal Phase suspected pharyngeal impairment  -    Clinical Swallow Evaluation Summary Seemingly delayed initiation w/ all po presentations, suspect oral holding. Residue w/ all consistencies requiring cues to clear oral cavity. Overt s/s of aspiration w/ all po presentaitons w/ multiple swallows w/ all trials. Recommend safest would be NPO w/ plan for MBS in am  -       Oral Prep Concerns    Oral Prep Concerns oral holding  -CJ       Oral Transit Concerns    Oral Transit Concerns delayed initiation of bolus transit  -    Delayed Intiation of Bolus Transit all consistencies  -CJ       Oral Residue Concerns    Oral Residue Concerns diffuse residue throughout oral cavity  -       Pharyngeal Phase Concerns    Pharyngeal Phase Concerns multiple swallows  -       SLP Evaluation Clinical Impression    SLP Swallowing Diagnosis oral dysphagia;suspected pharyngeal dysphagia  -    Functional Impact risk of aspiration/pneumonia;risk of malnutrition  -    Rehab Potential/Prognosis, Swallowing adequate, monitor progress closely  -    Swallow Criteria for Skilled Therapeutic Interventions Met demonstrates skilled criteria  -       Recommendations    Predicted Duration Therapy Intervention (Days) until discharge  -    SLP Diet Recommendation NPO  -    Recommended Diagnostics reassess via VFSS (MBS)  12/12/23  -    Recommended Precautions and Strategies general aspiration precautions  -    Oral Care Recommendations Oral Care BID/PRN;Suction toothbrush  -    SLP Rec. for Method of Medication Administration meds via alternate route  -    Monitor for Signs of Aspiration yes;notify SLP if any concerns  -    Anticipated Discharge Disposition (SLP) skilled nursing facility  -              User Key  (r) = Recorded By, (t) = Taken By, (c) = Cosigned By      Initials  Name Effective Dates    Sri Meléndez MS CCC-SLP 07/11/23 -                     EDUCATION  The patient has been educated in the following areas:   Dysphagia (Swallowing Impairment) Oral Care/Hydration.              Time Calculation:    Time Calculation- SLP       Row Name 12/11/23 1622             Time Calculation- SLP    SLP Start Time 1555  -      SLP Received On 12/11/23  -         Untimed Charges    [id number] Eval Oral Pharyng Swallow Minutes 41  -CJ         Total Minutes    Untimed Charges Total Minutes 41  -CJ       Total Minutes 41  -CJ                User Key  (r) = Recorded By, (t) = Taken By, (c) = Cosigned By      Initials Name Provider Type    Sri Meléndez, MS CCC-SLP Speech and Language Pathologist                    Therapy Charges for Today       Code Description Service Date Service Provider Modifiers Qty    [id number]  ST EVAL ORAL PHARYNG SWALLOW 3 12/11/2023 Sri High MS CCC-SLP GN 1                 Sri High MS CCC-SLP  12/11/2023

## 2023-12-11 NOTE — ED PROVIDER NOTES
Mobile    EMERGENCY DEPARTMENT ENCOUNTER      Pt Name: Beryl Montoya  MRN: [id number]  YOB: 1945  Date of evaluation: 12/11/2023  Provider: Kyle Ram DO    CHIEF COMPLAINT       Chief Complaint   Patient presents with    Weakness - Generalized         HISTORY OF PRESENT ILLNESS  (Location/Symptom, Timing/Onset, Context/Setting, Quality, Duration, Modifying Factors, Severity.)   Beryl Montoya is a 78 y.o. female who presents to the emergency department via EMS for evaluation of altered mental state, weakness which the family noted about 4 5 days ago prior to arrival.  Her baseline is limited verbal response, cognitive deficit from prior history of CVA, stroke.  Normally able to transfer with some minimal assistance from bed to wheelchair, etc.  Is been unable to do so over the last 4 days.  Patient provides limited history, yes no responses to questions which is worse than her normal baseline per EMS and family.  Complains of a mild headache, is on blood thinning medications with previous stroke, she is unable to tell me her baseline neurostatus on initial arrival.  EMS notes limited use of her upper extremities, nothing with her bilateral lower extremities.  She not have any recent falls that they are aware of.  HPI is limited, no family present to provide additional history on initial arrival.  Blood sugar in the 290s per EMS.      Nursing notes were reviewed.      PAST MEDICAL HISTORY     Past Medical History:   Diagnosis Date    CHF (congestive heart failure)     Coronary artery disease involving native coronary artery of native heart with angina pectoris 6/25/2020    Diabetes mellitus     GERD (gastroesophageal reflux disease)     High cholesterol     Hyperlipidemia     Hypertension     Ischemic cardiomyopathy     Stroke     2013 speech and short term memory     TIA (transient ischemic attack)          SURGICAL HISTORY       Past Surgical History:   Procedure Laterality  Date    CARDIAC CATHETERIZATION N/A 6/23/2020    Procedure: Left Heart Cath 70329 C19 @  [facility];  Surgeon: David Faulkner MD;  Location: [facility] CATH INVASIVE LOCATION;  Service: Cardiovascular;  Laterality: N/A;    CATARACT EXTRACTION Bilateral     COLONOSCOPY  2013    CORONARY ARTERY BYPASS GRAFT N/A 6/30/2020    Procedure: SHERI PER ANESTHESIA, MEDIAN STERNOTOMY, CORONARY ARTERY BYPASS GRAFTING X 3 , UTILIZING THE LEFT INTERNAL MAMMARY ARTERY AND EVH OF RIGHT GREATER SAPHENOUS VEIN;  Surgeon: Jerry Lozano MD;  Location: [facility] OR;  Service: Cardiothoracic;  Laterality: N/A;  LEG START - 0736  VEIN OUT - 0840  VEIN READY - 0850    EYE SURGERY      HYSTERECTOMY      total    INTERVENTIONAL RADIOLOGY PROCEDURE Bilateral 9/25/2017    Procedure: Carotid Cerebral Angiogram;  Surgeon: Maldonado Casas MD;  Location: [facility] CATH INVASIVE LOCATION;  Service:          CURRENT MEDICATIONS       Current Facility-Administered Medications:     aspirin chewable tablet 81 mg, 81 mg, Oral, Daily **OR** aspirin suppository 300 mg, 300 mg, Rectal, Daily, Sherry Calderon APRN    sodium chloride 0.9 % flush 10 mL, 10 mL, Intravenous, PRN, Kyle Ram, DO    Current Outpatient Medications:     aspirin (SM Aspirin Low Dose) 81 MG chewable tablet, Chew 1 tablet Daily. chew and swallow., Disp: 90 tablet, Rfl: 5    atorvastatin (LIPITOR) 80 MG tablet, Take 1 tablet by mouth once daily, Disp: 90 tablet, Rfl: 0    carvedilol (COREG) 12.5 MG tablet, Take 1 tablet by mouth twice daily, Disp: 180 tablet, Rfl: 0    Continuous Blood Gluc Sensor (FreeStyle [place] 14 Day Sensor) [facility] [place] USE TO CHECK BLOOD SUGAR SIX TIMES DAILY AS DIRECTED *CHANGE EVERY 14 DAYS*, Disp: 2 each, Rfl: 0    Entresto 24-26 MG tablet, TAKE 1 TABLET BY MOUTH EVERY 12 HOURS, Disp: 180 tablet, Rfl: 0    FREESTYLE LITE test strip, Use one each to check glucose six times daily E11.9, Disp: 300 each, Rfl: 3    furosemide (LASIX) 40 MG tablet, Take  1 tablet by mouth Daily., Disp: 30 tablet, Rfl: 2    Insulin Lispro Prot & Lispro (HumaLOG Mix 75/25 KwikPen) (75-25) 100 UNIT/ML suspension pen-injector pen, INJECT 30 TO 40 UNITS SUBCUTANEOUSLY TWICE DAILY DEPENDING ON BLOOD GLUCOSE LEVEL, Disp: 60 mL, Rfl: 0    isosorbide mononitrate (IMDUR) 30 MG 24 hr tablet, Take 1 tablet by mouth once daily, Disp: 90 tablet, Rfl: 0    levETIRAcetam (KEPPRA) 500 MG tablet, Take 1 tablet by mouth twice daily, Disp: 180 tablet, Rfl: 0    levothyroxine (SYNTHROID, LEVOTHROID) 25 MCG tablet, TAKE 1 TABLET BY MOUTH ONCE DAILY IN THE MORNING, Disp: 30 tablet, Rfl: 0    ondansetron ODT (ZOFRAN-ODT) 4 MG disintegrating tablet, Place 1 tablet on the tongue Every 8 (Eight) Hours As Needed for Nausea or Vomiting., Disp: 21 tablet, Rfl: 3    pantoprazole (PROTONIX) 40 MG EC tablet, Take 1 tablet by mouth once daily, Disp: 90 tablet, Rfl: 0    polyethylene glycol (MIRALAX) packet, Take 17 g by mouth Daily., Disp: , Rfl:     potassium chloride 10 MEQ CR tablet, Take 1 tablet by mouth twice daily, Disp: 180 tablet, Rfl: 0    ReliOn Pen Needles 32G X 4 MM misc, USE THREE TIMES DAILY AS DIRECTED, Disp: 300 each, Rfl: 0    Xarelto 20 MG tablet, Take 1 tablet by mouth once daily, Disp: 90 tablet, Rfl: 0    ALLERGIES     Patient has no known allergies.    FAMILY HISTORY       Family History   Problem Relation Age of Onset    Diabetes Mother     Cancer Mother     Hypertension Mother     Diabetes Father     Heart disease Father     Breast cancer Sister     Diabetes Maternal Grandmother     No Known Problems Maternal Grandfather     No Known Problems Paternal Grandmother     No Known Problems Paternal Grandfather           SOCIAL HISTORY       Social History     Socioeconomic History    Marital status:    Tobacco Use    Smoking status: Former     Packs/day: 0.25     Years: 4.00     Additional pack years: 0.00     Total pack years: 1.00     Types: Cigarettes     Quit date: 9/22/2005     Years  "since quittin.2    Smokeless tobacco: Never   Vaping Use    Vaping Use: Never used   Substance and Sexual Activity    Alcohol use: No    Drug use: No    Sexual activity: Defer         PHYSICAL EXAM    (up to 7 for level 4, 8 or more for level 5)     Vitals:    23 1301 23 1313 23 1400 23 1430   BP: (!) 206/66 (!) 186/75 178/80 (!) 186/78   Pulse: 64 57 54 60   Resp: 18      Temp: 98.6 °F (37 °C)      TempSrc: Axillary      SpO2: 98% 96% 96% 95%   Weight: 90.7 kg (200 lb)      Height: 162.6 cm (64\")          Physical Exam  General : Patient is awake, opens eyes to verbal stimuli, shakes her head yes or no, was able to answer questions appropriately only with one-word responses  HEENT: Pupils are equally round, EOMI, conjunctivae clear  Cardiac: Heart regular rate, rhythm, no murmurs, rubs, or gallops  Lungs: Lungs are clear to auscultation  Abdomen: Abdomen is soft, nontender, nondistended.   Musculoskeletal: Generalized atrophy, global weakness.  She is able to raise her right arm off the bed and hold it for a few seconds against gravity, unable to hold her left arm off the bed, she has global weakness in the bilateral lower extremities, is unable to raise either heel off the bed even limited  Neuro: Patient is awake, has speech difficulty, answering questions with one-word responses and head nods, she has global weakness to bilateral lower extremities, is able to raise her arm against gravity in the right upper extremity very limited with movement of the left upper arm  Dermatology: Skin is warm and dry        DIAGNOSTIC RESULTS     EKG:  All EKGs are interpreted by the Emergency Department Physician who either signs or Co-signs this chart in the absence of a cardiologist.    ECG 12 Lead Stroke Evaluation   Final Result   Test Reason : Stroke Evaluation   Blood Pressure :   */*   mmHG   Vent. Rate :  59 BPM     Atrial Rate :  59 BPM      P-R Int : 190 ms          QRS Dur : 132 ms       " QT Int : 554 ms       P-R-T Axes :  10 156  67 degrees      QTc Int : 548 ms      Sinus bradycardia   Right bundle branch block   Abnormal ECG   When compared with ECG of 12-JUL-2020 06:57,   Right bundle branch block is now present   Confirmed by ELENI MORLEY MD ([id number]) on 12/11/2023 2:07:31 PM      Referred By: ED MD           Confirmed By: ELENI MORLEY MD          RADIOLOGY:     [x] Radiologist's Report Reviewed:  XR Chest 1 View   Final Result   Impression:   1. Borderline cardiomegaly with postsurgical changes of CABG. No acute consolidation.         Electronically Signed: Jimenez Hadley     12/11/2023 2:02 PM EST     Workstation ID: [id number]      CT Head Without Contrast    (Results Pending)       I ordered and independently reviewed the above noted radiographic studies.      I viewed images of CT head which showed significant left cerebellar encephalomalacia, right karen abnormality which is new from previous neuroimaging per my independent interpretation.    See radiologist's dictation for official interpretation.      ED BEDSIDE ULTRASOUND:   Performed by ED Physician - none    LABS:    I have reviewed and interpreted all of the currently available lab results from this visit (if applicable):  Results for orders placed or performed during the hospital encounter of 12/11/23   CBC Auto Differential    Specimen: Blood   Result Value Ref Range    WBC 6.94 3.40 - 10.80 10*3/mm3    RBC 4.52 3.77 - 5.28 10*6/mm3    Hemoglobin 15.3 12.0 - 15.9 g/dL    Hematocrit 45.6 34.0 - 46.6 %    .9 (H) 79.0 - 97.0 fL    MCH 33.8 (H) 26.6 - 33.0 pg    MCHC 33.6 31.5 - 35.7 g/dL    RDW 12.7 12.3 - 15.4 %    RDW-SD 47.0 37.0 - 54.0 fl    MPV 9.9 6.0 - 12.0 fL    Platelets 268 140 - 450 10*3/mm3    Neutrophil % 51.5 42.7 - 76.0 %    Lymphocyte % 30.7 19.6 - 45.3 %    Monocyte % 8.1 5.0 - 12.0 %    Eosinophil % 8.1 (H) 0.3 - 6.2 %    Basophil % 1.3 0.0 - 1.5 %    Immature Grans % 0.3 0.0 - 0.5 %    Neutrophils, Absolute  3.58 1.70 - 7.00 10*3/mm3    Lymphocytes, Absolute 2.13 0.70 - 3.10 10*3/mm3    Monocytes, Absolute 0.56 0.10 - 0.90 10*3/mm3    Eosinophils, Absolute 0.56 (H) 0.00 - 0.40 10*3/mm3    Basophils, Absolute 0.09 0.00 - 0.20 10*3/mm3    Immature Grans, Absolute 0.02 0.00 - 0.05 10*3/mm3    nRBC 0.0 0.0 - 0.2 /100 WBC   BNP    Specimen: Blood   Result Value Ref Range    proBNP 1,308.0 0.0 - 1,800.0 pg/mL   Comprehensive Metabolic Panel    Specimen: Blood   Result Value Ref Range    Glucose 273 (H) 65 - 99 mg/dL    BUN 14 8 - 23 mg/dL    Creatinine 0.98 0.57 - 1.00 mg/dL    Sodium 141 136 - 145 mmol/L    Potassium 4.6 3.5 - 5.2 mmol/L    Chloride 103 98 - 107 mmol/L    CO2 24.0 22.0 - 29.0 mmol/L    Calcium 9.1 8.6 - 10.5 mg/dL    Total Protein 7.2 6.0 - 8.5 g/dL    Albumin 3.8 3.5 - 5.2 g/dL    ALT (SGPT) 23 1 - 33 U/L    AST (SGOT) 29 1 - 32 U/L    Alkaline Phosphatase 122 (H) 39 - 117 U/L    Total Bilirubin 0.9 0.0 - 1.2 mg/dL    Globulin 3.4 gm/dL    A/G Ratio 1.1 g/dL    BUN/Creatinine Ratio 14.3 7.0 - 25.0    Anion Gap 14.0 5.0 - 15.0 mmol/L    eGFR 59.2 (L) >60.0 mL/min/1.73   Single High Sensitivity Troponin T    Specimen: Blood   Result Value Ref Range    HS Troponin T 15 (H) <14 ng/L   TSH    Specimen: Blood   Result Value Ref Range    TSH 4.620 (H) 0.270 - 4.200 uIU/mL   ECG 12 Lead Stroke Evaluation   Result Value Ref Range    QT Interval 554 ms    QTC Interval 548 ms   Green Top (Gel)   Result Value Ref Range    Extra Tube Hold for add-ons.    Lavender Top   Result Value Ref Range    Extra Tube hold for add-on    Gold Top - SST   Result Value Ref Range    Extra Tube Hold for add-ons.    Light Blue Top   Result Value Ref Range    Extra Tube Hold for add-ons.         If labs were ordered, I independently reviewed the results and considered them in treating the patient.      EMERGENCY DEPARTMENT COURSE and DIFFERENTIAL DIAGNOSIS/MDM:   Vitals:  AS OF 15:13 EST    BP - (!) 186/78  HR - 60  TEMP - 98.6 °F (37 °C)  (Axillary)  O2 SATS - 95%      Orders placed during this visit:  Orders Placed This Encounter   Procedures    CT Head Without Contrast    XR Chest 1 View    [place] Draw    CBC Auto Differential    BNP    Comprehensive Metabolic Panel    Single High Sensitivity Troponin T    TSH    Urinalysis With Microscopic If Indicated (No Culture) - Straight Cath    NPO Diet NPO Type: Strict NPO    Undress and Gown    Continuous Pulse Oximetry    Vital Signs    Nursing Dysphagia Screening (Complete Prior to Giving anything PO)    RN to Place Order SLP Consult (IF swallow screen failed) - Eval & Treat Choosing Reason of RN Dysphagia Screen Failed    Nursing Dysphagia Screening (Complete Prior to Giving Anything By Mouth)    RN to Place Order SLP Consult - Eval & Treat Choosing Reason of RN Dysphagia Screen Failed    NIHSS Assessment    Oxygen Therapy- Nasal Cannula; Titrate 1-6 LPM Per SpO2; 90 - 95%    ECG 12 Lead Stroke Evaluation    Insert Peripheral IV    Seizure Precautions    CBC & Differential    Green Top (Gel)    Lavender Top    Gold Top - SST    Gray Top    Light Blue Top       All labs have been independently reviewed by me.  All radiology studies have been reviewed by me and the radiologist dictating the report.  All EKG's have been independently viewed and interpreted by me.      Discussion below represents my analysis of pertinent findings related to patient's condition, differential diagnosis, treatment plan and final disposition.    Differential diagnosis:  The differential diagnosis associated with the patient's presentation includes: CVA, TIA, hemorrhagic stroke, metabolic encephalopathy, dehydration, electrolyte abnormalities    Additional sources  Discussed/ obtained information from independent historians:   [] Spouse  [] Parent  [] Family member  [] Friend  [x] EMS   [] Other:    External (non-ED) record review:   [x] Inpatient record:   [] Office record:   [] Outpatient record:   [] Prior Outpatient  labs:   [] Prior Outpatient radiology:   [] Primary Care record:   [] Outside ED record:   [] Other:     Patient's care impacted by:   [x] Diabetes  [x] Hypertension  [x] CHF  [] Hyperlipidemia  [x] Coronary Artery Disease   [] COPD   [] Cancer   [] Tobacco Abuse   [] Substance Abuse    [] Other:     Care significantly affected by Social Determinants of Health (housing and economic circumstances, unemployment)    [] Yes     [x] No   If yes, Patient's care significantly limited by Social Determinants of Health including:   [] Inadequate housing   [] Low income   [] Alcoholism and drug addiction in family   [] Problems related to primary support group   [] Unemployment   [] Problems related to employment   [] Other Social Determinants of Health:       MEDICATIONS ADMINISTERED IN ED:  Medications   sodium chloride 0.9 % flush 10 mL (has no administration in time range)   aspirin chewable tablet 81 mg (has no administration in time range)     Or   aspirin suppository 300 mg (has no administration in time range)       ED Course as of 12/11/23 1513   Mon Dec 11, 2023   1308 Case discussed with stroke navigatorSherry, who will evaluate the patient in the ED, appreciate their input. [AP]      ED Course User Index  [AP] Kyle Ram, DO         70-year-old female with altered state, weakness, significantly worse than her normal baseline with a history of multiple previous CVA.  Onset of symptoms approximately 3 to 4 days prior to arrival.  She is immediately seen by myself on EMS arrival, she is awake but does have some obvious speech difficulty, weakness in her lower extremities and left upper extremity.  Her baseline is wheelchair-bound but is able to usually assist with transfers and she has been unable to do any of that for the last 4 days.  She is outside window for any TNK administration or neurosurgical thrombectomy, we did obtain a stat CT of the head and she was hypertensive on our initial evaluation,  on blood thinners.  CT head with left-sided cerebellar encephalomalacia from large previous stroke, she does have a right karen abnormality which is new from previous neuroimaging.  Stroke team is following and will plan on MRI for further assessment, plan for admission to the hospital for further workup and stroke evaluation.  Case discussed with hospitalist, Dr. Nevarez, for admission.    PROCEDURES:  Procedures    CRITICAL CARE TIME    Total Critical Care time was 35 minutes, excluding separately reportable procedures.  Neurological deficit, stroke workup requiring neurochecks, reassessment, discussions with multiple consultants and specialist and family members. There was a high probability of clinically significant/life threatening deterioration in the patient's condition which required my urgent intervention.      FINAL IMPRESSION      1. Cerebrovascular accident (CVA), unspecified mechanism    2. Generalized weakness    3. Expressive aphasia    4. Hypertension, unspecified type          DISPOSITION/PLAN     ED Disposition       ED Disposition   Decision to Admit    Condition   --    Comment   --               Comment: Please note this report has been produced using speech recognition software.      Kyle Ram DO  Attending Emergency Physician         Kyle Ram DO  12/11/23 9280

## 2023-12-11 NOTE — Clinical Note
Level of Care: Telemetry [5]   Diagnosis: Generalized weakness [002212]   Bed Request Comments: stroke workup

## 2023-12-11 NOTE — ED NOTES
Beryl Montoya    Nursing Report ED to Floor:  Mental status: A/O X1  Ambulatory status: NON AMBULATORY  Oxygen Therapy:  RA  Cardiac Rhythm: NSR  Admitted from: HOME  Safety Concerns:  FALL RISK, CVA  Social Issues: UNKNOWN  ED Room #:  11    ED Nurse Phone Extension - [phone] or may call [phone].      HPI:   Chief Complaint   Patient presents with    Weakness - Generalized       Past Medical History:  Past Medical History:   Diagnosis Date    CHF (congestive heart failure)     Coronary artery disease involving native coronary artery of native heart with angina pectoris 6/25/2020    Diabetes mellitus     GERD (gastroesophageal reflux disease)     High cholesterol     Hyperlipidemia     Hypertension     Ischemic cardiomyopathy     Stroke     2013 speech and short term memory     TIA (transient ischemic attack)         Past Surgical History:  Past Surgical History:   Procedure Laterality Date    CARDIAC CATHETERIZATION N/A 6/23/2020    Procedure: Left Heart Cath 67182 C19 @ [facility];  Surgeon: David Faulkner MD;  Location:  [facility] CATH INVASIVE LOCATION;  Service: Cardiovascular;  Laterality: N/A;    CATARACT EXTRACTION Bilateral     COLONOSCOPY  2013    CORONARY ARTERY BYPASS GRAFT N/A 6/30/2020    Procedure: SHERI PER ANESTHESIA, MEDIAN STERNOTOMY, CORONARY ARTERY BYPASS GRAFTING X 3 , UTILIZING THE LEFT INTERNAL MAMMARY ARTERY AND EVH OF RIGHT GREATER SAPHENOUS VEIN;  Surgeon: Jerry Lozano MD;  Location: [facility] OR;  Service: Cardiothoracic;  Laterality: N/A;  LEG START - 0736  VEIN OUT - 0840  VEIN READY - 0850    EYE SURGERY      HYSTERECTOMY      total    INTERVENTIONAL RADIOLOGY PROCEDURE Bilateral 9/25/2017    Procedure: Carotid Cerebral Angiogram;  Surgeon: Maldonado Casas MD;  Location:  [facility] CATH INVASIVE LOCATION;  Service:         Admitting Doctor:   Eboni Nevarez MD    Consulting Provider(s):  Consults       No orders found from 11/12/2023 to 12/12/2023.             Admitting  Diagnosis:   The primary encounter diagnosis was Cerebrovascular accident (CVA), unspecified mechanism. Diagnoses of Generalized weakness, Expressive aphasia, and Hypertension, unspecified type were also pertinent to this visit.    Most Recent Vitals:   Vitals:    12/11/23 1313 12/11/23 1400 12/11/23 1430 12/11/23 1530   BP: (!) 186/75 178/80 (!) 186/78 167/94   Pulse: 57 54 60 62   Resp:       Temp:       TempSrc:       SpO2: 96% 96% 95% 95%   Weight:       Height:           Active LDAs/IV Access:   Lines, Drains & Airways       Active LDAs       Name Placement date Placement time Site Days    Peripheral IV 12/11/23 1314 Right Antecubital 12/11/23  1314  Antecubital  less than 1                    Labs (abnormal labs have a star):   Labs Reviewed   CBC WITH AUTO DIFFERENTIAL - Abnormal; Notable for the following components:       Result Value    .9 (*)     MCH 33.8 (*)     Eosinophil % 8.1 (*)     Eosinophils, Absolute 0.56 (*)     All other components within normal limits   COMPREHENSIVE METABOLIC PANEL - Abnormal; Notable for the following components:    Glucose 273 (*)     Alkaline Phosphatase 122 (*)     eGFR 59.2 (*)     All other components within normal limits    Narrative:     GFR Normal >60  Chronic Kidney Disease <60  Kidney Failure <15    The GFR formula is only valid for adults with stable renal function between ages 18 and 70.   SINGLE HSTROPONIN T - Abnormal; Notable for the following components:    HS Troponin T 15 (*)     All other components within normal limits    Narrative:     High Sensitive Troponin T Reference Range:  <14.0 ng/L- Negative Female for AMI  <22.0 ng/L- Negative Male for AMI  >=14 - Abnormal Female indicating possible myocardial injury.  >=22 - Abnormal Male indicating possible myocardial injury.   Clinicians would have to utilize clinical acumen, EKG, Troponin, and serial changes to determine if it is an Acute Myocardial Infarction or myocardial injury due to an underlying  chronic condition.        TSH - Abnormal; Notable for the following components:    TSH 4.620 (*)     All other components within normal limits    Narrative:     Due to abnormal TSH results, suggest ordering Free T4.   URINALYSIS W/ MICROSCOPIC IF INDICATED (NO CULTURE) - Abnormal; Notable for the following components:    Appearance, UA Cloudy (*)     Ketones, UA 40 mg/dL (2+) (*)     Blood, UA Trace (*)     Protein,  mg/dL (2+) (*)     Leuk Esterase, UA Moderate (2+) (*)     Nitrite, UA Positive (*)     All other components within normal limits   BNP (IN-HOUSE) - Normal    Narrative:     This assay is used as an aid in the diagnosis of individuals suspected of having heart failure. It can be used as an aid in the diagnosis of acute decompensated heart failure (ADHF) in patients presenting with signs and symptoms of ADHF to the emergency department (ED). In addition, NT-proBNP of <300 pg/mL indicates ADHF is not likely.    Age Range Result Interpretation  NT-proBNP Concentration (pg/mL:      <50             Positive            >450                   Gray                 300-450                    Negative             <300    50-75           Positive            >900                  Gray                300-900                  Negative            <300      >75             Positive            >1800                  Gray                300-1800                  Negative            <300   RAINBOW DRAW    Narrative:     The following orders were created for panel order Mobile Draw.  Procedure                               Abnormality         Status                     ---------                               -----------         ------                     Green Top (Gel)[[id number]]                                  Final result               Lavender Top[[id number]]                                     Final result               Gold Top - SST[[id number]]                                   Final result               [place]  Top[[id number]]                                         In process                 Light Blue Top[[id number]]                                   Final result                 Please view results for these tests on the individual orders.   URINALYSIS, MICROSCOPIC ONLY   CBC AND DIFFERENTIAL    Narrative:     The following orders were created for panel order CBC & Differential.  Procedure                               Abnormality         Status                     ---------                               -----------         ------                     CBC Auto Differential[[id number]]        Abnormal            Final result                 Please view results for these tests on the individual orders.   GREEN TOP   LAVENDER TOP   GOLD TOP - SST   LIGHT BLUE TOP   GRAY TOP       Meds Given in ED:   Medications   sodium chloride 0.9 % flush 10 mL (has no administration in time range)   aspirin chewable tablet 81 mg ( Oral Not Given:  See Alt 12/11/23 1523)     Or   aspirin suppository 300 mg (300 mg Rectal Given 12/11/23 1523)

## 2023-12-11 NOTE — ED NOTES
Beryl Montoya    Nursing Report ED to Floor:  Mental status: alert but not able to speak  Ambulatory status: baseline is able to bare weight with assistance, pt is currently bedbound  Oxygen Therapy:  room air  Cardiac Rhythm: penny  Admitted from: ed  Safety Concerns:  skin looks good, pt is high fall risk  Social Issues: pt is from home, may need more assistance than what family is able to provide  ED Room #:  11    ED Nurse Phone Extension - [phone] or may call [phone].      HPI:   Chief Complaint   Patient presents with    Weakness - Generalized       Past Medical History:  Past Medical History:   Diagnosis Date    CHF (congestive heart failure)     Coronary artery disease involving native coronary artery of native heart with angina pectoris 6/25/2020    Diabetes mellitus     GERD (gastroesophageal reflux disease)     High cholesterol     Hyperlipidemia     Hypertension     Ischemic cardiomyopathy     Stroke     2013 speech and short term memory     TIA (transient ischemic attack)         Past Surgical History:  Past Surgical History:   Procedure Laterality Date    CARDIAC CATHETERIZATION N/A 6/23/2020    Procedure: Left Heart Cath 15070 C19 @  [facility];  Surgeon: David Faulkner MD;  Location: [facility] CATH INVASIVE LOCATION;  Service: Cardiovascular;  Laterality: N/A;    CATARACT EXTRACTION Bilateral     COLONOSCOPY  2013    CORONARY ARTERY BYPASS GRAFT N/A 6/30/2020    Procedure: SHERI PER ANESTHESIA, MEDIAN STERNOTOMY, CORONARY ARTERY BYPASS GRAFTING X 3 , UTILIZING THE LEFT INTERNAL MAMMARY ARTERY AND EVH OF RIGHT GREATER SAPHENOUS VEIN;  Surgeon: Jerry Lozano MD;  Location: [facility] OR;  Service: Cardiothoracic;  Laterality: N/A;  LEG START - 0736  VEIN OUT - 0840  VEIN READY - 0850    EYE SURGERY      HYSTERECTOMY      total    INTERVENTIONAL RADIOLOGY PROCEDURE Bilateral 9/25/2017    Procedure: Carotid Cerebral Angiogram;  Surgeon: Maldonado Casas MD;  Location: [facility] CATH INVASIVE  LOCATION;  Service:         Admitting Doctor:   Eboni Nevarez MD    Consulting Provider(s):  Consults       No orders found from 11/12/2023 to 12/12/2023.             Admitting Diagnosis:   The primary encounter diagnosis was Cerebrovascular accident (CVA), unspecified mechanism. Diagnoses of Generalized weakness, Expressive aphasia, and Hypertension, unspecified type were also pertinent to this visit.    Most Recent Vitals:   Vitals:    12/11/23 1313 12/11/23 1400 12/11/23 1430 12/11/23 1530   BP: (!) 186/75 178/80 (!) 186/78 167/94   Pulse: 57 54 60 62   Resp:       Temp:       TempSrc:       SpO2: 96% 96% 95% 95%   Weight:       Height:           Active LDAs/IV Access:   Lines, Drains & Airways       Active LDAs       Name Placement date Placement time Site Days    Peripheral IV 12/11/23 1314 Right Antecubital 12/11/23  1314  Antecubital  less than 1                    Labs (abnormal labs have a star):   Labs Reviewed   CBC WITH AUTO DIFFERENTIAL - Abnormal; Notable for the following components:       Result Value    .9 (*)     MCH 33.8 (*)     Eosinophil % 8.1 (*)     Eosinophils, Absolute 0.56 (*)     All other components within normal limits   COMPREHENSIVE METABOLIC PANEL - Abnormal; Notable for the following components:    Glucose 273 (*)     Alkaline Phosphatase 122 (*)     eGFR 59.2 (*)     All other components within normal limits    Narrative:     GFR Normal >60  Chronic Kidney Disease <60  Kidney Failure <15    The GFR formula is only valid for adults with stable renal function between ages 18 and 70.   SINGLE HSTROPONIN T - Abnormal; Notable for the following components:    HS Troponin T 15 (*)     All other components within normal limits    Narrative:     High Sensitive Troponin T Reference Range:  <14.0 ng/L- Negative Female for AMI  <22.0 ng/L- Negative Male for AMI  >=14 - Abnormal Female indicating possible myocardial injury.  >=22 - Abnormal Male indicating possible myocardial injury.    Clinicians would have to utilize clinical acumen, EKG, Troponin, and serial changes to determine if it is an Acute Myocardial Infarction or myocardial injury due to an underlying chronic condition.        TSH - Abnormal; Notable for the following components:    TSH 4.620 (*)     All other components within normal limits    Narrative:     Due to abnormal TSH results, suggest ordering Free T4.   BNP (IN-HOUSE) - Normal    Narrative:     This assay is used as an aid in the diagnosis of individuals suspected of having heart failure. It can be used as an aid in the diagnosis of acute decompensated heart failure (ADHF) in patients presenting with signs and symptoms of ADHF to the emergency department (ED). In addition, NT-proBNP of <300 pg/mL indicates ADHF is not likely.    Age Range Result Interpretation  NT-proBNP Concentration (pg/mL:      <50             Positive            >450                   Gray                 300-450                    Negative             <300    50-75           Positive            >900                  Gray                300-900                  Negative            <300      >75             Positive            >1800                  Gray                300-1800                  Negative            <300   RAINBOW DRAW    Narrative:     The following orders were created for panel order [place] Draw.  Procedure                               Abnormality         Status                     ---------                               -----------         ------                     Green Top (Gel)[[id number]]                                  Final result               Lavender Top[[id number]]                                     Final result               Gold Top - SST[[id number]]                                   Final result               Osborne Top[[id number]]                                         In process                 Light Blue Top[[id number]]                                   Final result                  Please view results for these tests on the individual orders.   URINALYSIS W/ MICROSCOPIC IF INDICATED (NO CULTURE)   CBC AND DIFFERENTIAL    Narrative:     The following orders were created for panel order CBC & Differential.  Procedure                               Abnormality         Status                     ---------                               -----------         ------                     CBC Auto Differential[[id number]]        Abnormal            Final result                 Please view results for these tests on the individual orders.   GREEN TOP   LAVENDER TOP   GOLD TOP - SST   LIGHT BLUE TOP   GRAY TOP       Meds Given in ED:   Medications   sodium chloride 0.9 % flush 10 mL (has no administration in time range)   aspirin chewable tablet 81 mg ( Oral Not Given:  See Alt 12/11/23 1523)     Or   aspirin suppository 300 mg (300 mg Rectal Given 12/11/23 1523)

## 2023-12-12 ENCOUNTER — APPOINTMENT (OUTPATIENT)
Dept: CARDIOLOGY | Facility: HOSPITAL | Age: 78
End: 2023-12-12
Payer: MEDICARE

## 2023-12-12 ENCOUNTER — APPOINTMENT (OUTPATIENT)
Dept: MRI IMAGING | Facility: HOSPITAL | Age: 78
End: 2023-12-12
Payer: MEDICARE

## 2023-12-12 ENCOUNTER — APPOINTMENT (OUTPATIENT)
Dept: GENERAL RADIOLOGY | Facility: HOSPITAL | Age: 78
End: 2023-12-12
Payer: MEDICARE

## 2023-12-12 ENCOUNTER — APPOINTMENT (OUTPATIENT)
Dept: NEUROLOGY | Facility: HOSPITAL | Age: 78
End: 2023-12-12
Payer: MEDICARE

## 2023-12-12 PROBLEM — I63.9 STROKE: Status: ACTIVE | Noted: 2023-12-12

## 2023-12-12 LAB
ANION GAP SERPL CALCULATED.3IONS-SCNC: 14 MMOL/L (ref 5–15)
BH CV ECHO MEAS - AO ROOT DIAM: 2.3 CM
BH CV ECHO MEAS - EDV(CUBED): 54.9 ML
BH CV ECHO MEAS - ESV(CUBED): 15.6 ML
BH CV ECHO MEAS - FS: 34.2 %
BH CV ECHO MEAS - IVS/LVPW: 1 CM
BH CV ECHO MEAS - IVSD: 0.9 CM
BH CV ECHO MEAS - LA DIMENSION: 3.9 CM
BH CV ECHO MEAS - LV MASS(C)D: 101.1 GRAMS
BH CV ECHO MEAS - LVIDD: 3.8 CM
BH CV ECHO MEAS - LVIDS: 2.5 CM
BH CV ECHO MEAS - LVOT AREA: 3.1 CM2
BH CV ECHO MEAS - LVOT DIAM: 2 CM
BH CV ECHO MEAS - LVPWD: 0.9 CM
BH CV ECHO MEAS - PA ACC TIME: 0.09 SEC
BUN SERPL-MCNC: 13 MG/DL (ref 8–23)
BUN/CREAT SERPL: 16.9 (ref 7–25)
CALCIUM SPEC-SCNC: 9.4 MG/DL (ref 8.6–10.5)
CHLORIDE SERPL-SCNC: 107 MMOL/L (ref 98–107)
CHOLEST SERPL-MCNC: 124 MG/DL (ref 0–200)
CO2 SERPL-SCNC: 22 MMOL/L (ref 22–29)
CREAT SERPL-MCNC: 0.77 MG/DL (ref 0.57–1)
EGFRCR SERPLBLD CKD-EPI 2021: 79.1 ML/MIN/1.73
GLUCOSE BLDC GLUCOMTR-MCNC: 192 MG/DL (ref 70–130)
GLUCOSE BLDC GLUCOMTR-MCNC: 214 MG/DL (ref 70–130)
GLUCOSE BLDC GLUCOMTR-MCNC: 220 MG/DL (ref 70–130)
GLUCOSE BLDC GLUCOMTR-MCNC: 278 MG/DL (ref 70–130)
GLUCOSE BLDC GLUCOMTR-MCNC: 301 MG/DL (ref 70–130)
GLUCOSE BLDC GLUCOMTR-MCNC: 339 MG/DL (ref 70–130)
GLUCOSE SERPL-MCNC: 190 MG/DL (ref 65–99)
HBA1C MFR BLD: 7.8 % (ref 4.8–5.6)
HDLC SERPL-MCNC: 49 MG/DL (ref 40–60)
LDLC SERPL CALC-MCNC: 57 MG/DL (ref 0–100)
LDLC/HDLC SERPL: 1.14 {RATIO}
POTASSIUM SERPL-SCNC: 4 MMOL/L (ref 3.5–5.2)
SODIUM SERPL-SCNC: 143 MMOL/L (ref 136–145)
TRIGL SERPL-MCNC: 96 MG/DL (ref 0–150)
VLDLC SERPL-MCNC: 18 MG/DL (ref 5–40)

## 2023-12-12 PROCEDURE — 25010000002 LEVETRIRACETAM PER 10 MG: Performed by: NURSE PRACTITIONER

## 2023-12-12 PROCEDURE — 70551 MRI BRAIN STEM W/O DYE: CPT

## 2023-12-12 PROCEDURE — 92610 EVALUATE SWALLOWING FUNCTION: CPT

## 2023-12-12 PROCEDURE — 82948 REAGENT STRIP/BLOOD GLUCOSE: CPT

## 2023-12-12 PROCEDURE — 93325 DOPPLER ECHO COLOR FLOW MAPG: CPT | Performed by: INTERNAL MEDICINE

## 2023-12-12 PROCEDURE — 74018 RADEX ABDOMEN 1 VIEW: CPT

## 2023-12-12 PROCEDURE — 93308 TTE F-UP OR LMTD: CPT

## 2023-12-12 PROCEDURE — 80061 LIPID PANEL: CPT | Performed by: NURSE PRACTITIONER

## 2023-12-12 PROCEDURE — 97161 PT EVAL LOW COMPLEX 20 MIN: CPT

## 2023-12-12 PROCEDURE — 80048 BASIC METABOLIC PNL TOTAL CA: CPT | Performed by: INTERNAL MEDICINE

## 2023-12-12 PROCEDURE — 93325 DOPPLER ECHO COLOR FLOW MAPG: CPT

## 2023-12-12 PROCEDURE — 93321 DOPPLER ECHO F-UP/LMTD STD: CPT | Performed by: INTERNAL MEDICINE

## 2023-12-12 PROCEDURE — 95816 EEG AWAKE AND DROWSY: CPT | Performed by: PSYCHIATRY & NEUROLOGY

## 2023-12-12 PROCEDURE — 93321 DOPPLER ECHO F-UP/LMTD STD: CPT

## 2023-12-12 PROCEDURE — 83036 HEMOGLOBIN GLYCOSYLATED A1C: CPT | Performed by: NURSE PRACTITIONER

## 2023-12-12 PROCEDURE — 63710000001 INSULIN DETEMIR PER 5 UNITS: Performed by: INTERNAL MEDICINE

## 2023-12-12 PROCEDURE — 97166 OT EVAL MOD COMPLEX 45 MIN: CPT

## 2023-12-12 PROCEDURE — 92611 MOTION FLUOROSCOPY/SWALLOW: CPT

## 2023-12-12 PROCEDURE — 63710000001 INSULIN LISPRO (HUMAN) PER 5 UNITS: Performed by: INTERNAL MEDICINE

## 2023-12-12 PROCEDURE — 99232 SBSQ HOSP IP/OBS MODERATE 35: CPT | Performed by: INTERNAL MEDICINE

## 2023-12-12 PROCEDURE — 70544 MR ANGIOGRAPHY HEAD W/O DYE: CPT

## 2023-12-12 PROCEDURE — 95816 EEG AWAKE AND DROWSY: CPT

## 2023-12-12 PROCEDURE — 99233 SBSQ HOSP IP/OBS HIGH 50: CPT | Performed by: PSYCHIATRY & NEUROLOGY

## 2023-12-12 PROCEDURE — 92523 SPEECH SOUND LANG COMPREHEN: CPT

## 2023-12-12 PROCEDURE — 25010000002 HYDRALAZINE PER 20 MG: Performed by: NURSE PRACTITIONER

## 2023-12-12 PROCEDURE — 74230 X-RAY XM SWLNG FUNCJ C+: CPT

## 2023-12-12 PROCEDURE — 70547 MR ANGIOGRAPHY NECK W/O DYE: CPT

## 2023-12-12 PROCEDURE — 93308 TTE F-UP OR LMTD: CPT | Performed by: INTERNAL MEDICINE

## 2023-12-12 PROCEDURE — 25010000002 CEFTRIAXONE PER 250 MG: Performed by: INTERNAL MEDICINE

## 2023-12-12 RX ORDER — ASPIRIN 81 MG/1
81 TABLET, CHEWABLE ORAL DAILY
COMMUNITY

## 2023-12-12 RX ORDER — HYDRALAZINE HYDROCHLORIDE 20 MG/ML
10 INJECTION INTRAMUSCULAR; INTRAVENOUS ONCE
Status: COMPLETED | OUTPATIENT
Start: 2023-12-12 | End: 2023-12-12

## 2023-12-12 RX ORDER — LEVETIRACETAM 500 MG/1
500 TABLET ORAL 2 TIMES DAILY
Status: DISCONTINUED | OUTPATIENT
Start: 2023-12-12 | End: 2023-12-12

## 2023-12-12 RX ORDER — OMEPRAZOLE 20 MG/1
20 CAPSULE, DELAYED RELEASE ORAL DAILY
COMMUNITY
End: 2023-12-21 | Stop reason: HOSPADM

## 2023-12-12 RX ORDER — POLYETHYLENE GLYCOL 3350 17 G/17G
17 POWDER, FOR SOLUTION ORAL DAILY PRN
COMMUNITY
End: 2023-12-21 | Stop reason: HOSPADM

## 2023-12-12 RX ORDER — LEVETIRACETAM 100 MG/ML
500 SOLUTION ORAL EVERY 12 HOURS SCHEDULED
Status: DISCONTINUED | OUTPATIENT
Start: 2023-12-12 | End: 2023-12-21 | Stop reason: HOSPADM

## 2023-12-12 RX ORDER — SACUBITRIL AND VALSARTAN 24; 26 MG/1; MG/1
1 TABLET, FILM COATED ORAL 2 TIMES DAILY
COMMUNITY

## 2023-12-12 RX ORDER — CLOPIDOGREL BISULFATE 75 MG/1
75 TABLET ORAL DAILY
Status: DISCONTINUED | OUTPATIENT
Start: 2023-12-13 | End: 2023-12-21 | Stop reason: HOSPADM

## 2023-12-12 RX ORDER — CLOPIDOGREL BISULFATE 75 MG/1
300 TABLET ORAL ONCE
Qty: 4 TABLET | Refills: 0 | Status: COMPLETED | OUTPATIENT
Start: 2023-12-12 | End: 2023-12-12

## 2023-12-12 RX ADMIN — BARIUM SULFATE 20 ML: 400 PASTE ORAL at 12:21

## 2023-12-12 RX ADMIN — HYDRALAZINE HYDROCHLORIDE 10 MG: 20 INJECTION INTRAMUSCULAR; INTRAVENOUS at 22:48

## 2023-12-12 RX ADMIN — ATORVASTATIN CALCIUM 80 MG: 40 TABLET, FILM COATED ORAL at 20:24

## 2023-12-12 RX ADMIN — CEFTRIAXONE 2000 MG: 2 INJECTION, POWDER, FOR SOLUTION INTRAMUSCULAR; INTRAVENOUS at 15:44

## 2023-12-12 RX ADMIN — Medication 10 ML: at 10:20

## 2023-12-12 RX ADMIN — LEVETIRACETAM 500 MG: 100 SOLUTION ORAL at 20:24

## 2023-12-12 RX ADMIN — CARVEDILOL 12.5 MG: 12.5 TABLET, FILM COATED ORAL at 20:24

## 2023-12-12 RX ADMIN — BARIUM SULFATE 100 ML: 0.81 POWDER, FOR SUSPENSION ORAL at 12:21

## 2023-12-12 RX ADMIN — INSULIN LISPRO 1 UNITS: 100 INJECTION, SOLUTION INTRAVENOUS; SUBCUTANEOUS at 01:14

## 2023-12-12 RX ADMIN — LEVETIRACETAM 500 MG: 100 INJECTION, SOLUTION INTRAVENOUS at 10:20

## 2023-12-12 RX ADMIN — CLOPIDOGREL BISULFATE 300 MG: 75 TABLET ORAL at 15:43

## 2023-12-12 RX ADMIN — SACUBITRIL AND VALSARTAN 1 TABLET: 24; 26 TABLET, FILM COATED ORAL at 18:29

## 2023-12-12 RX ADMIN — Medication 10 ML: at 20:39

## 2023-12-12 RX ADMIN — INSULIN DETEMIR 11 UNITS: 100 INJECTION, SOLUTION SUBCUTANEOUS at 21:29

## 2023-12-12 RX ADMIN — NYSTATIN 500000 UNITS: 100000 SUSPENSION ORAL at 20:24

## 2023-12-12 RX ADMIN — BARIUM SULFATE 50 ML: 400 SUSPENSION ORAL at 12:21

## 2023-12-12 RX ADMIN — INSULIN LISPRO 1 UNITS: 100 INJECTION, SOLUTION INTRAVENOUS; SUBCUTANEOUS at 18:33

## 2023-12-12 RX ADMIN — NYSTATIN 500000 UNITS: 100000 SUSPENSION ORAL at 18:29

## 2023-12-12 NOTE — THERAPY EVALUATION
Patient Name: Beryl Montoya  : 1945    MRN: [id number]                              Today's Date: 2023       Admit Date: 2023    Visit Dx:     ICD-10-CM ICD-9-CM   1. Cerebrovascular accident (CVA), unspecified mechanism  I63.9 434.91   2. Generalized weakness  R53.1 780.79   3. Expressive aphasia  R47.01 784.3   4. Hypertension, unspecified type  I10 401.9   5. 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 LVEF 30%    S/P CABG x 3 on 20    Acute postop embolic left occipital CVA 20. Extensive in L PCA distribution but additional areas in R PCA distribution (CMS/HCC)    Bilateral carotid atherosclerosis with moderate left common carotid artery stenosis (50-60%)    Seizure    Generalized weakness    Acute UTI (urinary tract infection)    History of seizure    Stroke     Past Medical History:   Diagnosis Date    CHF (congestive heart failure)     Coronary artery disease involving native coronary artery of native heart with angina pectoris 2020    Diabetes mellitus     GERD (gastroesophageal reflux disease)     High cholesterol     Hyperlipidemia     Hypertension     Ischemic cardiomyopathy     Stroke      speech and short term memory     TIA (transient ischemic attack)      Past Surgical History:   Procedure Laterality Date    CARDIAC CATHETERIZATION N/A 2020    Procedure: Left Heart Cath 80186 C19 @  [facility];  Surgeon: David Faulkner MD;  Location: [facility] CATH INVASIVE LOCATION;  Service: Cardiovascular;  Laterality: N/A;    CATARACT EXTRACTION Bilateral     COLONOSCOPY  2013    CORONARY ARTERY BYPASS GRAFT N/A  6/30/2020    Procedure: SHERI PER ANESTHESIA, MEDIAN STERNOTOMY, CORONARY ARTERY BYPASS GRAFTING X 3 , UTILIZING THE LEFT INTERNAL MAMMARY ARTERY AND EVH OF RIGHT GREATER SAPHENOUS VEIN;  Surgeon: Jerry Lozano MD;  Location: [facility] OR;  Service: Cardiothoracic;  Laterality: N/A;  LEG START - 0736  VEIN OUT - 0840  VEIN READY - 0850    EYE SURGERY      HYSTERECTOMY      total    INTERVENTIONAL RADIOLOGY PROCEDURE Bilateral 9/25/2017    Procedure: Carotid Cerebral Angiogram;  Surgeon: Maldonado Casas MD;  Location: [facility] CATH INVASIVE LOCATION;  Service:       General Information       Row Name 12/12/23 0956          OT Time and Intention    Document Type evaluation  -CS     Mode of Treatment occupational therapy  -CS       Row Name 12/12/23 0956          General Information    Patient Profile Reviewed yes  -CS     Prior Level of Function --  Pt unreliable historian this date. Pt reports transfers to  for most mobility, Ind with dressing and assist for bathing. Specific assist levels at baseline require further inquiry  -CS     Existing Precautions/Restrictions fall;other (see comments)   delayed processing, minimal verbal responses. CM note indicates use of RW, WC, and BSC.  -CS     Barriers to Rehab medically complex;previous functional deficit;cognitive status  -CS       Row Name 12/12/23 0956          Living Environment    People in Home spouse  -CS       Row Name 12/12/23 0956          Home Main Entrance    Number of Stairs, Main Entrance none;other (see comments)  Pt reports ramp  -CS       Row Name 12/12/23 0956          Stairs Within Home, Primary    Number of Stairs, Within Home, Primary none  -CS       Row Name 12/12/23 0956          Cognition    Orientation Status (Cognition) verbal cues/prompts needed for orientation;person  -CS       Row Name 12/12/23 0956          Safety Issues, Functional Mobility    Safety Issues Affecting Function (Mobility) insight into deficits/self-awareness;safety  precaution awareness;safety precautions follow-through/compliance;awareness of need for assistance  -CS     Impairments Affecting Function (Mobility) cognition;balance;endurance/activity tolerance;strength  -CS     Cognitive Impairments, Mobility Safety/Performance awareness, need for assistance;insight into deficits/self-awareness;problem-solving/reasoning;safety precaution awareness;safety precaution follow-through;sequencing abilities  -CS               User Key  (r) = Recorded By, (t) = Taken By, (c) = Cosigned By      Initials Name Provider Type    CS Joseluis Khan OT Occupational Therapist                     Mobility/ADL's       Row Name 12/12/23 1000          Bed Mobility    Bed Mobility supine-sit;scooting/bridging  -CS     Scooting/Bridging [place] (Bed Mobility) maximum assist (25% patient effort);2 person assist;verbal cues;nonverbal cues (demo/gesture)  -CS     Supine-Sit [place] (Bed Mobility) maximum assist (25% patient effort);2 person assist;verbal cues;nonverbal cues (demo/gesture)  -CS     Assistive Device (Bed Mobility) bed rails;head of bed elevated  -CS     Comment, (Bed Mobility) verbal/tactile cues for sequencing BLEs to EOB, increased assist at trunk to achieve sitting posture  -CS       Row Name 12/12/23 1000          Transfers    Transfers other (see comments)  -CS     Comment, (Transfers) Pt declined transfer to recliner emphatically  -CS       Row Name 12/12/23 1000          Functional Mobility    Functional Mobility- Comment declined mobility  -       Row Name 12/12/23 1000          Activities of Daily Living    BADL Assessment/Intervention lower body dressing;upper body dressing;grooming;toileting;feeding  -CS       Row Name 12/12/23 1000          Lower Body Dressing Assessment/Training    [place] Level (Lower Body Dressing) don;socks;dependent (less than 25% patient effort)  -CS     Position (Lower Body Dressing) edge of bed sitting  -CS       Row Name 12/12/23  1000          Upper Body Dressing Assessment/Training    [place] Level (Upper Body Dressing) don;pajama/robe;moderate assist (50% patient effort)  -CS     Position (Upper Body Dressing) edge of bed sitting  -CS       Row Name 12/12/23 1000          Grooming Assessment/Training    [place] Level (Grooming) wash face, hands;hair care, combing/brushing;maximum assist (25% patient effort)  -CS     Position (Grooming) supine  -CS       Row Name 12/12/23 1000          Toileting Assessment/Training    [place] Level (Toileting) adjust/manage clothing;perform perineal hygiene;dependent (less than 25% patient effort)  -CS       Row Name 12/12/23 1000          Self-Feeding Assessment/Training    [place] Level (Feeding) feeding skills  -CS     Comment, (Feeding) NPO awaiting SLP  -               User Key  (r) = Recorded By, (t) = Taken By, (c) = Cosigned By      Initials Name Provider Type    CS Joseluis Khan, OT Occupational Therapist                   Obj/Interventions       Row Name 12/12/23 1005          Sensory Assessment (Somatosensory)    Sensory Assessment (Somatosensory) UE sensation intact  -       Row Name 12/12/23 1005          Vision Assessment/Intervention    Visual Impairment/Limitations unable/difficult to assess  -       Row Name 12/12/23 1005          Range of Motion Comprehensive    General Range of Motion bilateral upper extremity ROM WFL  -       Row Name 12/12/23 1005          Strength Comprehensive (MMT)    General Manual Muscle Testing (MMT) Assessment upper extremity strength deficits identified  -CS     Comment, General Manual Muscle Testing (MMT) Assessment Demonstrated gross BUE strength of 3+/5 functionally, formal MMT limited by Pt participation  -       Row Name 12/12/23 1005          Motor Skills    Motor Skills coordination;functional endurance  -CS     Coordination bimanual skills;WFL  -CS     Functional Endurance O2 sats stable on RA  -CS       Row Name 12/12/23  1005          Balance    Balance Assessment sitting static balance;sitting dynamic balance  -CS     Static Sitting Balance contact guard  -CS     Dynamic Sitting Balance minimal assist  -CS     Position, Sitting Balance unsupported;sitting edge of bed  -CS     Balance Interventions sitting  -CS     Comment, Balance posterior lean in sitting  -CS               User Key  (r) = Recorded By, (t) = Taken By, (c) = Cosigned By      Initials Name Provider Type    CS Joseluis Khan, OT Occupational Therapist                   Goals/Plan       Row Name 12/12/23 1012          Bed Mobility Goal 1 (OT)    Activity/Assistive Device (Bed Mobility Goal 1, OT) supine to sit;sit to supine;scooting  -CS     [place] Level/Cues Needed (Bed Mobility Goal 1, OT) moderate assist (50-74% patient effort)  -CS     Time Frame (Bed Mobility Goal 1, OT) long term goal (LTG);10 days  -CS     Progress/Outcomes (Bed Mobility Goal 1, OT) new goal  -CS       Row Name 12/12/23 1012          Transfer Goal 1 (OT)    Activity/Assistive Device (Transfer Goal 1, OT) bed-to-chair/chair-to-bed;commode, bedside without drop arms;toilet  -CS     [place] Level/Cues Needed (Transfer Goal 1, OT) moderate assist (50-74% patient effort)  -CS     Time Frame (Transfer Goal 1, OT) long term goal (LTG);10 days  -CS     Progress/Outcome (Transfer Goal 1, OT) new goal  -CS       Row Name 12/12/23 1012          Dressing Goal 1 (OT)    Activity/Device (Dressing Goal 1, OT) lower body dressing  -CS     [place]/Cues Needed (Dressing Goal 1, OT) moderate assist (50-74% patient effort)  -CS     Time Frame (Dressing Goal 1, OT) long term goal (LTG);10 days  -CS     Progress/Outcome (Dressing Goal 1, OT) new goal  -CS       Row Name 12/12/23 1012          Grooming Goal 1 (OT)    Activity/Device (Grooming Goal 1, OT) hair care;wash face, hands  -CS     [place] (Grooming Goal 1, OT) minimum assist (75% or more patient effort)  -CS     Time Frame (Grooming  Goal 1, OT) long term goal (LTG);10 days  -CS     Progress/Outcome (Grooming Goal 1, OT) new goal  -CS       Row Name 12/12/23 1012          Therapy Assessment/Plan (OT)    Planned Therapy Interventions (OT) functional balance retraining;activity tolerance training;occupation/activity based interventions;ROM/therapeutic exercise;strengthening exercise;patient/caregiver education/training;transfer/mobility retraining;neuromuscular control/coordination retraining;adaptive equipment training  -CS               User Key  (r) = Recorded By, (t) = Taken By, (c) = Cosigned By      Initials Name Provider Type    CS Joseluis Khan OT Occupational Therapist                   Clinical Impression       Row Name 12/12/23 1009          Pain Assessment    Additional Documentation Pain Scale: FACES Pre/Post-Treatment (Group)  -CS       Row Name 12/12/23 1009          Pain Scale: FACES Pre/Post-Treatment    Pain: FACES Scale, Pretreatment 0-->no hurt  -CS     Posttreatment Pain Rating 0-->no hurt  -CS       Row Name 12/12/23 1009          Plan of Care Review    Plan of Care Reviewed With patient  -CS     Progress no change  -CS     Outcome Evaluation Pt presents with balance, strength, and cognitive deficits warranting skilled IP OT services - baseline assist levels for ADL performance require further inquiry, Pt unreliable historian this date. Grossly MaxA to achieve sitting EOB and Dep for LB tasks. Rec d/c to SNF.  -CS       Row Name 12/12/23 1009          Therapy Assessment/Plan (OT)    Rehab Potential (OT) good, to achieve stated therapy goals  -CS     Criteria for Skilled Therapeutic Interventions Met (OT) yes;meets criteria;skilled treatment is necessary  -CS     Therapy Frequency (OT) daily  -CS       Row Name 12/12/23 1009          Therapy Plan Review/Discharge Plan (OT)    Anticipated Discharge Disposition (OT) skilled nursing facility  -CS       Row Name 12/12/23 1009          Vital Signs    Pre Systolic BP Rehab 171   RN cleared for eval  -CS     Pre Treatment Diastolic BP 70  -CS     Post Systolic BP Rehab 216  -CS     Post Treatment Diastolic BP 87  -CS     O2 Delivery Pre Treatment room air  -CS     O2 Delivery Intra Treatment room air  -CS     O2 Delivery Post Treatment room air  -CS     Pre Patient Position Supine  -CS     Intra Patient Position Standing  -CS     Post Patient Position Sitting  -CS       Row Name 12/12/23 1009          Positioning and Restraints    Pre-Treatment Position in bed  -CS     Post Treatment Position chair  -CS     In Bed notified nsg;supine;call light within reach;encouraged to call for assist;exit alarm on  -CS               User Key  (r) = Recorded By, (t) = Taken By, (c) = Cosigned By      Initials Name Provider Type    CS Joseluis Khan, OT Occupational Therapist                   Outcome Measures       Row Name 12/12/23 1015          How much help from another is currently needed...    Putting on and taking off regular lower body clothing? 2  -CS     Bathing (including washing, rinsing, and drying) 2  -CS     Toileting (which includes using toilet bed pan or urinal) 2  -CS     Putting on and taking off regular upper body clothing 2  -CS     Taking care of personal grooming (such as brushing teeth) 2  -CS     Eating meals 2  -CS     AM-PAC 6 Clicks Score (OT) 12  -CS       Row Name 12/12/23 0981          How much help from another person do you currently need...    Turning from your back to your side while in flat bed without using bedrails? 2  -KR     Moving from lying on back to sitting on the side of a flat bed without bedrails? 2  -KR     Moving to and from a bed to a chair (including a wheelchair)? 2  -KR     Standing up from a chair using your arms (e.g., wheelchair, bedside chair)? 2  -KR     Climbing 3-5 steps with a railing? 1  -KR     To walk in hospital room? 1  -KR     AM-PAC 6 Clicks Score (PT) 10  -KR     Highest Level of Mobility Goal 4 --> Transfer to chair/commode  -KR        Row Name 12/12/23 1015 12/12/23 0939       Modified [place] Scale    Pre-Stroke Modified [place] Scale -- 6 - Unable to determine (UTD) from the medical record documentation  -KR    Modified [place] Scale 4 - Moderately severe disability.  Unable to walk without assistance, and unable to attend to own bodily needs without assistance.  -CS 4 - Moderately severe disability.  Unable to walk without assistance, and unable to attend to own bodily needs without assistance.  -KR      Row Name 12/12/23 1015 12/12/23 0939       Functional Assessment    Outcome Measure Options AM-PAC 6 Clicks Daily Activity (OT);Modified [place]  -CS [place]  -KR              User Key  (r) = Recorded By, (t) = Taken By, (c) = Cosigned By      Initials Name Provider Type    CS Joseluis Khan, OT Occupational Therapist    Malathi Cortez, PT Physical Therapist                    Occupational Therapy Education       Title: PT OT SLP Therapies (In Progress)       Topic: Occupational Therapy (In Progress)       Point: ADL training (In Progress)       Description:   Instruct learner(s) on proper safety adaptation and remediation techniques during self care or transfers.   Instruct in proper use of assistive devices.                  Learning Progress Summary             Patient Acceptance, E,D, NR,NL by CS at 12/12/2023 1015                         Point: Home exercise program (In Progress)       Description:   Instruct learner(s) on appropriate technique for monitoring, assisting and/or progressing therapeutic exercises/activities.                  Learning Progress Summary             Patient Acceptance, E,D, NR,NL by CS at 12/12/2023 1015                         Point: Precautions (In Progress)       Description:   Instruct learner(s) on prescribed precautions during self-care and functional transfers.                  Learning Progress Summary             Patient Acceptance, E,D, NR,NL by CS at 12/12/2023 1015                          Point: Body mechanics (Not Started)       Description:   Instruct learner(s) on proper positioning and spine alignment during self-care, functional mobility activities and/or exercises.                  Learner Progress:  Not documented in this visit.                              User Key       Initials Effective Dates Name Provider Type Discipline     06/16/21 -  Joseluis Khan, OT Occupational Therapist OT                  OT Recommendation and Plan  Planned Therapy Interventions (OT): functional balance retraining, activity tolerance training, occupation/activity based interventions, ROM/therapeutic exercise, strengthening exercise, patient/caregiver education/training, transfer/mobility retraining, neuromuscular control/coordination retraining, adaptive equipment training  Therapy Frequency (OT): daily  Plan of Care Review  Plan of Care Reviewed With: patient  Progress: no change  Outcome Evaluation: Pt presents with balance, strength, and cognitive deficits warranting skilled IP OT services - baseline assist levels for ADL performance require further inquiry, Pt unreliable historian this date. Grossly MaxA to achieve sitting EOB and Dep for LB tasks. Rec d/c to SNF.     Time Calculation:   Evaluation Complexity (OT)  Review Occupational Profile/Medical/Therapy History Complexity: expanded/moderate complexity  Assessment, Occupational Performance/Identification of Deficit Complexity: 3-5 performance deficits  Clinical Decision Making Complexity (OT): detailed assessment/moderate complexity  Overall Complexity of Evaluation (OT): moderate complexity     Time Calculation- OT       Row Name 12/12/23 1016             Time Calculation- OT    OT Start Time 0812  -CS      OT Received On 12/12/23  -CS      OT Goal Re-Cert Due Date 12/22/23  -CS         Untimed Charges    OT Eval/Re-eval Minutes 48  -CS         Total Minutes    Untimed Charges Total Minutes 48  -CS       Total Minutes 48  -CS                User Key   (r) = Recorded By, (t) = Taken By, (c) = Cosigned By      Initials Name Provider Type    CS Joseluis Khan, OT Occupational Therapist                  Therapy Charges for Today       Code Description Service Date Service Provider Modifiers Qty    [id number]  OT EVAL MOD COMPLEXITY 4 12/12/2023 Joseluis Khan OT GO 1                 Joseluis Khan OT  12/12/2023

## 2023-12-12 NOTE — NURSING NOTE
"Pt is refusing to answer questions, participate in NIH assessment and IV medication administration repeatedly stating \"no\" and \"get out of here\" while waving me away. Will continue to monitor throughout shift.  "

## 2023-12-12 NOTE — PLAN OF CARE
Goal Outcome Evaluation:  Plan of Care Reviewed With: patient           Outcome Evaluation: Pt presents with strength, balance, and endurance below baseline contributing to impairments in bed mobility, transfers, ambulation, and overall fxnl mobility. Overall PT evaluation limited by participation. Pt will benefit from PT to address aforementioned deficits and return to PLOF. PT rec SNF upon dc.      Anticipated Discharge Disposition (PT): skilled nursing facility

## 2023-12-12 NOTE — THERAPY EVALUATION
Patient Name: Beryl Montoya  : 1945    MRN: [id number]                              Today's Date: 2023       Admit Date: 2023    Visit Dx:     ICD-10-CM ICD-9-CM   1. Cerebrovascular accident (CVA), unspecified mechanism  I63.9 434.91   2. Generalized weakness  R53.1 780.79   3. Expressive aphasia  R47.01 784.3   4. Hypertension, unspecified type  I10 401.9   5. 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 LVEF 30%    S/P CABG x 3 on 20    Acute postop embolic left occipital CVA 20. Extensive in L PCA distribution but additional areas in R PCA distribution (CMS/HCC)    Bilateral carotid atherosclerosis with moderate left common carotid artery stenosis (50-60%)    Seizure    Generalized weakness    Acute UTI (urinary tract infection)    History of seizure    Stroke     Past Medical History:   Diagnosis Date    CHF (congestive heart failure)     Coronary artery disease involving native coronary artery of native heart with angina pectoris 2020    Diabetes mellitus     GERD (gastroesophageal reflux disease)     High cholesterol     Hyperlipidemia     Hypertension     Ischemic cardiomyopathy     Stroke      speech and short term memory     TIA (transient ischemic attack)      Past Surgical History:   Procedure Laterality Date    CARDIAC CATHETERIZATION N/A 2020    Procedure: Left Heart Cath 45829 C19 @  [facility];  Surgeon: David Faulkner MD;  Location: [facility] CATH INVASIVE LOCATION;  Service: Cardiovascular;  Laterality: N/A;    CATARACT EXTRACTION Bilateral     COLONOSCOPY  2013    CORONARY ARTERY BYPASS GRAFT N/A  "6/30/2020    Procedure: SHERI PER ANESTHESIA, MEDIAN STERNOTOMY, CORONARY ARTERY BYPASS GRAFTING X 3 , UTILIZING THE LEFT INTERNAL MAMMARY ARTERY AND EVH OF RIGHT GREATER SAPHENOUS VEIN;  Surgeon: Jerry Lozano MD;  Location: [facility] OR;  Service: Cardiothoracic;  Laterality: N/A;  LEG START - 0736  VEIN OUT - 0840  VEIN READY - 0850    EYE SURGERY      HYSTERECTOMY      total    INTERVENTIONAL RADIOLOGY PROCEDURE Bilateral 9/25/2017    Procedure: Carotid Cerebral Angiogram;  Surgeon: Maldonado Casas MD;  Location: [facility] INVASIVE LOCATION;  Service:       General Information       Row Name 12/12/23 0924          Physical Therapy Time and Intention    Document Type evaluation  -KR     Mode of Treatment physical therapy  -KR       Row Name 12/12/23 0924          General Information    Patient Profile Reviewed yes  -KR     Prior Level of Function --   Pt unreliable historian. Pt reports she independently transfers to  and self-propels. Pt reports she occasionally ambulates with FWW. Case management note states pt is dependent for all ADLs.  -KR     Existing Precautions/Restrictions fall;other (see comments)  minimal verbal responses  -KR     Barriers to Rehab medically complex;previous functional deficit;cognitive status;ineffective coping  -KR       Row Name 12/12/23 0924          Living Environment    People in Home spouse  -KR       Row Name 12/12/23 0924          Home Main Entrance    Number of Stairs, Main Entrance none;other (see comments)  pt reports ramp access into home  -KR       Row Name 12/12/23 0924          Stairs Within Home, Primary    Number of Stairs, Within Home, Primary none  -KR       Row Name 12/12/23 0924          Cognition    Orientation Status (Cognition) verbal cues/prompts needed for orientation;person;other (see comments)  pt with appropriate \"yes/no\" nods to orientation questions  -KR       Row Name 12/12/23 0924          Safety Issues, Functional Mobility    Safety " "Issues Affecting Function (Mobility) awareness of need for assistance;insight into deficits/self-awareness;judgment;problem-solving;safety precaution awareness;sequencing abilities;safety precautions follow-through/compliance  -KR     Impairments Affecting Function (Mobility) cognition;balance;endurance/activity tolerance;strength  -KR     Cognitive Impairments, Mobility Safety/Performance awareness, need for assistance;insight into deficits/self-awareness;judgment;problem-solving/reasoning;sequencing abilities;safety precaution follow-through;safety precaution awareness  -KR               User Key  (r) = Recorded By, (t) = Taken By, (c) = Cosigned By      Initials Name Provider Type    Malathi Cortez PT Physical Therapist                   Mobility       Row Name 12/12/23 0934          Bed Mobility    Bed Mobility sit-supine;scooting/bridging  -KR     Scooting/Bridging [place] (Bed Mobility) dependent (less than 25% patient effort);2 person assist  -KR     Sit-Supine [place] (Bed Mobility) maximum assist (25% patient effort);2 person assist  -KR     Assistive Device (Bed Mobility) draw sheet  -KR     Comment, (Bed Mobility) assist req'd for LEs and trunk.  -KR       Row Name 12/12/23 0934          Bed-Chair Transfer    Bed-Chair [place] (Transfers) unable to assess  -KR     Comment, (Bed-Chair Transfer) pt refused, yelling \"NO\" despite multiple attempts and education.  -KR       Row Name 12/12/23 0934          Sit-Stand Transfer    Sit-Stand [place] (Transfers) unable to assess  -KR     Comment, (Sit-Stand Transfer) pt refused, yelling \"NO\" despite multiple attempts and education.  -KR               User Key  (r) = Recorded By, (t) = Taken By, (c) = Cosigned By      Initials Name Provider Type    Malathi Cortez PT Physical Therapist                   Obj/Interventions       Row Name 12/12/23 0935          Range of Motion Comprehensive    General Range of Motion bilateral lower " extremity ROM WNL  -KR       Row Name 12/12/23 0935          Strength Comprehensive (MMT)    General Manual Muscle Testing (MMT) Assessment lower extremity strength deficits identified  -KR     Comment, General Manual Muscle Testing (MMT) Assessment BLEs grossly 3/5  -KR       Row Name 12/12/23 0935          Balance    Balance Assessment sitting static balance;sitting dynamic balance  -KR     Static Sitting Balance contact guard  -KR     Dynamic Sitting Balance minimal assist  -KR     Position, Sitting Balance unsupported;sitting edge of bed  -KR     Balance Interventions sitting;supported;static;dynamic  -KR       Row Name 12/12/23 0935          Sensory Assessment (Somatosensory)    Sensory Assessment (Somatosensory) LE sensation intact  -KR               User Key  (r) = Recorded By, (t) = Taken By, (c) = Cosigned By      Initials Name Provider Type    Malathi Cortez, PT Physical Therapist                   Goals/Plan       Row Name 12/12/23 0938          Bed Mobility Goal 1 (PT)    Activity/Assistive Device (Bed Mobility Goal 1, PT) bed mobility activities, all  -KR     Bureau Level/Cues Needed (Bed Mobility Goal 1, PT) contact guard required  -KR     Time Frame (Bed Mobility Goal 1, PT) long term goal (LTG);2 weeks  -KR       Row Name 12/12/23 0938          Transfer Goal 1 (PT)    Activity/Assistive Device (Transfer Goal 1, PT) sit-to-stand/stand-to-sit;bed-to-chair/chair-to-bed;walker, rolling  -KR     Bureau Level/Cues Needed (Transfer Goal 1, PT) minimum assist (75% or more patient effort)  -KR     Time Frame (Transfer Goal 1, PT) long term goal (LTG);2 weeks  -KR       Row Name 12/12/23 0938          Gait Training Goal 1 (PT)    Activity/Assistive Device (Gait Training Goal 1, PT) gait (walking locomotion);improve balance and speed;increase endurance/gait distance;assistive device use;walker, rolling  -KR     Bureau Level (Gait Training Goal 1, PT) contact guard required  -KR      Distance (Gait Training Goal 1, PT) 10  -KR     Time Frame (Gait Training Goal 1, PT) long term goal (LTG);2 weeks  -KR       Row Name 12/12/23 0938          Problem Specific Goal 1 (PT)    Problem Specific Goal 1 (PT) Pt will self propel wc 50' with SBA for improved navigation of home environment.  -KR     Time Frame (Problem Specific Goal 1, PT) long-term goal (LTG);2 weeks  -KR       Row Name 12/12/23 0938          Therapy Assessment/Plan (PT)    Planned Therapy Interventions (PT) balance training;bed mobility training;gait training;home exercise program;postural re-education;patient/family education;neuromuscular re-education;ROM (range of motion);strengthening;stretching;transfer training;motor coordination training;wheelchair management/propulsion training  -KR               User Key  (r) = Recorded By, (t) = Taken By, (c) = Cosigned By      Initials Name Provider Type    Malathi Cortez, PT Physical Therapist                   Clinical Impression       Row Name 12/12/23 0936          Pain    Pretreatment Pain Rating 0/10 - no pain  -KR     Posttreatment Pain Rating 0/10 - no pain  -KR       Row Name 12/12/23 0936          Plan of Care Review    Plan of Care Reviewed With patient  -KR     Outcome Evaluation Pt presents with strength, balance, and endurance below baseline contributing to impairments in bed mobility, transfers, ambulation, and overall fxnl mobility. Overall PT evaluation limited by participation. Pt will benefit from PT to address aforementioned deficits and return to PLOF. PT rec SNF upon dc.  -KR       Row Name 12/12/23 0936          Therapy Assessment/Plan (PT)    Rehab Potential (PT) fair, will monitor progress closely  -KR     Criteria for Skilled Interventions Met (PT) yes;skilled treatment is necessary  -KR     Therapy Frequency (PT) daily  -KR       Row Name 12/12/23 0936          Vital Signs    Pre Systolic BP Rehab 171  -KR     Pre Treatment Diastolic BP 70  -KR     Post Systolic BP  Rehab 216  -KR     Post Treatment Diastolic BP 87  -KR     Pre Patient Position Sitting  -KR     Intra Patient Position Sitting  -KR     Post Patient Position Supine  -KR       Row Name 12/12/23 0936          Positioning and Restraints    Pre-Treatment Position in bed  -KR     Post Treatment Position bed  -KR     In Bed notified nsg;fowlers;call light within reach;encouraged to call for assist;exit alarm on;side rails up x2;with SLP;waffle boots/both  -KR               User Key  (r) = Recorded By, (t) = Taken By, (c) = Cosigned By      Initials Name Provider Type    Malathi Cortez PT Physical Therapist                   Outcome Measures       Row Name 12/12/23 0939          How much help from another person do you currently need...    Turning from your back to your side while in flat bed without using bedrails? 2  -KR     Moving from lying on back to sitting on the side of a flat bed without bedrails? 2  -KR     Moving to and from a bed to a chair (including a wheelchair)? 2  -KR     Standing up from a chair using your arms (e.g., wheelchair, bedside chair)? 2  -KR     Climbing 3-5 steps with a railing? 1  -KR     To walk in hospital room? 1  -KR     AM-PAC 6 Clicks Score (PT) 10  -KR     Highest Level of Mobility Goal 4 --> Transfer to chair/commode  -KR       Row Name 12/12/23 0939          Modified [place] Scale    Pre-Stroke Modified [place] Scale 6 - Unable to determine (UTD) from the medical record documentation  -KR     Modified [place] Scale 4 - Moderately severe disability.  Unable to walk without assistance, and unable to attend to own bodily needs without assistance.  -KR       Row Name 12/12/23 0939          Functional Assessment    Outcome Measure Options Modified [place]  -KR               User Key  (r) = Recorded By, (t) = Taken By, (c) = Cosigned By      Initials Name Provider Type    Malathi Cortez PT Physical Therapist                                 Physical Therapy Education       Title: PT  OT SLP Therapies (In Progress)       Topic: Physical Therapy (In Progress)       Point: Mobility training (In Progress)       Learning Progress Summary             Patient Acceptance, E, NR by KR at 12/12/2023 0939                         Point: Home exercise program (In Progress)       Learning Progress Summary             Patient Acceptance, E, NR by KR at 12/12/2023 0939                         Point: Body mechanics (In Progress)       Learning Progress Summary             Patient Acceptance, E, NR by KR at 12/12/2023 0939                         Point: Precautions (In Progress)       Learning Progress Summary             Patient Acceptance, E, NR by KR at 12/12/2023 0939                                         User Key       Initials Effective Dates Name Provider Type Discipline    VINH 12/30/22 -  Malathi Riddle, PT Physical Therapist PT                  PT Recommendation and Plan  Planned Therapy Interventions (PT): balance training, bed mobility training, gait training, home exercise program, postural re-education, patient/family education, neuromuscular re-education, ROM (range of motion), strengthening, stretching, transfer training, motor coordination training, wheelchair management/propulsion training  Plan of Care Reviewed With: patient  Outcome Evaluation: Pt presents with strength, balance, and endurance below baseline contributing to impairments in bed mobility, transfers, ambulation, and overall fxnl mobility. Overall PT evaluation limited by participation. Pt will benefit from PT to address aforementioned deficits and return to PLOF. PT rec SNF upon dc.     Time Calculation:   PT Evaluation Complexity  History, PT Evaluation Complexity: 3 or more personal factors and/or comorbidities  Examination of Body Systems (PT Eval Complexity): total of 4 or more elements  Clinical Presentation (PT Evaluation Complexity): stable  Clinical Decision Making (PT Evaluation Complexity): low complexity  Overall  Complexity (PT Evaluation Complexity): low complexity     PT Charges       Row Name 12/12/23 0940             Time Calculation    Start Time 0830  -KR      PT Received On 12/12/23  -KR      PT Goal Re-Cert Due Date 12/22/23  -KR         Untimed Charges    PT Eval/Re-eval Minutes 48  -KR         Total Minutes    Untimed Charges Total Minutes 48  -KR       Total Minutes 48  -KR                User Key  (r) = Recorded By, (t) = Taken By, (c) = Cosigned By      Initials Name Provider Type    KR Malathi Riddle, PT Physical Therapist                  Therapy Charges for Today       Code Description Service Date Service Provider Modifiers Qty    98180817499 HC PT EVAL LOW COMPLEXITY 4 12/12/2023 Malathi Riddle, PT GP 1            PT G-Codes  Outcome Measure Options: Modified [place]  AM-PAC 6 Clicks Score (PT): 10  Modified [place] Scale: 4 - Moderately severe disability.  Unable to walk without assistance, and unable to attend to own bodily needs without assistance.  PT Discharge Summary  Anticipated Discharge Disposition (PT): skilled nursing facility    Malathi Riddle PT  12/12/2023

## 2023-12-12 NOTE — PROGRESS NOTES
"    [facility] Medicine Services  PROGRESS NOTE    Patient Name: Beryl Montoya  : 1945  MRN: [id number]    Date of Admission: 2023  Primary Care Physician: Argelia Gamez MD    Subjective   Subjective     CC:  Left arm weakness f/u    HPI:  Patient is agitated  Asks me to get away. When I ask her name reports \"you know what it is already....\"  Says I cannot listen to her heart  Nursing reports she wouldn't let them use her IV in the morning, this improved throughout the morning      Objective   Objective     Vital Signs:   Temp:  [97.9 °F (36.6 °C)-98.2 °F (36.8 °C)] 98 °F (36.7 °C)  Heart Rate:  [63-92] 92  Resp:  [16-18] 16  BP: (143-208)/(60-77) 143/77     Physical Exam:  Constitutional: Older female lying flat in bed, mildly agitated  Respiratory: respiratory effort normal on room air  Cardiovascular: *patient declined, see above  Gastrointestinal: Soft, nontender, nondistended  Musculoskeletal: Muscle tone within normal limits, no joint effusions appreciated  Psychiatric: Somewhat flat affect, agitated but redirectable  Neurologic: Awake, alert, appears oriented to self only. Right arm moves spontaneously much more than left but agitation limited exam  Skin: No rashes    Results Reviewed:  LAB RESULTS:      Lab 23  1314   WBC 6.94   HEMOGLOBIN 15.3   HEMATOCRIT 45.6   PLATELETS 268   NEUTROS ABS 3.58   IMMATURE GRANS (ABS) 0.02   LYMPHS ABS 2.13   MONOS ABS 0.56   EOS ABS 0.56*   .9*         Lab 23  0541 23  1314   SODIUM 143 141   POTASSIUM 4.0 4.6   CHLORIDE 107 103   CO2 22.0 24.0   ANION GAP 14.0 14.0   BUN 13 14   CREATININE 0.77 0.98   EGFR 79.1 59.2*   GLUCOSE 190* 273*   CALCIUM 9.4 9.1   HEMOGLOBIN A1C 7.80*  --    TSH  --  4.620*         Lab 23  1314   TOTAL PROTEIN 7.2   ALBUMIN 3.8   GLOBULIN 3.4   ALT (SGPT) 23   AST (SGOT) 29   BILIRUBIN 0.9   ALK PHOS 122*         Lab 23  1314   PROBNP 1,308.0   HSTROP T " 15*         Lab 12/12/23  0541   CHOLESTEROL 124   LDL CHOL 57   HDL CHOL 49   TRIGLYCERIDES 96             Brief Urine Lab Results  (Last result in the past 365 days)        Color   Clarity   Blood   Leuk Est   Nitrite   Protein   CREAT   Urine HCG        12/11/23 1523 Yellow   Cloudy   Trace   Moderate (2+)   Positive   100 mg/dL (2+)                   Microbiology Results Abnormal       None            FL Video Swallow With Speech Single Contrast    Result Date: 12/12/2023  FL VIDEO SWALLOW W SPEECH SINGLE-CONTRAST Date of Exam: 12/12/2023 12:03 PM EST Indication: dysphagia Comparison: None available. Technique:   The speech pathologist administered food and/or liquid mixed with barium to the patient with cine/video imaging.  Imaging assistance was provided to the speech pathologist and an image was saved. Fluoroscopic Time: 1 minute and 12 seconds Number of Images: 10 associated fluoroscopic loops were saved Findings: Please see speech therapy report for full details and recommendations.     Impression: Impression: Fluoroscopy provided for a modified barium swallow. Please see speech therapy report for full details and recommendations. Electronically Signed: Eduar Phillips MD  12/12/2023 4:18 PM EST  Workstation ID: [id number]    Adult Transthoracic Echo Limited W/ Cont if Necessary Per Protocol    Result Date: 12/12/2023    Left ventricular ejection fraction appears to be 51 - 55%. Normal left ventricular cavity size and wall thickness noted. All left ventricular wall segments contract normally.   Normal right ventricular cavity size and systolic function noted.   The left atrial cavity is mildly dilated.   No aortic valve regurgitation or stenosis is present. The aortic valve exhibits sclerosis. The aortic valve appears trileaflet.   The mitral valve is structurally normal with no significant stenosis present. Trace to mild mitral valve regurgitation is present.   The tricuspid valve is structurally normal with  no significant stenosis present. Physiologic tricuspid valve regurgitation is present. Estimated right ventricular systolic pressure from tricuspid regurgitation is normal (<35 mmHg).     MRI Angiogram Head Without Contrast    Result Date: 12/12/2023  MRI ANGIOGRAM HEAD WO CONTRAST Date of Exam: 12/12/2023 4:12 AM EST Indication: Stroke, follow up.  Comparison: CTA head 7/4/2020 Technique:  Routine 3-D time-of-flight gradient echo imaging was obtained of the head without contrast administration. Findings: Right carotid: The distal cervical ICA is normal. Intracranial ICA segments demonstrate mild atherosclerotic irregularity. The origin of the ophthalmic artery is normal. There is a patent P-comm. There is a patent A-Comm. The A1 segment is normal. There is mild focal narrowing of the proximal right A2 segment. The distal MERCY branches appear patent. There is a stable high-grade focal stenosis at the proximal right M1 segment with near occlusion. The distal M1 segment is widely patent and the distal MCA branches appear patent. Left carotid: The distal cervical ICA is normal. The intracranial ICA segments are widely patent. The origin of the ophthalmic artery is normal. There is a patent P-comm. The A1 and M1 segments and distal MERCY and MCA branches appear patent. Posterior circulation: V4 segments are patent. The basilar artery is normal. Visualized superior cerebellar arteries appear patent. The P1 segments are normal. There is atherosclerotic irregularity in the right PCA branches. There is stable severe disease in the left PCA with at least 2 areas of high-grade stenosis. Overall appearance is unchanged from prior CTA.     Impression: Impression: 1.Stable high-grade stenosis at the proximal right M1 segment with near occlusion. 2.Stable severe disease in the left PCA with at least 2 areas of high-grade stenosis. Electronically Signed: Nomi Fletcher MD  12/12/2023 5:53 AM EST  Workstation ID: [id number]    MRI  Angiogram Neck Without Contrast    Result Date: 12/12/2023  MRI ANGIOGRAM NECK WO CONTRAST Date of Exam: 12/12/2023 4:13 AM EST Indication: Stroke, follow up.  Comparison: CTA neck 7/4/2020. Technique:  Routine 3-D time-of-flight gradient echo imaging was obtained of the neck without contrast administration. Findings: Aorta: The visualized aortic arch is unremarkable. There is conventional three-vessel arch anatomy. The right brachiocephalic and right subclavian arteries appear patent. The proximal left subclavian artery is partially obscured, but the distal vessel appears patent. Right carotid: There is mild nonstenosing plaque in the distal CCA. The carotid bifurcation is widely patent. The ECA and distal branches appear patent and the mid and distal cervical ICA is normal. Left carotid: There is mild narrowing of the distal CCA due to eccentric plaque estimated to be less than 25%. The carotid bifurcation is normal. The ECA and distal branches appear normal and the cervical ICA is normal. Vertebral arteries: Vertebral arteries are codominant and following normal course and appear normal caliber throughout the neck.     Impression: Impression: 1. Mild, less than 25% narrowing at the distal left CCA. 2. No hemodynamically significant stenosis of the internal carotid or vertebral arteries. Electronically Signed: Nomi Fletcher MD  12/12/2023 5:43 AM EST  Workstation ID: [id number]    MRI Brain Without Contrast    Result Date: 12/12/2023  MRI BRAIN WO CONTRAST Date of Exam: 12/12/2023 4:14 AM EST Indication: Stroke, follow up.  Comparison: CT head 12/11/2023 Technique:  Routine multiplanar/multisequence sequence images of the brain were obtained without contrast administration. Findings: There is a new small area of diffusion restriction in the right karen compatible with acute/subacute lacunar type infarct. There is a large chronic infarct involving the left temporal and left occipital lobes, which is unchanged and  exhibits laminar necrosis. There is moderate underlying chronic small vessel ischemic change. There is generalized parenchymal volume loss. There are punctate microhemorrhages in the right karen, medial right temporal lobe and at multiple locations in the area of chronic infarction in the left temporal and occipital lobes. There are additional chronic microhemorrhages in the right temporal white matter and right parietal periventricular white matter. Midline structures appear intact. There is thinning of the orbital lenses bilaterally. There is complete filling of the right maxillary sinus with findings of chronic sinusitis. Mastoids are clear. The central flow voids appear intact. Calvarial and superficial soft tissue signal is within normal limits.     Impression: Impression: 1.New small acute/subacute lacunar type infarct in the right karen. 2.Large chronic infarct in the left temporal and occipital lobes. 3.Background of moderate chronic small vessel ischemic change and generalized parenchymal volume loss. 4.There are multiple chronic microhemorrhages in the right karen, medial right temporal lobe and right parietal periventricular white matter. This could be related to hypertension or amyloid angiopathy. 5.Chronic right maxillary sinusitis. Electronically Signed: Nomi Fletcher MD  12/12/2023 5:36 AM EST  Workstation ID: [id number]    XR Abdomen KUB    Result Date: 12/12/2023  XR ABDOMEN KUB Date of Exam: 12/12/2023 1:11 AM EST Indication: MRI Clearence, please include pelvis too Comparison: 7/10/2020 and 7/6/2020. Findings: Abdomen: There is evidence of prior median sternotomy. There is no unexpected metal artifact. No acute osseous abnormality. No bowel distention. CHEST: There is evidence of prior median sternotomy. There are no unexpected areas of metal artifact. CT HEAD: No unexpected metallic foreign bodies. No contraindication to MRI.     Impression: Impression: No contraindication to MRI identified on  abdomen or chest radiograph for head CT. Electronically Signed: Nomi Fletcher MD  12/12/2023 1:55 AM EST  Workstation ID: [id number]    CT Head Without Contrast    Result Date: 12/11/2023  CT HEAD WO CONTRAST Date of Exam: 12/11/2023 1:20 PM EST Indication: AMS, history of stroke, on Xarelto. Comparison: 7/14/2020 Technique: Axial CT images were obtained of the head without contrast administration.  Automated exposure control and iterative construction methods were used. Findings: Parenchyma:No acute intraparenchymal hemorrhage. Sequela of old left-sided infarct with encephalomalacia. New hypodensity within the right karen. Moderate parenchymal volume loss. Scattered periventricular and subcortical white matter hypodensities, nonspecific, but most often consistent with small vessel ischemic changes. No midline shift or herniation. Ventricles and extra axial spaces:Prominent ventricles and sulci secondary to volume loss. No extra axial fluid collection seen. Other:Lens replacements. Scattered paranasal sinus mucosal thickening. Mastoid air cells are clear. Calvarium is intact. Intracranial atherosclerotic calcification is present.     Impression: Impression: New focal hypodensity within the right karen may represent age-indeterminate infarct. Consider MRI to further evaluate. Chronic changes including old left-sided infarct as above. Findings discussed with stroke team by Dr. Eros Dejesus via telephone on 12/11/2023 3:31 PM EST. Electronically Signed: Eros Dejesus MD  12/11/2023 4:02 PM EST  Workstation ID: [id number]    XR Chest 1 View    Result Date: 12/11/2023  XR CHEST 1 VW Date of Exam: 12/11/2023 1:12 PM EST Indication: Stroke Protocol (Onset > 12 Hrs) Comparison: Chest radiograph dated 8/20/2020 Findings: There is borderline cardiomegaly. There are postsurgical changes of prior CABG. Pulmonary vascularity appears normal. There is no acute airspace consolidation, pleural effusion, or pneumothorax. There are  degenerative changes of the thoracic spine.     Impression: Impression: 1. Borderline cardiomegaly with postsurgical changes of CABG. No acute consolidation. Electronically Signed: Jimenez Hadley  12/11/2023 2:02 PM EST  Workstation ID: [id number]     Results for orders placed during the hospital encounter of 12/11/23    Adult Transthoracic Echo Limited W/ Cont if Necessary Per Protocol    Interpretation Summary    Left ventricular ejection fraction appears to be 51 - 55%. Normal left ventricular cavity size and wall thickness noted. All left ventricular wall segments contract normally.    Normal right ventricular cavity size and systolic function noted.    The left atrial cavity is mildly dilated.    No aortic valve regurgitation or stenosis is present. The aortic valve exhibits sclerosis. The aortic valve appears trileaflet.    The mitral valve is structurally normal with no significant stenosis present. Trace to mild mitral valve regurgitation is present.    The tricuspid valve is structurally normal with no significant stenosis present. Physiologic tricuspid valve regurgitation is present. Estimated right ventricular systolic pressure from tricuspid regurgitation is normal (<35 mmHg).      Current medications:  Scheduled Meds:aspirin, 81 mg, Oral, Daily   Or  aspirin, 300 mg, Rectal, Daily  atorvastatin, 80 mg, Oral, Nightly  carvedilol, 12.5 mg, Oral, BID  cefTRIAXone, 2,000 mg, Intravenous, Q24H  [START ON 12/13/2023] clopidogrel, 75 mg, Oral, Daily  furosemide, 40 mg, Oral, Daily  insulin detemir, 1-200 Units, Subcutaneous, Nightly - [employer]  insulin lispro, 1-200 Units, Subcutaneous, Q6H  isosorbide mononitrate, 30 mg, Oral, Daily  levETIRAcetam, 500 mg, Intravenous, Q12H  levothyroxine, 25 mcg, Oral, QAM  nystatin, 5 mL, Swish & Spit, 4x Daily  pharmacy consult - [facility], , Does not apply, Daily  sacubitril-valsartan, 1 tablet, Oral, Q12H  sodium chloride, 10 mL, Intravenous, Q12H  sodium chloride, 10 mL,  Intravenous, Q12H      Continuous Infusions:   PRN Meds:.  acetaminophen **OR** acetaminophen **OR** acetaminophen    senna-docusate sodium **AND** polyethylene glycol **AND** bisacodyl **AND** bisacodyl    Calcium Replacement - Follow Nurse / BPA Driven Protocol    dextrose    dextrose    glucagon (human recombinant)    insulin lispro    Magnesium Standard Dose Replacement - Follow Nurse / BPA Driven Protocol    ondansetron **OR** ondansetron    Phosphorus Replacement - Follow Nurse / BPA Driven Protocol    Potassium Replacement - Follow Nurse / BPA Driven Protocol    sodium chloride    sodium chloride    sodium chloride    sodium chloride    sodium chloride    Assessment & Plan   Assessment & Plan     Active Hospital Problems    Diagnosis  POA    **Generalized weakness [R53.1]  Yes    Stroke [I63.9]  Yes    Acute UTI (urinary tract infection) [N39.0]  Yes    History of seizure [Z87.898]  Yes    Ischemic cardiomyopathy. LVEF 30% [I25.5]  Yes    Essential hypertension [I10]  Yes    History of L MCA CVA [Z86.73]  Not Applicable    Type 2 diabetes mellitus with complication, with long-term current use of insulin [E11.8, Z79.4]  Not Applicable      Resolved Hospital Problems   No resolved problems to display.        Brief Hospital Course to date:  Beryl Montoya is a 78 y.o. female w CAD s/p CABG, CVA w residual speech deficits, wheelchair bound at baseline who presented with left arm weakness x 4-5 days. Found to have late acute/subacute right karen CVA, microhemorrhage likely 2/2 uncontrolled HTN.     Late acute/subacute right karen CVA presenting w LUE weakness  -DAPT, statin  -work-up per stroke team: OK for normal BP goals    Sz disorder - home keppra  E coli UTI - IV ceftriaxone, f/u susceptibilities  CAD s/p CABG - dapt, coreg, imdur  DMII, insulin dept - per [employer]  HTN - normal goals as above  BMI 34    Expected Discharge Location and Transportation: SNF  Expected Discharge medically ready pending  plcmt likely 12/13  Expected Discharge Date: 12/15/2023; Expected Discharge Time:      DVT prophylaxis:  Mechanical DVT prophylaxis orders are present.     AM-PAC 6 Clicks Score (PT): 10 (12/12/23 7781)    CODE STATUS:   Code Status and Medical Interventions:   Ordered at: 12/11/23 1626     Medical Intervention Limits:    NO intubation (DNI)     Level Of Support Discussed With:    Next of Kin (If No Surrogate)     Code Status (Patient has no pulse and is not breathing):    No CPR (Do Not Attempt to Resuscitate)     Medical Interventions (Patient has pulse or is breathing):    Limited Support       Deb Freeman MD  12/12/23

## 2023-12-12 NOTE — PROGRESS NOTES
Neurology Note    Patient:  Beryl Montoya    YOB: 1945    REFERRING PHYSICIAN:  Dr. Freeman    CHIEF COMPLAINT:    Aphasia, weakness    HISTORY OF PRESENT ILLNESS:   The patient is talking better today, moving left side some. Last NIHSS 12 last night. -180 today.    Past Medical History:  Past Medical History:   Diagnosis Date    CHF (congestive heart failure)     Coronary artery disease involving native coronary artery of native heart with angina pectoris 6/25/2020    Diabetes mellitus     GERD (gastroesophageal reflux disease)     High cholesterol     Hyperlipidemia     Hypertension     Ischemic cardiomyopathy     Stroke     2013 speech and short term memory     TIA (transient ischemic attack)        Past Surgical History:  Past Surgical History:   Procedure Laterality Date    CARDIAC CATHETERIZATION N/A 6/23/2020    Procedure: Left Heart Cath 02210 C19 @  [facility];  Surgeon: David Faulkner MD;  Location:  [facility] CATH INVASIVE [facility]ATION;  Service: Cardiovascular;  Laterality: N/A;    CATARACT EXTRACTION Bilateral     COLONOSCOPY  2013    CORONARY ARTERY BYPASS GRAFT N/A 6/30/2020    Procedure: SHERI PER ANESTHESIA, MEDIAN STERNOTOMY, CORONARY ARTERY BYPASS GRAFTING X 3 , UTILIZING THE LEFT INTERNAL MAMMARY ARTERY AND EVH OF RIGHT GREATER SAPHENOUS VEIN;  Surgeon: Jerry Lozano MD;  Location:  [facility] OR;  Service: Cardiothoracic;  Laterality: N/A;  LEG START - 0736  VEIN OUT - 0840  VEIN READY - 0850    EYE SURGERY      HYSTERECTOMY      total    INTERVENTIONAL RADIOLOGY PROCEDURE Bilateral 9/25/2017    Procedure: Carotid Cerebral Angiogram;  Surgeon: Maldonado Casas MD;  Location:  [facility] CATH INVASIVE LOCATION;  Service:        Social History:   Social History     Socioeconomic History    Marital status:    Tobacco Use    Smoking status: Former     Packs/day: 0.25     Years: 4.00     Additional pack years: 0.00     Total pack years: 1.00     Types: Cigarettes      Quit date: 2005     Years since quittin.2    Smokeless tobacco: Never   Vaping Use    Vaping Use: Never used   Substance and Sexual Activity    Alcohol use: No    Drug use: No    Sexual activity: Defer        Family History:   Family History   Problem Relation Age of Onset    Diabetes Mother     Cancer Mother     Hypertension Mother     Diabetes Father     Heart disease Father     Breast cancer Sister     Diabetes Maternal Grandmother     No Known Problems Maternal Grandfather     No Known Problems Paternal Grandmother     No Known Problems Paternal Grandfather        Medications Prior to Admission:    Prior to Admission medications    Medication Sig Start Date End Date Taking? Authorizing Provider   aspirin (SM Aspirin Low Dose) 81 MG chewable tablet Chew 1 tablet Daily. chew and swallow. 23   Argelia Gamez MD   atorvastatin (LIPITOR) 80 MG tablet Take 1 tablet by mouth once daily 23   Argelia Gamez MD   carvedilol (COREG) 12.5 MG tablet Take 1 tablet by mouth twice daily 23   Argelia Gamez MD   Continuous Blood Gluc Sensor (FreeStyle Flakito 14 Day Sensor) [facility] USE TO CHECK BLOOD SUGAR SIX TIMES DAILY AS DIRECTED *CHANGE EVERY 14 DAYS* 23   Argelia Gamez MD   Entresto 24-26 MG tablet TAKE 1 TABLET BY MOUTH EVERY 12 HOURS 23   Argelia Gamez MD   FREESTYLE LITE test strip Use one each to check glucose six times daily E11.9 21   Argelia Gamez MD   furosemide (LASIX) 40 MG tablet Take 1 tablet by mouth Daily. 20   Argelia Gamez MD   Insulin Lispro Prot & Lispro (HumaLOG Mix 75/25 KwikPen) (75-25) 100 UNIT/ML suspension pen-injector pen INJECT 30 TO 40 UNITS SUBCUTANEOUSLY TWICE DAILY DEPENDING ON BLOOD GLUCOSE LEVEL 23   Argelia Gamez MD   isosorbide mononitrate (IMDUR) 30 MG 24 hr tablet Take 1 tablet by mouth once daily 7/3/23   Argelia Gamez  MD   levETIRAcetam (KEPPRA) 500 MG tablet Take 1 tablet by mouth twice daily 11/28/23   Argelia Gamez MD   levothyroxine (SYNTHROID, LEVOTHROID) 25 MCG tablet TAKE 1 TABLET BY MOUTH ONCE DAILY IN THE MORNING 8/16/23   Argelia Gamez MD   ondansetron ODT (ZOFRAN-ODT) 4 MG disintegrating tablet Place 1 tablet on the tongue Every 8 (Eight) Hours As Needed for Nausea or Vomiting. 6/28/23   Argelia Gamez MD   pantoprazole (PROTONIX) 40 MG EC tablet Take 1 tablet by mouth once daily 2/27/23   Argelia Gamez MD   polyethylene glycol (MIRALAX) packet Take 17 g by mouth Daily.    Provider, MD Berta   potassium chloride 10 MEQ CR tablet Take 1 tablet by mouth twice daily 8/18/23   Argelia Gamez MD   ReliOn Pen Needles 32G X 4 MM misc USE THREE TIMES DAILY AS DIRECTED 7/29/22   Argelia Gamez MD   Xarelto 20 MG tablet Take 1 tablet by mouth once daily 2/22/22   Argelia Gamez MD       Allergies:  Patient has no known allergies.      Review of system  Review of Systems   Unable to perform ROS: Mental status change       Vitals:    12/12/23 0655   BP:    Pulse: 76   Resp: 16   Temp: 98.2 °F (36.8 °C)   SpO2:        Physical exam  Physical Exam  Cardiovascular:      Rate and Rhythm: Normal rate and regular rhythm.   Pulmonary:      Effort: Pulmonary effort is normal.   Neurological:      Mental Status: She is alert.      Deep Tendon Reflexes: Babinski sign absent on the right side. Babinski sign present on the left side.      Comments: Orients to voice, oriented to self, speech dysarthric, left facial droop, VFF, moves right side against gravity, left side with drift.           Lab Results   Component Value Date    WBC 6.94 12/11/2023    HGB 15.3 12/11/2023    HCT 45.6 12/11/2023    .9 (H) 12/11/2023     12/11/2023     Lab Results   Component Value Date    GLUCOSE 190 (H) 12/12/2023    BUN 13 12/12/2023    CREATININE  0.77 12/12/2023    EGFRIFNONA 55 (L) 11/30/2020    BCR 16.9 12/12/2023    CO2 22.0 12/12/2023    CALCIUM 9.4 12/12/2023    ALBUMIN 3.8 12/11/2023    AST 29 12/11/2023    ALT 23 12/11/2023     ECG 12 Lead Stroke Evaluation (Order [id number])  Order-Level Documents:    Scan on 12/11/2023 1351 by [employer] Onbase, [place]: ECG 12-LEAD         Author: -- Service: -- Author Type: --   Filed: Date of Service: Creation Time:   Status: (Other)   Test Reason : Stroke Evaluation  Blood Pressure :   */*   mmHG  Vent. Rate :  59 BPM     Atrial Rate :  59 BPM     P-R Int : 190 ms          QRS Dur : 132 ms      QT Int : 554 ms       P-R-T Axes :  10 156  67 degrees     QTc Int : 548 ms     Sinus bradycardia  Right bundle branch block  Abnormal ECG  When compared with ECG of 12-JUL-2020 06:57,  Right bundle branch block is now present  Confirmed by ELENI MORLEY MD ([id number]) on 12/11/2023 2:07:31 PM     Referred By: PARRISH BURROUGHS           Confirmed By: ELENI MORLEY MD        Signed    Electronically signed by Eleni Morley DO on 12/11/23 at 1407 EST     EEG (Order [id number])  Author: -- Service: -- Author Type: --   Filed: Date of Service: Creation Time:   Status: (Other)   Reason for referral:     78 y.o.female with altered mental status     Technical Summary:      A 19 channel digital EEG was performed using the international 10-20 placement system, including eye leads and EKG leads.     Duration: 22 minutes     Findings:     The awake tracing shows diffuse low amplitude intermixed theta and alpha activity present symmetrically over both hemispheres.  EMG artifact and eye blink artifact is seen anteriorly.  A clear posterior rhythm is not seen.  Drowsiness is seen with mild slowing of the tracing but stage II sleep is not clearly seen.  No focal features or epileptiform activity are seen.  Hyperventilation and photic stimulation are not performed.     Video: Available     Technical quality: Superior     EKG: Regular, 70 bpm      SUMMARY:     Normal EEG in the awake and lightly asleep states     No focal features or epileptiform activity are seen     IMPRESSION:     Normal study     This report is transcribed using the Dragon dictation system.             Radiological Studies:       Limited Transthoracic Echocardiogram with Limited Doppler and Color Flow    Accession Number: [id number]   Date of Study: 12/12/23   Ordering Provider: Sherry Calderon APRN   Clinical Indications: Cardiac source of emboli - Stroke        Interpreting Physicians  Performing Staff   Ramses Brambila MD Tech: Beatriz Rg, [facility], [facility]        Admission Information    Admission Date/Time Discharge Date/Time Room/Bed   12/11/23  1258  S306/1     Patient Hx Of Height, Weight, and Vitals    Height Weight BSA (Calculated - sq m) BMI (Calculated) Retired BMI (kg/m2) Pulse BP        77 179/72      [facility] PACS Images     Show images for Adult Transthoracic Echo Limited W/ Cont if Necessary Per Protocol   Clinical Indication    Cardiac source of emboli - Stroke     Interpretation Summary         Left ventricular ejection fraction appears to be 51 - 55%. Normal left ventricular cavity size and wall thickness noted. All left ventricular wall segments contract normally.    Normal right ventricular cavity size and systolic function noted.    The left atrial cavity is mildly dilated.    No aortic valve regurgitation or stenosis is present. The aortic valve exhibits sclerosis. The aortic valve appears trileaflet.    The mitral valve is structurally normal with no significant stenosis present. Trace to mild mitral valve regurgitation is present.    The tricuspid valve is structurally normal with no significant stenosis present. Physiologic tricuspid valve regurgitation is present. Estimated right ventricular systolic pressure from tricuspid regurgitation is normal (<35 mmHg).   MRI Angiogram Head Without Contrast    Result Date: 12/12/2023  MRI ANGIOGRAM HEAD WO CONTRAST  Date of Exam: 12/12/2023 4:12 AM EST Indication: Stroke, follow up.  Comparison: CTA head 7/4/2020 Technique:  Routine 3-D time-of-flight gradient echo imaging was obtained of the head without contrast administration. Findings: Right carotid: The distal cervical ICA is normal. Intracranial ICA segments demonstrate mild atherosclerotic irregularity. The origin of the ophthalmic artery is normal. There is a patent P-comm. There is a patent A-Comm. The A1 segment is normal. There is mild focal narrowing of the proximal right A2 segment. The distal MERCY branches appear patent. There is a stable high-grade focal stenosis at the proximal right M1 segment with near occlusion. The distal M1 segment is widely patent and the distal MCA branches appear patent. Left carotid: The distal cervical ICA is normal. The intracranial ICA segments are widely patent. The origin of the ophthalmic artery is normal. There is a patent P-comm. The A1 and M1 segments and distal MERCY and MCA branches appear patent. Posterior circulation: V4 segments are patent. The basilar artery is normal. Visualized superior cerebellar arteries appear patent. The P1 segments are normal. There is atherosclerotic irregularity in the right PCA branches. There is stable severe disease in the left PCA with at least 2 areas of high-grade stenosis. Overall appearance is unchanged from prior CTA.     Impression: 1.Stable high-grade stenosis at the proximal right M1 segment with near occlusion. 2.Stable severe disease in the left PCA with at least 2 areas of high-grade stenosis. Electronically Signed: Nomi Fletcher MD  12/12/2023 5:53 AM EST  Workstation ID: [id number]    MRI Angiogram Neck Without Contrast    Result Date: 12/12/2023  MRI ANGIOGRAM NECK WO CONTRAST Date of Exam: 12/12/2023 4:13 AM EST Indication: Stroke, follow up.  Comparison: CTA neck 7/4/2020. Technique:  Routine 3-D time-of-flight gradient echo imaging was obtained of the neck without contrast  administration. Findings: Aorta: The visualized aortic arch is unremarkable. There is conventional three-vessel arch anatomy. The right brachiocephalic and right subclavian arteries appear patent. The proximal left subclavian artery is partially obscured, but the distal vessel appears patent. Right carotid: There is mild nonstenosing plaque in the distal CCA. The carotid bifurcation is widely patent. The ECA and distal branches appear patent and the mid and distal cervical ICA is normal. Left carotid: There is mild narrowing of the distal CCA due to eccentric plaque estimated to be less than 25%. The carotid bifurcation is normal. The ECA and distal branches appear normal and the cervical ICA is normal. Vertebral arteries: Vertebral arteries are codominant and following normal course and appear normal caliber throughout the neck.     Impression: 1. Mild, less than 25% narrowing at the distal left CCA. 2. No hemodynamically significant stenosis of the internal carotid or vertebral arteries. Electronically Signed: Nomi Fletcher MD  12/12/2023 5:43 AM EST  Workstation ID: [id number]    MRI Brain Without Contrast    Result Date: 12/12/2023  MRI BRAIN WO CONTRAST Date of Exam: 12/12/2023 4:14 AM EST Indication: Stroke, follow up.  Comparison: CT head 12/11/2023 Technique:  Routine multiplanar/multisequence sequence images of the brain were obtained without contrast administration. Findings: There is a new small area of diffusion restriction in the right karen compatible with acute/subacute lacunar type infarct. There is a large chronic infarct involving the left temporal and left occipital lobes, which is unchanged and exhibits laminar necrosis. There is moderate underlying chronic small vessel ischemic change. There is generalized parenchymal volume loss. There are punctate microhemorrhages in the right karen, medial right temporal lobe and at multiple locations in the area of chronic infarction in the left temporal and  occipital lobes. There are additional chronic microhemorrhages in the right temporal white matter and right parietal periventricular white matter. Midline structures appear intact. There is thinning of the orbital lenses bilaterally. There is complete filling of the right maxillary sinus with findings of chronic sinusitis. Mastoids are clear. The central flow voids appear intact. Calvarial and superficial soft tissue signal is within normal limits.     Impression: 1.New small acute/subacute lacunar type infarct in the right karen. 2.Large chronic infarct in the left temporal and occipital lobes. 3.Background of moderate chronic small vessel ischemic change and generalized parenchymal volume loss. 4.There are multiple chronic microhemorrhages in the right karen, medial right temporal lobe and right parietal periventricular white matter. This could be related to hypertension or amyloid angiopathy. 5.Chronic right maxillary sinusitis. Electronically Signed: Nomi Fletcher MD  12/12/2023 5:36 AM EST  Workstation ID: [id number]    XR Abdomen KUB    Result Date: 12/12/2023  XR ABDOMEN KUB Date of Exam: 12/12/2023 1:11 AM EST Indication: MRI Clearence, please include pelvis too Comparison: 7/10/2020 and 7/6/2020. Findings: Abdomen: There is evidence of prior median sternotomy. There is no unexpected metal artifact. No acute osseous abnormality. No bowel distention. CHEST: There is evidence of prior median sternotomy. There are no unexpected areas of metal artifact. CT HEAD: No unexpected metallic foreign bodies. No contraindication to MRI.     Impression: No contraindication to MRI identified on abdomen or chest radiograph for head CT. Electronically Signed: Nomi Fletcher MD  12/12/2023 1:55 AM EST  Workstation ID: [id number]    CT Head Without Contrast    Result Date: 12/11/2023  CT HEAD WO CONTRAST Date of Exam: 12/11/2023 1:20 PM EST Indication: AMS, history of stroke, on Xarelto. Comparison: 7/14/2020 Technique: Axial CT  images were obtained of the head without contrast administration.  Automated exposure control and iterative construction methods were used. Findings: Parenchyma:No acute intraparenchymal hemorrhage. Sequela of old left-sided infarct with encephalomalacia. New hypodensity within the right karen. Moderate parenchymal volume loss. Scattered periventricular and subcortical white matter hypodensities, nonspecific, but most often consistent with small vessel ischemic changes. No midline shift or herniation. Ventricles and extra axial spaces:Prominent ventricles and sulci secondary to volume loss. No extra axial fluid collection seen. Other:Lens replacements. Scattered paranasal sinus mucosal thickening. Mastoid air cells are clear. Calvarium is intact. Intracranial atherosclerotic calcification is present.     Impression: New focal hypodensity within the right karen may represent age-indeterminate infarct. Consider MRI to further evaluate. Chronic changes including old left-sided infarct as above. Findings discussed with stroke team by Dr. Eros Dejesus via telephone on 12/11/2023 3:31 PM EST. Electronically Signed: Eros Dejesus MD  12/11/2023 4:02 PM EST  Workstation ID: [id number]    XR Chest 1 View    Result Date: 12/11/2023  XR CHEST 1 VW Date of Exam: 12/11/2023 1:12 PM EST Indication: Stroke Protocol (Onset > 12 Hrs) Comparison: Chest radiograph dated 8/20/2020 Findings: There is borderline cardiomegaly. There are postsurgical changes of prior CABG. Pulmonary vascularity appears normal. There is no acute airspace consolidation, pleural effusion, or pneumothorax. There are degenerative changes of the thoracic spine.     Impression: 1. Borderline cardiomegaly with postsurgical changes of CABG. No acute consolidation. Electronically Signed: Jimenez Butcher  12/11/2023 2:02 PM EST  Workstation ID: [id number]       During this visit the following were done:  Labs Reviewed [x]    Labs Ordered []    Radiology Reports  Reviewed [x]    Radiology Ordered []    EKG, echo, and/or stress test reviewed [x]    EEG results reviewed  []    EEG reviewed and interpreted per myself   []    Discussed case with neurointerventionalist or neuroradiologist []    Referring Provider Records Reviewed []    ER Records Reviewed []    Hospital Records Reviewed []    History Obtained From Family []    Radiological images view and Interpreted per myself [x]    Case Discussed with referring provider []     Decision to obtain and request outside records  []        Assessment and Plan     Acute/subacute right pontine stroke 22 small vessel thrombosis. Old large left PCA stroke, likely cardiac source, previously on Xarelto. MRI with numerous foci on hemosiderin on SWI, possible amyloid. MRA head with chronic right M1 and left PCA stenosis. Seizure d/o, stable on Keppra, EEG normal.   - Observation on telemetry.   - Stroke order set.   - Fall precautions.   - Plavix load, P2Y12 in am, DAPT long term given ICAD, aspirin failure.   - High dose statin.   - -160, DBP<90.   - ST, PT, OT.   - Cardiac monitor on discharge.   - Stroke clinic F/U in 3 months.              Electronically signed by Steffen Hsu MD on 12/12/2023 at 11:28 EST

## 2023-12-12 NOTE — PLAN OF CARE
Goal Outcome Evaluation:  Plan of Care Reviewed With: patient, spouse, son        Progress: improving     SLP evaluation and re-evaluation completed. Will address oropharyngeal dysphagia and cognitive-communication impairments in tx, as appropriate. Please see note for further details and recommendations.      Anticipated Discharge Disposition (SLP): skilled nursing facility

## 2023-12-12 NOTE — CASE MANAGEMENT/SOCIAL WORK
Discharge Planning Assessment  [facility]     Patient Name: Beryl Montoya  MRN: [id number]  Today's Date: 12/12/2023    Admit Date: 12/11/2023    Plan: [facility]   Discharge Needs Assessment       Row Name 12/12/23 0825       Living Environment    People in Home spouse    Name(s) of People in Home Jean (spouse) [phone]    Current Living Arrangements home    Potentially Unsafe Housing Conditions none    Primary Care Provided by self;spouse/significant other    Provides Primary Care For no one, unable/limited ability to care for self    Family Caregiver if Needed spouse    Able to Return to Prior Arrangements yes       Resource/Environmental Concerns    Resource/Environmental Concerns none    Transportation Concerns none       Transition Planning    Patient/Family Anticipates Transition to inpatient rehabilitation facility    Patient/Family Anticipated Services at Transition none    Transportation Anticipated family or friend will provide;health plan transportation       Discharge Needs Assessment    Readmission Within the Last 30 Days no previous admission in last 30 days    Current Outpatient/Agency/Support Group inpatient rehabilitation facility    Equipment Currently Used at Home walker, rolling;wheelchair;commode    Concerns to be Addressed denies needs/concerns at this time    Anticipated Changes Related to Illness none    Equipment Needed After Discharge none    Outpatient/Agency/Support Group Needs inpatient rehabilitation facility    Discharge Facility/Level of Care Needs rehabilitation facility    Provided Post Acute Provider List? Yes    Post Acute Provider List Inpatient Rehab    Delivered To Support Person    Support Person Jean (spouse)    Method of Delivery Telephone    Patient's Choice of Community Agency(s) [facility]                   Discharge Plan       Row Name 12/12/23 0913       Plan    Plan [facility]    Patient/Family in Agreement with Plan yes    Plan Comments Spoke  with spouse by phone. Lives at Jean (spouse) [phone] in [facility]. Patient is dependent with ADL's. No problems with [facility] Medicare or medications. Uses a commode, rolling walker and wheelchair at home. Has advanced directives. PCP is Argelia Gamez MD. Plan is [facility]. Demetra has the referral. CM will continue to follow.    Final Discharge Disposition Code 62 - inpatient rehab facility                  Continued Care and Services - Admitted Since 12/11/2023    Coordination has not been started for this encounter.       Expected Discharge Date and Time       Expected Discharge Date Expected Discharge Time    Dec 15, 2023            Demographic Summary       Row Name 12/12/23 0824       General Information    Admission Type observation    Arrived From emergency department    Referral Source admission list    Reason for Consult discharge planning    Preferred Language English       Contact Information    Permission Granted to Share Info With     Contact Information Obtained for                    Functional Status       Row Name 12/12/23 0824       Functional Status    Usual Activity Tolerance poor    Current Activity Tolerance poor    Functional Status Comments --       Functional Status, IADL    Medications assistive person    Meal Preparation assistive person    Housekeeping assistive person    Laundry assistive person    Shopping assistive person       Mental Status    General Appearance WDL WDL       Mental Status Summary    Recent Changes in Mental Status/Cognitive Functioning no changes       Employment/    Employment Status retired                   Psychosocial    No documentation.                  Abuse/Neglect    No documentation.                  Legal    No documentation.                  Substance Abuse    No documentation.                  Patient Forms    No documentation.                     Dexter Coelho RN

## 2023-12-12 NOTE — MBS/VFSS/FEES
Acute Care - Speech Language Pathology Initial Evaluation  [facility]  Cognitive-Communication Evaluation  Clinical Swallow Re-evaluation  & Modified Barium Swallow Study (MBS)     Patient Name: Beryl Montoya  : 1945  MRN: [id number]  Today's Date: 2023               Admit Date: 2023     Visit Dx:    ICD-10-CM ICD-9-CM   1. Cerebrovascular accident (CVA), unspecified mechanism  I63.9 434.91   2. Generalized weakness  R53.1 780.79   3. Expressive aphasia  R47.01 784.3   4. Hypertension, unspecified type  I10 401.9   5. Dysphagia, unspecified type  R13.10 787.20   6. Cognitive communication deficit  R41.841 799.52   7. Dysarthria  R47.1 784.51     Patient Active Problem List   Diagnosis    History of L MCA CVA    Essential hypertension    Type 2 diabetes mellitus with complication, with long-term current use of insulin    GERD (gastroesophageal reflux disease)    Bilateral carotid artery stenosis    Abnormal stress test    Severe 3 vessel CAD with angina pectoris (CMS/HCC)    Ischemic cardiomyopathy. LVEF 30%    S/P CABG x 3 on 20    Acute postop embolic left occipital CVA 20. Extensive in L PCA distribution but additional areas in R PCA distribution (CMS/HCC)    Bilateral carotid atherosclerosis with moderate left common carotid artery stenosis (50-60%)    Seizure    Generalized weakness    Acute UTI (urinary tract infection)    History of seizure    Stroke     Past Medical History:   Diagnosis Date    CHF (congestive heart failure)     Coronary artery disease involving native coronary artery of native heart with angina pectoris 2020    Diabetes mellitus     GERD (gastroesophageal reflux disease)     High cholesterol     Hyperlipidemia     Hypertension     Ischemic cardiomyopathy     Stroke     2013 speech and short term memory     TIA (transient ischemic attack)      Past Surgical History:   Procedure Laterality Date    CARDIAC CATHETERIZATION N/A 2020    Procedure: Left  Heart Cath 27342 C19 @  [facility];  Surgeon: David Faulkner MD;  Location:  [facility] CATH INVASIVE LOCATION;  Service: Cardiovascular;  Laterality: N/A;    CATARACT EXTRACTION Bilateral     COLONOSCOPY  2013    CORONARY ARTERY BYPASS GRAFT N/A 6/30/2020    Procedure: SHERI PER ANESTHESIA, MEDIAN STERNOTOMY, CORONARY ARTERY BYPASS GRAFTING X 3 , UTILIZING THE LEFT INTERNAL MAMMARY ARTERY AND EVH OF RIGHT GREATER SAPHENOUS VEIN;  Surgeon: Jerry Lozano MD;  Location:  [facility] OR;  Service: Cardiothoracic;  Laterality: N/A;  LEG START - 0736  VEIN OUT - 0840  VEIN READY - 0850    EYE SURGERY      HYSTERECTOMY      total    INTERVENTIONAL RADIOLOGY PROCEDURE Bilateral 9/25/2017    Procedure: Carotid Cerebral Angiogram;  Surgeon: Maldonado Casas MD;  Location:  [facility] CATH INVASIVE LOCATION;  Service:        SLP Recommendation and Plan  SLP Diagnosis: severe, cognitive-linguistic disorder, moderate, dysarthria (12/12/23 1100)  SLP Diagnosis Comments: Pt's spouse indicated that pt has baseline cognitive-linguistic deficits and that he and his son assist w/ all ADLs @ home. Indicated that cognitive-linguistic skills may be acutely worse. Dysarthria is new. (12/12/23 1100)        Swallow Criteria for Skilled Therapeutic Interventions Met: demonstrates skilled criteria (12/12/23 1210)  SLC Criteria for Skilled Therapy Interventions Met: yes (12/12/23 1100)  Anticipated Discharge Disposition (SLP): skilled nursing facility (12/12/23 1210)     Therapy Frequency (Swallow): 5 days per week (12/12/23 1210)  Therapy Frequency (SLP SLC): 5 days per week (12/12/23 1100)  Predicted Duration Therapy Intervention (Days): until discharge (12/12/23 1210)  Oral Care Recommendations: Oral Care BID/PRN, Suction toothbrush (12/12/23 1210)                          Plan of Care Reviewed With: patient, spouse, son (12/12/23 1349)  Progress: improving (12/12/23 1349)      SLP EVALUATION (last 72 hours)       SLP SLC Evaluation        Row Name 12/12/23 [facility]                   Communication Assessment/Intervention    Document Type evaluation  -AC        Subjective Information no complaints  -AC        Patient Observations alert;cooperative  -AC        Patient/Family/Caregiver Comments/Observations Spouse present.  -AC        Patient Effort adequate  -AC           General Information    Patient Profile Reviewed yes  -AC        Pertinent History Of Current Problem See previous eval. New small acute/subacute lacunar type infarct in the right karen per MRI.  -AC        Precautions/Limitations, Vision WFL;for purposes of eval  -AC        Precautions/Limitations, Hearing WFL;for purposes of eval  -AC        Prior Level of Function-Communication cognitive-linguistic impairment  -AC        Plans/Goals Discussed with patient;spouse/S.O.;agreed upon  -AC        Barriers to Rehab previous functional deficit;cognitive status  -AC        Patient's Goals for Discharge patient did not state  -        Family Goals for Discharge family did not state  expressed difficulty caring for her @ home  -           Pain Scale: FACES Pre/Post-Treatment    Pain: FACES Scale, Pretreatment 0-->no hurt  -AC        Posttreatment Pain Rating 0-->no hurt  -AC           Comprehension Assessment/Intervention    Comprehension Assessment/Intervention Auditory Comprehension  -AC           Auditory Comprehension Assessment/Intervention    Auditory Comprehension (Communication) severe impairment  -AC        Answers Questions (Communication) severe impairment;simple;personal;yes/no  -AC        Able to Follow Commands (Communication) severe impairment;1-step  -AC           Expression Assessment/Intervention    Expression Assessment/Intervention verbal expression  -AC           Verbal Expression Assessment/Intervention    Verbal Expression moderate impairment;severe impairment  -AC        Automatic Speech (Communication) WFL;response to greeting  -AC        Spontaneous/Functional Words  moderate impairment;severe impairment;simple;neologisms  -           Oral Motor Structure and Function    Oral Lesions or Structural Abnormalities and/or variants Creamy white, sticky coating lingual surface--? c/w oral thrush.  -AC        Dentition Assessment poor oral hygiene  -        Mucosal Quality dry;sticky  -           Oral Musculature and Cranial Nerve Assessment    Oral Motor General Assessment unable to assess  -           Motor Speech Assessment/Intervention    Motor Speech Function moderate impairment;unfamiliar listener;familiar listener  -        Characteristics Consistent with Dysarthria decreased articulation  -AC        Speech intelligibility 60%;in quiet environment;in connected speech;with unfamiliar listener  -           Cursory Voice Assessment/Intervention    Quality and Resonance (Voice) unable/difficult to assess  -           Cognitive Assessment Intervention- SLP    Cognitive Function (Cognition) severe impairment  -        Orientation Status (Cognition) severe impairment;place;time;situation;WFL;person  -           SLP Evaluation Clinical Impressions    SLP Diagnosis severe;cognitive-linguistic disorder;moderate;dysarthria  -AC        SLP Diagnosis Comments Pt's spouse indicated that pt has baseline cognitive-linguistic deficits and that he and his son assist w/ all ADLs @ home. Indicated that cognitive-linguistic skills may be acutely worse. Dysarthria is new.  -AC        Rehab Potential/Prognosis fair  -        SLC Criteria for Skilled Therapy Interventions Met yes  -AC        Functional Impact difficulty communicating wants, needs;difficulty communicating in an emergency  -AC           Recommendations    Therapy Frequency (SLP SLC) 5 days per week  -        Predicted Duration Therapy Intervention (Days) until discharge  -AC        Anticipated Discharge Disposition (SLP) skilled nursing facility  -                  User Key  (r) = Recorded By, (t) = Taken By, (c)  = Cosigned By      Initials Name Effective Dates    AC Cassia Mcgrath, MS [facility]-SLP 02/03/23 -                        EDUCATION  The patient has been educated in the following areas:     Cognitive Impairment Communication Impairment.           SLP GOALS       Row Name 12/12/23 1210             (LTG) Patient will demonstrate functional swallow for    Diet Texture (Demonstrate functional swallow) pureed textures  -AC      Liquid viscosity (Demonstrate functional swallow) nectar/ mildly thick liquids  -AC      [place] (Demonstrate functional swallow) with 1:1 assist/ supervision  -AC      Time Frame (Demonstrate functional swallow) by discharge  -AC      Comment (Demonstrate functional swallow) Trial tx pending cognitive status.  -AC         (STG) Patient will tolerate trials of    Consistencies Trialed (Tolerate trials) pureed textures;nectar/ mildly thick liquids  -AC      Desired Outcome (Tolerate trials) without signs/symptoms of aspiration;with adequate oral prep/transit/clearance;with use of compensatory strategies (see comments)  small bolus size, alternate bites/sips, no straws  -AC      [place] (Tolerate trials) with 1:1 assist/ supervision  -AC      Time Frame (Tolerate trials) by discharge  -AC         (STG) Patient will tolerate therapeutic trials of    Consistencies Trialed (Tolerate therapeutic trials) thin liquids  -AC      Desired Outcome (Tolerate therapeutic trials) without signs/symptoms of aspiration;without signs of distress  -AC      [place] (Tolerate therapeutic trials) with 1:1 assist/ supervision  -AC      Time Frame (Tolerate therapeutic trials) by discharge  -AC         (STG) Lingual Strengthening Goal 1 (SLP)    Activity (Lingual Strengthening Goal 1, SLP) increase tongue back strength;increase lingual tone/sensation/control/coordination/movement  -AC      Increase Lingual Tone/Sensation/Control/Coordination/Movement lingual resistance exercises;swallow trials  -AC      Increase  Tongue Back Strength lingual resistance exercises  -AC      [place]/Accuracy (Lingual Strengthening Goal 1, SLP) with maximum cues (25-49% accuracy)  -AC      Time Frame (Lingual Strengthening Goal 1, SLP) short term goal (STG)  -AC         (STG) Pharyngeal Strengthening Exercise Goal 1 (SLP)    Activity (Pharyngeal Strengthening Goal 1, SLP) increase timing;increase superior movement of the hyolaryngeal complex;increase anterior movement of the hyolaryngeal complex;increase squeeze/positive pressure generation  -AC      Increase Timing prepping - 3 second prep or suck swallow or 3-step swallow  -AC      Increase Superior Movement of the Hyolaryngeal Complex falsetto  -AC      Increase Anterior Movement of the Hyolaryngeal Complex chin tuck against resistance (CTAR)  -AC      Increase Squeeze/Positive Pressure Generation hard effortful swallow  -AC      [place]/Accuracy (Pharyngeal Strengthening Goal 1, SLP) with maximum cues (25-49% accuracy)  -AC      Time Frame (Pharyngeal Strengthening Goal 1, SLP) short term goal (STG)  -AC         Patient will demonstrate functional cognitive-linguistic skills for return to discharge environment    [place] with maximum cues  -AC      Time frame by discharge  -AC         SLP Diagnostic Treatment     Patient will participate in further assessment in the following areas clarification of baseline cognitive communication status  -AC      Time Frame (Diagnostic) short term goal (STG)  -AC         Follow Directions Goal 2 (SLP)    Improve Ability to Follow Directions Goal 1 (SLP) 1 step direction without objects;80%;with moderate cues (50-74%)  -AC      Time Frame (Follow Directions Goal 1, SLP) short term goal (STG)  -AC         Awareness of Basic Personal Information Goal 1 (SLP)    Improve Awareness of Basic Personal Information Goal 1 (SLP) answering yes/no questions regarding personal/biographical information;naming self, family members;70%;with moderate cues  (50-74%)  -AC      Time Frame (Awareness of Basic Personal Information Goal 1, SLP) short term goal (STG)  -AC         Orientation Goal 1 (SLP)    Improve Orientation Through Goal 1 (SLP) demonstrating orientation to place;use environmental aids to assist with orientation;70%;with maximum cues (25-49%)  -AC      Time Frame (Orientation Goal 1, SLP) short term goal (STG)  -AC                User Key  (r) = Recorded By, (t) = Taken By, (c) = Cosigned By      Initials Name Provider Type    Cassia Bedoya MS CCC-SLP Speech and Language Pathologist                            Time Calculation:      Time Calculation- SLP       Row Name 23 1350             Time Calculation- SLP    SLP Start Time 1100  -AC      SLP Received On 23  -AC         Untimed Charges    12187-WH Eval Speech and Production w/ Language Minutes 25  -AC      04249-CO Eval Oral Pharyng Swallow Minutes 30  -AC      71379-GJ Motion Fluoro Eval Swallow Minutes 71  -AC         Total Minutes    Untimed Charges Total Minutes 126  -AC       Total Minutes 126  -AC                User Key  (r) = Recorded By, (t) = Taken By, (c) = Cosigned By      Initials Name Provider Type    Cassia Bedoya MS CCC-SLP Speech and Language Pathologist                    Therapy Charges for Today       Code Description Service Date Service Provider Modifiers Qty    83857822542 HC ST EVAL ORAL PHARYNG SWALLOW 2 2023 Cassia Mcgrath MS CCC-SLP GN 1    65325608597 HC ST EVAL SPEECH AND PROD W LANG  2 2023 Cassia Mcgrath MS CCC-SLP GN 1    [id number] HC ST MOTION FLUORO EVAL SWALLOW 5 2023 Cassia Mcgrath MS CCC-SLP GN 1                       Cassia Mcgrath MS CCC-SLP  2023   and Acute Care - Speech Language Pathology   Swallow Re-Evaluation  Luis Daniel     Patient Name: Beryl Montoya  : 1945  MRN: [id number]  Today's Date: 2023               Admit Date: 2023    Visit Dx:     ICD-10-CM ICD-9-CM   1. Cerebrovascular  accident (CVA), unspecified mechanism  I63.9 434.91   2. Generalized weakness  R53.1 780.79   3. Expressive aphasia  R47.01 784.3   4. Hypertension, unspecified type  I10 401.9   5. Dysphagia, unspecified type  R13.10 787.20   6. Cognitive communication deficit  R41.841 799.52   7. Dysarthria  R47.1 784.51     Patient Active Problem List   Diagnosis    History of L MCA CVA    Essential hypertension    Type 2 diabetes mellitus with complication, with long-term current use of insulin    GERD (gastroesophageal reflux disease)    Bilateral carotid artery stenosis    Abnormal stress test    Severe 3 vessel CAD with angina pectoris (CMS/[facility])    Ischemic cardiomyopathy. LVEF 30%    S/P CABG x 3 on 6/30/20    Acute postop embolic left occipital CVA 7/4/20. Extensive in L PCA distribution but additional areas in R PCA distribution (CMS/HCC)    Bilateral carotid atherosclerosis with moderate left common carotid artery stenosis (50-60%)    Seizure    Generalized weakness    Acute UTI (urinary tract infection)    History of seizure    Stroke     Past Medical History:   Diagnosis Date    CHF (congestive heart failure)     Coronary artery disease involving native coronary artery of native heart with angina pectoris 6/25/2020    Diabetes mellitus     GERD (gastroesophageal reflux disease)     High cholesterol     Hyperlipidemia     Hypertension     Ischemic cardiomyopathy     Stroke     2013 speech and short term memory     TIA (transient ischemic attack)      Past Surgical History:   Procedure Laterality Date    CARDIAC CATHETERIZATION N/A 6/23/2020    Procedure: Left Heart Cath 37824 C19 @ [facility];  Surgeon: David Faulkner MD;  Location: [facility] CATH INVASIVE LOCATION;  Service: Cardiovascular;  Laterality: N/A;    CATARACT EXTRACTION Bilateral     COLONOSCOPY  2013    CORONARY ARTERY BYPASS GRAFT N/A 6/30/2020    Procedure: SHERI PER ANESTHESIA, MEDIAN STERNOTOMY, CORONARY ARTERY BYPASS GRAFTING X 3 , UTILIZING THE LEFT  INTERNAL MAMMARY ARTERY AND EVH OF RIGHT GREATER SAPHENOUS VEIN;  Surgeon: Jerry Lozano MD;  Location:  [facility] OR;  Service: Cardiothoracic;  Laterality: N/A;  LEG START - 0736  VEIN OUT - 0840  VEIN READY - 0850    EYE SURGERY      HYSTERECTOMY      total    INTERVENTIONAL RADIOLOGY PROCEDURE Bilateral 9/25/2017    Procedure: Carotid Cerebral Angiogram;  Surgeon: Maldonado Casas MD;  Location:  [facility] CATH INVASIVE LOCATION;  Service:        SLP Recommendation and Plan  SLP Swallowing Diagnosis: moderate, oral dysphagia, pharyngeal dysphagia (12/12/23 1210)  SLP Diet Recommendation: puree, no mixed consistencies, nectar thick liquids (12/12/23 1210)  Recommended Precautions and Strategies: upright posture during/after eating, 1:1 supervision, assist with feeding, small bites of food and sips of liquid, no straw, alternate between small bites of food and sips of liquid, check mouth frequently for oral residue/pocketing (12/12/23 1210)  SLP Rec. for Method of Medication Administration: meds crushed, with puree, as tolerated (12/12/23 1210)     Monitor for Signs of Aspiration: notify SLP if any concerns (12/12/23 1210)  Swallow Criteria for Skilled Therapeutic Interventions Met: demonstrates skilled criteria (12/12/23 1210)  Anticipated Discharge Disposition (SLP): skilled nursing facility (12/12/23 1210)  Rehab Potential/Prognosis, Swallowing: adequate, monitor progress closely (12/12/23 1210)  Therapy Frequency (Swallow): 5 days per week (12/12/23 1210)  Predicted Duration Therapy Intervention (Days): until discharge (12/12/23 1210)  Oral Care Recommendations: Oral Care BID/PRN, Suction toothbrush (12/12/23 1210)     Swallowing Considerations per Physician Discretion: medical management of suspected oropharyngeal candidiasis, as indicated (notified RN) (12/12/23 1210)                                Oral Care Recommendations: Oral Care BID/PRN, Suction toothbrush (12/12/23 1210)    Plan of Care Reviewed  With: patient, spouse, son  Progress: improving      SWALLOW EVALUATION (last 72 hours)       SLP Adult Swallow Evaluation       Row Name 12/12/23 1210 12/12/23 1110 12/11/23 4315             Rehab Evaluation    Document Type evaluation  -AC re-evaluation  -AC evaluation  -CJ      Subjective Information no complaints  -AC -- no complaints  -CJ      Patient Observations alert;cooperative  -AC -- alert;cooperative  -CJ      Patient/Family/Caregiver Comments/Observations Son present.  -AC -- family present  -CJ      Patient Effort adequate  -AC adequate  -AC adequate  -CJ      Comment -- Attempted to speak to pt 0900 to determine if remained agreeable to MBS/if still indicated. Pt easily upset. Refused PO trials. Allowed pt to rest. She was much calmer/cooperative when returned 1110 & spouse was present.  -AC --      Symptoms Noted During/After Treatment -- -- none  -CJ         General Information    Patient Profile Reviewed yes  -AC yes  -AC yes  -CJ      Pertinent History Of Current Problem -- -- Pt adm w/ generalized weakness; sig h/o L MCA, HTN, DM2, GERD, CABG, L occipital CVA in 2020, seizures, cardiomyopathy. Family at bedside reported ongoing dysphagia for last few days  -CJ      Current Method of Nutrition NPO  -AC NPO  -AC NPO  -CJ      Precautions/Limitations, Vision -- -- WFL;for purposes of eval  -CJ      Precautions/Limitations, Hearing -- -- WFL;for purposes of eval  -CJ      Prior Level of Function-Communication -- -- unknown  -CJ      Prior Level of Function-Swallowing no diet consistency restrictions  son reported pt has developed swallowing difficulty x1 week or so  - no diet consistency restrictions  family reported noting some difficulty swallowing for a while now, but has worsened lately  -AC other (see comments)  FEES 7/15/2020 w/ recs for MS, chopped w/ thins  -CJ      Plans/Goals Discussed with patient and family;agreed upon  -AC patient;spouse/S.O.;agreed upon  -AC patient;family  -CJ       Barriers to Rehab previous functional deficit;cognitive status  -AC previous functional deficit;cognitive status  -AC previous functional deficit;medically complex  -      Patient's Goals for Discharge patient did not state  -AC patient did not state  -AC patient could not state  -      Family Goals for Discharge patient able to return to PO diet;patient able to eat/drink without coughing/choking  -AC family did not state  -AC family did not state  -         Pain    Additional Documentation -- -- Pain Scale: FACES Pre/Post-Treatment (Group)  -         Pain Scale: FACES Pre/Post-Treatment    Pain: FACES Scale, Pretreatment 0-->no hurt  -AC 0-->no hurt  -AC 0-->no hurt  -CJ      Posttreatment Pain Rating 0-->no hurt  -AC 0-->no hurt  -AC 0-->no hurt  -CJ         Oral Motor Structure and Function    Dentition Assessment -- -- natural, present and adequate;poor oral hygiene  -      Secretion Management -- WNL/WFL  -AC dried secretions in oral cavity  -      Mucosal Quality -- -- dry;sticky  -         Oral Musculature and Cranial Nerve Assessment    Oral Motor General Assessment -- -- generalized oral motor weakness;oral labial or buccal impairment;lingual impairment  -         General Eating/Swallowing Observations    Respiratory Support Currently in Use room air  - room air  - room air  -      Eating/Swallowing Skills fed by SLP;self-fed;needed assist  -AC fed by SLP  - fed by SLP;unable to perform self-feeding  -      Positioning During Eating upright in chair  - semi-reclined;upright in bed  pt u/a to tolerate sitting up @ 90'  - upright in bed  -      Utensils Used -- spoon;cup  -AC spoon;cup;straw  -      Consistencies Trialed -- thin liquids;nectar/syrup-thick liquids;pudding thick  -AC pureed;ice chips;thin liquids;nectar/syrup-thick liquids  -         Clinical Swallow Eval    Oral Prep Phase -- impaired  -AC impaired  -CJ      Oral Transit -- impaired  -AC impaired  -CJ       Oral Residue -- impaired  -AC impaired  -      Pharyngeal Phase -- suspected pharyngeal impairment  -AC suspected pharyngeal impairment  -CJ      Esophageal Phase -- unremarkable  -AC --      Clinical Swallow Evaluation Summary -- -- Seemingly delayed initiation w/ all po presentations, suspect oral holding. Residue w/ all consistencies requiring cues to clear oral cavity. Overt s/s of aspiration w/ all po presentaitons w/ multiple swallows w/ all trials. Recommend safest would be NPO w/ plan for MBS in   -         Oral Prep Concerns    Oral Prep Concerns -- oral holding  - oral holding  -      Oral Holding -- all consistencies  -AC --         Oral Transit Concerns    Oral Transit Concerns -- delayed initiation of bolus transit  - delayed initiation of bolus transit  -      Delayed Intiation of Bolus Transit -- all consistencies  -AC all consistencies  -         Oral Residue Concerns    Oral Residue Concerns -- -- diffuse residue throughout oral cavity  -      Diffuse Residue Throughout Oral Cavity -- pudding  - --      Oral Residue Concerns, Comment -- mild-mod--cleared w/ liquid wash  -AC --         Pharyngeal Phase Concerns    Pharyngeal Phase Concerns -- wet vocal quality;cough;multiple swallows  - multiple swallows  -      Wet Vocal Quality -- thin;nectar  - --      Multiple Swallows -- all consistencies  -AC --      Cough -- thin  -AC --      Pharyngeal Phase Concerns, Comment -- Accepted PO trials & eager to attempt to eat/drink. Cont'd overt clinical s/sxs aspiration. Pt agreeable to MBS. Spouse feels pt would be more cooperative for MBS c/t FEES.  -AC --         MBS/VFSS    Utensils Used spoon;cup;straw  -AC -- --      Consistencies Trialed thin liquids;nectar/syrup-thick liquids;pudding thick;soft to chew textures;chopped  -AC -- --         MBS/VFSS Interpretation    Oral Prep Phase impaired oral phase of swallowing  -AC -- --      Oral Transit Phase impaired  -AC -- --      Oral  Residue impaired  -AC -- --         Oral Preparatory Phase    Oral Preparatory Phase prolonged manipulation;oral holding  -AC -- --      Oral Holding pudding/puree;mechanical soft;secondary to impaired cognitive status  -AC -- --      Prolonged Manipulation mechanical soft;secondary to reduced lingual strength;secondary to reduced lingual range of motion;secondary to impaired cognitive status  -AC -- --         Oral Transit Phase    Impaired Oral Transit Phase delayed initiation of bolus transit;increased A-P transit time;premature spillage of liquids into pharynx  -AC -- --      Delayed Initiation of bolus transit pudding/puree;mechanical soft;discoordination of lingual movement;secondary to impaired cognitive status  -AC -- --      Increased A-P Transit Time all consistencies tested;discoordination of lingual movement;secondary to impaired cognitive status  -AC -- --      Premature Spillage of Liquids into Pharynx thin liquids;nectar-thick liquids;secondary to reduced lingual control;other (see comments)  NT via straw only  -AC -- --         Oral Residue    Impaired Oral Residue diffuse residue throughout oral cavity  -AC -- --      Diffuse Residue throughout Oral Cavity all consistencies tested;secondary to reduced lingual strength;secondary to reduced lingual range of motion  -AC -- --      Response to Oral Residue partial residue clearance;with spontaneous subsequent swallow;with liquid wash  -AC -- --      Oral Residue, Comment Moderate amount  -AC -- --         Initiation of Pharyngeal Swallow    Initiation of Pharyngeal Swallow bolus in pyriform sinuses  -AC -- --      Pharyngeal Phase impaired pharyngeal phase of swallowing  -AC -- --      Penetration Before the Swallow nectar-thick liquids;secondary to reduced back of tongue control;secondary to delayed swallow initiation or mistiming;other (see comments)  via consecutive straw drinks  -AC -- --      Penetration During the Swallow nectar-thick  liquids;secondary to delayed swallow initiation or mistiming;secondary to reduced laryngeal elevation;secondary to reduced vestibular closure  -AC -- --      Aspiration After the Swallow thin liquids;nectar-thick liquids;secondary to residue;in pyriform sinuses;in laryngeal vestibule;other (see comments)  nectar via consecutive straw drinks only  -AC -- --      Depth of Penetration other (see comments)  shallow/expelled spontaneously (w/ completion of swallow or 2nd swallow) w/ nectar via cup sips; deeper & larger amount penetrated w/ nectar via straw  -AC -- --      Response to Aspiration Yes  -AC -- --      Responded to aspiration with inconsistent;delayed;non-effective;throat clear  weak-sounding; cued cough also ineffective  -AC -- --      Rosenbek's Scale thin:;nectar:;8--->level 8  -AC -- --      Pharyngeal Residue all consistencies tested;diffuse within pharynx;secondary to reduced base of tongue retraction;secondary to reduced posterior pharyngeal wall stripping;secondary to reduced laryngeal elevation;secondary to reduced hyolaryngeal excursion;other (see comments)  mild-mod w/ thin/nectar-thick liquids; mod w/ pudding/solid  -AC -- --      Response to Residue partial residue clearance;cleared residue with spontaneous subsequent swallow;cleared residue with liquid wash  -AC -- --      Attempted Compensatory Maneuvers bolus size;bolus presentation style  did not attempt more complex compensations 2' cognitive status  -AC -- --      Response to Attempted Compensatory Maneuvers prevented aspiration  w/ nectar-thick liquid--transient penetration w/ cup sips and no penetration/aspiration w/ tsp sips  -AC -- --         Swallowing Quality of Life Assessment    Education and counseling provided Signs of aspiration;Silent aspiration;Risks of aspiration;Safest diet options;Aspiration precautions;Feeding assistance and techniques  discussed all w/ pt's son following study  -AC Signs of aspiration;Risks of aspiration   - --         SLP Evaluation Clinical Impression    SLP Swallowing Diagnosis moderate;oral dysphagia;pharyngeal dysphagia  - suspected pharyngeal dysphagia;oral dysphagia  - oral dysphagia;suspected pharyngeal dysphagia  -      Functional Impact risk of aspiration/pneumonia;risk of malnutrition;risk of dehydration  - risk of aspiration/pneumonia;risk of malnutrition;risk of dehydration  - risk of aspiration/pneumonia;risk of malnutrition  -      Rehab Potential/Prognosis, Swallowing adequate, monitor progress closely  - adequate, monitor progress closely  - adequate, monitor progress closely  -      Swallow Criteria for Skilled Therapeutic Interventions Met demonstrates skilled criteria  - demonstrates skilled criteria  - demonstrates skilled criteria  -         Recommendations    Therapy Frequency (Swallow) 5 days per week  - -- --      Predicted Duration Therapy Intervention (Days) until discharge  - -- until discharge  -      SLP Diet Recommendation puree;no mixed consistencies;nectar thick liquids  - NPO  - NPO  -      Recommended Diagnostics -- VFSS (MBS)  - reassess via VFSS (MBS)  12/12/23  -      Recommended Precautions and Strategies upright posture during/after eating;1:1 supervision;assist with feeding;small bites of food and sips of liquid;no straw;alternate between small bites of food and sips of liquid;check mouth frequently for oral residue/pocketing  - -- general aspiration precautions  -      Oral Care Recommendations Oral Care BID/PRN;Suction toothbrush  - Oral Care BID/PRN;Suction toothbrush  - Oral Care BID/PRN;Suction toothbrush  -      SLP Rec. for Method of Medication Administration meds crushed;with puree;as tolerated  - meds via alternate route  - meds via alternate route  -      Monitor for Signs of Aspiration notify SLP if any concerns  - -- yes;notify SLP if any concerns  -      Anticipated Discharge Disposition (SLP) skilled  nursing facility  -AC skilled nursing facility  -AC skilled nursing facility  -      Swallowing Considerations per Physician Discretion medical management of suspected oropharyngeal candidiasis, as indicated  notified RN  -AC -- --                User Key  (r) = Recorded By, (t) = Taken By, (c) = Cosigned By      Initials Name Effective Dates    AC Cassia Mcgrath, MS CCC-SLP 02/03/23 -     Sri Meléndez, MS CCC-SLP 07/11/23 -                     EDUCATION  The patient has been educated in the following areas:   Dysphagia (Swallowing Impairment) Modified Diet Instruction.        SLP GOALS       Row Name 12/12/23 1210             (LTG) Patient will demonstrate functional swallow for    Diet Texture (Demonstrate functional swallow) pureed textures  -AC      Liquid viscosity (Demonstrate functional swallow) nectar/ mildly thick liquids  -AC      [place] (Demonstrate functional swallow) with 1:1 assist/ supervision  -AC      Time Frame (Demonstrate functional swallow) by discharge  -AC      Comment (Demonstrate functional swallow) Trial tx pending cognitive status.  -AC         (STG) Patient will tolerate trials of    Consistencies Trialed (Tolerate trials) pureed textures;nectar/ mildly thick liquids  -AC      Desired Outcome (Tolerate trials) without signs/symptoms of aspiration;with adequate oral prep/transit/clearance;with use of compensatory strategies (see comments)  small bolus size, alternate bites/sips, no straws  -AC      [place] (Tolerate trials) with 1:1 assist/ supervision  -AC      Time Frame (Tolerate trials) by discharge  -AC         (STG) Patient will tolerate therapeutic trials of    Consistencies Trialed (Tolerate therapeutic trials) thin liquids  -AC      Desired Outcome (Tolerate therapeutic trials) without signs/symptoms of aspiration;without signs of distress  -AC      [place] (Tolerate therapeutic trials) with 1:1 assist/ supervision  -AC      Time Frame (Tolerate  therapeutic trials) by discharge  -AC         (STG) Lingual Strengthening Goal 1 (SLP)    Activity (Lingual Strengthening Goal 1, SLP) increase tongue back strength;increase lingual tone/sensation/control/coordination/movement  -AC      Increase Lingual Tone/Sensation/Control/Coordination/Movement lingual resistance exercises;swallow trials  -AC      Increase Tongue Back Strength lingual resistance exercises  -AC      [place]/Accuracy (Lingual Strengthening Goal 1, SLP) with maximum cues (25-49% accuracy)  -AC      Time Frame (Lingual Strengthening Goal 1, SLP) short term goal (STG)  -AC         (STG) Pharyngeal Strengthening Exercise Goal 1 (SLP)    Activity (Pharyngeal Strengthening Goal 1, SLP) increase timing;increase superior movement of the hyolaryngeal complex;increase anterior movement of the hyolaryngeal complex;increase squeeze/positive pressure generation  -AC      Increase Timing prepping - 3 second prep or suck swallow or 3-step swallow  -AC      Increase Superior Movement of the Hyolaryngeal Complex falsetto  -AC      Increase Anterior Movement of the Hyolaryngeal Complex chin tuck against resistance (CTAR)  -AC      Increase Squeeze/Positive Pressure Generation hard effortful swallow  -AC      [place]/Accuracy (Pharyngeal Strengthening Goal 1, SLP) with maximum cues (25-49% accuracy)  -AC      Time Frame (Pharyngeal Strengthening Goal 1, SLP) short term goal (STG)  -AC         Patient will demonstrate functional cognitive-linguistic skills for return to discharge environment    [place] with maximum cues  -AC      Time frame by discharge  -AC         SLP Diagnostic Treatment     Patient will participate in further assessment in the following areas clarification of baseline cognitive communication status  -AC      Time Frame (Diagnostic) short term goal (STG)  -AC         Follow Directions Goal 2 (SLP)    Improve Ability to Follow Directions Goal 1 (SLP) 1 step direction without  objects;80%;with moderate cues (50-74%)  -AC      Time Frame (Follow Directions Goal 1, SLP) short term goal (STG)  -AC         Awareness of Basic Personal Information Goal 1 (SLP)    Improve Awareness of Basic Personal Information Goal 1 (SLP) answering yes/no questions regarding personal/biographical information;naming self, family members;70%;with moderate cues (50-74%)  -AC      Time Frame (Awareness of Basic Personal Information Goal 1, SLP) short term goal (STG)  -AC         Orientation Goal 1 (SLP)    Improve Orientation Through Goal 1 (SLP) demonstrating orientation to place;use environmental aids to assist with orientation;70%;with maximum cues (25-49%)  -AC      Time Frame (Orientation Goal 1, SLP) short term goal (STG)  -AC                User Key  (r) = Recorded By, (t) = Taken By, (c) = Cosigned By      Initials Name Provider Type    Cassia Bedoya MS CCC-SLP Speech and Language Pathologist                       Time Calculation:    Time Calculation- SLP       Row Name 12/12/23 1350             Time Calculation- SLP    SLP Start Time 1100  -AC      SLP Received On 12/12/23  -AC         Untimed Charges    09551-HQ Eval Speech and Production w/ Language Minutes 25  -AC      74743-VQ Eval Oral Pharyng Swallow Minutes 30  -AC      90043-QI Motion Fluoro Eval Swallow Minutes 71  -AC         Total Minutes    Untimed Charges Total Minutes 126  -AC       Total Minutes 126  -AC                User Key  (r) = Recorded By, (t) = Taken By, (c) = Cosigned By      Initials Name Provider Type    Cassia Bedoya MS CCC-SLP Speech and Language Pathologist                    Therapy Charges for Today       Code Description Service Date Service Provider Modifiers Qty    19215578571 HC ST EVAL ORAL PHARYNG SWALLOW 2 12/12/2023 Cassia Mcgrath MS CCC-SLP GN 1    67088437795 HC ST EVAL SPEECH AND PROD W LANG  2 12/12/2023 Cassia Mcgrath MS CCC-SLP GN 1    65196242869 HC ST MOTION FLUORO EVAL SWALLOW 5 12/12/2023  Alcides, Cassia BOYER, MS CCC-SLP GN 1                 Cassia Mcgrath, MS CCC-SLP  12/12/2023

## 2023-12-12 NOTE — PROGRESS NOTES
"                  Clinical Nutrition   Nutrition Support Assessment  Reason for Visit: MST score 2+, Difficulty chewing/swallowing, \"Unsure\" unintentional weight loss      Patient Name: Beryl Montoya  YOB: 1945  MRN: [id number]  Date of Encounter: 12/12/23 14:16 EST  Admission date: 12/11/2023    Comments:    Ordered Magic Cup BID; assist w/meals and encourage po intake.     Nutrition Assessment   Admission Diagnosis:  Generalized weakness [R53.1]  Stroke [I63.9]      Problem List:    Generalized weakness    History of L MCA CVA    Essential hypertension    Type 2 diabetes mellitus with complication, with long-term current use of insulin    Ischemic cardiomyopathy. LVEF 30%    Acute UTI (urinary tract infection)    History of seizure    Stroke        PMH:   She  has a past medical history of CHF (congestive heart failure), Coronary artery disease involving native coronary artery of native heart with angina pectoris (6/25/2020), Diabetes mellitus, GERD (gastroesophageal reflux disease), High cholesterol, Hyperlipidemia, Hypertension, Ischemic cardiomyopathy, Stroke, and TIA (transient ischemic attack).    PSH:  She  has a past surgical history that includes Hysterectomy; Interventional radiology procedure (Bilateral, 9/25/2017); Cardiac catheterization (N/A, 6/23/2020); Colonoscopy (2013); Eye surgery; Cataract extraction (Bilateral); and Coronary artery bypass graft (N/A, 6/30/2020).    Applicable Nutrition Concerns:   Skin:  Oral:  GI:    Applicable Interval History:   12/12-SLP Diet Recommendation: puree, no mixed consistencies, nectar thick liquids       Reported/Observed/Food/Nutrition Related History:     Pt unable to provide nutrition hx, able to obtain from son and  at bedside. Pt had been eating normally PTA and did not have unintended wt loss to son/ knowledge. Difficult to weigh pt 2/2 wheelchair bound. Pt was feeding self but does need assistance w/meals at this time. Pt " "seen by SLP and started on diet, family agreeable to ONS. NKFA.     Anthropometrics     Flowsheet Rows      Flowsheet Row First Filed Value   Admission Height 162.6 cm (64\") Documented at 12/11/2023 1301   Admission Weight 90.7 kg (200 lb) Documented at 12/11/2023 1301          Height: Height: 162.6 cm (64\")  Last Filed Weight: Weight: 90.7 kg (200 lb) (12/11/23 1301)  Method: Weight Method: Bed scale  BMI: BMI (Calculated): 34.3  BMI classification: Obese Class I: 30-34.9kg/m2  IBW:  55kg    UBW:  family unsure  Weight change:   limited wt data available   Weight       Weight (kg) Weight (lbs) Weight Method Visit Report   3/15/2022 84.46 kg  186 lb 3.2 oz   --    10/12/2022 87.091 kg  192 lb   --    4/14/2023 86.728 kg  191 lb 3.2 oz   --    6/14/2023 87.544 kg  193 lb   --    12/11/2023 90.719 kg  200 lb  Bed scale         Nutrition Focused Physical Exam     Date:     12/12    NFPE completed, patient does not meet criteria for MSA at this time.     Current Nutrition Prescription   PO: Diet: Cardiac Diets, Diabetic Diets; Healthy Heart (2-3 Na+); Consistent Carbohydrate; No Mixed Consistencies, Feeding Assistance - Nursing, No Straw; Texture: Pureed (NDD 1); Fluid Consistency: [place] Thick  Oral Nutrition Supplement:   Intake: Insufficient data    Nutrition Diagnosis   Date:  12/12            Updated:    Problem Swallowing difficulty   Etiology dysphagia   Signs/Symptoms Pureed/NTL   Status:     Goal:   General: Nutrition to support treatment  PO: Establish PO  EN/PN: N/A    Nutrition Intervention      Follow treatment progress, Care plan reviewed, Encourage intake, Supplement provided    Magic Cup (stella) BID  Assist w/feeding at meal times    Monitoring/Evaluation:   Per protocol, PO intake, Supplement intake, Weight, Swallow function      Terrie Garces, MS,RD,LD  Time Spent: 20  "

## 2023-12-12 NOTE — PLAN OF CARE
Goal Outcome Evaluation:  Plan of Care Reviewed With: patient        Progress: no change  Outcome Evaluation: Pt presents with balance, strength, and cognitive deficits warranting skilled IP OT services - baseline assist levels for ADL performance require further inquiry, Pt unreliable historian this date. Grossly MaxA to achieve sitting EOB and Dep for LB tasks. Rec d/c to SNF.      Anticipated Discharge Disposition (OT): skilled nursing facility

## 2023-12-12 NOTE — CONSULTS
Order noted for diabetes per stroke protocol. GCS 10, inappropriate for education at this time. Diabetes ed will follow

## 2023-12-13 LAB
BACTERIA SPEC AEROBE CULT: ABNORMAL
GLUCOSE BLDC GLUCOMTR-MCNC: 265 MG/DL (ref 70–130)
GLUCOSE BLDC GLUCOMTR-MCNC: 291 MG/DL (ref 70–130)
GLUCOSE BLDC GLUCOMTR-MCNC: 296 MG/DL (ref 70–130)
GLUCOSE BLDC GLUCOMTR-MCNC: 296 MG/DL (ref 70–130)
PA ADP PRP-ACNC: 107 PRU

## 2023-12-13 PROCEDURE — 99232 SBSQ HOSP IP/OBS MODERATE 35: CPT | Performed by: NURSE PRACTITIONER

## 2023-12-13 PROCEDURE — 92526 ORAL FUNCTION THERAPY: CPT

## 2023-12-13 PROCEDURE — 25010000002 ZIPRASIDONE MESYLATE PER 10 MG: Performed by: INTERNAL MEDICINE

## 2023-12-13 PROCEDURE — 85576 BLOOD PLATELET AGGREGATION: CPT | Performed by: PSYCHIATRY & NEUROLOGY

## 2023-12-13 PROCEDURE — 82948 REAGENT STRIP/BLOOD GLUCOSE: CPT

## 2023-12-13 PROCEDURE — 63710000001 INSULIN LISPRO (HUMAN) PER 5 UNITS: Performed by: INTERNAL MEDICINE

## 2023-12-13 PROCEDURE — 92507 TX SP LANG VOICE COMM INDIV: CPT

## 2023-12-13 PROCEDURE — 99232 SBSQ HOSP IP/OBS MODERATE 35: CPT | Performed by: INTERNAL MEDICINE

## 2023-12-13 PROCEDURE — 63710000001 INSULIN DETEMIR PER 5 UNITS: Performed by: INTERNAL MEDICINE

## 2023-12-13 PROCEDURE — 25010000002 CEFTRIAXONE PER 250 MG: Performed by: INTERNAL MEDICINE

## 2023-12-13 RX ORDER — TRAZODONE HYDROCHLORIDE 50 MG/1
50 TABLET ORAL NIGHTLY
Status: DISCONTINUED | OUTPATIENT
Start: 2023-12-13 | End: 2023-12-20

## 2023-12-13 RX ORDER — CARVEDILOL 12.5 MG/1
25 TABLET ORAL 2 TIMES DAILY
Status: DISCONTINUED | OUTPATIENT
Start: 2023-12-13 | End: 2023-12-21 | Stop reason: HOSPADM

## 2023-12-13 RX ORDER — ZIPRASIDONE MESYLATE 20 MG/ML
10 INJECTION, POWDER, LYOPHILIZED, FOR SOLUTION INTRAMUSCULAR ONCE
Status: COMPLETED | OUTPATIENT
Start: 2023-12-13 | End: 2023-12-13

## 2023-12-13 RX ORDER — CHOLECALCIFEROL (VITAMIN D3) 125 MCG
5 CAPSULE ORAL NIGHTLY
Status: DISCONTINUED | OUTPATIENT
Start: 2023-12-13 | End: 2023-12-21 | Stop reason: HOSPADM

## 2023-12-13 RX ADMIN — INSULIN LISPRO 2 UNITS: 100 INJECTION, SOLUTION INTRAVENOUS; SUBCUTANEOUS at 22:04

## 2023-12-13 RX ADMIN — NYSTATIN 500000 UNITS: 100000 SUSPENSION ORAL at 18:01

## 2023-12-13 RX ADMIN — SACUBITRIL AND VALSARTAN 1 TABLET: 49; 51 TABLET, FILM COATED ORAL at 20:12

## 2023-12-13 RX ADMIN — ISOSORBIDE MONONITRATE 30 MG: 30 TABLET, EXTENDED RELEASE ORAL at 09:04

## 2023-12-13 RX ADMIN — Medication 5 MG: at 20:12

## 2023-12-13 RX ADMIN — CARVEDILOL 12.5 MG: 12.5 TABLET, FILM COATED ORAL at 09:04

## 2023-12-13 RX ADMIN — CARVEDILOL 25 MG: 12.5 TABLET, FILM COATED ORAL at 20:11

## 2023-12-13 RX ADMIN — INSULIN LISPRO 2 UNITS: 100 INJECTION, SOLUTION INTRAVENOUS; SUBCUTANEOUS at 00:07

## 2023-12-13 RX ADMIN — NYSTATIN 500000 UNITS: 100000 SUSPENSION ORAL at 22:08

## 2023-12-13 RX ADMIN — INSULIN DETEMIR 14 UNITS: 100 INJECTION, SOLUTION SUBCUTANEOUS at 22:03

## 2023-12-13 RX ADMIN — INSULIN LISPRO 2 UNITS: 100 INJECTION, SOLUTION INTRAVENOUS; SUBCUTANEOUS at 18:27

## 2023-12-13 RX ADMIN — Medication 10 ML: at 09:12

## 2023-12-13 RX ADMIN — ASPIRIN 81 MG CHEWABLE TABLET 81 MG: 81 TABLET CHEWABLE at 09:04

## 2023-12-13 RX ADMIN — INSULIN LISPRO 2 UNITS: 100 INJECTION, SOLUTION INTRAVENOUS; SUBCUTANEOUS at 06:44

## 2023-12-13 RX ADMIN — ATORVASTATIN CALCIUM 80 MG: 40 TABLET, FILM COATED ORAL at 20:11

## 2023-12-13 RX ADMIN — TRAZODONE HYDROCHLORIDE 50 MG: 50 TABLET ORAL at 20:11

## 2023-12-13 RX ADMIN — LEVETIRACETAM 500 MG: 100 SOLUTION ORAL at 22:04

## 2023-12-13 RX ADMIN — CLOPIDOGREL BISULFATE 75 MG: 75 TABLET ORAL at 09:04

## 2023-12-13 RX ADMIN — LEVOTHYROXINE SODIUM 25 MCG: 25 TABLET ORAL at 06:45

## 2023-12-13 RX ADMIN — FUROSEMIDE 40 MG: 40 TABLET ORAL at 09:04

## 2023-12-13 RX ADMIN — Medication 10 ML: at 20:22

## 2023-12-13 RX ADMIN — ZIPRASIDONE MESYLATE 10 MG: 20 INJECTION, POWDER, LYOPHILIZED, FOR SOLUTION INTRAMUSCULAR at 13:41

## 2023-12-13 RX ADMIN — SACUBITRIL AND VALSARTAN 1 TABLET: 24; 26 TABLET, FILM COATED ORAL at 09:04

## 2023-12-13 RX ADMIN — CEFTRIAXONE 2000 MG: 2 INJECTION, POWDER, FOR SOLUTION INTRAMUSCULAR; INTRAVENOUS at 18:27

## 2023-12-13 NOTE — PROGRESS NOTES
Stroke Progress Note       Chief Complaint: Aphasia and weakness    Subjective    Subjective     Subjective: No adverse events overnight.  Patient is resting comfortably in bed.  Continues to have speech deficits and left-sided weakness.      Review of Systems   Cardiovascular:  Negative for palpitations.   Neurological:  Positive for speech difficulty, weakness and numbness. Negative for facial asymmetry and headaches.            Objective    Objective      Temp:  [97.5 °F (36.4 °C)-98.6 °F (37 °C)] 97.5 °F (36.4 °C)  Heart Rate:  [] 100  Resp:  [16-18] 18  BP: (133-222)/() 193/93        Neurological Exam  Mental Status  Alert. Oriented only to person. Mild dysarthria present. Expressive aphasia present.    Cranial Nerves  CN II: Right visual acuity: Finger movement. Left visual acuity: Finger movement. Visual fields full to confrontation.  CN III, IV, VI: Extraocular movements intact bilaterally. Tracks me around the room. Normal lids and orbits bilaterally. Pupils equal round and reactive to light bilaterally.  CN V: Facial sensation is normal.  CN VII: Full and symmetric facial movement.    Motor  Normal muscle bulk throughout. No fasciculations present. Normal muscle tone. No abnormal involuntary movements. Strength is 5/5 in all four extremities except as noted.  Moves all extremities against gravity, weaker on the left.    Sensory  Light touch abnormality:     Gait    Not tested.      Physical Exam  Vitals reviewed.   HENT:      Head: Normocephalic and atraumatic.   Eyes:      General: Lids are normal.      Extraocular Movements: Extraocular movements intact.      Pupils: Pupils are equal, round, and reactive to light.   Cardiovascular:      Rate and Rhythm: Normal rate.   Pulmonary:      Effort: Pulmonary effort is normal. No respiratory distress.   Musculoskeletal:         General: No swelling. Normal range of motion.      Cervical back: Normal range of motion and neck supple.   Skin:      General: Skin is warm and dry.   Neurological:      Mental Status: She is alert. She is disoriented.      Cranial Nerves: Dysarthria present. No cranial nerve deficit.      Sensory: No sensory deficit.      Motor: Weakness present.   Psychiatric:         Mood and Affect: Mood normal.         Behavior: Behavior normal.         Results Review:    I reviewed the patient's new clinical results.  WBC   Date Value Ref Range Status   12/11/2023 6.94 3.40 - 10.80 10*3/mm3 Final     RBC   Date Value Ref Range Status   12/11/2023 4.52 3.77 - 5.28 10*6/mm3 Final     Hemoglobin   Date Value Ref Range Status   12/11/2023 15.3 12.0 - 15.9 g/dL Final     Hematocrit   Date Value Ref Range Status   12/11/2023 45.6 34.0 - 46.6 % Final     MCV   Date Value Ref Range Status   12/11/2023 100.9 (H) 79.0 - 97.0 fL Final     MCH   Date Value Ref Range Status   12/11/2023 33.8 (H) 26.6 - 33.0 pg Final     MCHC   Date Value Ref Range Status   12/11/2023 33.6 31.5 - 35.7 g/dL Final     RDW   Date Value Ref Range Status   12/11/2023 12.7 12.3 - 15.4 % Final     RDW-SD   Date Value Ref Range Status   12/11/2023 47.0 37.0 - 54.0 fl Final     MPV   Date Value Ref Range Status   12/11/2023 9.9 6.0 - 12.0 fL Final     Platelets   Date Value Ref Range Status   12/11/2023 268 140 - 450 10*3/mm3 Final     Neutrophil %   Date Value Ref Range Status   12/11/2023 51.5 42.7 - 76.0 % Final     Lymphocyte %   Date Value Ref Range Status   12/11/2023 30.7 19.6 - 45.3 % Final     Monocyte %   Date Value Ref Range Status   12/11/2023 8.1 5.0 - 12.0 % Final     Eosinophil %   Date Value Ref Range Status   12/11/2023 8.1 (H) 0.3 - 6.2 % Final     Basophil %   Date Value Ref Range Status   12/11/2023 1.3 0.0 - 1.5 % Final     Immature Grans %   Date Value Ref Range Status   12/11/2023 0.3 0.0 - 0.5 % Final     Neutrophils, Absolute   Date Value Ref Range Status   12/11/2023 3.58 1.70 - 7.00 10*3/mm3 Final     Lymphocytes, Absolute   Date Value Ref Range Status    12/11/2023 2.13 0.70 - 3.10 10*3/mm3 Final     Monocytes, Absolute   Date Value Ref Range Status   12/11/2023 0.56 0.10 - 0.90 10*3/mm3 Final     Eosinophils, Absolute   Date Value Ref Range Status   12/11/2023 0.56 (H) 0.00 - 0.40 10*3/mm3 Final     Basophils, Absolute   Date Value Ref Range Status   12/11/2023 0.09 0.00 - 0.20 10*3/mm3 Final     Immature Grans, Absolute   Date Value Ref Range Status   12/11/2023 0.02 0.00 - 0.05 10*3/mm3 Final     nRBC   Date Value Ref Range Status   12/11/2023 0.0 0.0 - 0.2 /100 WBC Final     Lab Results   Component Value Date    GLUCOSE 190 (H) 12/12/2023    BUN 13 12/12/2023    CREATININE 0.77 12/12/2023    EGFR 79.1 12/12/2023    BCR 16.9 12/12/2023    K 4.0 12/12/2023    CO2 22.0 12/12/2023    CALCIUM 9.4 12/12/2023    ALBUMIN 3.8 12/11/2023    BILITOT 0.9 12/11/2023    AST 29 12/11/2023    ALT 23 12/11/2023     A1c 7.8  LDL 57  P2Y12  107    EEG    Result Date: 12/12/2023  Normal study This report is transcribed using the Dragon dictation system.      MRI Angiogram Head Without Contrast    Result Date: 12/12/2023  Impression: 1.Stable high-grade stenosis at the proximal right M1 segment with near occlusion. 2.Stable severe disease in the left PCA with at least 2 areas of high-grade stenosis. Electronically Signed: Nomi Fletcher MD  12/12/2023 5:53 AM EST  Workstation ID: [id number]    MRI Angiogram Neck Without Contrast    Result Date: 12/12/2023  Impression: 1. Mild, less than 25% narrowing at the distal left CCA. 2. No hemodynamically significant stenosis of the internal carotid or vertebral arteries. Electronically Signed: Nomi Fletcher MD  12/12/2023 5:43 AM EST  Workstation ID: [id number]    MRI Brain Without Contrast    Result Date: 12/12/2023  Impression: 1.New small acute/subacute lacunar type infarct in the right karen. 2.Large chronic infarct in the left temporal and occipital lobes. 3.Background of moderate chronic small vessel ischemic change and generalized  parenchymal volume loss. 4.There are multiple chronic microhemorrhages in the right karen, medial right temporal lobe and right parietal periventricular white matter. This could be related to hypertension or amyloid angiopathy. 5.Chronic right maxillary sinusitis. Electronically Signed: Nomi Fletcher MD  12/12/2023 5:36 AM EST  Workstation ID: [id number]      CT Head Without Contrast    Result Date: 12/11/2023  Impression: New focal hypodensity within the right karen may represent age-indeterminate infarct. Consider MRI to further evaluate. Chronic changes including old left-sided infarct as above. Findings discussed with stroke team by Dr. Eros Dejesus via telephone on 12/11/2023 3:31 PM EST. Electronically Signed: Eros Dejesus MD  12/11/2023 4:02 PM EST  Workstation ID: [id number]        Results for orders placed during the hospital encounter of 12/11/23    Adult Transthoracic Echo Limited W/ Cont if Necessary Per Protocol    Interpretation Summary    Left ventricular ejection fraction appears to be 51 - 55%. Normal left ventricular cavity size and wall thickness noted. All left ventricular wall segments contract normally.    Normal right ventricular cavity size and systolic function noted.    The left atrial cavity is mildly dilated.    No aortic valve regurgitation or stenosis is present. The aortic valve exhibits sclerosis. The aortic valve appears trileaflet.    The mitral valve is structurally normal with no significant stenosis present. Trace to mild mitral valve regurgitation is present.    The tricuspid valve is structurally normal with no significant stenosis present. Physiologic tricuspid valve regurgitation is present. Estimated right ventricular systolic pressure from tricuspid regurgitation is normal (<35 mmHg).            Assessment/Plan     Assessment/Plan:  78 y.o. female with PMH of prior left MCA and left PCA stroke (on Xarelto), HTN, Seizure disorder, T2DM, CHF, and CAD s/p CABG in 2020 who  presents with altered mental status and general weakness which began approximately 4 days ago.  CT head with an age-indeterminate hypodensity in the right karen new from previous scan in 2020.  Multiple chronic infarcts noted.  No evidence of hemorrhage.  She is not a candidate for TNK or neuro intervention due to LK W >4.5 hrs. she will be admitted for further evaluation.     Antiplatelet PTA: ASA 81 mg qd  Anticoagulant PTA: Xarelto 20 mg qd but not taking in over a year      Acute/subacute right pontine stroke likely secondary to small vessel thrombosis  -Patient has a history of multiple prior infarcts including the left MCA PCA territory secondary to cardioembolic source (was on xarelto in the past but has not had this filled since sept. 2022)  -MRI brain without showing multiple foci of hemosiderin concerning for amyloid angiopathy  -MRA head showing chronic right M1 and left PCA stenosis  -EEG is negative  -TTE shows EF of 51-55%, mild left atrial dilation; bubble study was unable to be completed  -Continue DAPT indefinitely given extensive ICAD  -P2Y12  107 indicating acceptable Plavix response  -HLD:  Continue high intensity statin  -Sz. d/o:  Continue home dose of keppra  -HTN:  goal -160  -UTI:  antbx per hospitalist  -cardiac monitor at discharge to tessa gutierrez fib burden  -Passed FEES, tolerating modified diet  -PT/OT/SLP following, therapy recommending SNF at discharge  -Follow up in the stroke clinic in 4-6 weeks    Discussed the importance of medication compliance Plavix 75mg daily, Aspirin 81mg daily, and Atorvastatin 80mg nightly and lifestyle modifications adequate control of blood pressure, adequate control of cholesterol (goal LDL <70), adequate control of glucose (<140, A1c goal <7), increased physical activity, and implementation of healthy diet to help reduce the risk of future cerebrovascular events.  Also discussed the signs symptoms that would warrant the patient return back to the  emergency department including unilateral weakness, unilateral numbness, visual disturbances, loss of balance, speech difficulties, and/or a sudden severe headache.  Patient's family member verbalized understanding.      Plan of care discussed with patient and family, Dr. Chatman.  No further workup, will follow in clinic          MARIE Gandara  12/13/23  09:46 EST

## 2023-12-13 NOTE — THERAPY TREATMENT NOTE
Acute Care - Speech Language Pathology   Swallow Treatment Note [facility]     Patient Name: Beryl Montoya  : 1945  MRN: [id number]  Today's Date: 2023               Admit Date: 2023    Visit Dx:     ICD-10-CM ICD-9-CM   1. Cerebrovascular accident (CVA), unspecified mechanism  I63.9 434.91   2. Generalized weakness  R53.1 780.79   3. Expressive aphasia  R47.01 784.3   4. Hypertension, unspecified type  I10 401.9   5. Dysphagia, unspecified type  R13.10 787.20   6. Cognitive communication deficit  R41.841 799.52   7. Dysarthria  R47.1 784.51   8. Cerebrovascular accident (CVA) due to thrombosis of precerebral artery  I63.00 433.91     Patient Active Problem List   Diagnosis    History of L MCA CVA    Essential hypertension    Type 2 diabetes mellitus with complication, with long-term current use of insulin    GERD (gastroesophageal reflux disease)    Bilateral carotid artery stenosis    Abnormal stress test    Severe 3 vessel CAD with angina pectoris (CMS/HCC)    Ischemic cardiomyopathy. LVEF 30%    S/P CABG x 3 on 20    Acute postop embolic left occipital CVA 20. Extensive in L PCA distribution but additional areas in R PCA distribution (CMS/HCC)    Bilateral carotid atherosclerosis with moderate left common carotid artery stenosis (50-60%)    Seizure    Generalized weakness    Acute UTI (urinary tract infection)    History of seizure    Stroke     Past Medical History:   Diagnosis Date    CHF (congestive heart failure)     Coronary artery disease involving native coronary artery of native heart with angina pectoris 2020    Diabetes mellitus     GERD (gastroesophageal reflux disease)     High cholesterol     Hyperlipidemia     Hypertension     Ischemic cardiomyopathy     Stroke     2013 speech and short term memory     TIA (transient ischemic attack)      Past Surgical History:   Procedure Laterality Date    CARDIAC CATHETERIZATION N/A 2020    Procedure: Left Heart Cath  26015 C19 @  [facility];  Surgeon: David Faulkner MD;  Location:  [facility];  Service: Cardiovascular;  Laterality: N/A;    CATARACT EXTRACTION Bilateral     COLONOSCOPY  2013    CORONARY ARTERY BYPASS GRAFT N/A 6/30/2020    Procedure: SHERI PER ANESTHESIA, MEDIAN STERNOTOMY, CORONARY ARTERY BYPASS GRAFTING X 3 , UTILIZING THE LEFT INTERNAL MAMMARY ARTERY AND EVH OF RIGHT GREATER SAPHENOUS VEIN;  Surgeon: Jerry Lozano MD;  Location:  [facility] OR;  Service: Cardiothoracic;  Laterality: N/A;  LEG START - 0736  VEIN OUT - 0840  VEIN READY - 0850    EYE SURGERY      HYSTERECTOMY      total    INTERVENTIONAL RADIOLOGY PROCEDURE Bilateral 9/25/2017    Procedure: Carotid Cerebral Angiogram;  Surgeon: Maldonado Casas MD;  Location:  [facility];  Service:        SLP Recommendation and Plan     SLP Diet Recommendation: puree, no mixed consistencies, nectar thick liquids (12/13/23 1550)  Recommended Precautions and Strategies: upright posture during/after eating, 1:1 supervision, assist with feeding, small bites of food and sips of liquid, no straw, alternate between small bites of food and sips of liquid, check mouth frequently for oral residue/pocketing (12/13/23 1550)  SLP Rec. for Method of Medication Administration: meds crushed, with puree, as tolerated (12/13/23 1550)     Monitor for Signs of Aspiration: notify SLP if any concerns (12/13/23 1550)        Anticipated Discharge Disposition (SLP): skilled nursing facility (12/13/23 1550)     Therapy Frequency (Swallow): 5 days per week (12/13/23 1550)  Predicted Duration Therapy Intervention (Days): until discharge (12/13/23 1550)  Oral Care Recommendations: Oral Care BID/PRN, Suction toothbrush (12/13/23 1550)        Daily Summary of Progress (SLP): progress toward functional goals as expected (12/13/23 1550)               Treatment Assessment (SLP): toleration of diet, suspected, pharyngeal dysphagia, continued,  cognitive-linguistic disorder, dysarthria (12/13/23 1550)  Treatment Assessment Comments (SLP): Pt lethargic, cues required throughout for pt remain awake. No s/s of aspiration w/ NTL or pureed trials. Throat clear & mutliple swallows w/ thin liquid trials. Reviewed compensations w/ pt & family. Pt w/ increased difficulty completing exercises requiring multiple steps, focsed on CTAR & hard effortful swallow. Provided handout & encouraged family to assist w/ practice. SLP will cont to f/u for tx as appropriate (12/13/23 1550)  Plan for Continued Treatment (SLP): continue treatment per plan of care (12/13/23 1550)       Oral Care Recommendations: Oral Care BID/PRN, Suction toothbrush (12/13/23 1550)           SWALLOW EVALUATION (last 72 hours)       SLP Adult Swallow Evaluation       Row Name 12/13/23 1550 12/12/23 1210 12/12/23 1110 12/11/23 1555          Rehab Evaluation    Document Type therapy note (daily note)  - evaluation  -AC re-evaluation  -AC evaluation  -     Subjective Information no complaints  - no complaints  -AC -- no complaints  -     Patient Observations lethargic  - alert;cooperative  - -- alert;cooperative  -     Patient/Family/Caregiver Comments/Observations Family present  - Son present.  -AC -- family present  -     Patient Effort good  - adequate  -AC adequate  -AC adequate  -     Comment -- -- Attempted to speak to pt 0900 to determine if remained agreeable to MBS/if still indicated. Pt easily upset. Refused PO trials. Allowed pt to rest. She was much calmer/cooperative when returned 1110 & spouse was present.  -AC --     Symptoms Noted During/After Treatment none  - -- -- none  -        General Information    Patient Profile Reviewed -- yes  -AC yes  -AC yes  -CJ     Pertinent History Of Current Problem -- -- -- Pt adm w/ generalized weakness; sig h/o L MCA, HTN, DM2, GERD, CABG, L occipital CVA in 2020, seizures, cardiomyopathy. Family at bedside reported ongoing  dysphagia for last few days  -     Current Method of Nutrition -- NPO  -AC NPO  -AC NPO  -     Precautions/Limitations, Vision -- -- -- WFL;for purposes of eval  -CJ     Precautions/Limitations, Hearing -- -- -- WFL;for purposes of eval  -CJ     Prior Level of Function-Communication -- -- -- unknown  -     Prior Level of Function-Swallowing -- no diet consistency restrictions  son reported pt has developed swallowing difficulty x1 week or so  - no diet consistency restrictions  family reported noting some difficulty swallowing for a while now, but has worsened lately  - other (see comments)  FEES 7/15/2020 w/ recs for MS, chopped w/ thins  -     Plans/Goals Discussed with -- patient and family;agreed upon  - patient;spouse/S.O.;agreed upon  - patient;family  -     Barriers to Rehab -- previous functional deficit;cognitive status  - previous functional deficit;cognitive status  - previous functional deficit;medically complex  -     Patient's Goals for Discharge -- patient did not state  - patient did not state  - patient could not state  -     Family Goals for Discharge -- patient able to return to PO diet;patient able to eat/drink without coughing/choking  - family did not state  - family did not state  -        Pain    Additional Documentation Pain Scale: FACES Pre/Post-Treatment (Group)  - -- -- Pain Scale: FACES Pre/Post-Treatment (Group)  -        Pain Scale: FACES Pre/Post-Treatment    Pain: FACES Scale, Pretreatment 0-->no hurt  -MH 0-->no hurt  -AC 0-->no hurt  -AC 0-->no hurt  -     Posttreatment Pain Rating 0-->no hurt  -MH 0-->no hurt  -AC 0-->no hurt  -AC 0-->no hurt  -        Oral Motor Structure and Function    Dentition Assessment -- -- -- natural, present and adequate;poor oral hygiene  -     Secretion Management -- -- WNL/WFL  - dried secretions in oral cavity  -     Mucosal Quality -- -- -- dry;sticky  -        Oral Musculature and Cranial Nerve  Assessment    Oral Motor General Assessment -- -- -- generalized oral motor weakness;oral labial or buccal impairment;lingual impairment  -        General Eating/Swallowing Observations    Respiratory Support Currently in Use -- room air  - room air  - room air  -     Eating/Swallowing Skills -- fed by SLP;self-fed;needed assist  - fed by SLP  - fed by SLP;unable to perform self-feeding  -     Positioning During Eating -- upright in chair  - semi-reclined;upright in bed  pt u/a to tolerate sitting up @ 90'  - upright in bed  -     Utensils Used -- -- spoon;cup  - spoon;cup;straw  -     Consistencies Trialed -- -- thin liquids;nectar/syrup-thick liquids;pudding thick  - pureed;ice chips;thin liquids;nectar/syrup-thick liquids  -        Clinical Swallow Eval    Oral Prep Phase -- -- impaired  -AC impaired  -     Oral Transit -- -- impaired  -AC impaired  -     Oral Residue -- -- impaired  - impaired  -     Pharyngeal Phase -- -- suspected pharyngeal impairment  - suspected pharyngeal impairment  -     Esophageal Phase -- -- unremarkable  -AC --     Clinical Swallow Evaluation Summary -- -- -- Seemingly delayed initiation w/ all po presentations, suspect oral holding. Residue w/ all consistencies requiring cues to clear oral cavity. Overt s/s of aspiration w/ all po presentaitons w/ multiple swallows w/ all trials. Recommend safest would be NPO w/ plan for MBS in [facility]        Oral Prep Concerns    Oral Prep Concerns -- -- oral holding  - oral holding  -     Oral Holding -- -- all consistencies  - --        Oral Transit Concerns    Oral Transit Concerns -- -- delayed initiation of bolus transit  - delayed initiation of bolus transit  -     Delayed Intiation of Bolus Transit -- -- all consistencies  - all consistencies  -        Oral Residue Concerns    Oral Residue Concerns -- -- -- diffuse residue throughout oral cavity  -     Diffuse Residue Throughout Oral  Cavity -- -- pudding  -AC --     Oral Residue Concerns, Comment -- -- mild-mod--cleared w/ liquid wash  -AC --        Pharyngeal Phase Concerns    Pharyngeal Phase Concerns -- -- wet vocal quality;cough;multiple swallows  -AC multiple swallows  -CJ     Wet Vocal Quality -- -- thin;nectar  -AC --     Multiple Swallows -- -- all consistencies  -AC --     Cough -- -- thin  -AC --     Pharyngeal Phase Concerns, Comment -- -- Accepted PO trials & eager to attempt to eat/drink. Cont'd overt clinical s/sxs aspiration. Pt agreeable to MBS. Spouse feels pt would be more cooperative for MBS c/t FEES.  -AC --        MBS/VFSS    Utensils Used -- spoon;cup;straw  -AC -- --     Consistencies Trialed -- thin liquids;nectar/syrup-thick liquids;pudding thick;soft to chew textures;chopped  -AC -- --        MBS/VFSS Interpretation    Oral Prep Phase -- impaired oral phase of swallowing  -AC -- --     Oral Transit Phase -- impaired  -AC -- --     Oral Residue -- impaired  -AC -- --        Oral Preparatory Phase    Oral Preparatory Phase -- prolonged manipulation;oral holding  -AC -- --     Oral Holding -- pudding/puree;mechanical soft;secondary to impaired cognitive status  -AC -- --     Prolonged Manipulation -- mechanical soft;secondary to reduced lingual strength;secondary to reduced lingual range of motion;secondary to impaired cognitive status  -AC -- --        Oral Transit Phase    Impaired Oral Transit Phase -- delayed initiation of bolus transit;increased A-P transit time;premature spillage of liquids into pharynx  -AC -- --     Delayed Initiation of bolus transit -- pudding/puree;mechanical soft;discoordination of lingual movement;secondary to impaired cognitive status  -AC -- --     Increased A-P Transit Time -- all consistencies tested;discoordination of lingual movement;secondary to impaired cognitive status  -AC -- --     Premature Spillage of Liquids into Pharynx -- thin liquids;nectar-thick liquids;secondary to reduced  lingual control;other (see comments)  NT via straw only  -AC -- --        Oral Residue    Impaired Oral Residue -- diffuse residue throughout oral cavity  -AC -- --     Diffuse Residue throughout Oral Cavity -- all consistencies tested;secondary to reduced lingual strength;secondary to reduced lingual range of motion  -AC -- --     Response to Oral Residue -- partial residue clearance;with spontaneous subsequent swallow;with liquid wash  -AC -- --     Oral Residue, Comment -- Moderate amount  -AC -- --        Initiation of Pharyngeal Swallow    Initiation of Pharyngeal Swallow -- bolus in pyriform sinuses  -AC -- --     Pharyngeal Phase -- impaired pharyngeal phase of swallowing  -AC -- --     Penetration Before the Swallow -- nectar-thick liquids;secondary to reduced back of tongue control;secondary to delayed swallow initiation or mistiming;other (see comments)  via consecutive straw drinks  -AC -- --     Penetration During the Swallow -- nectar-thick liquids;secondary to delayed swallow initiation or mistiming;secondary to reduced laryngeal elevation;secondary to reduced vestibular closure  -AC -- --     Aspiration After the Swallow -- thin liquids;nectar-thick liquids;secondary to residue;in pyriform sinuses;in laryngeal vestibule;other (see comments)  nectar via consecutive straw drinks only  -AC -- --     Depth of Penetration -- other (see comments)  shallow/expelled spontaneously (w/ completion of swallow or 2nd swallow) w/ nectar via cup sips; deeper & larger amount penetrated w/ nectar via straw  -AC -- --     Response to Aspiration -- Yes  -AC -- --     Responded to aspiration with -- inconsistent;delayed;non-effective;throat clear  weak-sounding; cued cough also ineffective  -AC -- --     Rosenbek's Scale -- thin:;nectar:;8--->level 8  -AC -- --     Pharyngeal Residue -- all consistencies tested;diffuse within pharynx;secondary to reduced base of tongue retraction;secondary to reduced posterior pharyngeal  wall stripping;secondary to reduced laryngeal elevation;secondary to reduced hyolaryngeal excursion;other (see comments)  mild-mod w/ thin/nectar-thick liquids; mod w/ pudding/solid  -AC -- --     Response to Residue -- partial residue clearance;cleared residue with spontaneous subsequent swallow;cleared residue with liquid wash  -AC -- --     Attempted Compensatory Maneuvers -- bolus size;bolus presentation style  did not attempt more complex compensations 2' cognitive status  -AC -- --     Response to Attempted Compensatory Maneuvers -- prevented aspiration  w/ nectar-thick liquid--transient penetration w/ cup sips and no penetration/aspiration w/ tsp sips  -AC -- --        Swallowing Quality of Life Assessment    Education and counseling provided -- Signs of aspiration;Silent aspiration;Risks of aspiration;Safest diet options;Aspiration precautions;Feeding assistance and techniques  discussed all w/ pt's son following study  - Signs of aspiration;Risks of aspiration  -AC --        SLP Evaluation Clinical Impression    SLP Swallowing Diagnosis -- moderate;oral dysphagia;pharyngeal dysphagia  -AC suspected pharyngeal dysphagia;oral dysphagia  -AC oral dysphagia;suspected pharyngeal dysphagia  -     Functional Impact -- risk of aspiration/pneumonia;risk of malnutrition;risk of dehydration  - risk of aspiration/pneumonia;risk of malnutrition;risk of dehydration  -AC risk of aspiration/pneumonia;risk of malnutrition  -     Rehab Potential/Prognosis, Swallowing -- adequate, monitor progress closely  -AC adequate, monitor progress closely  -AC adequate, monitor progress closely  -     Swallow Criteria for Skilled Therapeutic Interventions Met -- demonstrates skilled criteria  -AC demonstrates skilled criteria  -AC demonstrates skilled criteria  -CJ        SLP Treatment Clinical Impressions    Treatment Assessment (SLP) toleration of diet;suspected;pharyngeal dysphagia;continued;cognitive-linguistic  disorder;dysarthria  - -- -- --     Treatment Assessment Comments (SLP) Pt lethargic, cues required throughout for pt remain awake. No s/s of aspiration w/ NTL or pureed trials. Throat clear & mutliple swallows w/ thin liquid trials. Reviewed compensations w/ pt & family. Pt w/ increased difficulty completing exercises requiring multiple steps, focsed on CTAR & hard effortful swallow. Provided handout & encouraged family to assist w/ practice. SLP will cont to f/u for tx as appropriate  - -- -- --     Daily Summary of Progress (SLP) progress toward functional goals as expected  - -- -- --     Barriers to Overall Progress (SLP) Lethargy;Cognitive status  - -- -- --     Plan for Continued Treatment (SLP) continue treatment per plan of care  - -- -- --     Care Plan Review care plan/treatment goals reviewed  - -- -- --        Recommendations    Therapy Frequency ([facility]) 5 days per week  - 5 days per week  - -- --     Predicted Duration Therapy Intervention (Days) until discharge  - until discharge  - -- until discharge  -     SLP Diet Recommendation puree;no mixed consistencies;nectar thick liquids  - puree;no mixed consistencies;nectar thick liquids  - NPO  - NPO  -     Recommended Diagnostics -- -- VFSS ([facility] [place])  - reassess via VFSS ([facility] [place])  12/12/23  -     Recommended Precautions and Strategies upright posture during/after eating;1:1 supervision;assist with feeding;small bites of food and sips of liquid;no straw;alternate between small bites of food and sips of liquid;check mouth frequently for oral residue/pocketing  - upright posture during/after eating;1:1 supervision;assist with feeding;small bites of food and sips of liquid;no straw;alternate between small bites of food and sips of liquid;check mouth frequently for oral residue/pocketing  - -- general aspiration precautions  -     Oral Care Recommendations Oral Care BID/PRN;Suction toothbrush  - Oral Care BID/PRN;Suction  toothbrush  - Oral Care BID/PRN;Suction toothbrush  - Oral Care BID/PRN;Suction toothbrush  -     SLP Rec. for Method of Medication Administration meds crushed;with puree;as tolerated  - meds crushed;with puree;as tolerated  - meds via alternate route  - meds via alternate route  -     Monitor for Signs of Aspiration notify SLP if any concerns  - notify SLP if any concerns  - -- yes;notify SLP if any concerns  -     Anticipated Discharge Disposition (SLP) skilled nursing facility  - skilled nursing facility  - skilled nursing facility  - skilled nursing facility  -     Swallowing Considerations per Physician Discretion -- medical management of suspected oropharyngeal candidiasis, as indicated  notified RN  - -- --               User Key  (r) = Recorded By, (t) = Taken By, (c) = Cosigned By      Initials Name Effective Dates     Cassia Mcgrath, MS CCC-SLP 02/03/23 -      Sri iH[facility], MS CCC-SLP 07/11/23 -     Brooklynn Hwang, MS CCC-SLP 05/12/23 -                     EDUCATION  The patient has been educated in the following areas:   Communication Impairment Dysphagia (Swallowing Impairment).        SLP GOALS       Row Name 12/13/23 1550 12/12/23 1210          (LTG) Patient will demonstrate functional swallow for    Diet Texture (Demonstrate functional swallow) soft to chew (whole) textures  - pureed textures  -     Liquid viscosity (Demonstrate functional swallow) thin liquids  - nectar/ mildly thick liquids  -     [place] (Demonstrate functional swallow) with 1:1 assist/ supervision  - with 1:1 assist/ supervision  -     Time Frame (Demonstrate functional swallow) by discharge  - by discharge  -     Progress/Outcomes (Demonstrate functional swallow) continuing progress toward goal  - --     Comment (Demonstrate functional swallow) Multiple swallows and throat clear w/ thin liquid trials  - Trial tx pending cognitive status.  -        (STG)  Patient will tolerate trials of    Consistencies Trialed (Tolerate trials) pureed textures;nectar/ mildly thick liquids  - pureed textures;nectar/ mildly thick liquids  -AC     Desired Outcome (Tolerate trials) without signs/symptoms of aspiration;with adequate oral prep/transit/clearance;with use of compensatory strategies (see comments)  small bolus size, alternate bites/sips, no straws  - without signs/symptoms of aspiration;with adequate oral prep/transit/clearance;with use of compensatory strategies (see comments)  small bolus size, alternate bites/sips, no straws  -     [place] (Tolerate trials) with 1:1 assist/ supervision  - with 1:1 assist/ supervision  -AC     Time Frame (Tolerate trials) by discharge  - by discharge  -     Progress/Outcomes (Tolerate trials) continuing progress toward goal  - --     Comment (Tolerate trials) No overt s/s of aspiration w/ trials of NTL or pureed consistencies  - --        (STG) Patient will tolerate therapeutic trials of    Consistencies Trialed (Tolerate therapeutic trials) thin liquids  - thin liquids  -AC     Desired Outcome (Tolerate therapeutic trials) without signs/symptoms of aspiration;without signs of distress  - without signs/symptoms of aspiration;without signs of distress  -     [place] (Tolerate therapeutic trials) with 1:1 assist/ supervision  - with 1:1 assist/ supervision  -     Time Frame (Tolerate therapeutic trials) by discharge  - by discharge  -     Progress/Outcomes (Tolerate therapeutic trials) continuing progress toward goal  - --     Comment (Tolerate therapeutic trials) Throat clear & multiple swallows w/ thin liquid trials  - --        (STG) Lingual Strengthening Goal 1 (SLP)    Activity (Lingual Strengthening Goal 1, SLP) increase tongue back strength;increase lingual tone/sensation/control/coordination/movement  - increase tongue back strength;increase lingual  tone/sensation/control/coordination/movement  -AC     Increase Lingual Tone/Sensation/Control/Coordination/Movement lingual resistance exercises;swallow trials  - lingual resistance exercises;swallow trials  -AC     Increase Tongue Back Strength lingual resistance exercises  - lingual resistance exercises  -AC     [place]/Accuracy (Lingual Strengthening Goal 1, SLP) with maximum cues (25-49% accuracy)  - with maximum cues (25-49% accuracy)  -AC     Time Frame (Lingual Strengthening Goal 1, SLP) short term goal (STG)  - short term goal (STG)  -AC     Progress/Outcomes (Lingual Strengthening Goal 1, SLP) continuing progress toward goal  - --        (STG) Pharyngeal Strengthening Exercise Goal 1 (SLP)    Activity (Pharyngeal Strengthening Goal 1, SLP) increase timing;increase superior movement of the hyolaryngeal complex;increase anterior movement of the hyolaryngeal complex;increase squeeze/positive pressure generation  - increase timing;increase superior movement of the hyolaryngeal complex;increase anterior movement of the hyolaryngeal complex;increase squeeze/positive pressure generation  -     Increase Timing prepping - 3 second prep or suck swallow or 3-step swallow  - prepping - 3 second prep or suck swallow or 3-step swallow  -AC     Increase Superior Movement of the Hyolaryngeal Complex falsetto  - falsetto  -AC     Increase Anterior Movement of the Hyolaryngeal Complex chin tuck against resistance (CTAR)  - chin tuck against resistance (CTAR)  -AC     Increase Squeeze/Positive Pressure Generation hard effortful swallow  - hard effortful swallow  -AC     [place]/Accuracy (Pharyngeal Strengthening Goal 1, SLP) with maximum cues (25-49% accuracy)  - with maximum cues (25-49% accuracy)  -AC     Time Frame (Pharyngeal Strengthening Goal 1, SLP) short term goal (STG)  - short term goal (STG)  -AC     Progress/Outcomes (Pharyngeal Strengthening Goal 1, SLP) progress slower than  expected  - --     Comment (Pharyngeal Strengthening Goal 1, SLP) Pt w/ increased difficulty completing exercises requiring multiple steps. Focused on CTAR and hard effortful swallow  - --        Patient will demonstrate functional cognitive-linguistic skills for return to discharge environment    [place] with maximum cues  -MH with maximum cues  -AC     Time frame by discharge  - by discharge  -AC     Progress/Outcomes continuing progress toward goal  - --        SLP Diagnostic Treatment     Patient will participate in further assessment in the following areas clarification of baseline cognitive communication status  - clarification of baseline cognitive communication status  -AC     Time Frame (Diagnostic) short term goal (STG)  - short term goal (STG)  -AC     Progress/Outcomes (Additional Goal 1, SLP) goal met  - --     Comment (Diagnostic) Son reports waxing/waning cog status @ baseline, however reports dysarthria is new  - --        Follow Directions Goal 2 (SLP)    Improve Ability to Follow Directions Goal 1 (SLP) 1 step direction without objects;80%;with moderate cues (50-74%)  - 1 step direction without objects;80%;with moderate cues (50-74%)  -AC     Time Frame (Follow Directions Goal 1, SLP) short term goal (STG)  - short term goal (STG)  -AC     Progress (Ability to Follow Directions Goal 1, SLP) 50%;with maximum cues (25-49%)  - --     Progress/Outcomes (Follow Directions Goal 1, SLP) continuing progress toward goal  - --        Phonation Goal 1 (SLP)    Improve Phonation By Goal 1 (SLP) using loud speech;80%;with maximum cues (25-49%)  - --     Time Frame (Phonation Goal 1, SLP) short term goal (STG)  - --     Progress/Outcomes (Phonation Goal 1, SLP) new goal  - --        Articulation Goal 1 (SLP)    Improve Articulation Goal 1 (SLP) by over-articulating at word level;80%;with maximum cues (25-49%)  - --     Time Frame (Articulation Goal 1, SLP) short term goal  (STG)  - --     Progress/Outcomes (Articulation Goal 1, SLP) new goal  - --        Awareness of Basic Personal Information Goal 1 (SLP)    Improve Awareness of Basic Personal Information Goal 1 (SLP) answering yes/no questions regarding personal/biographical information;naming self, family members;70%;with moderate cues (50-74%)  - answering yes/no questions regarding personal/biographical information;naming self, family members;70%;with moderate cues (50-74%)  -AC     Time Frame (Awareness of Basic Personal Information Goal 1, SLP) short term goal (STG)  -MH short term goal (STG)  -AC     Progress (Awareness of Basic Personal Information Goal 1, SLP) 30%;with maximum cues (25-49%)  - --     Progress/Outcomes (Awareness of Basic Personal Information Goal 1, SLP) continuing progress toward goal  - --     Comment (Awareness of Basic Personal Information Goal 1, SLP) Yes/No, naming  - --        Orientation Goal 1 (SLP)    Improve Orientation Through Goal 1 (SLP) demonstrating orientation to place;use environmental aids to assist with orientation;70%;with maximum cues (25-49%)  - demonstrating orientation to place;use environmental aids to assist with orientation;70%;with maximum cues (25-49%)  -AC     Time Frame (Orientation Goal 1, SLP) short term goal (STG)  - short term goal (STG)  -AC     Progress (Orientation Goal 1, SLP) 0%;with maximum cues (25-49%)  - --     Progress/Outcomes (Orientation Goal 1, SLP) progress slower than expected  - --     Comment (Orientation Goal 1, SLP) Pt not oriented to any, u/a to recall answers to orientation questions immediately after stated  - --               User Key  (r) = Recorded By, (t) = Taken By, (c) = Cosigned By      Initials Name Provider Type    AC Cassia Mcgrath MS CCC-SLP Speech and Language Pathologist    Brooklynn Hwang MS CCC-SLP Speech and Language Pathologist                       Time Calculation:    Time Calculation- SLP       Row  Name 12/13/23 1620             Time Calculation- SLP    SLP Start Time 1550  -      SLP Received On 12/13/23  -         Untimed Charges    98500-KB Treatment/ST Modification Prosth Aug Alter  36  -      36101-QY Treatment Swallow Minutes 35  -MH         Total Minutes    Untimed Charges Total Minutes 71  -MH       Total Minutes 71  -MH                User Key  (r) = Recorded By, (t) = Taken By, (c) = Cosigned By      Initials Name Provider Type     Brooklynn Penny, MS CCC-SLP Speech and Language Pathologist                    Therapy Charges for Today       Code Description Service Date Service Provider Modifiers Qty    75077837583 HC ST TREATMENT SWALLOW 2 12/13/2023 Brooklynn Penny, MS CCC-SLP GN 1    82604271763 HC ST TREATMENT SPEECH 2 12/13/2023 Brooklynn Penny, MS FAUSTIN-SLP GN 1                 Brooklynn Penny MS CCC-PARKER  12/13/2023

## 2023-12-13 NOTE — PROGRESS NOTES
[facility] Medicine Services  PROGRESS NOTE    Patient Name: Beryl Montoya  : 1945  MRN: [id number]    Date of Admission: 2023  Primary Care Physician: Argelia Gamez MD    Subjective   Subjective     CC: f/u CVA    HPI: Up in bed resting comfortably. Pleasant and answering questions appropriately.      Objective   Objective     Vital Signs:   Temp:  [97.5 °F (36.4 °C)-98.6 °F (37 °C)] 97.5 °F (36.4 °C)  Heart Rate:  [] 100  Resp:  [16-18] 18  BP: (133-222)/() 193/93     Physical Exam:  Constitutional: No acute distress, up smiling  HENT: NCAT, mucous membranes moist  Respiratory: Clear to auscultation bilaterally, respiratory effort normal   Cardiovascular: RRR, no murmurs, rubs, or gallops  Gastrointestinal: Positive bowel sounds, soft, nontender, nondistended  Musculoskeletal: No bilateral ankle edema  Psychiatric: Pleasant  Neurologic: Answers simple questions, strength symmetric in all extremities, Cranial Nerves grossly intact to confrontation, speech clear  Skin: No rashes     Results Reviewed:  LAB RESULTS:      Lab 23  1314   WBC 6.94   HEMOGLOBIN 15.3   HEMATOCRIT 45.6   PLATELETS 268   NEUTROS ABS 3.58   IMMATURE GRANS (ABS) 0.02   LYMPHS ABS 2.13   MONOS ABS 0.56   EOS ABS 0.56*   .9*         Lab 23  0541 23  1314   SODIUM 143 141   POTASSIUM 4.0 4.6   CHLORIDE 107 103   CO2 22.0 24.0   ANION GAP 14.0 14.0   BUN 13 14   CREATININE 0.77 0.98   EGFR 79.1 59.2*   GLUCOSE 190* 273*   CALCIUM 9.4 9.1   HEMOGLOBIN A1C 7.80*  --    TSH  --  4.620*         Lab 23  1314   TOTAL PROTEIN 7.2   ALBUMIN 3.8   GLOBULIN 3.4   ALT (SGPT) 23   AST (SGOT) 29   BILIRUBIN 0.9   ALK PHOS 122*         Lab 23  1314   PROBNP 1,308.0   HSTROP T 15*         Lab 23  0541   CHOLESTEROL 124   LDL CHOL 57   HDL CHOL 49   TRIGLYCERIDES 96             Brief Urine Lab Results  (Last result in the past 365 days)        Color    Clarity   Blood   Leuk Est   Nitrite   Protein   CREAT   Urine HCG        12/11/23 1523 Yellow   Cloudy   Trace   Moderate (2+)   Positive   100 mg/dL (2+)                   Microbiology Results Abnormal       None            EEG    Result Date: 12/12/2023  Reason for referral: 78 y.o.female with altered mental status Technical Summary:  A 19 channel digital EEG was performed using the international 10-20 placement system, including eye leads and EKG leads. Duration: 22 minutes Findings: The awake tracing shows diffuse low amplitude intermixed theta and alpha activity present symmetrically over both hemispheres.  EMG artifact and eye blink artifact is seen anteriorly.  A clear posterior rhythm is not seen.  Drowsiness is seen with mild slowing of the tracing but stage II sleep is not clearly seen.  No focal features or epileptiform activity are seen.  Hyperventilation and photic stimulation are not performed. Video: Available Technical quality: Superior EKG: Regular, 70 bpm SUMMARY: Normal EEG in the awake and lightly asleep states No focal features or epileptiform activity are seen     Impression: Normal study This report is transcribed using the Dragon dictation system.      FL Video Swallow With Speech Single Contrast    Result Date: 12/12/2023  FL VIDEO SWALLOW W SPEECH SINGLE-CONTRAST Date of Exam: 12/12/2023 12:03 PM EST Indication: dysphagia Comparison: None available. Technique:   The speech pathologist administered food and/or liquid mixed with barium to the patient with cine/video imaging.  Imaging assistance was provided to the speech pathologist and an image was saved. Fluoroscopic Time: 1 minute and 12 seconds Number of Images: 10 associated fluoroscopic loops were saved Findings: Please see speech therapy report for full details and recommendations.     Impression: Impression: Fluoroscopy provided for a modified barium swallow. Please see speech therapy report for full details and recommendations.  Electronically Signed: Eduar Phillips MD  12/12/2023 4:18 PM EST  Workstation ID: [id number]    Adult Transthoracic Echo Limited W/ Cont if Necessary Per Protocol    Result Date: 12/12/2023    Left ventricular ejection fraction appears to be 51 - 55%. Normal left ventricular cavity size and wall thickness noted. All left ventricular wall segments contract normally.   Normal right ventricular cavity size and systolic function noted.   The left atrial cavity is mildly dilated.   No aortic valve regurgitation or stenosis is present. The aortic valve exhibits sclerosis. The aortic valve appears trileaflet.   The mitral valve is structurally normal with no significant stenosis present. Trace to mild mitral valve regurgitation is present.   The tricuspid valve is structurally normal with no significant stenosis present. Physiologic tricuspid valve regurgitation is present. Estimated right ventricular systolic pressure from tricuspid regurgitation is normal (<35 mmHg).     MRI Angiogram Head Without Contrast    Result Date: 12/12/2023  MRI ANGIOGRAM HEAD WO CONTRAST Date of Exam: 12/12/2023 4:12 AM EST Indication: Stroke, follow up.  Comparison: CTA head 7/4/2020 Technique:  Routine 3-D time-of-flight gradient echo imaging was obtained of the head without contrast administration. Findings: Right carotid: The distal cervical ICA is normal. Intracranial ICA segments demonstrate mild atherosclerotic irregularity. The origin of the ophthalmic artery is normal. There is a patent P-comm. There is a patent A-Comm. The A1 segment is normal. There is mild focal narrowing of the proximal right A2 segment. The distal MERCY branches appear patent. There is a stable high-grade focal stenosis at the proximal right M1 segment with near occlusion. The distal M1 segment is widely patent and the distal MCA branches appear patent. Left carotid: The distal cervical ICA is normal. The intracranial ICA segments are widely patent. The origin of the  ophthalmic artery is normal. There is a patent P-comm. The A1 and M1 segments and distal MERCY and MCA branches appear patent. Posterior circulation: V4 segments are patent. The basilar artery is normal. Visualized superior cerebellar arteries appear patent. The P1 segments are normal. There is atherosclerotic irregularity in the right PCA branches. There is stable severe disease in the left PCA with at least 2 areas of high-grade stenosis. Overall appearance is unchanged from prior CTA.     Impression: Impression: 1.Stable high-grade stenosis at the proximal right M1 segment with near occlusion. 2.Stable severe disease in the left PCA with at least 2 areas of high-grade stenosis. Electronically Signed: Nomi Fletcher MD  12/12/2023 5:53 AM EST  Workstation ID: [id number]    MRI Angiogram Neck Without Contrast    Result Date: 12/12/2023  MRI ANGIOGRAM NECK WO CONTRAST Date of Exam: 12/12/2023 4:13 AM EST Indication: Stroke, follow up.  Comparison: CTA neck 7/4/2020. Technique:  Routine 3-D time-of-flight gradient echo imaging was obtained of the neck without contrast administration. Findings: Aorta: The visualized aortic arch is unremarkable. There is conventional three-vessel arch anatomy. The right brachiocephalic and right subclavian arteries appear patent. The proximal left subclavian artery is partially obscured, but the distal vessel appears patent. Right carotid: There is mild nonstenosing plaque in the distal CCA. The carotid bifurcation is widely patent. The ECA and distal branches appear patent and the mid and distal cervical ICA is normal. Left carotid: There is mild narrowing of the distal CCA due to eccentric plaque estimated to be less than 25%. The carotid bifurcation is normal. The ECA and distal branches appear normal and the cervical ICA is normal. Vertebral arteries: Vertebral arteries are codominant and following normal course and appear normal caliber throughout the neck.     Impression: Impression:  1. Mild, less than 25% narrowing at the distal left CCA. 2. No hemodynamically significant stenosis of the internal carotid or vertebral arteries. Electronically Signed: Nomi Fletcher MD  12/12/2023 5:43 AM EST  Workstation ID: [id number]    MRI Brain Without Contrast    Result Date: 12/12/2023  MRI BRAIN WO CONTRAST Date of Exam: 12/12/2023 4:14 AM EST Indication: Stroke, follow up.  Comparison: CT head 12/11/2023 Technique:  Routine multiplanar/multisequence sequence images of the brain were obtained without contrast administration. Findings: There is a new small area of diffusion restriction in the right karen compatible with acute/subacute lacunar type infarct. There is a large chronic infarct involving the left temporal and left occipital lobes, which is unchanged and exhibits laminar necrosis. There is moderate underlying chronic small vessel ischemic change. There is generalized parenchymal volume loss. There are punctate microhemorrhages in the right karen, medial right temporal lobe and at multiple locations in the area of chronic infarction in the left temporal and occipital lobes. There are additional chronic microhemorrhages in the right temporal white matter and right parietal periventricular white matter. Midline structures appear intact. There is thinning of the orbital lenses bilaterally. There is complete filling of the right maxillary sinus with findings of chronic sinusitis. Mastoids are clear. The central flow voids appear intact. Calvarial and superficial soft tissue signal is within normal limits.     Impression: Impression: 1.New small acute/subacute lacunar type infarct in the right karen. 2.Large chronic infarct in the left temporal and occipital lobes. 3.Background of moderate chronic small vessel ischemic change and generalized parenchymal volume loss. 4.There are multiple chronic microhemorrhages in the right karen, medial right temporal lobe and right parietal periventricular white matter.  This could be related to hypertension or amyloid angiopathy. 5.Chronic right maxillary sinusitis. Electronically Signed: Nomi Fletcher MD  12/12/2023 5:36 AM EST  Workstation ID: [id number]    XR Abdomen KUB    Result Date: 12/12/2023  XR ABDOMEN KUB Date of Exam: 12/12/2023 1:11 AM EST Indication: MRI Clearence, please include pelvis too Comparison: 7/10/2020 and 7/6/2020. Findings: Abdomen: There is evidence of prior median sternotomy. There is no unexpected metal artifact. No acute osseous abnormality. No bowel distention. CHEST: There is evidence of prior median sternotomy. There are no unexpected areas of metal artifact. CT HEAD: No unexpected metallic foreign bodies. No contraindication to MRI.     Impression: Impression: No contraindication to MRI identified on abdomen or chest radiograph for head CT. Electronically Signed: Nomi Fletcher MD  12/12/2023 1:55 AM EST  Workstation ID: [id number]    CT Head Without Contrast    Result Date: 12/11/2023  CT HEAD WO CONTRAST Date of Exam: 12/11/2023 1:20 PM EST Indication: AMS, history of stroke, on Xarelto. Comparison: 7/14/2020 Technique: Axial CT images were obtained of the head without contrast administration.  Automated exposure control and iterative construction methods were used. Findings: Parenchyma:No acute intraparenchymal hemorrhage. Sequela of old left-sided infarct with encephalomalacia. New hypodensity within the right karen. Moderate parenchymal volume loss. Scattered periventricular and subcortical white matter hypodensities, nonspecific, but most often consistent with small vessel ischemic changes. No midline shift or herniation. Ventricles and extra axial spaces:Prominent ventricles and sulci secondary to volume loss. No extra axial fluid collection seen. Other:Lens replacements. Scattered paranasal sinus mucosal thickening. Mastoid air cells are clear. Calvarium is intact. Intracranial atherosclerotic calcification is present.     Impression:  Impression: New focal hypodensity within the right karen may represent age-indeterminate infarct. Consider MRI to further evaluate. Chronic changes including old left-sided infarct as above. Findings discussed with stroke team by Dr. Eros Dejesus via telephone on 12/11/2023 3:31 PM EST. Electronically Signed: Eros Dejesus MD  12/11/2023 4:02 PM EST  Workstation ID: [id number]    XR Chest 1 View    Result Date: 12/11/2023  XR CHEST 1 VW Date of Exam: 12/11/2023 1:12 PM EST Indication: Stroke Protocol (Onset > 12 Hrs) Comparison: Chest radiograph dated 8/20/2020 Findings: There is borderline cardiomegaly. There are postsurgical changes of prior CABG. Pulmonary vascularity appears normal. There is no acute airspace consolidation, pleural effusion, or pneumothorax. There are degenerative changes of the thoracic spine.     Impression: Impression: 1. Borderline cardiomegaly with postsurgical changes of CABG. No acute consolidation. Electronically Signed: Jimenez Butcher  12/11/2023 2:02 PM EST  Workstation ID: [id number]     Results for orders placed during the hospital encounter of 12/11/23    Adult Transthoracic Echo Limited W/ Cont if Necessary Per Protocol    Interpretation Summary    Left ventricular ejection fraction appears to be 51 - 55%. Normal left ventricular cavity size and wall thickness noted. All left ventricular wall segments contract normally.    Normal right ventricular cavity size and systolic function noted.    The left atrial cavity is mildly dilated.    No aortic valve regurgitation or stenosis is present. The aortic valve exhibits sclerosis. The aortic valve appears trileaflet.    The mitral valve is structurally normal with no significant stenosis present. Trace to mild mitral valve regurgitation is present.    The tricuspid valve is structurally normal with no significant stenosis present. Physiologic tricuspid valve regurgitation is present. Estimated right ventricular systolic pressure from  tricuspid regurgitation is normal (<35 mmHg).      Current medications:  Scheduled Meds:aspirin, 81 mg, Oral, Daily   Or  aspirin, 300 mg, Rectal, Daily  atorvastatin, 80 mg, Oral, Nightly  carvedilol, 12.5 mg, Oral, BID  cefTRIAXone, 2,000 mg, Intravenous, Q24H  clopidogrel, 75 mg, Oral, Daily  furosemide, 40 mg, Oral, Daily  insulin detemir, 1-200 Units, Subcutaneous, Nightly - Glu[employer]ander  insulin lispro, 1-200 Units, Subcutaneous, Q6H  isosorbide mononitrate, 30 mg, Oral, Daily  levETIRAcetam, 500 mg, Oral, Q12H  levothyroxine, 25 mcg, Oral, QAM  nystatin, 5 mL, Swish & Spit, 4x Daily  sacubitril-valsartan, 1 tablet, Oral, Q12H  sodium chloride, 10 mL, Intravenous, Q12H      Continuous Infusions:   PRN Meds:.  acetaminophen **OR** acetaminophen **OR** acetaminophen    senna-docusate sodium **AND** polyethylene glycol **AND** bisacodyl **AND** bisacodyl    Calcium Replacement - Follow Nurse / BPA Driven Protocol    dextrose    dextrose    glucagon (human recombinant)    insulin lispro    Magnesium Standard Dose Replacement - Follow Nurse / BPA Driven Protocol    ondansetron **OR** ondansetron    Phosphorus Replacement - Follow Nurse / BPA Driven Protocol    Potassium Replacement - Follow Nurse / BPA Driven Protocol    sodium chloride    sodium chloride    Assessment & Plan   Assessment & Plan     Active Hospital Problems    Diagnosis  POA    **Generalized weakness [R53.1]  Yes    Stroke [I63.9]  Yes    Acute UTI (urinary tract infection) [N39.0]  Yes    History of seizure [Z87.898]  Yes    Ischemic cardiomyopathy. LVEF 30% [I25.5]  Yes    Essential hypertension [I10]  Yes    History of L MCA CVA [Z86.73]  Not Applicable    Type 2 diabetes mellitus with complication, with long-term current use of insulin [E11.8, Z79.4]  Not Applicable      Resolved Hospital Problems   No resolved problems to display.        Brief Hospital Course to date:  Beryl Montoya is a 78 y.o. female w CAD s/p CABG, CVA w residual  speech deficits, wheelchair bound at baseline who presented with left arm weakness x 4-5 days. Found to have late acute/subacute right karen CVA, microhemorrhage likely 2/2 uncontrolled HTN.      Late acute/subacute right karen CVA presenting w LUE weakness  -DAPT, statin. Work-up per stroke team: OK for normal BP goals  -PT/OT follows.     Sz disorder - home keppra  E coli UTI - IV ceftriaxone, f/u susceptibilities  CAD s/p CABG - dapt, coreg, imdur  DMII, insulin dept - per glucommander  HTN - normal goals as above  BMI 34    I spoke with her  Jean via phone @ [phone]. Updated on bp med changes and plan for rehab soon.    Expected Discharge Location and Transportation:   Expected Discharge   Expected Discharge Date: 12/15/2023; Expected Discharge Time:      DVT prophylaxis:  Mechanical DVT prophylaxis orders are present.     AM-PAC 6 Clicks Score (PT): 10 (12/12/23 9348)    CODE STATUS:   Code Status and Medical Interventions:   Ordered at: 12/11/23 1626     Medical Intervention Limits:    NO intubation (DNI)     Level Of Support Discussed With:    Next of Kin (If No Surrogate)     Code Status (Patient has no pulse and is not breathing):    No CPR (Do Not Attempt to Resuscitate)     Medical Interventions (Patient has pulse or is breathing):    Limited Support       Viviana Pendleton II, DO  12/13/23

## 2023-12-13 NOTE — PLAN OF CARE
Goal Outcome Evaluation:      SLP treatment completed. Will continue to address dysphagia and communication. Please see note for further details and recommendations.                 Anticipated Discharge Disposition (SLP): skilled nursing facility

## 2023-12-13 NOTE — PLAN OF CARE
Problem: Adult Inpatient Plan of Care  Goal: Plan of Care Review  Outcome: Ongoing, Progressing  Goal: Patient-Specific Goal (Individualized)  Outcome: Ongoing, Progressing  Goal: Absence of Hospital-Acquired Illness or Injury  Outcome: Ongoing, Progressing  Intervention: Identify and Manage Fall Risk  Description: Perform standard risk assessment on admission using a validated tool or comprehensive approach appropriate to the patient; reassess fall risk frequently, with change in status or transfer to another level of care.  Communicate fall injury risk to interprofessional healthcare team.  Determine need for increased observation, equipment and environmental modification, such as low bed, signage and supportive, nonskid footwear.  Adjust safety measures to individual developmental age, stage and identified risk factors.  Reinforce the importance of safety and physical activity with patient and family.  Perform regular intentional rounding to assess need for position change, pain assessment and personal needs, including assistance with toileting.  Recent Flowsheet Documentation  Taken 12/12/2023 1800 by Michelle Paige RN  Safety Promotion/Fall Prevention:   activity supervised   safety round/check completed  Taken 12/12/2023 1623 by Michelle Paige RN  Safety Promotion/Fall Prevention:   activity supervised   safety round/check completed  Taken 12/12/2023 1302 by Michelle Paige RN  Safety Promotion/Fall Prevention:   safety round/check completed   activity supervised  Taken 12/12/2023 1157 by Michelle Paige RN  Safety Promotion/Fall Prevention: patient off unit  Taken 12/12/2023 1021 by Michelle Paige RN  Safety Promotion/Fall Prevention: safety round/check completed  Taken 12/12/2023 0828 by Michelle Paige RN  Safety Promotion/Fall Prevention:   activity supervised   safety round/check completed  Intervention: Prevent Skin Injury  Description: Perform a screening for skin injury risk, such as  pressure or moisture associated skin damage on admission and at regular intervals throughout hospital stay.  Keep all areas of skin (especially folds) clean and dry.  Maintain adequate skin hydration.  Relieve and redistribute pressure and protect bony prominences; implement measures based on patient-specific risk factors.  Match turning and repositioning schedule to clinical condition.  Encourage weight shift frequently; assist with reposition if unable to complete independently.  Float heels off bed; avoid pressure on the Achilles tendon.  Keep skin free from extended contact with medical devices.  Encourage functional activity and mobility, as early as tolerated.  Use aids (e.g., slide boards, mechanical lift) during transfer.  Recent Flowsheet Documentation  Taken 12/12/2023 1800 by Michelle Paige RN  Body Position: sitting up in bed  Skin Protection: adhesive use limited  Taken 12/12/2023 1623 by Michelle Paige RN  Body Position: sitting up in bed  Skin Protection: adhesive use limited  Taken 12/12/2023 1302 by Michelle Paige RN  Body Position:   turned   right   weight shifting  Skin Protection: adhesive use limited  Taken 12/12/2023 1021 by Michelle Paige RN  Body Position: sitting up in bed  Skin Protection: adhesive use limited  Taken 12/12/2023 0828 by Michelle Paige RN  Body Position: supine  Skin Protection: adhesive use limited  Intervention: Prevent and Manage VTE (Venous Thromboembolism) Risk  Description: Assess for VTE (venous thromboembolism) risk.  Encourage and assist with early ambulation.  Initiate and maintain compression or other therapy, as indicated, based on identified risk in accordance with organizational protocol and provider order.  Encourage both active and passive leg exercises while in bed, if unable to ambulate.  Recent Flowsheet Documentation  Taken 12/12/2023 1800 by Michelle Paige RN  Activity Management: activity encouraged  Taken 12/12/2023 1623 by Royal  Michelle BEST RN  Activity Management: activity encouraged  VTE Prevention/Management:   sequential compression devices off   patient refused intervention  Taken 12/12/2023 1302 by Michelle Paige RN  Activity Management: activity encouraged  Taken 12/12/2023 1021 by Michelle Paige RN  Activity Management: activity encouraged  Taken 12/12/2023 0828 by Michelle Paige RN  Activity Management: activity encouraged  VTE Prevention/Management:   bilateral   sequential compression devices off   patient refused intervention  Range of Motion: (pt refused)   active ROM (range of motion) encouraged   other (see comments)  Intervention: Prevent Infection  Description: Maintain skin and mucous membrane integrity; promote hand, oral and pulmonary hygiene.  Optimize fluid balance, nutrition, sleep and glycemic control to maximize infection resistance.  Identify potential sources of infection early to prevent or mitigate progression of infection (e.g., wound, lines, devices).  Evaluate ongoing need for invasive devices; remove promptly when no longer indicated.  Recent Flowsheet Documentation  Taken 12/12/2023 0828 by Michelle Paige RN  Infection Prevention: environmental surveillance performed  Goal: Optimal Comfort and Wellbeing  Outcome: Ongoing, Progressing  Intervention: Provide Person-Centered Care  Description: Use a family-focused approach to care.  Develop trust and rapport by proactively providing information, encouraging questions, addressing concerns and offering reassurance.  Acknowledge emotional response to hospitalization.  Recognize and utilize personal coping strategies.  Honor spiritual and cultural preferences.  Recent Flowsheet Documentation  Taken 12/12/2023 0828 by Michelle Paige RN  Trust Relationship/Rapport:   choices provided   care explained   thoughts/feelings acknowledged  Goal: Readiness for Transition of Care  Outcome: Ongoing, Progressing   Goal Outcome Evaluation:      Unable to assess pt  orientation, RA, VSS. Nonverbal indicators of pain absent. Pt uncooperative and agitated at beginning of shift, refusing medications and assessments. Given time to rest. Spouse and son encouraged to visit, pt calm and cooperative, meds administered. Turned [id number], heel boots, all safety precautions in place. No concerns at this time.

## 2023-12-13 NOTE — CASE MANAGEMENT/SOCIAL WORK
Continued Stay Note   Luis Daniel     Patient Name: Beryl Montoya  MRN: [id number]  Today's Date: 12/13/2023    Admit Date: 12/11/2023    Plan: [facility]   Discharge Plan       Row Name 12/13/23 1119       Plan    Plan [facility]    Patient/Family in Agreement with Plan yes    Plan Comments Spoke with Cely with [facility] and they are working on referral. If approved by [facility], she will need insurance approval from her [employer] for the rehab. CM will continue to follow up.    Final Discharge Disposition Code 62 - inpatient rehab facility                   Discharge Codes    No documentation.                 Expected Discharge Date and Time       Expected Discharge Date Expected Discharge Time    Dec 15, 2023               Katelyn Vaughan RN

## 2023-12-14 LAB
GLUCOSE BLDC GLUCOMTR-MCNC: 247 MG/DL (ref 70–130)
GLUCOSE BLDC GLUCOMTR-MCNC: 267 MG/DL (ref 70–130)
GLUCOSE BLDC GLUCOMTR-MCNC: 270 MG/DL (ref 70–130)
GLUCOSE BLDC GLUCOMTR-MCNC: 309 MG/DL (ref 70–130)

## 2023-12-14 PROCEDURE — 63710000001 INSULIN DETEMIR PER 5 UNITS: Performed by: NURSE PRACTITIONER

## 2023-12-14 PROCEDURE — 97110 THERAPEUTIC EXERCISES: CPT

## 2023-12-14 PROCEDURE — 63710000001 INSULIN LISPRO (HUMAN) PER 5 UNITS: Performed by: NURSE PRACTITIONER

## 2023-12-14 PROCEDURE — 97530 THERAPEUTIC ACTIVITIES: CPT

## 2023-12-14 PROCEDURE — 99232 SBSQ HOSP IP/OBS MODERATE 35: CPT | Performed by: NURSE PRACTITIONER

## 2023-12-14 PROCEDURE — 25010000002 CEFTRIAXONE PER 250 MG: Performed by: INTERNAL MEDICINE

## 2023-12-14 PROCEDURE — 82948 REAGENT STRIP/BLOOD GLUCOSE: CPT

## 2023-12-14 PROCEDURE — 63710000001 INSULIN LISPRO (HUMAN) PER 5 UNITS: Performed by: INTERNAL MEDICINE

## 2023-12-14 PROCEDURE — 25810000003 SODIUM CHLORIDE 0.9 % SOLUTION: Performed by: NURSE PRACTITIONER

## 2023-12-14 RX ORDER — NICOTINE POLACRILEX 4 MG
15 LOZENGE BUCCAL
Status: DISCONTINUED | OUTPATIENT
Start: 2023-12-14 | End: 2023-12-15

## 2023-12-14 RX ORDER — INSULIN LISPRO 100 [IU]/ML
1-200 INJECTION, SOLUTION INTRAVENOUS; SUBCUTANEOUS AS NEEDED
Status: DISCONTINUED | OUTPATIENT
Start: 2023-12-14 | End: 2023-12-15

## 2023-12-14 RX ORDER — SODIUM CHLORIDE 9 MG/ML
75 INJECTION, SOLUTION INTRAVENOUS CONTINUOUS
Status: ACTIVE | OUTPATIENT
Start: 2023-12-14 | End: 2023-12-15

## 2023-12-14 RX ORDER — INSULIN LISPRO 100 [IU]/ML
1-200 INJECTION, SOLUTION INTRAVENOUS; SUBCUTANEOUS
Status: DISCONTINUED | OUTPATIENT
Start: 2023-12-14 | End: 2023-12-15

## 2023-12-14 RX ORDER — DEXTROSE MONOHYDRATE 25 G/50ML
10-50 INJECTION, SOLUTION INTRAVENOUS
Status: DISCONTINUED | OUTPATIENT
Start: 2023-12-14 | End: 2023-12-15

## 2023-12-14 RX ADMIN — ATORVASTATIN CALCIUM 80 MG: 40 TABLET, FILM COATED ORAL at 22:26

## 2023-12-14 RX ADMIN — INSULIN LISPRO 3 UNITS: 100 INJECTION, SOLUTION INTRAVENOUS; SUBCUTANEOUS at 06:52

## 2023-12-14 RX ADMIN — INSULIN DETEMIR 18 UNITS: 100 INJECTION, SOLUTION SUBCUTANEOUS at 21:06

## 2023-12-14 RX ADMIN — NYSTATIN 500000 UNITS: 100000 SUSPENSION ORAL at 08:56

## 2023-12-14 RX ADMIN — SACUBITRIL AND VALSARTAN 1 TABLET: 49; 51 TABLET, FILM COATED ORAL at 08:55

## 2023-12-14 RX ADMIN — NYSTATIN 500000 UNITS: 100000 SUSPENSION ORAL at 22:25

## 2023-12-14 RX ADMIN — Medication 10 ML: at 08:56

## 2023-12-14 RX ADMIN — ISOSORBIDE MONONITRATE 30 MG: 30 TABLET, EXTENDED RELEASE ORAL at 08:55

## 2023-12-14 RX ADMIN — CLOPIDOGREL BISULFATE 75 MG: 75 TABLET ORAL at 08:55

## 2023-12-14 RX ADMIN — INSULIN LISPRO 2 UNITS: 100 INJECTION, SOLUTION INTRAVENOUS; SUBCUTANEOUS at 17:13

## 2023-12-14 RX ADMIN — CARVEDILOL 25 MG: 12.5 TABLET, FILM COATED ORAL at 22:25

## 2023-12-14 RX ADMIN — NYSTATIN 500000 UNITS: 100000 SUSPENSION ORAL at 17:10

## 2023-12-14 RX ADMIN — LEVETIRACETAM 500 MG: 100 SOLUTION ORAL at 22:26

## 2023-12-14 RX ADMIN — ASPIRIN 81 MG CHEWABLE TABLET 81 MG: 81 TABLET CHEWABLE at 08:55

## 2023-12-14 RX ADMIN — LEVOTHYROXINE SODIUM 25 MCG: 25 TABLET ORAL at 08:56

## 2023-12-14 RX ADMIN — LEVETIRACETAM 500 MG: 100 SOLUTION ORAL at 09:55

## 2023-12-14 RX ADMIN — CARVEDILOL 25 MG: 12.5 TABLET, FILM COATED ORAL at 08:55

## 2023-12-14 RX ADMIN — TRAZODONE HYDROCHLORIDE 50 MG: 50 TABLET ORAL at 22:26

## 2023-12-14 RX ADMIN — SODIUM CHLORIDE 75 ML/HR: 9 INJECTION, SOLUTION INTRAVENOUS at 15:05

## 2023-12-14 RX ADMIN — INSULIN LISPRO 2 UNITS: 100 INJECTION, SOLUTION INTRAVENOUS; SUBCUTANEOUS at 21:13

## 2023-12-14 RX ADMIN — CEFTRIAXONE 2000 MG: 2 INJECTION, POWDER, FOR SOLUTION INTRAMUSCULAR; INTRAVENOUS at 17:10

## 2023-12-14 RX ADMIN — SACUBITRIL AND VALSARTAN 1 TABLET: 49; 51 TABLET, FILM COATED ORAL at 22:49

## 2023-12-14 RX ADMIN — FUROSEMIDE 40 MG: 40 TABLET ORAL at 08:56

## 2023-12-14 NOTE — THERAPY TREATMENT NOTE
Patient Name: Beryl Montoya  : 1945    MRN: [id number]                              Today's Date: 2023       Admit Date: 2023    Visit Dx:     ICD-10-CM ICD-9-CM   1. Cerebrovascular accident (CVA), unspecified mechanism  I63.9 434.91   2. Generalized weakness  R53.1 780.79   3. Expressive aphasia  R47.01 784.3   4. Hypertension, unspecified type  I10 401.9   5. Dysphagia, unspecified type  R13.10 787.20   6. Cognitive communication deficit  R41.841 799.52   7. Dysarthria  R47.1 784.51   8. Cerebrovascular accident (CVA) due to thrombosis of precerebral artery  I63.00 433.91     Patient Active Problem List   Diagnosis    History of L MCA CVA    Essential hypertension    Type 2 diabetes mellitus with complication, with long-term current use of insulin    GERD (gastroesophageal reflux disease)    Bilateral carotid artery stenosis    Abnormal stress test    Severe 3 vessel CAD with angina pectoris (CMS/HCC)    Ischemic cardiomyopathy. LVEF 30%    S/P CABG x 3 on 20    Acute postop embolic left occipital CVA 20. Extensive in L PCA distribution but additional areas in R PCA distribution (CMS/HCC)    Bilateral carotid atherosclerosis with moderate left common carotid artery stenosis (50-60%)    Seizure    Generalized weakness    Acute UTI (urinary tract infection)    History of seizure    Stroke     Past Medical History:   Diagnosis Date    CHF (congestive heart failure)     Coronary artery disease involving native coronary artery of native heart with angina pectoris 2020    Diabetes mellitus     GERD (gastroesophageal reflux disease)     High cholesterol     Hyperlipidemia     Hypertension     Ischemic cardiomyopathy     Stroke     2013 speech and short term memory     TIA (transient ischemic attack)      Past Surgical History:   Procedure Laterality Date    CARDIAC CATHETERIZATION N/A 2020    Procedure: Left Heart Cath 31828 C19 @ [facility];  Surgeon: David Faulkner MD;   Location:  [facility];  Service: Cardiovascular;  Laterality: N/A;    CATARACT EXTRACTION Bilateral     COLONOSCOPY  2013    CORONARY ARTERY BYPASS GRAFT N/A 6/30/2020    Procedure: SHERI PER ANESTHESIA, MEDIAN STERNOTOMY, CORONARY ARTERY BYPASS GRAFTING X 3 , UTILIZING THE LEFT INTERNAL MAMMARY ARTERY AND EVH OF RIGHT GREATER SAPHENOUS VEIN;  Surgeon: Jerry Lozano MD;  Location: [facility] OR;  Service: Cardiothoracic;  Laterality: N/A;  LEG START - 0736  VEIN OUT - 0840  VEIN READY - 0850    EYE SURGERY      HYSTERECTOMY      total    INTERVENTIONAL RADIOLOGY PROCEDURE Bilateral 9/25/2017    Procedure: Carotid Cerebral Angiogram;  Surgeon: Maldonado Casas MD;  Location:  [facility];  Service:       General Information       Row Name 12/14/23 1628          Physical Therapy Time and Intention    Document Type therapy note (daily note)  -CD     Mode of Treatment physical therapy  -CD       Row Name 12/14/23 4629          General Information    Patient Profile Reviewed yes  -CD     Existing Precautions/Restrictions fall  -CD     Barriers to Rehab medically complex;previous functional deficit;cognitive status  -CD       Row Name 12/14/23 6255          Cognition    Orientation Status (Cognition) oriented to;person  PT SHAKING HEAD YES/NO BUT VERY SUBTLE. AGREED TO UP TO CHAIR.  FOLLOWING COMMANDS FOR ROM EXERCISE WITH MAX CUES.  -CD       Row Name 12/14/23 2342          Safety Issues, Functional Mobility    Safety Issues Affecting Function (Mobility) awareness of need for assistance;insight into deficits/self-awareness;safety precautions follow-through/compliance;safety precaution awareness;sequencing abilities  -CD     Impairments Affecting Function (Mobility) balance;cognition;coordination;endurance/activity tolerance;strength;visual/perceptual  -CD     Cognitive Impairments, Mobility Safety/Performance awareness, need for assistance;attention;insight into  deficits/self-awareness;safety precaution awareness;safety precaution follow-through  -CD     Comment, Safety Issues/Impairments (Mobility) PT LETHARGIC BUT NODDING HEAD YES/NO AND FOLLOWING COMMANDS WITH MAX CUES TO ASSIST WITH BED MOBILITY AND THER EX.  -CD               User Key  (r) = Recorded By, (t) = Taken By, (c) = Cosigned By      Initials Name Provider Type     Samantha Winters, PT Physical Therapist                   Mobility       Row Name 12/14/23 1637          Bed Mobility    Bed Mobility rolling right;rolling left  -CD     Rolling Left [place] (Bed Mobility) maximum assist (25% patient effort)  -CD     Rolling Right [place] (Bed Mobility) maximum assist (25% patient effort)  -CD     Assistive Device (Bed Mobility) bed rails;head of bed elevated  -CD     Comment, (Bed Mobility) ROLLED L/R FOR PLACEMENT OF MECHANICAL LIFT SLING. PT ABLE TO HOLD TO BED RAILS TO ASSIST WITH CUES.  WITH CUES.  -CD       Row Name 12/14/23 1637          Transfers    Comment, (Transfers) MECHANICAL LIFT BED TO CHAIR FOR SAFETY DUE TO LEVEL OF ALERTNESS. DEFERRED ATTEMPTS AT STS.  -CD       Row Name 12/14/23 1637          Bed-Chair Transfer    Bed-Chair [place] (Transfers) dependent (less than 25% patient effort)  -CD     Assistive Device (Bed-Chair Transfers) lift device  -CD       Row Name 12/14/23 1637          Sit-Stand Transfer    Sit-Stand [place] (Transfers) unable to assess  -CD               User Key  (r) = Recorded By, (t) = Taken By, (c) = Cosigned By      Initials Name Provider Type     Samantha Winters, PT Physical Therapist                   Obj/Interventions       Row Name 12/14/23 1642          Motor Skills    Therapeutic Exercise --  COMPLETED SEATED B UE/LE THER EX: UE PUSH/PULL, MARCHING, HEEL SLIDES, LAQ X 10 REPS EACH. PT PARTICIPATING AND ABLE TO ACCEPT RESISTANCE WITH MAX CUES.  -CD       Row Name 12/14/23 1642          Balance    Static Sitting Balance moderate assist  -CD      Balance Interventions sitting;static;dynamic  -CD     Comment, Balance POSTERIOR LEAN IN SITTING .  -CD               User Key  (r) = Recorded By, (t) = Taken By, (c) = Cosigned By      Initials Name Provider Type    CD Samantha Winters, PT Physical Therapist                   Goals/Plan    No documentation.                  Clinical Impression       Row Name 12/14/23 4422          Pain    Pretreatment Pain Rating 0/10 - no pain  -CD     Posttreatment Pain Rating 0/10 - no pain  -CD     Pre/Posttreatment Pain Comment NO VERBAL OR VISUAL SIGNS OF PAIN.  -CD       Row Name 12/14/23 2991          Plan of Care Review    Plan of Care Reviewed With patient  -CD     Progress no change  -CD     Outcome Evaluation PT LIMITED BY DECRASED LEVEL OF ALERTNESS REQUIRING MAX VERBAL/TACTILE CUES. PT NODDING HEAD YES/NO AND FOLLOWING COMMANDS WITH MAX CUES TO ASSIST WITH BED MOBILITY AND B UE/LE THER EX. PT ABLE TO TAKE RESISTANCE WITH UE PUSH/PULL AND HEEL SLIDES. RECOMMEND SNF AT D/C.  -CD       Row Name 12/14/23 9951          Therapy Assessment/Plan (PT)    Patient/Family Therapy Goals Statement (PT) DID NOT STATE.  -CD     Rehab Potential (PT) fair, will monitor progress closely  -CD     Criteria for Skilled Interventions Met (PT) yes;meets criteria;skilled treatment is necessary  -CD     Therapy Frequency (PT) daily  -CD       Row Name 12/14/23 3264          Vital Signs    Pre Systolic BP Rehab 121  -CD     Pre Treatment Diastolic BP 94  -CD     Post Systolic BP Rehab 142  -CD     Post Treatment Diastolic   -CD     Pre SpO2 (%) 96  -CD     O2 Delivery Pre Treatment room air  -CD     O2 Delivery Intra Treatment room air  -CD     Post SpO2 (%) 95  -CD     O2 Delivery Post Treatment room air  -CD     Pre Patient Position Supine  -CD     Intra Patient Position Sitting  -CD     Post Patient Position Sitting  -CD       Row Name 12/14/23 8064          Positioning and Restraints    Pre-Treatment Position in bed  -CD     Post  Treatment Position chair  -CD     In Chair reclined;call light within reach;encouraged to call for assist;exit alarm on;RUE elevated;LUE elevated;legs elevated;heels elevated;notified nsg  -CD               User Key  (r) = Recorded By, (t) = Taken By, (c) = Cosigned By      Initials Name Provider Type    CD Samantha Winters, PT Physical Therapist                   Outcome Measures       Row Name 12/14/23 1650          How much help from another person do you currently need...    Turning from your back to your side while in flat bed without using bedrails? 2  -CD     Moving from lying on back to sitting on the side of a flat bed without bedrails? 2  -CD     Moving to and from a bed to a chair (including a wheelchair)? 1  -CD     Standing up from a chair using your arms (e.g., wheelchair, bedside chair)? 1  -CD     Climbing 3-5 steps with a railing? 1  -CD     To walk in hospital room? 1  -CD     AM-PAC 6 Clicks Score (PT) 8  -CD     Highest Level of Mobility Goal 3 --> Sit at edge of bed  -CD       Row Name 12/14/23 1650          Modified [place] Scale    Modified [place] Scale 5 - Severe disability.  Bedridden, incontinent, and requiring constant nursing care and attention.  -CD       Row Name 12/14/23 1650 12/14/23 1330       Functional Assessment    Outcome Measure Options AM-PAC 6 Clicks Basic Mobility (PT)  -CD AM-PAC 6 Clicks Daily Activity (OT)  -              User Key  (r) = Recorded By, (t) = Taken By, (c) = Cosigned By      Initials Name Provider Type     Samantha Winters, PT Physical Therapist    JR Viviane Rodriguez OT Occupational Therapist                                 Physical Therapy Education       Title: PT OT SLP Therapies (In Progress)       Topic: Physical Therapy (In Progress)       Point: Mobility training (In Progress)       Learning Progress Summary             Patient Acceptance, E, NR by CD at 12/14/2023 1651    Comment: SEE FLOWSHEET    Acceptance, E, NR by KR at 12/12/2023 4708                          Point: Home exercise program (In Progress)       Learning Progress Summary             Patient Acceptance, E, NR by CD at 12/14/2023 1651    Comment: SEE FLOWSHEET    Acceptance, E, NR by KR at 12/12/2023 0939                         Point: Body mechanics (In Progress)       Learning Progress Summary             Patient Acceptance, E, NR by CD at 12/14/2023 1651    Comment: SEE FLOWSHEET    Acceptance, E, NR by KR at 12/12/2023 0939                         Point: Precautions (In Progress)       Learning Progress Summary             Patient Acceptance, E, NR by CD at 12/14/2023 1651    Comment: SEE FLOWSHEET    Acceptance, E, NR by KR at 12/12/2023 0939                                         User Key       Initials Effective Dates Name Provider Type Discipline    CD 02/03/23 -  Samantha Winters, PT Physical Therapist PT    VINH 12/30/22 -  Malathi Riddle PT Physical Therapist PT                  PT Recommendation and Plan     Plan of Care Reviewed With: patient  Progress: no change  Outcome Evaluation: PT LIMITED BY DECRASED LEVEL OF ALERTNESS REQUIRING MAX VERBAL/TACTILE CUES. PT NODDING HEAD YES/NO AND FOLLOWING COMMANDS WITH MAX CUES TO ASSIST WITH BED MOBILITY AND B UE/LE THER EX. PT ABLE TO TAKE RESISTANCE WITH UE PUSH/PULL AND HEEL SLIDES. RECOMMEND SNF AT D/C.     Time Calculation:         PT Charges       Row Name 12/14/23 1652             Time Calculation    Start Time 1601  -CD      PT Received On 12/14/23  -CD         Time Calculation- PT    Total Timed Code Minutes- PT 23 minute(s)  -CD         Timed Charges    49383 - PT Therapeutic Exercise Minutes 13  -CD      11685 - PT Therapeutic Activity Minutes 10  -CD         Total Minutes    Timed Charges Total Minutes 23  -CD       Total Minutes 23  -CD                User Key  (r) = Recorded By, (t) = Taken By, (c) = Cosigned By      Initials Name Provider Type    CD Samantha Winters, PT Physical Therapist                  Therapy Charges for  Today       Code Description Service Date Service Provider Modifiers Qty    73600073566  PT THER PROC EA 15 MIN 12/14/2023 Samantha Winters, PT GP 1    43223299007 HC PT THERAPEUTIC ACT EA 15 MIN 12/14/2023 Samantha Winters, PT GP 1            PT G-Codes  Outcome Measure Options: AM-PAC 6 Clicks Basic Mobility (PT)  AM-PAC 6 Clicks Score (PT): 8  AM-PAC 6 Clicks Score (OT): 7  Modified [place] Scale: 5 - Severe disability.  Bedridden, incontinent, and requiring constant nursing care and attention.  PT Discharge Summary  Anticipated Discharge Disposition (PT): skilled nursing facility    Samantha Winters, PT  12/14/2023

## 2023-12-14 NOTE — THERAPY TREATMENT NOTE
Patient Name: Beryl Montoya  : 1945    MRN: [id number]                              Today's Date: 2023       Admit Date: 2023    Visit Dx:     ICD-10-CM ICD-9-CM   1. Cerebrovascular accident (CVA), unspecified mechanism  I63.9 434.91   2. Generalized weakness  R53.1 780.79   3. Expressive aphasia  R47.01 784.3   4. Hypertension, unspecified type  I10 401.9   5. Dysphagia, unspecified type  R13.10 787.20   6. Cognitive communication deficit  R41.841 799.52   7. Dysarthria  R47.1 784.51   8. Cerebrovascular accident (CVA) due to thrombosis of precerebral artery  I63.00 433.91     Patient Active Problem List   Diagnosis    History of L MCA CVA    Essential hypertension    Type 2 diabetes mellitus with complication, with long-term current use of insulin    GERD (gastroesophageal reflux disease)    Bilateral carotid artery stenosis    Abnormal stress test    Severe 3 vessel CAD with angina pectoris (CMS/HCC)    Ischemic cardiomyopathy. LVEF 30%    S/P CABG x 3 on 20    Acute postop embolic left occipital CVA 20. Extensive in L PCA distribution but additional areas in R PCA distribution (CMS/HCC)    Bilateral carotid atherosclerosis with moderate left common carotid artery stenosis (50-60%)    Seizure    Generalized weakness    Acute UTI (urinary tract infection)    History of seizure    Stroke     Past Medical History:   Diagnosis Date    CHF (congestive heart failure)     Coronary artery disease involving native coronary artery of native heart with angina pectoris 2020    Diabetes mellitus     GERD (gastroesophageal reflux disease)     High cholesterol     Hyperlipidemia     Hypertension     Ischemic cardiomyopathy     Stroke     2013 speech and short term memory     TIA (transient ischemic attack)      Past Surgical History:   Procedure Laterality Date    CARDIAC CATHETERIZATION N/A 2020    Procedure: Left Heart Cath 79611 C19 @ [facility];  Surgeon: Dvaid Faulkner MD;   Location:  [facility];  Service: Cardiovascular;  Laterality: N/A;    CATARACT EXTRACTION Bilateral     COLONOSCOPY  2013    CORONARY ARTERY BYPASS GRAFT N/A 6/30/2020    Procedure: SHERI PER ANESTHESIA, MEDIAN STERNOTOMY, CORONARY ARTERY BYPASS GRAFTING X 3 , UTILIZING THE LEFT INTERNAL MAMMARY ARTERY AND EVH OF RIGHT GREATER SAPHENOUS VEIN;  Surgeon: Jerry Lozano MD;  Location: [facility] OR;  Service: Cardiothoracic;  Laterality: N/A;  LEG START - 0736  VEIN OUT - 0840  VEIN READY - 0850    EYE SURGERY      HYSTERECTOMY      total    INTERVENTIONAL RADIOLOGY PROCEDURE Bilateral 9/25/2017    Procedure: Carotid Cerebral Angiogram;  Surgeon: Maldonado Casas MD;  Location:  [facility];  Service:       General Information       Row Name 12/14/23 1323          OT Time and Intention    Document Type therapy note (daily note)  -     Mode of Treatment occupational therapy  -       Row Name 12/14/23 1323          General Information    Patient Profile Reviewed yes  -JR     Existing Precautions/Restrictions fall  delayed processing, minimal verbal responses  -     Barriers to Rehab medically complex;previous functional deficit;cognitive status  -       Row Name 12/14/23 1323          Cognition    Orientation Status (Cognition) oriented to;person  Pt nodding head yes/no, shook her head yes to name, otherwise not oriented, limited command following, decreased alert level this date  -       Row Name 12/14/23 1323          Safety Issues, Functional Mobility    Safety Issues Affecting Function (Mobility) awareness of need for assistance;insight into deficits/self-awareness;judgment;problem-solving;safety precaution awareness  -JR     Impairments Affecting Function (Mobility) balance;cognition;coordination;endurance/activity tolerance;grasp;strength;visual/perceptual  -JR     Cognitive Impairments, Mobility Safety/Performance attention;awareness, need for assistance;insight  into deficits/self-awareness;problem-solving/reasoning;safety precaution awareness  -               User Key  (r) = Recorded By, (t) = Taken By, (c) = Cosigned By      Initials Name Provider Type    Viviane Vanessa, OT Occupational Therapist                     Mobility/ADL's       Row Name 12/14/23 1325          Bed Mobility    Comment, (Bed Mobility) Defer-decreased alert level and limited command following  -       Row Name 12/14/23 1325          Activities of Daily Living    BADL Assessment/Intervention grooming  -       Row Name 12/14/23 1325          Grooming Assessment/Training    [place] Level (Grooming) wash face, hands;hair care, combing/brushing;moderate assist (50% patient effort)  -     Assistive Devices (Grooming) hand over hand  -     Position (Grooming) supine  -     Comment, (Grooming) Pt initailly able to initiate and wipe mouth, required assist to complete face washing task this date. pt required hand over hand to initiate brushing hair, required mod A to complete task. Later pt noted to be taking blanket and attempting to continue brushing hair. Provided brush and pt able to continue brushing hair. Pt only brushing R side of head this date.  -               User Key  (r) = Recorded By, (t) = Taken By, (c) = Cosigned By      Initials Name Provider Type    Viviane Vanessa, OT Occupational Therapist                   Obj/Interventions       Row Name 12/14/23 1327          Shoulder (Therapeutic Exercise)    Shoulder (Therapeutic Exercise) AAROM (active assistive range of motion)  -     Shoulder AAROM (Therapeutic Exercise) bilateral;flexion;extension;aBduction;aDduction;10 repetitions  -       Row Name 12/14/23 1327          Elbow/Forearm (Therapeutic Exercise)    Elbow/Forearm (Therapeutic Exercise) AAROM (active assistive range of motion)  -     Elbow/Forearm AAROM (Therapeutic Exercise) bilateral;flexion;extension;supination;pronation;10 repetitions  -        Row Name 12/14/23 1327          Wrist (Therapeutic Exercise)    Wrist (Therapeutic Exercise) AAROM (active assistive range of motion)  -     Wrist AAROM (Therapeutic Exercise) bilateral;flexion;extension;10 repetitions  -JR       Row Name 12/14/23 1327          Hand (Therapeutic Exercise)    Hand (Therapeutic Exercise) AROM (active range of motion)  -     Hand AROM/AAROM (Therapeutic Exercise) AAROM (active assistive range of motion);finger flexion;finger extension;10 repetitions  -JR       Row Name 12/14/23 1327          Motor Skills    Therapeutic Exercise shoulder;elbow/forearm;wrist;hand  -               User Key  (r) = Recorded By, (t) = Taken By, (c) = Cosigned By      Initials Name Provider Type     Viviane Rodriguez, OT Occupational Therapist                   Goals/Plan    No documentation.                  Clinical Impression       Row Name 12/14/23 1328          Pain Scale: FACES Pre/Post-Treatment    Pain: FACES Scale, Pretreatment 0-->no hurt  -     Posttreatment Pain Rating 0-->no hurt  -JR       Row Name 12/14/23 1328          Plan of Care Review    Plan of Care Reviewed With patient  -     Progress no change  -     Outcome Evaluation Limited treatment session as pt with decreased alert level, limited command following. Pt would benefit from continued skilled OT services as she remains below baseline with ADL's and mobility. Continue to recommend SNF if deemed medically necessary.  -JR       Row Name 12/14/23 1328          Therapy Plan Review/Discharge Plan (OT)    Anticipated Discharge Disposition (OT) skilled nursing facility  -JR       Row Name 12/14/23 1328          Vital Signs    Pre Systolic BP Rehab 108  -     Pre Treatment Diastolic BP 43  -     Post Systolic BP Rehab 106  -     Post Treatment Diastolic BP 48  -     Pretreatment Heart Rate (beats/min) 49  -JR     Posttreatment Heart Rate (beats/min) 53  -     Pre SpO2 (%) 96  -     O2 Delivery Pre Treatment room air   -JR     Post SpO2 (%) 96  -JR     O2 Delivery Post Treatment room air  -JR     Pre Patient Position Supine  -JR     Intra Patient Position Supine  -JR     Post Patient Position Supine  -JR       Row Name 12/14/23 1328          Positioning and Restraints    Pre-Treatment Position in bed  -JR     Post Treatment Position bed  -JR     In Bed notified nsg;supine;call light within reach;encouraged to call for assist;exit alarm on  -JR               User Key  (r) = Recorded By, (t) = Taken By, (c) = Cosigned By      Initials Name Provider Type    Viviane Vanessa OT Occupational Therapist                   Outcome Measures       Row Name 12/14/23 1330          How much help from another is currently needed...    Putting on and taking off regular lower body clothing? 1  -JR     Bathing (including washing, rinsing, and drying) 1  -JR     Toileting (which includes using toilet bed pan or urinal) 1  -JR     Putting on and taking off regular upper body clothing 1  -JR     Taking care of personal grooming (such as brushing teeth) 2  -JR     Eating meals 1  -JR     AM-PAC 6 Clicks Score (OT) 7  -JR       Row Name 12/14/23 1330          Functional Assessment    Outcome Measure Options AM-PAC 6 Clicks Daily Activity (OT)  -JR               User Key  (r) = Recorded By, (t) = Taken By, (c) = Cosigned By      Initials Name Provider Type    Viviane Vanessa OT Occupational Therapist                    Occupational Therapy Education       Title: PT OT SLP Therapies (In Progress)       Topic: Occupational Therapy (In Progress)       Point: ADL training (In Progress)       Description:   Instruct learner(s) on proper safety adaptation and remediation techniques during self care or transfers.   Instruct in proper use of assistive devices.                  Learning Progress Summary             Patient Acceptance, E, NR by  at 12/14/2023 1214    Acceptance, E,D, NR,NL by  at 12/12/2023 1015                         Point: Home  exercise program (In Progress)       Description:   Instruct learner(s) on appropriate technique for monitoring, assisting and/or progressing therapeutic exercises/activities.                  Learning Progress Summary             Patient Acceptance, E, NR by  at 12/14/2023 1214    Acceptance, E,D, NR,NL by  at 12/12/2023 1015                         Point: Precautions (In Progress)       Description:   Instruct learner(s) on prescribed precautions during self-care and functional transfers.                  Learning Progress Summary             Patient Acceptance, E,D, NR,NL by CS at 12/12/2023 1015                         Point: Body mechanics (Not Started)       Description:   Instruct learner(s) on proper positioning and spine alignment during self-care, functional mobility activities and/or exercises.                  Learner Progress:  Not documented in this visit.                              User Key       Initials Effective Dates Name Provider Type Discipline     02/03/23 -  Viviane Rodriguez, OT Occupational Therapist OT     06/16/21 -  Joseluis Khan, OT Occupational Therapist OT                  OT Recommendation and Plan     Plan of Care Review  Plan of Care Reviewed With: patient  Progress: no change  Outcome Evaluation: Limited treatment session as pt with decreased alert level, limited command following. Pt would benefit from continued skilled OT services as she remains below baseline with ADL's and mobility. Continue to recommend SNF if deemed medically necessary.     Time Calculation:         Time Calculation- OT       Row Name 12/14/23 1331             Time Calculation- OT    OT Start Time 1214  -      OT Received On 12/14/23  -         Timed Charges    32144 - OT Therapeutic Activity Minutes 16  -JR         Total Minutes    Timed Charges Total Minutes 16  -JR       Total Minutes 16  -JR                User Key  (r) = Recorded By, (t) = Taken By, (c) = Cosigned By      Initials Name  Provider Type     Viviane Rodriguez OT Occupational Therapist                  Therapy Charges for Today       Code Description Service Date Service Provider Modifiers Qty    [id number]  OT THERAPEUTIC ACT EA 15 MIN 12/14/2023 Viviane Rodriguez OT GO 1                 Viviane Rodriguez OT  12/14/2023

## 2023-12-14 NOTE — CASE MANAGEMENT/SOCIAL WORK
Continued Stay Note   Luis Daniel     Patient Name: Beryl Montoya  MRN: [id number]  Today's Date: 12/14/2023    Admit Date: 12/11/2023    Plan: [facility]   Discharge Plan       Row Name 12/14/23 1506       Plan    Plan [facility]    Patient/Family in Agreement with Plan yes    Plan Comments Spoke with Ms. Montoya's family at the bedside. Awaiting insurance approval from Ms. Montoya's [employer] [facility] Medicare for the rehab at [facility]. CM will continue to follow.    Final Discharge Disposition Code 62 - inpatient rehab facility                   Discharge Codes    No documentation.                 Expected Discharge Date and Time       Expected Discharge Date Expected Discharge Time    Dec 15, 2023               Katelyn Vaughan RN

## 2023-12-14 NOTE — PROGRESS NOTES
[facility] Medicine Services  PROGRESS NOTE    Patient Name: Beryl Montoya  : 1945  MRN: [id number]    Date of Admission: 2023  Primary Care Physician: Argelia Gamez MD    Subjective   Subjective     CC: f/u CVA    HPI: resting in bed. Opens eyes and answers questions, tired today      Objective   Objective     Vital Signs:   Temp:  [96.7 °F (35.9 °C)-98 °F (36.7 °C)] 97.8 °F (36.6 °C)  Heart Rate:  [66-90] 75  Resp:  [18] 18  BP: (116-194)/(60-99) 138/99     Physical Exam:  Constitutional: No acute distress, resting in bed  HENT: NCAT, mucous membranes moist  Respiratory: Clear to auscultation bilaterally, respiratory effort normal   Cardiovascular: RRR, no murmurs, rubs, or gallops  Gastrointestinal: Positive bowel sounds, soft, nontender, nondistended  Musculoskeletal: No bilateral ankle edema  Psychiatric: calm, cooperative  Neurologic: answers ROS, Ox 1, HAMILTON freely  Skin: No rashes     Results Reviewed:  LAB RESULTS:      Lab 23  1314   WBC 6.94   HEMOGLOBIN 15.3   HEMATOCRIT 45.6   PLATELETS 268   NEUTROS ABS 3.58   IMMATURE GRANS (ABS) 0.02   LYMPHS ABS 2.13   MONOS ABS 0.56   EOS ABS 0.56*   .9*         Lab 23  0541 23  1314   SODIUM 143 141   POTASSIUM 4.0 4.6   CHLORIDE 107 103   CO2 22.0 24.0   ANION GAP 14.0 14.0   BUN 13 14   CREATININE 0.77 0.98   EGFR 79.1 59.2*   GLUCOSE 190* 273*   CALCIUM 9.4 9.1   HEMOGLOBIN A1C 7.80*  --    TSH  --  4.620*         Lab 23  1314   TOTAL PROTEIN 7.2   ALBUMIN 3.8   GLOBULIN 3.4   ALT (SGPT) 23   AST (SGOT) 29   BILIRUBIN 0.9   ALK PHOS 122*         Lab 23  1314   PROBNP 1,308.0   HSTROP T 15*         Lab 23  0541   CHOLESTEROL 124   LDL CHOL 57   HDL CHOL 49   TRIGLYCERIDES 96             Brief Urine Lab Results  (Last result in the past 365 days)        Color   Clarity   Blood   Leuk Est   Nitrite   Protein   CREAT   Urine HCG        23 1523 Yellow    Cloudy   Trace   Moderate (2+)   Positive   100 mg/dL (2+)                   Microbiology Results Abnormal       None            EEG    Result Date: 12/12/2023  Reason for referral: 78 y.o.female with altered mental status Technical Summary:  A 19 channel digital EEG was performed using the international 10-20 placement system, including eye leads and EKG leads. Duration: 22 minutes Findings: The awake tracing shows diffuse low amplitude intermixed theta and alpha activity present symmetrically over both hemispheres.  EMG artifact and eye blink artifact is seen anteriorly.  A clear posterior rhythm is not seen.  Drowsiness is seen with mild slowing of the tracing but stage II sleep is not clearly seen.  No focal features or epileptiform activity are seen.  Hyperventilation and photic stimulation are not performed. Video: Available Technical quality: Superior EKG: Regular, 70 bpm SUMMARY: Normal EEG in the awake and lightly asleep states No focal features or epileptiform activity are seen     Impression: Normal study This report is transcribed using the Dragon dictation system.      FL Video Swallow With Speech Single Contrast    Result Date: 12/12/2023  FL VIDEO SWALLOW W SPEECH SINGLE-CONTRAST Date of Exam: 12/12/2023 12:03 PM EST Indication: dysphagia Comparison: None available. Technique:   The speech pathologist administered food and/or liquid mixed with barium to the patient with cine/video imaging.  Imaging assistance was provided to the speech pathologist and an image was saved. Fluoroscopic Time: 1 minute and 12 seconds Number of Images: 10 associated fluoroscopic loops were saved Findings: Please see speech therapy report for full details and recommendations.     Impression: Impression: Fluoroscopy provided for a modified barium swallow. Please see speech therapy report for full details and recommendations. Electronically Signed: Eduar Phillips MD  12/12/2023 4:18 PM EST  Workstation ID: [id number]    Adult  Transthoracic Echo Limited W/ Cont if Necessary Per Protocol    Result Date: 12/12/2023    Left ventricular ejection fraction appears to be 51 - 55%. Normal left ventricular cavity size and wall thickness noted. All left ventricular wall segments contract normally.   Normal right ventricular cavity size and systolic function noted.   The left atrial cavity is mildly dilated.   No aortic valve regurgitation or stenosis is present. The aortic valve exhibits sclerosis. The aortic valve appears trileaflet.   The mitral valve is structurally normal with no significant stenosis present. Trace to mild mitral valve regurgitation is present.   The tricuspid valve is structurally normal with no significant stenosis present. Physiologic tricuspid valve regurgitation is present. Estimated right ventricular systolic pressure from tricuspid regurgitation is normal (<35 mmHg).      Results for orders placed during the hospital encounter of 12/11/23    Adult Transthoracic Echo Limited W/ Cont if Necessary Per Protocol    Interpretation Summary    Left ventricular ejection fraction appears to be 51 - 55%. Normal left ventricular cavity size and wall thickness noted. All left ventricular wall segments contract normally.    Normal right ventricular cavity size and systolic function noted.    The left atrial cavity is mildly dilated.    No aortic valve regurgitation or stenosis is present. The aortic valve exhibits sclerosis. The aortic valve appears trileaflet.    The mitral valve is structurally normal with no significant stenosis present. Trace to mild mitral valve regurgitation is present.    The tricuspid valve is structurally normal with no significant stenosis present. Physiologic tricuspid valve regurgitation is present. Estimated right ventricular systolic pressure from tricuspid regurgitation is normal (<35 mmHg).      Current medications:  Scheduled Meds:aspirin, 81 mg, Oral, Daily   Or  aspirin, 300 mg, Rectal,  Daily  atorvastatin, 80 mg, Oral, Nightly  carvedilol, 25 mg, Oral, BID  cefTRIAXone, 2,000 mg, Intravenous, Q24H  clopidogrel, 75 mg, Oral, Daily  furosemide, 40 mg, Oral, Daily  insulin detemir, 1-200 Units, Subcutaneous, Nightly - Glucommander  insulin lispro, 1-200 Units, Subcutaneous, 4x Daily With Meals & Nightly  isosorbide mononitrate, 30 mg, Oral, Daily  levETIRAcetam, 500 mg, Oral, Q12H  levothyroxine, 25 mcg, Oral, QAM  melatonin, 5 mg, Oral, Nightly  nystatin, 5 mL, Swish & Spit, 4x Daily  sacubitril-valsartan, 1 tablet, Oral, Q12H  sodium chloride, 10 mL, Intravenous, Q12H  traZODone, 50 mg, Oral, Nightly      Continuous Infusions:   PRN Meds:.  acetaminophen **OR** acetaminophen **OR** acetaminophen    senna-docusate sodium **AND** polyethylene glycol **AND** bisacodyl **AND** bisacodyl    Calcium Replacement - Follow Nurse / BPA Driven Protocol    dextrose    dextrose    glucagon (human recombinant)    insulin lispro    Magnesium Standard Dose Replacement - Follow Nurse / BPA Driven Protocol    ondansetron **OR** ondansetron    Phosphorus Replacement - Follow Nurse / BPA Driven Protocol    Potassium Replacement - Follow Nurse / BPA Driven Protocol    sodium chloride    sodium chloride    Assessment & Plan   Assessment & Plan     Active Hospital Problems    Diagnosis  POA    **Generalized weakness [R53.1]  Yes    Stroke [I63.9]  Yes    Acute UTI (urinary tract infection) [N39.0]  Yes    History of seizure [Z87.898]  Yes    Ischemic cardiomyopathy. LVEF 30% [I25.5]  Yes    Essential hypertension [I10]  Yes    History of L MCA CVA [Z86.73]  Not Applicable    Type 2 diabetes mellitus with complication, with long-term current use of insulin [E11.8, Z79.4]  Not Applicable      Resolved Hospital Problems   No resolved problems to display.        Brief Hospital Course to date:  Beryl Montoya is a 78 y.o. female w CAD s/p CABG, CVA w residual speech deficits, wheelchair bound at baseline who presented  with left arm weakness x 4-5 days. Found to have late acute/subacute right karen CVA, microhemorrhage likely 2/2 uncontrolled HTN.      Late acute/subacute right karen CVA presenting w LUE weakness  -DAPT, statin. Work-up per stroke team: Goal -160, improved with increase coreg  -PT/OT follows. Plan rehab  -follow up with stroke/ neuro 4-6 weeks  -slp: jonathan NTL     Sz disorder - home keppra, EEG negative sz activity  E coli UTI - IV ceftriaxone, day4; pansusceptible   CAD s/p CABG - dapt, coreg, imdur  DMII, insulin dept - per glucommander- change from q 6hr ssi to achs and increase parameters for elevated glucose  HTN - normal goals as above  BMI 34      Expected Discharge Location and Transportation:  rehab  Expected Discharge   Expected Discharge Date: 12/15/2023; Expected Discharge Time:      DVT prophylaxis:  Mechanical DVT prophylaxis orders are present.     AM-PAC 6 Clicks Score (PT): 9 (12/13/23 0800)    CODE STATUS:   Code Status and Medical Interventions:   Ordered at: 12/11/23 1626     Medical Intervention Limits:    NO intubation (DNI)     Level Of Support Discussed With:    Next of Kin (If No Surrogate)     Code Status (Patient has no pulse and is not breathing):    No CPR (Do Not Attempt to Resuscitate)     Medical Interventions (Patient has pulse or is breathing):    Limited Support       Dianna Alejandra, APRN  12/14/23

## 2023-12-14 NOTE — PLAN OF CARE
Goal Outcome Evaluation:  Plan of Care Reviewed With: patient        Progress: no change  Outcome Evaluation: PT LIMITED BY DECRASED LEVEL OF ALERTNESS REQUIRING MAX VERBAL/TACTILE CUES. PT NODDING HEAD YES/NO AND FOLLOWING COMMANDS WITH MAX CUES TO ASSIST WITH BED MOBILITY AND B UE/LE THER EX. PT ABLE TO TAKE RESISTANCE WITH UE PUSH/PULL AND HEEL SLIDES. RECOMMEND SNF AT D/C.      Anticipated Discharge Disposition (PT): skilled nursing facility

## 2023-12-14 NOTE — PLAN OF CARE
Goal Outcome Evaluation:  Plan of Care Reviewed With: patient        Progress: no change  Outcome Evaluation: Limited treatment session as pt with decreased alert level, limited command following. Pt would benefit from continued skilled OT services as she remains below baseline with ADL's and mobility. Continue to recommend SNF if deemed medically necessary.      Anticipated Discharge Disposition (OT): skilled nursing facility

## 2023-12-15 LAB
ANION GAP SERPL CALCULATED.3IONS-SCNC: 12 MMOL/L (ref 5–15)
BUN SERPL-MCNC: 26 MG/DL (ref 8–23)
BUN/CREAT SERPL: 18.4 (ref 7–25)
CALCIUM SPEC-SCNC: 8.7 MG/DL (ref 8.6–10.5)
CHLORIDE SERPL-SCNC: 106 MMOL/L (ref 98–107)
CO2 SERPL-SCNC: 25 MMOL/L (ref 22–29)
CREAT SERPL-MCNC: 1.41 MG/DL (ref 0.57–1)
DEPRECATED RDW RBC AUTO: 49.8 FL (ref 37–54)
EGFRCR SERPLBLD CKD-EPI 2021: 38.3 ML/MIN/1.73
ERYTHROCYTE [DISTWIDTH] IN BLOOD BY AUTOMATED COUNT: 13.2 % (ref 12.3–15.4)
GLUCOSE BLDC GLUCOMTR-MCNC: 106 MG/DL (ref 70–130)
GLUCOSE BLDC GLUCOMTR-MCNC: 122 MG/DL (ref 70–130)
GLUCOSE BLDC GLUCOMTR-MCNC: 194 MG/DL (ref 70–130)
GLUCOSE BLDC GLUCOMTR-MCNC: 237 MG/DL (ref 70–130)
GLUCOSE SERPL-MCNC: 266 MG/DL (ref 65–99)
HCT VFR BLD AUTO: 45.3 % (ref 34–46.6)
HGB BLD-MCNC: 15.1 G/DL (ref 12–15.9)
MCH RBC QN AUTO: 34.2 PG (ref 26.6–33)
MCHC RBC AUTO-ENTMCNC: 33.3 G/DL (ref 31.5–35.7)
MCV RBC AUTO: 102.7 FL (ref 79–97)
PLATELET # BLD AUTO: 219 10*3/MM3 (ref 140–450)
PMV BLD AUTO: 10 FL (ref 6–12)
POTASSIUM SERPL-SCNC: 4.1 MMOL/L (ref 3.5–5.2)
RBC # BLD AUTO: 4.41 10*6/MM3 (ref 3.77–5.28)
SODIUM SERPL-SCNC: 143 MMOL/L (ref 136–145)
WBC NRBC COR # BLD AUTO: 7.7 10*3/MM3 (ref 3.4–10.8)

## 2023-12-15 PROCEDURE — 80048 BASIC METABOLIC PNL TOTAL CA: CPT | Performed by: NURSE PRACTITIONER

## 2023-12-15 PROCEDURE — 63710000001 INSULIN DETEMIR PER 5 UNITS: Performed by: NURSE PRACTITIONER

## 2023-12-15 PROCEDURE — 92507 TX SP LANG VOICE COMM INDIV: CPT

## 2023-12-15 PROCEDURE — 82948 REAGENT STRIP/BLOOD GLUCOSE: CPT

## 2023-12-15 PROCEDURE — 85027 COMPLETE CBC AUTOMATED: CPT | Performed by: NURSE PRACTITIONER

## 2023-12-15 PROCEDURE — 99232 SBSQ HOSP IP/OBS MODERATE 35: CPT | Performed by: NURSE PRACTITIONER

## 2023-12-15 PROCEDURE — 25010000002 CEFTRIAXONE PER 250 MG: Performed by: INTERNAL MEDICINE

## 2023-12-15 PROCEDURE — 63710000001 INSULIN LISPRO (HUMAN) PER 5 UNITS: Performed by: INTERNAL MEDICINE

## 2023-12-15 PROCEDURE — 63710000001 INSULIN LISPRO (HUMAN) PER 5 UNITS: Performed by: NURSE PRACTITIONER

## 2023-12-15 RX ORDER — INSULIN LISPRO 100 [IU]/ML
3-14 INJECTION, SOLUTION INTRAVENOUS; SUBCUTANEOUS
Status: DISCONTINUED | OUTPATIENT
Start: 2023-12-15 | End: 2023-12-15

## 2023-12-15 RX ORDER — INSULIN LISPRO 100 [IU]/ML
3 INJECTION, SOLUTION INTRAVENOUS; SUBCUTANEOUS
Status: DISCONTINUED | OUTPATIENT
Start: 2023-12-15 | End: 2023-12-21 | Stop reason: HOSPADM

## 2023-12-15 RX ORDER — INSULIN LISPRO 100 [IU]/ML
2-7 INJECTION, SOLUTION INTRAVENOUS; SUBCUTANEOUS
Status: DISCONTINUED | OUTPATIENT
Start: 2023-12-15 | End: 2023-12-21 | Stop reason: HOSPADM

## 2023-12-15 RX ADMIN — CEFTRIAXONE 2000 MG: 2 INJECTION, POWDER, FOR SOLUTION INTRAMUSCULAR; INTRAVENOUS at 17:33

## 2023-12-15 RX ADMIN — LEVOTHYROXINE SODIUM 25 MCG: 25 TABLET ORAL at 08:51

## 2023-12-15 RX ADMIN — SACUBITRIL AND VALSARTAN 1 TABLET: 49; 51 TABLET, FILM COATED ORAL at 08:51

## 2023-12-15 RX ADMIN — INSULIN LISPRO 5 UNITS: 100 INJECTION, SOLUTION INTRAVENOUS; SUBCUTANEOUS at 08:50

## 2023-12-15 RX ADMIN — INSULIN LISPRO 3 UNITS: 100 INJECTION, SOLUTION INTRAVENOUS; SUBCUTANEOUS at 12:36

## 2023-12-15 RX ADMIN — INSULIN LISPRO 2 UNITS: 100 INJECTION, SOLUTION INTRAVENOUS; SUBCUTANEOUS at 12:36

## 2023-12-15 RX ADMIN — TRAZODONE HYDROCHLORIDE 50 MG: 50 TABLET ORAL at 23:15

## 2023-12-15 RX ADMIN — NYSTATIN 500000 UNITS: 100000 SUSPENSION ORAL at 23:15

## 2023-12-15 RX ADMIN — INSULIN DETEMIR 15 UNITS: 100 INJECTION, SOLUTION SUBCUTANEOUS at 12:36

## 2023-12-15 RX ADMIN — CARVEDILOL 25 MG: 12.5 TABLET, FILM COATED ORAL at 08:51

## 2023-12-15 RX ADMIN — CLOPIDOGREL BISULFATE 75 MG: 75 TABLET ORAL at 08:51

## 2023-12-15 RX ADMIN — SACUBITRIL AND VALSARTAN 1 TABLET: 49; 51 TABLET, FILM COATED ORAL at 23:15

## 2023-12-15 RX ADMIN — LEVETIRACETAM 500 MG: 100 SOLUTION ORAL at 08:51

## 2023-12-15 RX ADMIN — NYSTATIN 500000 UNITS: 100000 SUSPENSION ORAL at 12:36

## 2023-12-15 RX ADMIN — Medication 10 ML: at 08:52

## 2023-12-15 RX ADMIN — ATORVASTATIN CALCIUM 80 MG: 40 TABLET, FILM COATED ORAL at 23:15

## 2023-12-15 RX ADMIN — NYSTATIN 500000 UNITS: 100000 SUSPENSION ORAL at 08:52

## 2023-12-15 RX ADMIN — FUROSEMIDE 40 MG: 40 TABLET ORAL at 08:51

## 2023-12-15 RX ADMIN — NYSTATIN 500000 UNITS: 100000 SUSPENSION ORAL at 17:33

## 2023-12-15 RX ADMIN — LEVETIRACETAM 500 MG: 100 SOLUTION ORAL at 23:14

## 2023-12-15 RX ADMIN — CARVEDILOL 25 MG: 12.5 TABLET, FILM COATED ORAL at 23:15

## 2023-12-15 RX ADMIN — INSULIN LISPRO 3 UNITS: 100 INJECTION, SOLUTION INTRAVENOUS; SUBCUTANEOUS at 17:33

## 2023-12-15 RX ADMIN — Medication 5 MG: at 23:15

## 2023-12-15 RX ADMIN — ISOSORBIDE MONONITRATE 30 MG: 30 TABLET, EXTENDED RELEASE ORAL at 08:50

## 2023-12-15 RX ADMIN — ASPIRIN 81 MG CHEWABLE TABLET 81 MG: 81 TABLET CHEWABLE at 08:50

## 2023-12-15 RX ADMIN — INSULIN DETEMIR 15 UNITS: 100 INJECTION, SOLUTION SUBCUTANEOUS at 23:14

## 2023-12-15 NOTE — CASE MANAGEMENT/SOCIAL WORK
Continued Stay Note   Luis Daniel     Patient Name: Beryl Montoya  MRN: [id number]  Today's Date: 12/15/2023    Admit Date: 12/11/2023    Plan: SNF   Discharge Plan       Row Name 12/15/23 1423       Plan    Plan SNF    Patient/Family in Agreement with Plan yes    Plan Comments Spoke with Ms. Montoya's family at the bedside. Ms. Montoya's insurance denied approval for acute level of care at [facility]. Discussed with family SNF level of care and facilities. Patient choice list given to family. The family wants to discuss and will let me know when they decide on a facility. CM will continue to follow up.    Final Discharge Disposition Code 03 - skilled nursing facility (SNF)                   Discharge Codes    No documentation.                 Expected Discharge Date and Time       Expected Discharge Date Expected Discharge Time    Dec 15, 2023               Katelyn Vaughan RN

## 2023-12-15 NOTE — PROGRESS NOTES
[facility] Medicine Services  PROGRESS NOTE    Patient Name: Beryl Montoya  : 1945  MRN: [id number]    Date of Admission: 2023  Primary Care Physician: Argelia Gamez MD    Subjective   Subjective     CC: f/u CVA    HPI:   Patient sound asleep when entering room and attempted to awaken her but she did not wake up.  Apparently did not sleep well the night before and was complaining of being very tired on .  Vital signs are all stable.    Objective   Objective     Vital Signs:   Temp:  [97.5 °F (36.4 °C)-98.5 °F (36.9 °C)] 97.8 °F (36.6 °C)  Heart Rate:  [50-87] 50  Resp:  [16-18] 18  BP: (108-160)/(43-82) 135/52     Physical Exam:  Constitutional: Asleep obese female lying in bed in NAD  Respiratory: Nonlabored, symmetrical chest expansion, CTAB, 98% RA  Cardiovascular: RRR, no M/R/G, +DP pulses bilaterally  Musculoskeletal: no LE edema bilaterally  Neurologic: MERRITT d/t sleep  Skin: No rashes on exposed skin  Psychiatric: MERRITT d/t sleep    Results Reviewed:  LAB RESULTS:      Lab 12/15/23  0514 23  1314   WBC 7.70 6.94   HEMOGLOBIN 15.1 15.3   HEMATOCRIT 45.3 45.6   PLATELETS 219 268   NEUTROS ABS  --  3.58   IMMATURE GRANS (ABS)  --  0.02   LYMPHS ABS  --  2.13   MONOS ABS  --  0.56   EOS ABS  --  0.56*   .7* 100.9*         Lab 12/15/23  0514 23  0541 23  1314   SODIUM 143 143 141   POTASSIUM 4.1 4.0 4.6   CHLORIDE 106 107 103   CO2 25.0 22.0 24.0   ANION GAP 12.0 14.0 14.0   BUN 26* 13 14   CREATININE 1.41* 0.77 0.98   EGFR 38.3* 79.1 59.2*   GLUCOSE 266* 190* 273*   CALCIUM 8.7 9.4 9.1   HEMOGLOBIN A1C  --  7.80*  --    TSH  --   --  4.620*         Lab 23  1314   TOTAL PROTEIN 7.2   ALBUMIN 3.8   GLOBULIN 3.4   ALT (SGPT) 23   AST (SGOT) 29   BILIRUBIN 0.9   ALK PHOS 122*         Lab 23  1314   PROBNP 1,308.0   HSTROP T 15*         Lab 23  0541   CHOLESTEROL 124   LDL CHOL 57   HDL CHOL 49   TRIGLYCERIDES 96              Brief Urine Lab Results  (Last result in the past 365 days)        Color   Clarity   Blood   Leuk Est   Nitrite   Protein   CREAT   Urine HCG        12/11/23 1523 Yellow   Cloudy   Trace   Moderate (2+)   Positive   100 mg/dL (2+)                   Microbiology Results Abnormal       None            No radiology results from the last 24 hrs    Results for orders placed during the hospital encounter of 12/11/23    Adult Transthoracic Echo Limited W/ Cont if Necessary Per Protocol    Interpretation Summary    Left ventricular ejection fraction appears to be 51 - 55%. Normal left ventricular cavity size and wall thickness noted. All left ventricular wall segments contract normally.    Normal right ventricular cavity size and systolic function noted.    The left atrial cavity is mildly dilated.    No aortic valve regurgitation or stenosis is present. The aortic valve exhibits sclerosis. The aortic valve appears trileaflet.    The mitral valve is structurally normal with no significant stenosis present. Trace to mild mitral valve regurgitation is present.    The tricuspid valve is structurally normal with no significant stenosis present. Physiologic tricuspid valve regurgitation is present. Estimated right ventricular systolic pressure from tricuspid regurgitation is normal (<35 mmHg).      Current medications:  Scheduled Meds:aspirin, 81 mg, Oral, Daily   Or  aspirin, 300 mg, Rectal, Daily  atorvastatin, 80 mg, Oral, Nightly  carvedilol, 25 mg, Oral, BID  cefTRIAXone, 2,000 mg, Intravenous, Q24H  clopidogrel, 75 mg, Oral, Daily  furosemide, 40 mg, Oral, Daily  insulin detemir, 1-200 Units, Subcutaneous, Nightly - Glucommander  insulin lispro, 1-200 Units, Subcutaneous, 4x Daily With Meals & Nightly  isosorbide mononitrate, 30 mg, Oral, Daily  levETIRAcetam, 500 mg, Oral, Q12H  levothyroxine, 25 mcg, Oral, QAM  melatonin, 5 mg, Oral, Nightly  nystatin, 5 mL, Swish & Spit, 4x Daily  sacubitril-valsartan, 1  tablet, Oral, Q12H  sodium chloride, 10 mL, Intravenous, Q12H  traZODone, 50 mg, Oral, Nightly      Continuous Infusions:   PRN Meds:.  acetaminophen **OR** acetaminophen **OR** acetaminophen    senna-docusate sodium **AND** polyethylene glycol **AND** bisacodyl **AND** bisacodyl    Calcium Replacement - Follow Nurse / BPA Driven Protocol    dextrose    dextrose    glucagon (human recombinant)    insulin lispro    Magnesium Standard Dose Replacement - Follow Nurse / BPA Driven Protocol    ondansetron **OR** ondansetron    Phosphorus Replacement - Follow Nurse / BPA Driven Protocol    Potassium Replacement - Follow Nurse / BPA Driven Protocol    sodium chloride    sodium chloride    Assessment & Plan   Assessment & Plan     Active Hospital Problems    Diagnosis  POA    **Generalized weakness [R53.1]  Yes    Stroke [I63.9]  Yes    Acute UTI (urinary tract infection) [N39.0]  Yes    History of seizure [Z87.898]  Yes    Ischemic cardiomyopathy. LVEF 30% [I25.5]  Yes    Essential hypertension [I10]  Yes    History of L MCA CVA [Z86.73]  Not Applicable    Type 2 diabetes mellitus with complication, with long-term current use of insulin [E11.8, Z79.4]  Not Applicable      Resolved Hospital Problems   No resolved problems to display.        Brief Hospital Course to date:  Beryl Montoya is a 78 y.o. female w CAD s/p CABG, CVA w residual speech deficits, wheelchair bound at baseline who presented with left arm weakness x 4-5 days. Found to have late acute/subacute right karen CVA, microhemorrhage likely 2/2 uncontrolled HTN.      Late acute/subacute right karen CVA presenting w LUE weakness  -DAPT, statin. Work-up per stroke team: Goal -160, improved with increase coreg  -PT/OT follows. 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     AM-PAC 6 Clicks Score (PT): 8 (12/14/23 1650)    CODE STATUS:   Code Status and Medical Interventions:   Ordered at: 12/11/23 1626     Medical Intervention Limits:    NO intubation (DNI)     Level Of Support Discussed With:    Next of Kin (If No Surrogate)     Code Status (Patient has no pulse and is not breathing):    No CPR (Do Not Attempt to Resuscitate)     Medical Interventions (Patient has pulse or is breathing):    Limited Support       Nilam Sandoval, APRN  12/15/23

## 2023-12-15 NOTE — PLAN OF CARE
Goal Outcome Evaluation:  Plan of Care Reviewed With: patient        Progress: no change     SLP treatment completed. Will continue to address dysphagia. Please see note for further details and recommendations.      Anticipated Discharge Disposition (SLP): skilled nursing facility

## 2023-12-15 NOTE — THERAPY TREATMENT NOTE
Acute Care - Speech Language Pathology   Swallow Treatment Note [facility]     Patient Name: Beryl Montoya  : 1945  MRN: [id number]  Today's Date: 12/15/2023               Admit Date: 2023    Visit Dx:     ICD-10-CM ICD-9-CM   1. Cerebrovascular accident (CVA), unspecified mechanism  I63.9 434.91   2. Generalized weakness  R53.1 780.79   3. Expressive aphasia  R47.01 784.3   4. Hypertension, unspecified type  I10 401.9   5. Dysphagia, unspecified type  R13.10 787.20   6. Cognitive communication deficit  R41.841 799.52   7. Dysarthria  R47.1 784.51   8. Cerebrovascular accident (CVA) due to thrombosis of precerebral artery  I63.00 433.91     Patient Active Problem List   Diagnosis    History of L MCA CVA    Essential hypertension    Type 2 diabetes mellitus with complication, with long-term current use of insulin    GERD (gastroesophageal reflux disease)    Bilateral carotid artery stenosis    Abnormal stress test    Severe 3 vessel CAD with angina pectoris (CMS/HCC)    Ischemic cardiomyopathy. LVEF 30%    S/P CABG x 3 on 20    Acute postop embolic left occipital CVA 20. Extensive in L PCA distribution but additional areas in R PCA distribution (CMS/HCC)    Bilateral carotid atherosclerosis with moderate left common carotid artery stenosis (50-60%)    Seizure    Generalized weakness    Acute UTI (urinary tract infection)    History of seizure    Stroke     Past Medical History:   Diagnosis Date    CHF (congestive heart failure)     Coronary artery disease involving native coronary artery of native heart with angina pectoris 2020    Diabetes mellitus     GERD (gastroesophageal reflux disease)     High cholesterol     Hyperlipidemia     Hypertension     Ischemic cardiomyopathy     Stroke     2013 speech and short term memory     TIA (transient ischemic attack)      Past Surgical History:   Procedure Laterality Date    CARDIAC CATHETERIZATION N/A 2020    Procedure: Left Heart Cath  44447 C19 @  [facility];  Surgeon: David Faulkner MD;  Location:  [facility] CATH INVASIVE [facility]ATION;  Service: Cardiovascular;  Laterality: N/A;    CATARACT EXTRACTION Bilateral     COLONOSCOPY  2013    CORONARY ARTERY BYPASS GRAFT N/A 6/30/2020    Procedure: SHERI PER ANESTHESIA, MEDIAN STERNOTOMY, CORONARY ARTERY BYPASS GRAFTING X 3 , UTILIZING THE LEFT INTERNAL MAMMARY ARTERY AND EVH OF RIGHT GREATER SAPHENOUS VEIN;  Surgeon: Jerry Lozano MD;  Location:  [facility] OR;  Service: Cardiothoracic;  Laterality: N/A;  LEG START - 0736  VEIN OUT - 0840  VEIN READY - 0850    EYE SURGERY      HYSTERECTOMY      total    INTERVENTIONAL RADIOLOGY PROCEDURE Bilateral 9/25/2017    Procedure: Carotid Cerebral Angiogram;  Surgeon: Maldonado Casas MD;  Location:  [facility] CATH INVASIVE LOCATION;  Service:        SLP Recommendation and Plan     SLP Diet Recommendation: puree, no mixed consistencies, nectar thick liquids (12/15/23 1600)  Recommended Precautions and Strategies: upright posture during/after eating, 1:1 supervision, assist with feeding, small bites of food and sips of liquid, no straw, alternate between small bites of food and sips of liquid, check mouth frequently for oral residue/pocketing (12/15/23 1600)  SLP Rec. for Method of Medication Administration: meds crushed, with puree, as tolerated (12/15/23 1600)     Monitor for Signs of Aspiration: notify SLP if any concerns (12/15/23 1600)        Anticipated Discharge Disposition (SLP): skilled nursing facility (12/15/23 1600)     Therapy Frequency (Swallow): 5 days per week (12/15/23 1600)  Predicted Duration Therapy Intervention (Days): until discharge (12/15/23 1600)  Oral Care Recommendations: Oral Care BID/PRN, Suction toothbrush (12/15/23 1600)        Daily Summary of Progress (SLP): progress towards functional goals is fair (12/15/23 1600)               Treatment Assessment (SLP): toleration of diet, continued, pharyngeal dysphagia (12/15/23  1600)  Treatment Assessment Comments (SLP): Pt seen for tx, lethargic. Difficulty following directions for traditional dysphagia tx 2/2 mental status. No overt s/sx distress or dysphagia w nectar liquids. (12/15/23 1600)  Plan for Continued Treatment (SLP): continue treatment per plan of care (12/15/23 1600)       Oral Care Recommendations: Oral Care BID/PRN, Suction toothbrush (12/15/23 1600)    Plan of Care Reviewed With: patient  Progress: no change      SWALLOW EVALUATION (last 72 hours)       SLP Adult Swallow Evaluation       Row Name 12/15/23 1600 12/13/23 6115                Rehab Evaluation    Document Type therapy note (daily note)  -RS therapy note (daily note)  -       Subjective Information no complaints  -RS no complaints  -       Patient Observations lethargic  -RS lethargic  -       Patient/Family/Caregiver Comments/Observations no family present  -RS Family present  -       Patient Effort fair  -RS good  -MH       Symptoms Noted During/After Treatment none  -RS none  -MH       Oral Care swabbed with antiseptic solution  -RS --          Pain    Additional Documentation Pain Scale: FACES Pre/Post-Treatment (Group)  -RS Pain Scale: FACES Pre/Post-Treatment (Group)  -          Pain Scale: FACES Pre/Post-Treatment    Pain: FACES Scale, Pretreatment 0-->no hurt  -RS 0-->no hurt  -       Posttreatment Pain Rating 0-->no hurt  -RS 0-->no hurt  -          SLP Treatment Clinical Impressions    Treatment Assessment (SLP) toleration of diet;continued;pharyngeal dysphagia  -RS toleration of diet;suspected;pharyngeal dysphagia;continued;cognitive-linguistic disorder;dysarthria  -       Treatment Assessment Comments (SLP) Pt seen for tx, lethargic. Difficulty following directions for traditional dysphagia tx 2/2 mental status. No overt s/sx distress or dysphagia w nectar liquids.  -RS Pt lethargic, cues required throughout for pt remain awake. No s/s of aspiration w/ NTL or pureed trials. Throat  clear & mutliple swallows w/ thin liquid trials. Reviewed compensations w/ pt & family. Pt w/ increased difficulty completing exercises requiring multiple steps, focsed on CTAR & hard effortful swallow. Provided handout & encouraged family to assist w/ practice. SLP will cont to f/u for tx as appropriate  -       Daily Summary of Progress (SLP) progress towards functional goals is fair  -RS progress toward functional goals as expected  -       Barriers to Overall Progress (SLP) Lethargy;Cognitive status  -RS Lethargy;Cognitive status  -       Plan for Continued Treatment (SLP) continue treatment per plan of care  -RS continue treatment per plan of care  -       Care Plan Review evaluation/treatment results reviewed  - care plan/treatment goals reviewed  -          Recommendations    Therapy Frequency (Swallow) 5 days per week  -RS 5 days per week  -       Predicted Duration Therapy Intervention (Days) until discharge  -RS until discharge  -       SLP Diet Recommendation puree;no mixed consistencies;nectar thick liquids  - puree;no mixed consistencies;nectar thick liquids  -       Recommended Precautions and Strategies upright posture during/after eating;1:1 supervision;assist with feeding;small bites of food and sips of liquid;no straw;alternate between small bites of food and sips of liquid;check mouth frequently for oral residue/pocketing  - upright posture during/after eating;1:1 supervision;assist with feeding;small bites of food and sips of liquid;no straw;alternate between small bites of food and sips of liquid;check mouth frequently for oral residue/pocketing  -       Oral Care Recommendations Oral Care BID/PRN;Suction toothbrush  - Oral Care BID/PRN;Suction toothbrush  -       SLP Rec. for Method of Medication Administration meds crushed;with puree;as tolerated  - meds crushed;with puree;as tolerated  -       Monitor for Signs of Aspiration notify SLP if any concerns  -  notify SLP if any concerns  -MH       Anticipated Discharge Disposition (SLP) skilled nursing facility  -RS skilled nursing [facility]  -          Swallow Goals (SLP)    Swallow LTGs Patient will demonstrate functional swallow for  -RS --       Swallow STGs diet tolerance goal selection (SLP);lingual strengthening goal selection (SLP);pharyngeal strengthening exercise goal selection (SLP)  -RS --       Diet Tolerance Goal Selection (SLP) Patient will tolerate trials of;Patient will tolerate therapeutic trials of  -RS --       Lingual Strengthening Goal Selection (SLP) lingual strengthening, SLP goal 1  -RS --       Pharyngeal Strengthening Exercise Goal Selection (SLP) pharyngeal strengthening exercise, SLP goal 1  -RS --          (LTG) Patient will demonstrate functional swallow for    Diet Texture (Demonstrate functional swallow) soft to chew (whole) textures  -RS --       Liquid viscosity (Demonstrate functional swallow) thin liquids  -RS --       [place] (Demonstrate functional swallow) with 1:1 assist/ supervision  -RS --       Time Frame (Demonstrate functional swallow) by discharge  -RS --       Progress/Outcomes (Demonstrate functional swallow) goal ongoing  -RS --          (STG) Patient will tolerate trials of    Consistencies Trialed (Tolerate trials) pureed textures;nectar/ mildly thick liquids  -RS --       Desired Outcome (Tolerate trials) without signs/symptoms of aspiration;with adequate oral prep/transit/clearance;with use of compensatory strategies (see comments)  -RS --       [place] (Tolerate trials) with 1:1 assist/ supervision  -RS --       Time Frame (Tolerate trials) by discharge  -RS --       Progress/Outcomes (Tolerate trials) continuing progress toward goal  -RS --       Comment (Tolerate trials) No overt s/s of aspiration w/ trials of NTL or pureed consistencies  -RS --          (STG) Patient will tolerate therapeutic trials of    Consistencies Trialed (Tolerate therapeutic  trials) thin liquids  -RS --       Desired Outcome (Tolerate therapeutic trials) without signs/symptoms of aspiration;without signs of distress  -RS --       [place] (Tolerate therapeutic trials) with 1:1 assist/ supervision  -RS --       Time Frame (Tolerate therapeutic trials) by discharge  -RS --       Progress/Outcomes (Tolerate therapeutic trials) goal ongoing  -RS --          (STG) Lingual Strengthening Goal 1 (SLP)    Activity (Lingual Strengthening Goal 1, SLP) increase tongue back strength;increase lingual tone/sensation/control/coordination/movement  -RS --       Increase Lingual Tone/Sensation/Control/Coordination/Movement lingual resistance exercises;swallow trials  -RS --       Increase Tongue Back Strength lingual resistance exercises  -RS --       [place]/Accuracy (Lingual Strengthening Goal 1, SLP) with maximum cues (25-49% accuracy)  -RS --       Time Frame (Lingual Strengthening Goal 1, SLP) short term goal (STG)  -RS --       Progress/Outcomes (Lingual Strengthening Goal 1, SLP) progress slower than expected  -RS --       Comment (Lingual Strengthening Goal 1, SLP) Really  -RS --          (STG) Pharyngeal Strengthening Exercise Goal 1 (SLP)    Activity (Pharyngeal Strengthening Goal 1, SLP) increase timing;increase superior movement of the hyolaryngeal complex;increase anterior movement of the hyolaryngeal complex;increase squeeze/positive pressure generation  -RS --       Increase Timing prepping - 3 second prep or suck swallow or 3-step swallow  -RS --       Increase Superior Movement of the Hyolaryngeal Complex falsetto  -RS --       Increase Anterior Movement of the Hyolaryngeal Complex chin tuck against resistance (CTAR)  -RS --       Increase Squeeze/Positive Pressure Generation hard effortful swallow  -RS --       [place]/Accuracy (Pharyngeal Strengthening Goal 1, SLP) with maximum cues (25-49% accuracy)  -RS --       Time Frame (Pharyngeal Strengthening Goal 1, SLP) short  term goal (STG)  -RS --       Progress/Outcomes (Pharyngeal Strengthening Goal 1, SLP) progress slower than expected  -RS --       Comment (Pharyngeal Strengthening Goal 1, SLP) unable to follow directions to accurately complete exs  -RS --                 User Key  (r) = Recorded By, (t) = Taken By, (c) = Cosigned By      Initials Name Effective Dates     Brooklynn Penny, MS CCC-SLP 05/12/23 -     RS Da Freeman MS CCC-SLP 09/14/23 -                     EDUCATION  The patient has been educated in the following areas:   Dysphagia (Swallowing Impairment).        SLP GOALS       Row Name 12/15/23 1600 12/13/23 1550          (LTG) Patient will demonstrate functional swallow for    Diet Texture (Demonstrate functional swallow) soft to chew (whole) textures  -RS soft to chew (whole) textures  -     Liquid viscosity (Demonstrate functional swallow) thin liquids  -RS thin liquids  -     [place] (Demonstrate functional swallow) with 1:1 assist/ supervision  -RS with 1:1 assist/ supervision  -     Time Frame (Demonstrate functional swallow) by discharge  -RS by discharge  -     Progress/Outcomes (Demonstrate functional swallow) goal ongoing  -RS continuing progress toward goal  -     Comment (Demonstrate functional swallow) -- Multiple swallows and throat clear w/ thin liquid trials  -        (STG) Patient will tolerate trials of    Consistencies Trialed (Tolerate trials) pureed textures;nectar/ mildly thick liquids  - pureed textures;nectar/ mildly thick liquids  -     Desired Outcome (Tolerate trials) without signs/symptoms of aspiration;with adequate oral prep/transit/clearance;with use of compensatory strategies (see comments)  -RS without signs/symptoms of aspiration;with adequate oral prep/transit/clearance;with use of compensatory strategies (see comments)  small bolus size, alternate bites/sips, no straws  -     [place] (Tolerate trials) with 1:1 assist/ supervision  -RS with 1:1  assist/ supervision  -     Time Frame (Tolerate trials) by discharge  -RS by discharge  -     Progress/Outcomes (Tolerate trials) continuing progress toward goal  -RS continuing progress toward goal  -     Comment (Tolerate trials) No overt s/s of aspiration w/ trials of NTL or pureed consistencies  -RS No overt s/s of aspiration w/ trials of NTL or pureed consistencies  -        (STG) Patient will tolerate therapeutic trials of    Consistencies Trialed (Tolerate therapeutic trials) thin liquids  -RS thin liquids  -     Desired Outcome (Tolerate therapeutic trials) without signs/symptoms of aspiration;without signs of distress  -RS without signs/symptoms of aspiration;without signs of distress  -     [place] (Tolerate therapeutic trials) with 1:1 assist/ supervision  -RS with 1:1 assist/ supervision  -     Time Frame (Tolerate therapeutic trials) by discharge  -RS by discharge  -     Progress/Outcomes (Tolerate therapeutic trials) goal ongoing  -RS continuing progress toward goal  -     Comment (Tolerate therapeutic trials) -- Throat clear & multiple swallows w/ thin liquid trials  -        ([facility]) Lingual Strengthening Goal 1 (SLP)    Activity (Lingual Strengthening Goal 1, SLP) increase tongue back strength;increase lingual tone/sensation/control/coordination/movement  - increase tongue back strength;increase lingual tone/sensation/control/coordination/movement  -     Increase Lingual Tone/Sensation/Control/Coordination/Movement lingual resistance exercises;swallow trials  -RS lingual resistance exercises;swallow trials  -     Increase Tongue Back Strength lingual resistance exercises  -RS lingual resistance exercises  -     [place]/Accuracy (Lingual Strengthening Goal 1, SLP) with maximum cues (25-49% accuracy)  -RS with maximum cues (25-49% accuracy)  -     Time Frame (Lingual Strengthening Goal 1, SLP) short term goal (STG)  -RS short term goal ([facility])  -      Progress/Outcomes (Lingual Strengthening Goal 1, SLP) progress slower than expected  -RS continuing progress toward goal  -     Comment (Lingual Strengthening Goal 1, SLP) Really  -RS --        (STG) Pharyngeal Strengthening Exercise Goal 1 (SLP)    Activity (Pharyngeal Strengthening Goal 1, SLP) increase timing;increase superior movement of the hyolaryngeal complex;increase anterior movement of the hyolaryngeal complex;increase squeeze/positive pressure generation  -RS increase timing;increase superior movement of the hyolaryngeal complex;increase anterior movement of the hyolaryngeal complex;increase squeeze/positive pressure generation  -     Increase Timing prepping - 3 second prep or suck swallow or 3-step swallow  -RS prepping - 3 second prep or suck swallow or 3-step swallow  -     Increase Superior Movement of the Hyolaryngeal Complex falsetto  -RS falsetto  -     Increase Anterior Movement of the Hyolaryngeal Complex chin tuck against resistance (CTAR)  -RS chin tuck against resistance (CTAR)  -     Increase Squeeze/Positive Pressure Generation hard effortful swallow  -RS hard effortful swallow  -     [place]/Accuracy (Pharyngeal Strengthening Goal 1, SLP) with maximum cues (25-49% accuracy)  -RS with maximum cues (25-49% accuracy)  -     Time Frame (Pharyngeal Strengthening Goal 1, SLP) short term goal (STG)  -RS short term goal (STG)  -     Progress/Outcomes (Pharyngeal Strengthening Goal 1, SLP) progress slower than expected  -RS progress slower than expected  -     Comment (Pharyngeal Strengthening Goal 1, SLP) unable to follow directions to accurately complete exs  -RS Pt w/ increased difficulty completing exercises requiring multiple steps. Focused on CTAR and hard effortful swallow  -        Patient will demonstrate functional cognitive-linguistic skills for return to discharge environment    [place] -- with maximum cues  -     Time frame -- by discharge  -      Progress/Outcomes -- continuing progress toward goal  -        SLP Diagnostic Treatment     Patient will participate in further assessment in the following areas -- clarification of baseline cognitive communication status  -     Time Frame (Diagnostic) -- short term goal (STG)  -     Progress/Outcomes (Additional Goal 1, SLP) -- goal met  -     Comment (Diagnostic) -- Son reports waxing/waning cog status @ baseline, however reports dysarthria is new  -        Follow Directions Goal 2 (SLP)    Improve Ability to Follow Directions Goal 1 (SLP) -- 1 step direction without objects;80%;with moderate cues (50-74%)  -     Time Frame (Follow Directions Goal 1, SLP) -- short term goal (STG)  -MH     Progress (Ability to Follow Directions Goal 1, SLP) -- 50%;with maximum cues (25-49%)  -     Progress/Outcomes (Follow Directions Goal 1, SLP) -- continuing progress toward goal  -        Phonation Goal 1 (SLP)    Improve Phonation By Goal 1 (SLP) -- using loud speech;80%;with maximum cues (25-49%)  -     Time Frame (Phonation Goal 1, SLP) -- short term goal (STG)  -     Progress/Outcomes (Phonation Goal 1, SLP) -- new goal  -        Articulation Goal 1 (SLP)    Improve Articulation Goal 1 (SLP) -- by over-articulating at word level;80%;with maximum cues (25-49%)  -     Time Frame (Articulation Goal 1, SLP) -- short term goal (STG)  -     Progress/Outcomes (Articulation Goal 1, SLP) -- new goal  -        Awareness of Basic Personal Information Goal 1 (SLP)    Improve Awareness of Basic Personal Information Goal 1 (SLP) -- answering yes/no questions regarding personal/biographical information;naming self, family members;70%;with moderate cues (50-74%)  -     Time Frame (Awareness of Basic Personal Information Goal 1, SLP) -- short term goal (STG)  -     Progress (Awareness of Basic Personal Information Goal 1, SLP) -- 30%;with maximum cues (25-49%)  -     Progress/Outcomes (Awareness of Basic  Personal Information Goal 1, SLP) -- continuing progress toward goal  -MH     Comment (Awareness of Basic Personal Information Goal 1, SLP) -- Yes/No, naming  -        Orientation Goal 1 (SLP)    Improve Orientation Through Goal 1 (SLP) -- demonstrating orientation to place;use environmental aids to assist with orientation;70%;with maximum cues (25-49%)  -MH     Time Frame (Orientation Goal 1, SLP) -- short term goal (STG)  -MH     Progress (Orientation Goal 1, SLP) -- 0%;with maximum cues (25-49%)  -MH     Progress/Outcomes (Orientation Goal 1, SLP) -- progress slower than expected  -MH     Comment (Orientation Goal 1, SLP) -- Pt not oriented to any, u/a to recall answers to orientation questions immediately after stated  -MH               User Key  (r) = Recorded By, (t) = Taken By, (c) = Cosigned By      Initials Name Provider Type     Brooklynn Penny MS CCC-SLP Speech and Language Pathologist    Da Beatty MS CCC-SLP Speech and Language Pathologist                       Time Calculation:    Time Calculation- SLP       Row Name 12/15/23 1613             Time Calculation- SLP    SLP Start Time 1530  -RS      SLP Received On 12/15/23  -RS         Untimed Charges    85441-SW Treatment Swallow Minutes 39  -RS         Total Minutes    Untimed Charges Total Minutes 39  -RS       Total Minutes 39  -RS                User Key  (r) = Recorded By, (t) = Taken By, (c) = Cosigned By      Initials Name Provider Type    Da Beatty MS CCC-SLP Speech and Language Pathologist                    Therapy Charges for Today       Code Description Service Date Service Provider Modifiers Qty    [id number]  ST TREATMENT SPEECH 3 12/15/2023 Da Freeman MS CCC-SLP GN 1                 MS ELMA Gruber  12/15/2023

## 2023-12-15 NOTE — CONSULTS
Diabetes Education    Patient Name:  Beryl Montoya  YOB: 1945  MRN: [id number]  Admit Date:  12/11/2023      Chart reviewed for diabetes education opportunity. Pt most recent GCS 11. Per PT note, rec SNF at d/c. As a result, pt is not a candidate for follow-up stroke class.     We will cont to follow and attempt education as appropriate, but please call [phone] with interval needs. Thank you.       Electronically signed by:  La Mann RN  12/15/23 08:54 EST

## 2023-12-16 PROBLEM — N17.9 AKI (ACUTE KIDNEY INJURY): Status: ACTIVE | Noted: 2023-12-16

## 2023-12-16 LAB
ANION GAP SERPL CALCULATED.3IONS-SCNC: 11 MMOL/L (ref 5–15)
BUN SERPL-MCNC: 25 MG/DL (ref 8–23)
BUN/CREAT SERPL: 26.3 (ref 7–25)
CALCIUM SPEC-SCNC: 9 MG/DL (ref 8.6–10.5)
CHLORIDE SERPL-SCNC: 108 MMOL/L (ref 98–107)
CO2 SERPL-SCNC: 27 MMOL/L (ref 22–29)
CREAT SERPL-MCNC: 0.95 MG/DL (ref 0.57–1)
EGFRCR SERPLBLD CKD-EPI 2021: 61.5 ML/MIN/1.73
GLUCOSE BLDC GLUCOMTR-MCNC: 151 MG/DL (ref 70–130)
GLUCOSE BLDC GLUCOMTR-MCNC: 167 MG/DL (ref 70–130)
GLUCOSE BLDC GLUCOMTR-MCNC: 175 MG/DL (ref 70–130)
GLUCOSE BLDC GLUCOMTR-MCNC: 196 MG/DL (ref 70–130)
GLUCOSE SERPL-MCNC: 147 MG/DL (ref 65–99)
POTASSIUM SERPL-SCNC: 3.9 MMOL/L (ref 3.5–5.2)
SODIUM SERPL-SCNC: 146 MMOL/L (ref 136–145)

## 2023-12-16 PROCEDURE — 80048 BASIC METABOLIC PNL TOTAL CA: CPT | Performed by: NURSE PRACTITIONER

## 2023-12-16 PROCEDURE — 99232 SBSQ HOSP IP/OBS MODERATE 35: CPT | Performed by: NURSE PRACTITIONER

## 2023-12-16 PROCEDURE — 63710000001 INSULIN DETEMIR PER 5 UNITS: Performed by: NURSE PRACTITIONER

## 2023-12-16 PROCEDURE — 63710000001 INSULIN LISPRO (HUMAN) PER 5 UNITS: Performed by: NURSE PRACTITIONER

## 2023-12-16 PROCEDURE — 25810000003 SODIUM CHLORIDE 0.9 % SOLUTION: Performed by: NURSE PRACTITIONER

## 2023-12-16 PROCEDURE — 82948 REAGENT STRIP/BLOOD GLUCOSE: CPT

## 2023-12-16 RX ORDER — SODIUM CHLORIDE 9 MG/ML
50 INJECTION, SOLUTION INTRAVENOUS CONTINUOUS
Status: ACTIVE | OUTPATIENT
Start: 2023-12-16 | End: 2023-12-17

## 2023-12-16 RX ADMIN — INSULIN LISPRO 3 UNITS: 100 INJECTION, SOLUTION INTRAVENOUS; SUBCUTANEOUS at 08:50

## 2023-12-16 RX ADMIN — LEVOTHYROXINE SODIUM 25 MCG: 25 TABLET ORAL at 08:24

## 2023-12-16 RX ADMIN — INSULIN LISPRO 2 UNITS: 100 INJECTION, SOLUTION INTRAVENOUS; SUBCUTANEOUS at 12:47

## 2023-12-16 RX ADMIN — INSULIN LISPRO 3 UNITS: 100 INJECTION, SOLUTION INTRAVENOUS; SUBCUTANEOUS at 18:19

## 2023-12-16 RX ADMIN — CLOPIDOGREL BISULFATE 75 MG: 75 TABLET ORAL at 08:24

## 2023-12-16 RX ADMIN — INSULIN DETEMIR 15 UNITS: 100 INJECTION, SOLUTION SUBCUTANEOUS at 21:46

## 2023-12-16 RX ADMIN — ATORVASTATIN CALCIUM 80 MG: 40 TABLET, FILM COATED ORAL at 21:49

## 2023-12-16 RX ADMIN — SODIUM CHLORIDE 50 ML/HR: 9 INJECTION, SOLUTION INTRAVENOUS at 10:28

## 2023-12-16 RX ADMIN — INSULIN DETEMIR 15 UNITS: 100 INJECTION, SOLUTION SUBCUTANEOUS at 08:24

## 2023-12-16 RX ADMIN — TRAZODONE HYDROCHLORIDE 50 MG: 50 TABLET ORAL at 21:49

## 2023-12-16 RX ADMIN — FUROSEMIDE 40 MG: 40 TABLET ORAL at 08:23

## 2023-12-16 RX ADMIN — LEVETIRACETAM 500 MG: 100 SOLUTION ORAL at 21:49

## 2023-12-16 RX ADMIN — INSULIN LISPRO 2 UNITS: 100 INJECTION, SOLUTION INTRAVENOUS; SUBCUTANEOUS at 21:47

## 2023-12-16 RX ADMIN — INSULIN LISPRO 3 UNITS: 100 INJECTION, SOLUTION INTRAVENOUS; SUBCUTANEOUS at 12:46

## 2023-12-16 RX ADMIN — SACUBITRIL AND VALSARTAN 1 TABLET: 49; 51 TABLET, FILM COATED ORAL at 08:22

## 2023-12-16 RX ADMIN — NYSTATIN 500000 UNITS: 100000 SUSPENSION ORAL at 09:01

## 2023-12-16 RX ADMIN — LEVETIRACETAM 500 MG: 100 SOLUTION ORAL at 08:22

## 2023-12-16 RX ADMIN — CARVEDILOL 25 MG: 12.5 TABLET, FILM COATED ORAL at 21:49

## 2023-12-16 RX ADMIN — ASPIRIN 81 MG CHEWABLE TABLET 81 MG: 81 TABLET CHEWABLE at 08:23

## 2023-12-16 RX ADMIN — ISOSORBIDE MONONITRATE 30 MG: 30 TABLET, EXTENDED RELEASE ORAL at 08:23

## 2023-12-16 RX ADMIN — Medication 5 MG: at 21:49

## 2023-12-16 RX ADMIN — Medication 10 ML: at 08:22

## 2023-12-16 RX ADMIN — SACUBITRIL AND VALSARTAN 1 TABLET: 49; 51 TABLET, FILM COATED ORAL at 21:49

## 2023-12-16 NOTE — PROGRESS NOTES
[facility] Medicine Services  PROGRESS NOTE    Patient Name: Beryl Montoya  : 1945  MRN: [id number]    Date of Admission: 2023  Primary Care Physician: Argelia Gamez MD    Subjective   Subjective     CC: f/u CVA    HPI:   Patient has sleep, opens eyes to verbal stimuli but closes them back and falls asleep.  Did not speak or follow any commands.  Dayshift RN states that she was awake, ate breakfast (fed by staff) and ate well. Vital signs are all stable.    Objective   Objective     Vital Signs:   Temp:  [97.7 °F (36.5 °C)-98.5 °F (36.9 °C)] 97.8 °F (36.6 °C)  Heart Rate:  [56-75] 65  Resp:  [18] 18  BP: (148-180)/(52-73) 156/60     Physical Exam:  Constitutional: Asleep, partially opened eyes but did not speak a word or follow any commands obese female lying in bed in NAD  Respiratory: Nonlabored, symmetrical chest expansion, CTAB, 96% RA  Cardiovascular: RRR, no M/R/G, +DP pulses bilaterally  Musculoskeletal: no LE edema bilaterally  Neurologic: MERRITT d/t sleepiness  Skin: No rashes on exposed skin  Psychiatric: MERRITT d/t sleepiness    Results Reviewed:  LAB RESULTS:      Lab 12/15/23  0514 23  1314   WBC 7.70 6.94   HEMOGLOBIN 15.1 15.3   HEMATOCRIT 45.3 45.6   PLATELETS 219 268   NEUTROS ABS  --  3.58   IMMATURE GRANS (ABS)  --  0.02   LYMPHS ABS  --  2.13   MONOS ABS  --  0.56   EOS ABS  --  0.56*   .7* 100.9*         Lab 23  0836 12/15/23  0514 23  0541 23  1314   SODIUM 146* 143 143 141   POTASSIUM 3.9 4.1 4.0 4.6   CHLORIDE 108* 106 107 103   CO2 27.0 25.0 22.0 24.0   ANION GAP 11.0 12.0 14.0 14.0   BUN 25* 26* 13 14   CREATININE 0.95 1.41* 0.77 0.98   EGFR 61.5 38.3* 79.1 59.2*   GLUCOSE 147* 266* 190* 273*   CALCIUM 9.0 8.7 9.4 9.1   HEMOGLOBIN A1C  --   --  7.80*  --    TSH  --   --   --  4.620*         Lab 23  1314   TOTAL PROTEIN 7.2   ALBUMIN 3.8   GLOBULIN 3.4   ALT (SGPT) 23   AST (SGOT) 29   BILIRUBIN 0.9    ALK PHOS 122*         Lab 12/11/23  1314   PROBNP 1,308.0   HSTROP T 15*         Lab 12/12/23  0541   CHOLESTEROL 124   LDL CHOL 57   HDL CHOL 49   TRIGLYCERIDES 96             Brief Urine Lab Results  (Last result in the past 365 days)        Color   Clarity   Blood   Leuk Est   Nitrite   Protein   CREAT   Urine HCG        12/11/23 1523 Yellow   Cloudy   Trace   Moderate (2+)   Positive   100 mg/dL (2+)                   Microbiology Results Abnormal       None            No radiology results from the last 24 hrs    Results for orders placed during the hospital encounter of 12/11/23    Adult Transthoracic Echo Limited W/ Cont if Necessary Per Protocol    Interpretation Summary    Left ventricular ejection fraction appears to be 51 - 55%. Normal left ventricular cavity size and wall thickness noted. All left ventricular wall segments contract normally.    Normal right ventricular cavity size and systolic function noted.    The left atrial cavity is mildly dilated.    No aortic valve regurgitation or stenosis is present. The aortic valve exhibits sclerosis. The aortic valve appears trileaflet.    The mitral valve is structurally normal with no significant stenosis present. Trace to mild mitral valve regurgitation is present.    The tricuspid valve is structurally normal with no significant stenosis present. Physiologic tricuspid valve regurgitation is present. Estimated right ventricular systolic pressure from tricuspid regurgitation is normal (<35 mmHg).      Current medications:  Scheduled Meds:aspirin, 81 mg, Oral, Daily   Or  aspirin, 300 mg, Rectal, Daily  atorvastatin, 80 mg, Oral, Nightly  carvedilol, 25 mg, Oral, BID  clopidogrel, 75 mg, Oral, Daily  furosemide, 40 mg, Oral, Daily  insulin detemir, 15 Units, Subcutaneous, Q12H  insulin lispro, 2-7 Units, Subcutaneous, 4x Daily AC & at Bedtime  Insulin Lispro, 3 Units, Subcutaneous, TID With Meals  isosorbide mononitrate, 30 mg, Oral, Daily  levETIRAcetam,  500 mg, Oral, Q12H  levothyroxine, 25 mcg, Oral, QAM  melatonin, 5 mg, Oral, Nightly  nystatin, 5 mL, Swish & Spit, 4x Daily  sacubitril-valsartan, 1 tablet, Oral, Q12H  traZODone, 50 mg, Oral, Nightly      Continuous Infusions:sodium chloride, 50 mL/hr, Last Rate: 50 mL/hr (12/16/23 1028)      PRN Meds:.  acetaminophen **OR** acetaminophen **OR** acetaminophen    senna-docusate sodium **AND** polyethylene glycol **AND** bisacodyl **AND** bisacodyl    Calcium Replacement - Follow Nurse / BPA Driven Protocol    Magnesium Standard Dose Replacement - Follow Nurse / BPA Driven Protocol    ondansetron **OR** ondansetron    Phosphorus Replacement - Follow Nurse / BPA Driven Protocol    Potassium Replacement - Follow Nurse / BPA Driven Protocol    sodium chloride    sodium chloride    Assessment & Plan   Assessment & Plan     Active Hospital Problems    Diagnosis  POA    **Generalized weakness [R53.1]  Yes    RAJINDER (acute kidney injury) [N17.9]  Unknown    Stroke [I63.9]  Yes    Acute UTI (urinary tract infection) [N39.0]  Yes    History of seizure [Z87.898]  Yes    Ischemic cardiomyopathy. LVEF 30% [I25.5]  Yes    Essential hypertension [I10]  Yes    History of L MCA CVA [Z86.73]  Not Applicable    Type 2 diabetes mellitus with complication, with long-term current use of insulin [E11.8, Z79.4]  Not Applicable      Resolved Hospital Problems   No resolved problems to display.        Brief Hospital Course to date:  Beryl Montoya is a 78 y.o. female w CAD s/p CABG, CVA w residual speech deficits, wheelchair bound at baseline who presented with left arm weakness x 4-5 days. Found to have late acute/subacute right karen CVA, microhemorrhage likely 2/2 uncontrolled HTN.      Late acute/subacute right karen CVA presenting w LUE weakness  -DAPT, statin. Work-up per stroke team: Goal -160, improved with increase coreg  -PT/OT follows. 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     AM-PAC 6 Clicks Score (PT): 8 (12/16/23 0014)    CODE STATUS:   Code Status and Medical Interventions:   Ordered at: 12/11/23 1626     Medical Intervention Limits:    NO intubation (DNI)     Level Of Support Discussed With:    Next of Kin (If No Surrogate)     Code Status (Patient has no pulse and is not breathing):    No CPR (Do Not Attempt to Resuscitate)     Medical Interventions (Patient has pulse or is breathing):    Limited Support       Nilam Sandoval, APRN  12/16/23

## 2023-12-17 LAB
GLUCOSE BLDC GLUCOMTR-MCNC: 109 MG/DL (ref 70–130)
GLUCOSE BLDC GLUCOMTR-MCNC: 140 MG/DL (ref 70–130)
GLUCOSE BLDC GLUCOMTR-MCNC: 157 MG/DL (ref 70–130)
GLUCOSE BLDC GLUCOMTR-MCNC: 163 MG/DL (ref 70–130)

## 2023-12-17 PROCEDURE — 82948 REAGENT STRIP/BLOOD GLUCOSE: CPT

## 2023-12-17 PROCEDURE — 63710000001 INSULIN LISPRO (HUMAN) PER 5 UNITS: Performed by: NURSE PRACTITIONER

## 2023-12-17 PROCEDURE — 63710000001 INSULIN DETEMIR PER 5 UNITS: Performed by: NURSE PRACTITIONER

## 2023-12-17 PROCEDURE — 99232 SBSQ HOSP IP/OBS MODERATE 35: CPT | Performed by: NURSE PRACTITIONER

## 2023-12-17 PROCEDURE — 25810000003 SODIUM CHLORIDE 0.9 % SOLUTION: Performed by: NURSE PRACTITIONER

## 2023-12-17 RX ADMIN — LEVETIRACETAM 500 MG: 100 SOLUTION ORAL at 08:15

## 2023-12-17 RX ADMIN — SACUBITRIL AND VALSARTAN 1 TABLET: 49; 51 TABLET, FILM COATED ORAL at 08:15

## 2023-12-17 RX ADMIN — CARVEDILOL 25 MG: 12.5 TABLET, FILM COATED ORAL at 08:15

## 2023-12-17 RX ADMIN — FUROSEMIDE 40 MG: 40 TABLET ORAL at 08:14

## 2023-12-17 RX ADMIN — INSULIN LISPRO 3 UNITS: 100 INJECTION, SOLUTION INTRAVENOUS; SUBCUTANEOUS at 17:20

## 2023-12-17 RX ADMIN — INSULIN DETEMIR 15 UNITS: 100 INJECTION, SOLUTION SUBCUTANEOUS at 21:35

## 2023-12-17 RX ADMIN — CLOPIDOGREL BISULFATE 75 MG: 75 TABLET ORAL at 08:14

## 2023-12-17 RX ADMIN — ASPIRIN 81 MG CHEWABLE TABLET 81 MG: 81 TABLET CHEWABLE at 08:15

## 2023-12-17 RX ADMIN — TRAZODONE HYDROCHLORIDE 50 MG: 50 TABLET ORAL at 21:35

## 2023-12-17 RX ADMIN — INSULIN DETEMIR 15 UNITS: 100 INJECTION, SOLUTION SUBCUTANEOUS at 08:15

## 2023-12-17 RX ADMIN — Medication 5 MG: at 21:35

## 2023-12-17 RX ADMIN — ATORVASTATIN CALCIUM 80 MG: 40 TABLET, FILM COATED ORAL at 21:35

## 2023-12-17 RX ADMIN — CARVEDILOL 25 MG: 12.5 TABLET, FILM COATED ORAL at 21:35

## 2023-12-17 RX ADMIN — SODIUM CHLORIDE 50 ML/HR: 9 INJECTION, SOLUTION INTRAVENOUS at 01:40

## 2023-12-17 RX ADMIN — LEVOTHYROXINE SODIUM 25 MCG: 25 TABLET ORAL at 08:14

## 2023-12-17 RX ADMIN — LEVETIRACETAM 500 MG: 100 SOLUTION ORAL at 21:35

## 2023-12-17 RX ADMIN — SACUBITRIL AND VALSARTAN 1 TABLET: 49; 51 TABLET, FILM COATED ORAL at 21:35

## 2023-12-17 RX ADMIN — ISOSORBIDE MONONITRATE 30 MG: 30 TABLET, EXTENDED RELEASE ORAL at 08:14

## 2023-12-17 RX ADMIN — INSULIN LISPRO 2 UNITS: 100 INJECTION, SOLUTION INTRAVENOUS; SUBCUTANEOUS at 21:35

## 2023-12-17 NOTE — PROGRESS NOTES
[facility] Medicine Services  PROGRESS NOTE    Patient Name: Beryl Montoya  : 1945  MRN: [id number]    Date of Admission: 2023  Primary Care Physician: Argelia Gamez MD    Subjective   Subjective     CC: f/u CVA    HPI:   Patient sitting up in bed asleep but more easily arousable today.  Patient will only partially open her eyes, but is whispering answers and following commands.  She shook her head stating no complaints when asked.    Objective   Objective     Vital Signs:   Temp:  [97.7 °F (36.5 °C)-98.4 °F (36.9 °C)] 98.2 °F (36.8 °C)  Heart Rate:  [45-66] 45  Resp:  [18] 18  BP: (117-169)/(48-74) 156/74     Physical Exam:  Constitutional: Asleep but arousable obese female lying in bed in NAD  Respiratory: Nonlabored, symmetrical chest expansion, CTAB, 96% RA  Cardiovascular: RRR, no M/R/G, +DP pulses bilaterally  Musculoskeletal: no LE edema bilaterally  Neurologic: Does not open eyes, but will answer with very soft clear speech she does follow simple commands and shakes her head yes and no  Skin: No rashes on exposed skin  Psychiatric: Pleasant and cooperative    Results Reviewed:  LAB RESULTS:      Lab 12/15/23  0514 23  1314   WBC 7.70 6.94   HEMOGLOBIN 15.1 15.3   HEMATOCRIT 45.3 45.6   PLATELETS 219 268   NEUTROS ABS  --  3.58   IMMATURE GRANS (ABS)  --  0.02   LYMPHS ABS  --  2.13   MONOS ABS  --  0.56   EOS ABS  --  0.56*   .7* 100.9*         Lab 23  0836 12/15/23  0514 23  0541 23  1314   SODIUM 146* 143 143 141   POTASSIUM 3.9 4.1 4.0 4.6   CHLORIDE 108* 106 107 103   CO2 27.0 25.0 22.0 24.0   ANION GAP 11.0 12.0 14.0 14.0   BUN 25* 26* 13 14   CREATININE 0.95 1.41* 0.77 0.98   EGFR 61.5 38.3* 79.1 59.2*   GLUCOSE 147* 266* 190* 273*   CALCIUM 9.0 8.7 9.4 9.1   HEMOGLOBIN A1C  --   --  7.80*  --    TSH  --   --   --  4.620*         Lab 23  1314   TOTAL PROTEIN 7.2   ALBUMIN 3.8   GLOBULIN 3.4   ALT (SGPT) 23    AST (SGOT) 29   BILIRUBIN 0.9   ALK PHOS 122*         Lab 12/11/23  1314   PROBNP 1,308.0   HSTROP T 15*         Lab 12/12/23  0541   CHOLESTEROL 124   LDL CHOL 57   HDL CHOL 49   TRIGLYCERIDES 96             Brief Urine Lab Results  (Last result in the past 365 days)        Color   Clarity   Blood   Leuk Est   Nitrite   Protein   CREAT   Urine HCG        12/11/23 1523 Yellow   Cloudy   Trace   Moderate (2+)   Positive   100 mg/dL (2+)                   Microbiology Results Abnormal       None            No radiology results from the last 24 hrs    Results for orders placed during the hospital encounter of 12/11/23    Adult Transthoracic Echo Limited W/ Cont if Necessary Per Protocol    Interpretation Summary    Left ventricular ejection fraction appears to be 51 - 55%. Normal left ventricular cavity size and wall thickness noted. All left ventricular wall segments contract normally.    Normal right ventricular cavity size and systolic function noted.    The left atrial cavity is mildly dilated.    No aortic valve regurgitation or stenosis is present. The aortic valve exhibits sclerosis. The aortic valve appears trileaflet.    The mitral valve is structurally normal with no significant stenosis present. Trace to mild mitral valve regurgitation is present.    The tricuspid valve is structurally normal with no significant stenosis present. Physiologic tricuspid valve regurgitation is present. Estimated right ventricular systolic pressure from tricuspid regurgitation is normal (<35 mmHg).      Current medications:  Scheduled Meds:aspirin, 81 mg, Oral, Daily   Or  aspirin, 300 mg, Rectal, Daily  atorvastatin, 80 mg, Oral, Nightly  carvedilol, 25 mg, Oral, BID  clopidogrel, 75 mg, Oral, Daily  furosemide, 40 mg, Oral, Daily  insulin detemir, 15 Units, Subcutaneous, Q12H  insulin lispro, 2-7 Units, Subcutaneous, 4x Daily AC & at Bedtime  Insulin Lispro, 3 Units, Subcutaneous, TID With Meals  isosorbide mononitrate, 30  mg, Oral, Daily  levETIRAcetam, 500 mg, Oral, Q12H  levothyroxine, 25 mcg, Oral, QAM  melatonin, 5 mg, Oral, Nightly  nystatin, 5 mL, Swish & Spit, 4x Daily  sacubitril-valsartan, 1 tablet, Oral, Q12H  traZODone, 50 mg, Oral, Nightly      Continuous Infusions:   PRN Meds:.  acetaminophen **OR** acetaminophen **OR** acetaminophen    senna-docusate sodium **AND** polyethylene glycol **AND** bisacodyl **AND** bisacodyl    Calcium Replacement - Follow Nurse / BPA Driven Protocol    Magnesium Standard Dose Replacement - Follow Nurse / BPA Driven Protocol    ondansetron **OR** ondansetron    Phosphorus Replacement - Follow Nurse / BPA Driven Protocol    Potassium Replacement - Follow Nurse / BPA Driven Protocol    sodium chloride    sodium chloride    Assessment & Plan   Assessment & Plan     Active Hospital Problems    Diagnosis  POA    **Generalized weakness [R53.1]  Yes    RAJINDER (acute kidney injury) [N17.9]  Unknown    Stroke [I63.9]  Yes    Acute UTI (urinary tract infection) [N39.0]  Yes    History of seizure [Z87.898]  Yes    Ischemic cardiomyopathy. LVEF 30% [I25.5]  Yes    Essential hypertension [I10]  Yes    History of L MCA CVA [Z86.73]  Not Applicable    Type 2 diabetes mellitus with complication, with long-term current use of insulin [E11.8, Z79.4]  Not Applicable      Resolved Hospital Problems   No resolved problems to display.        Brief Hospital Course to date:  Beryl Montoya is a 78 y.o. female w CAD s/p CABG, CVA w residual speech deficits, wheelchair bound at baseline who presented with left arm weakness x 4-5 days. Found to have late acute/subacute right karen CVA, microhemorrhage likely 2/2 uncontrolled HTN.      Late acute/subacute right karen CVA presenting w LUE weakness  -DAPT, statin. Work-up per stroke team: Goal -160, improved with increase coreg  -PT/OT follows. 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  List of SNF facilities was given to the family and they will get back with the CM when they have decided on which one to pursue.    Expected Discharge   Expected Discharge Date: 12/18/2023; Expected Discharge Time:      DVT prophylaxis:  Mechanical DVT prophylaxis orders are present.     AM-PAC 6 Clicks Score (PT): 6 (12/16/23 8808)    CODE STATUS:   Code Status and Medical Interventions:   Ordered at: 12/11/23 1624     Medical Intervention Limits:    NO intubation (DNI)     Level Of Support Discussed With:    Next of Kin (If No Surrogate)     Code Status (Patient has no pulse and is not breathing):    No CPR (Do Not Attempt to Resuscitate)     Medical Interventions (Patient has pulse or is breathing):    Limited Support       Nilam Sandoval, APRN  12/17/23

## 2023-12-18 LAB
GLUCOSE BLDC GLUCOMTR-MCNC: 115 MG/DL (ref 70–130)
GLUCOSE BLDC GLUCOMTR-MCNC: 143 MG/DL (ref 70–130)
GLUCOSE BLDC GLUCOMTR-MCNC: 154 MG/DL (ref 70–130)
GLUCOSE BLDC GLUCOMTR-MCNC: 167 MG/DL (ref 70–130)

## 2023-12-18 PROCEDURE — 99232 SBSQ HOSP IP/OBS MODERATE 35: CPT | Performed by: NURSE PRACTITIONER

## 2023-12-18 PROCEDURE — 63710000001 INSULIN DETEMIR PER 5 UNITS: Performed by: NURSE PRACTITIONER

## 2023-12-18 PROCEDURE — 82948 REAGENT STRIP/BLOOD GLUCOSE: CPT

## 2023-12-18 PROCEDURE — 63710000001 INSULIN LISPRO (HUMAN) PER 5 UNITS: Performed by: NURSE PRACTITIONER

## 2023-12-18 RX ORDER — MINERAL OIL 100 G/100G
1 OIL RECTAL ONCE
Status: DISCONTINUED | OUTPATIENT
Start: 2023-12-18 | End: 2023-12-20

## 2023-12-18 RX ORDER — LACTULOSE 10 G/15ML
10 SOLUTION ORAL ONCE AS NEEDED
Status: DISCONTINUED | OUTPATIENT
Start: 2023-12-18 | End: 2023-12-21 | Stop reason: HOSPADM

## 2023-12-18 RX ADMIN — LEVETIRACETAM 500 MG: 100 SOLUTION ORAL at 08:02

## 2023-12-18 RX ADMIN — CARVEDILOL 25 MG: 12.5 TABLET, FILM COATED ORAL at 08:02

## 2023-12-18 RX ADMIN — INSULIN LISPRO 3 UNITS: 100 INJECTION, SOLUTION INTRAVENOUS; SUBCUTANEOUS at 12:50

## 2023-12-18 RX ADMIN — INSULIN LISPRO 2 UNITS: 100 INJECTION, SOLUTION INTRAVENOUS; SUBCUTANEOUS at 18:21

## 2023-12-18 RX ADMIN — ASPIRIN 81 MG CHEWABLE TABLET 81 MG: 81 TABLET CHEWABLE at 08:04

## 2023-12-18 RX ADMIN — BISACODYL 10 MG: 10 SUPPOSITORY RECTAL at 10:22

## 2023-12-18 RX ADMIN — INSULIN DETEMIR 15 UNITS: 100 INJECTION, SOLUTION SUBCUTANEOUS at 21:56

## 2023-12-18 RX ADMIN — CLOPIDOGREL BISULFATE 75 MG: 75 TABLET ORAL at 08:04

## 2023-12-18 RX ADMIN — SACUBITRIL AND VALSARTAN 1 TABLET: 49; 51 TABLET, FILM COATED ORAL at 21:56

## 2023-12-18 RX ADMIN — FUROSEMIDE 40 MG: 40 TABLET ORAL at 08:04

## 2023-12-18 RX ADMIN — TRAZODONE HYDROCHLORIDE 50 MG: 50 TABLET ORAL at 21:56

## 2023-12-18 RX ADMIN — ATORVASTATIN CALCIUM 80 MG: 40 TABLET, FILM COATED ORAL at 21:56

## 2023-12-18 RX ADMIN — BISACODYL 5 MG: 5 TABLET, COATED ORAL at 08:09

## 2023-12-18 RX ADMIN — Medication 10 ML: at 08:02

## 2023-12-18 RX ADMIN — SACUBITRIL AND VALSARTAN 1 TABLET: 49; 51 TABLET, FILM COATED ORAL at 08:02

## 2023-12-18 RX ADMIN — LEVOTHYROXINE SODIUM 25 MCG: 25 TABLET ORAL at 08:02

## 2023-12-18 RX ADMIN — INSULIN DETEMIR 15 UNITS: 100 INJECTION, SOLUTION SUBCUTANEOUS at 08:02

## 2023-12-18 RX ADMIN — ISOSORBIDE MONONITRATE 30 MG: 30 TABLET, EXTENDED RELEASE ORAL at 08:04

## 2023-12-18 RX ADMIN — CARVEDILOL 25 MG: 12.5 TABLET, FILM COATED ORAL at 21:56

## 2023-12-18 RX ADMIN — LEVETIRACETAM 500 MG: 100 SOLUTION ORAL at 22:00

## 2023-12-18 RX ADMIN — Medication 5 MG: at 21:56

## 2023-12-18 NOTE — CASE MANAGEMENT/SOCIAL WORK
Continued Stay Note   Luis Daniel     Patient Name: Beryl Montoya  MRN: [id number]  Today's Date: 12/18/2023    Admit Date: 12/11/2023    Plan: SNF   Discharge Plan       Row Name 12/18/23 1512       Plan    Plan SNF    Patient/Family in Agreement with Plan yes    Plan Comments Spoke with Mrs. Montoya's Son at the bedside. PT is recommending rehab. Family is agreeable to [facility] in [facility]. Referral faxed to Orly with [facility] at [phone]. CM will continue to follow.    Final Discharge Disposition Code 03 - skilled nursing facility (SNF)                   Discharge Codes    No documentation.                 Expected Discharge Date and Time       Expected Discharge Date Expected Discharge Time    Dec 21, 2023               Katelyn Vaughan RN

## 2023-12-18 NOTE — PROGRESS NOTES
[facility] Medicine Services  PROGRESS NOTE    Patient Name: Beryl Montoya  : 1945  MRN: [id number]    Date of Admission: 2023  Primary Care Physician: Argelia Gamez MD    Subjective   Subjective     CC: f/u CVA    HPI:   Patient sitting up in bed is open and more appropriate this morning.  She shook her head yes and no appropriately.  She complained of lower abdominal pain that was more tender on the right side of her abdomen.  Patient has not had a BM in almost 7 days.    Objective   Objective     Vital Signs:   Temp:  [96 °F (35.6 °C)-98.9 °F (37.2 °C)] 96 °F (35.6 °C)  Heart Rate:  [51-71] 61  Resp:  [18-19] 18  BP: (138-162)/(43-74) 162/64     Physical Exam:  Constitutional: Awake, female sitting up in bed in NAD  Respiratory: Nonlabored, symmetrical chest expansion, CTAB, 98% RA  Cardiovascular: RRR, no M/R/G, +DP pulses bilaterally  Abdomen: +RLQ abdominal pain TTP no distention, + BS  Musculoskeletal: no LE edema bilaterally  Neurologic: Opens eyes, answer with very soft clear speech, follows simple commands and shakes her head yes and no  Skin: No rashes on exposed skin  Psychiatric: Pleasant and cooperative    Results Reviewed:  LAB RESULTS:      Lab 12/15/23  0514 23  1314   WBC 7.70 6.94   HEMOGLOBIN 15.1 15.3   HEMATOCRIT 45.3 45.6   PLATELETS 219 268   NEUTROS ABS  --  3.58   IMMATURE GRANS (ABS)  --  0.02   LYMPHS ABS  --  2.13   MONOS ABS  --  0.56   EOS ABS  --  0.56*   .7* 100.9*         Lab 23  0836 12/15/23  0514 23  0541 23  1314   SODIUM 146* 143 143 141   POTASSIUM 3.9 4.1 4.0 4.6   CHLORIDE 108* 106 107 103   CO2 27.0 25.0 22.0 24.0   ANION GAP 11.0 12.0 14.0 14.0   BUN 25* 26* 13 14   CREATININE 0.95 1.41* 0.77 0.98   EGFR 61.5 38.3* 79.1 59.2*   GLUCOSE 147* 266* 190* 273*   CALCIUM 9.0 8.7 9.4 9.1   HEMOGLOBIN A1C  --   --  7.80*  --    TSH  --   --   --  4.620*         Lab 23  1314   TOTAL  PROTEIN 7.2   ALBUMIN 3.8   GLOBULIN 3.4   ALT (SGPT) 23   AST (SGOT) 29   BILIRUBIN 0.9   ALK PHOS 122*         Lab 12/11/23  1314   PROBNP 1,308.0   HSTROP T 15*         Lab 12/12/23  0541   CHOLESTEROL 124   LDL CHOL 57   HDL CHOL 49   TRIGLYCERIDES 96             Brief Urine Lab Results  (Last result in the past 365 days)        Color   Clarity   Blood   Leuk Est   Nitrite   Protein   CREAT   Urine HCG        12/11/23 1523 Yellow   Cloudy   Trace   Moderate (2+)   Positive   100 mg/dL (2+)                   Microbiology Results Abnormal       None            No radiology results from the last 24 hrs    Results for orders placed during the hospital encounter of 12/11/23    Adult Transthoracic Echo Limited W/ Cont if Necessary Per Protocol    Interpretation Summary    Left ventricular ejection fraction appears to be 51 - 55%. Normal left ventricular cavity size and wall thickness noted. All left ventricular wall segments contract normally.    Normal right ventricular cavity size and systolic function noted.    The left atrial cavity is mildly dilated.    No aortic valve regurgitation or stenosis is present. The aortic valve exhibits sclerosis. The aortic valve appears trileaflet.    The mitral valve is structurally normal with no significant stenosis present. Trace to mild mitral valve regurgitation is present.    The tricuspid valve is structurally normal with no significant stenosis present. Physiologic tricuspid valve regurgitation is present. Estimated right ventricular systolic pressure from tricuspid regurgitation is normal (<35 mmHg).      Current medications:  Scheduled Meds:aspirin, 81 mg, Oral, Daily   Or  aspirin, 300 mg, Rectal, Daily  atorvastatin, 80 mg, Oral, Nightly  carvedilol, 25 mg, Oral, BID  clopidogrel, 75 mg, Oral, Daily  furosemide, 40 mg, Oral, Daily  insulin detemir, 15 Units, Subcutaneous, Q12H  insulin lispro, 2-7 Units, Subcutaneous, 4x Daily AC & at Bedtime  Insulin Lispro, 3 Units,  Subcutaneous, TID With Meals  isosorbide mononitrate, 30 mg, Oral, Daily  levETIRAcetam, 500 mg, Oral, Q12H  levothyroxine, 25 mcg, Oral, QAM  melatonin, 5 mg, Oral, Nightly  sacubitril-valsartan, 1 tablet, Oral, Q12H  traZODone, 50 mg, Oral, Nightly      Continuous Infusions:   PRN Meds:.  acetaminophen **OR** acetaminophen **OR** acetaminophen    senna-docusate sodium **AND** polyethylene glycol **AND** bisacodyl **AND** bisacodyl    Calcium Replacement - Follow Nurse / BPA Driven Protocol    Magnesium Standard Dose Replacement - Follow Nurse / BPA Driven Protocol    ondansetron **OR** ondansetron    Phosphorus Replacement - Follow Nurse / BPA Driven Protocol    Potassium Replacement - Follow Nurse / BPA Driven Protocol    sodium chloride    sodium chloride    Assessment & Plan   Assessment & Plan     Active Hospital Problems    Diagnosis  POA    **Generalized weakness [R53.1]  Yes    RAJINDER (acute kidney injury) [N17.9]  Unknown    Stroke [I63.9]  Yes    Acute UTI (urinary tract infection) [N39.0]  Yes    History of seizure [Z87.898]  Yes    Ischemic cardiomyopathy. LVEF 30% [I25.5]  Yes    Essential hypertension [I10]  Yes    History of L MCA CVA [Z86.73]  Not Applicable    Type 2 diabetes mellitus with complication, with long-term current use of insulin [E11.8, Z79.4]  Not Applicable      Resolved Hospital Problems   No resolved problems to display.        Brief Hospital Course to date:  Beryl Montoya is a 78 y.o. female w CAD s/p CABG, CVA w residual speech deficits, wheelchair bound at baseline who presented with left arm weakness x 4-5 days. Found to have late acute/subacute right karen CVA, microhemorrhage likely 2/2 uncontrolled HTN.      Late acute/subacute right karen CVA presenting w LUE weakness  -DAPT, statin. Work-up per stroke team: Goal -160, improved with increase coreg  -PT/OT follows. 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  List of SNF facilities was given to the family and they will get back with the CM when they have decided on which one to pursue.    Expected Discharge   Expected Discharge Date: 12/21/2023; Expected Discharge Time:      DVT prophylaxis:  Mechanical DVT prophylaxis orders are present.     AM-PAC 6 Clicks Score (PT): 6 (12/18/23 3395)    CODE STATUS:   Code Status and Medical Interventions:   Ordered at: 12/11/23 6853     Medical Intervention Limits:    NO intubation (DNI)     Level Of Support Discussed With:    Next of Kin (If No Surrogate)     Code Status (Patient has no pulse and is not breathing):    No CPR (Do Not Attempt to Resuscitate)     Medical Interventions (Patient has pulse or is breathing):    Limited Support       Nilam Sandoval, APRN  12/18/23

## 2023-12-19 LAB
GLUCOSE BLDC GLUCOMTR-MCNC: 104 MG/DL (ref 70–130)
GLUCOSE BLDC GLUCOMTR-MCNC: 118 MG/DL (ref 70–130)
GLUCOSE BLDC GLUCOMTR-MCNC: 124 MG/DL (ref 70–130)
GLUCOSE BLDC GLUCOMTR-MCNC: 186 MG/DL (ref 70–130)

## 2023-12-19 PROCEDURE — 97110 THERAPEUTIC EXERCISES: CPT

## 2023-12-19 PROCEDURE — 63710000001 INSULIN DETEMIR PER 5 UNITS: Performed by: HOSPITALIST

## 2023-12-19 PROCEDURE — 82948 REAGENT STRIP/BLOOD GLUCOSE: CPT

## 2023-12-19 PROCEDURE — 99232 SBSQ HOSP IP/OBS MODERATE 35: CPT | Performed by: HOSPITALIST

## 2023-12-19 PROCEDURE — 97530 THERAPEUTIC ACTIVITIES: CPT

## 2023-12-19 PROCEDURE — 92507 TX SP LANG VOICE COMM INDIV: CPT

## 2023-12-19 PROCEDURE — 92526 ORAL FUNCTION THERAPY: CPT

## 2023-12-19 PROCEDURE — 63710000001 INSULIN DETEMIR PER 5 UNITS: Performed by: NURSE PRACTITIONER

## 2023-12-19 RX ADMIN — TRAZODONE HYDROCHLORIDE 50 MG: 50 TABLET ORAL at 22:10

## 2023-12-19 RX ADMIN — ATORVASTATIN CALCIUM 80 MG: 40 TABLET, FILM COATED ORAL at 22:10

## 2023-12-19 RX ADMIN — Medication 5 MG: at 22:10

## 2023-12-19 RX ADMIN — CLOPIDOGREL BISULFATE 75 MG: 75 TABLET ORAL at 09:43

## 2023-12-19 RX ADMIN — LEVETIRACETAM 500 MG: 100 SOLUTION ORAL at 09:44

## 2023-12-19 RX ADMIN — CARVEDILOL 25 MG: 12.5 TABLET, FILM COATED ORAL at 09:43

## 2023-12-19 RX ADMIN — LEVETIRACETAM 500 MG: 100 SOLUTION ORAL at 22:15

## 2023-12-19 RX ADMIN — INSULIN DETEMIR 12 UNITS: 100 INJECTION, SOLUTION SUBCUTANEOUS at 22:09

## 2023-12-19 RX ADMIN — CARVEDILOL 25 MG: 12.5 TABLET, FILM COATED ORAL at 22:10

## 2023-12-19 RX ADMIN — INSULIN DETEMIR 15 UNITS: 100 INJECTION, SOLUTION SUBCUTANEOUS at 09:43

## 2023-12-19 RX ADMIN — LEVOTHYROXINE SODIUM 25 MCG: 25 TABLET ORAL at 09:43

## 2023-12-19 RX ADMIN — SACUBITRIL AND VALSARTAN 1 TABLET: 49; 51 TABLET, FILM COATED ORAL at 22:09

## 2023-12-19 RX ADMIN — FUROSEMIDE 40 MG: 40 TABLET ORAL at 09:43

## 2023-12-19 RX ADMIN — ISOSORBIDE MONONITRATE 30 MG: 30 TABLET, EXTENDED RELEASE ORAL at 09:43

## 2023-12-19 RX ADMIN — ASPIRIN 81 MG CHEWABLE TABLET 81 MG: 81 TABLET CHEWABLE at 09:43

## 2023-12-19 RX ADMIN — SACUBITRIL AND VALSARTAN 1 TABLET: 49; 51 TABLET, FILM COATED ORAL at 09:43

## 2023-12-19 NOTE — THERAPY TREATMENT NOTE
Acute Care - Speech Language Pathology Treatment Note  [facility]     Patient Name: Beryl Montoya  : 1945  MRN: [id number]  Today's Date: 2023               Admit Date: 2023     Visit Dx:    ICD-10-CM ICD-9-CM   1. Cerebrovascular accident (CVA), unspecified mechanism  I63.9 434.91   2. Generalized weakness  R53.1 780.79   3. Expressive aphasia  R47.01 784.3   4. Hypertension, unspecified type  I10 401.9   5. Dysphagia, unspecified type  R13.10 787.20   6. Cognitive communication deficit  R41.841 799.52   7. Dysarthria  R47.1 784.51   8. Cerebrovascular accident (CVA) due to thrombosis of precerebral artery  I63.00 433.91     Patient Active Problem List   Diagnosis    History of L MCA CVA    Essential hypertension    Type 2 diabetes mellitus with complication, with long-term current use of insulin    GERD (gastroesophageal reflux disease)    Bilateral carotid artery stenosis    Abnormal stress test    Severe 3 vessel CAD with angina pectoris (CMS/HCC)    Ischemic cardiomyopathy. LVEF 30%    S/P CABG x 3 on 20    Acute postop embolic left occipital CVA 20. Extensive in L PCA distribution but additional areas in R PCA distribution (CMS/HCC)    Bilateral carotid atherosclerosis with moderate left common carotid artery stenosis (50-60%)    Seizure    Generalized weakness    Acute UTI (urinary tract infection)    History of seizure    Stroke    RAJINDER (acute kidney injury)     Past Medical History:   Diagnosis Date    CHF (congestive heart failure)     Coronary artery disease involving native coronary artery of native heart with angina pectoris 2020    Diabetes mellitus     GERD (gastroesophageal reflux disease)     High cholesterol     Hyperlipidemia     Hypertension     Ischemic cardiomyopathy     Stroke     2013 speech and short term memory     TIA (transient ischemic attack)      Past Surgical History:   Procedure Laterality Date    CARDIAC CATHETERIZATION N/A 2020     Procedure: Left Heart Cath 02226 C19 @ [facility];  Surgeon: David Faulkner MD;  Location: [facility] CATH INVASIVE LOCATION;  Service: Cardiovascular;  Laterality: N/A;    CATARACT EXTRACTION Bilateral     COLONOSCOPY  2013    CORONARY ARTERY BYPASS GRAFT N/A 6/30/2020    Procedure: SHERI PER ANESTHESIA, MEDIAN STERNOTOMY, CORONARY ARTERY BYPASS GRAFTING X 3 , UTILIZING THE LEFT INTERNAL MAMMARY ARTERY AND EVH OF RIGHT GREATER SAPHENOUS VEIN;  Surgeon: Jerry Lozano MD;  Location: [facility] OR;  Service: Cardiothoracic;  Laterality: N/A;  LEG START - 0736  VEIN OUT - 0840  VEIN READY - 0850    EYE SURGERY      HYSTERECTOMY      total    INTERVENTIONAL RADIOLOGY PROCEDURE Bilateral 9/25/2017    Procedure: Carotid Cerebral Angiogram;  Surgeon: Maldonado Casas MD;  Location: [facility] CATH INVASIVE LOCATION;  Service:        SLP Recommendation and Plan                                      Daily Summary of Progress (SLP): progress towards functional goals is fair (12/19/23 1000)           Treatment Assessment (SLP): continued, aphasia, communication disorder, clinical signs of, aspiration (12/19/23 1000)  Treatment Assessment Comments (SLP): Pt very weak and limited participation. She communicates only via head nod yes/no. Tried to encourage verbal output, but pt really seems too weak to voice. She did clear throat audibly and responses were appropriate for yes/no. (12/19/23 1000)  Plan for Continued Treatment (SLP): continue treatment per plan of care (12/19/23 1000)  Plan of Care Reviewed With: patient (12/19/23 1046)  Progress: no change (12/19/23 1046)      SLP EVALUATION (last 72 hours)       SLP SLC Evaluation       Row Name 12/19/23 1000                   Communication Assessment/Intervention    Document Type therapy note (daily note)  -AW        Subjective Information no complaints  -AW        Patient Observations alert;cooperative;lethargic  -AW        Patient/Family/Caregiver Comments/Observations no  family present  -AW        Patient Effort adequate  -AW        Symptoms Noted During/After Treatment none  -AW           Pain    Additional Documentation Pain Scale: FACES Pre/Post-Treatment (Group)  -AW           Pain Scale: FACES Pre/Post-Treatment    Pain: FACES Scale, Pretreatment 0-->no hurt  -AW        Posttreatment Pain Rating 0-->no hurt  -AW           SLP Treatment Clinical Impressions    Treatment Assessment (SLP) continued;aphasia;communication disorder;clinical signs of;aspiration  -AW        Treatment Assessment Comments (SLP) Pt very weak and limited participation. She communicates only via head nod yes/no. Tried to encourage verbal output, but pt really seems too weak to voice. She did clear throat audibly and responses were appropriate for yes/no.  -AW        Daily Summary of Progress (SLP) progress towards functional goals is fair  -AW        Barriers to Overall Progress (SLP) Lethargy;Fatigue;Cognitive status;Baseline deficits;Medically complex  -AW        Plan for Continued Treatment (SLP) continue treatment per plan of care  -AW        Care Plan Review care plan/treatment goals reviewed  -AW                  User Key  (r) = Recorded By, (t) = Taken By, (c) = Cosigned By      Initials Name Effective Dates    AW Babita West, MS CCC-SLP 02/03/23 -                        EDUCATION  The patient has been educated in the following areas:     Communication Impairment.           SLP GOALS       Row Name 12/19/23 1000             (LTG) Patient will demonstrate functional swallow for    Diet Texture (Demonstrate functional swallow) soft to chew (whole) textures  -AW      Liquid viscosity (Demonstrate functional swallow) thin liquids  -AW      [place] (Demonstrate functional swallow) with 1:1 assist/ supervision  -AW      Time Frame (Demonstrate functional swallow) by discharge  -AW      Progress/Outcomes (Demonstrate functional swallow) unable to make needed progress  tried small piece of cracker  but pt is unable to bite or manipulate at all, even when placed in mouth. Cracker had to be removed from oral cavity. Pt does not like puree and hadn't touched breakfast tray, but also unable to advance at this point.  -AW         (STG) Patient will tolerate trials of    Consistencies Trialed (Tolerate trials) pureed textures;nectar/ mildly thick liquids  -AW      Desired Outcome (Tolerate trials) without signs/symptoms of aspiration;with adequate oral prep/transit/clearance;with use of compensatory strategies (see comments)  -AW      [place] (Tolerate trials) with 1:1 assist/ supervision  -AW      Time Frame (Tolerate trials) by discharge  -AW      Progress/Outcomes (Tolerate trials) continuing progress toward goal  -AW      Comment (Tolerate trials) pt tolerating nectar and puree, no s/s, limited intake  -AW         (STG) Patient will tolerate therapeutic trials of    Consistencies Trialed (Tolerate therapeutic trials) thin liquids  -AW      Desired Outcome (Tolerate therapeutic trials) without signs/symptoms of aspiration;without signs of distress  -AW      [place] (Tolerate therapeutic trials) with 1:1 assist/ supervision  -AW      Time Frame (Tolerate therapeutic trials) by discharge  -AW      Progress/Outcomes (Tolerate therapeutic trials) progress slower than expected  -AW      Comment (Tolerate therapeutic trials) pt coughing with thin by tsp and straw, wet vocal quality also noted  -AW         (STG) Lingual Strengthening Goal 1 (SLP)    Activity (Lingual Strengthening Goal 1, SLP) increase tongue back strength;increase lingual tone/sensation/control/coordination/movement  -AW      Increase Lingual Tone/Sensation/Control/Coordination/Movement lingual resistance exercises;swallow trials  -AW      Increase Tongue Back Strength lingual resistance exercises  -AW      [place]/Accuracy (Lingual Strengthening Goal 1, SLP) with maximum cues (25-49% accuracy)  -AW      Time Frame (Lingual Strengthening  Goal 1, SLP) short term goal (STG)  -AW      Progress/Outcomes (Lingual Strengthening Goal 1, SLP) progress slower than expected  -AW      Comment (Lingual Strengthening Goal 1, SLP) pt with significant difficulty participating in exercises, ? apraxia contributing  -AW         (STG) Pharyngeal Strengthening Exercise Goal 1 (SLP)    Activity (Pharyngeal Strengthening Goal 1, SLP) increase timing;increase superior movement of the hyolaryngeal complex;increase anterior movement of the hyolaryngeal complex;increase squeeze/positive pressure generation  -AW      Increase Timing prepping - 3 second prep or suck swallow or 3-step swallow  -AW      Increase Superior Movement of the Hyolaryngeal Complex falsetto  -AW      Increase Anterior Movement of the Hyolaryngeal Complex chin tuck against resistance (CTAR)  -AW      Increase Squeeze/Positive Pressure Generation hard effortful swallow  -AW      [place]/Accuracy (Pharyngeal Strengthening Goal 1, SLP) with maximum cues (25-49% accuracy)  -AW      Time Frame (Pharyngeal Strengthening Goal 1, SLP) short term goal (STG)  -AW      Progress/Outcomes (Pharyngeal Strengthening Goal 1, SLP) progress slower than expected  -AW      Comment (Pharyngeal Strengthening Goal 1, SLP) pt unable to do hard swallow even with max cues and pressure applied to BOT  -AW         Patient will demonstrate functional cognitive-linguistic skills for return to discharge environment    [place] with maximum cues  -AW      Time frame by discharge  -AW      Barriers lethargy;cognitive status;baseline deficits;medically complex;advanced age  -AW      Progress/Outcomes progress slower than expected  -AW         Follow Directions Goal 2 (SLP)    Improve Ability to Follow Directions Goal 1 (SLP) 1 step direction without objects;80%;with moderate cues (50-74%)  -AW      Time Frame (Follow Directions Goal 1, SLP) short term goal (STG)  -AW      Progress (Ability to Follow Directions Goal 1, SLP)  60%;with maximum cues (25-49%)  -AW      Progress/Outcomes (Follow Directions Goal 1, SLP) progress slower than expected  -AW         Phonation Goal 1 (SLP)    Improve Phonation By Goal 1 (SLP) using loud speech;80%;with maximum cues (25-49%)  -AW      Time Frame (Phonation Goal 1, SLP) short term goal (STG)  -AW      Progress/Outcomes (Phonation Goal 1, SLP) progress slower than expected  -AW      Comment (Phonation Goal 1, SLP) pt would not verbalize at all, only nodding  -AW         Articulation Goal 1 (SLP)    Improve Articulation Goal 1 (SLP) by over-articulating at word level;80%;with maximum cues (25-49%)  -AW      Time Frame (Articulation Goal 1, SLP) short term goal (STG)  -AW      Progress (Articulation Goal 1, SLP) 0%;with maximum cues (25-49%)  -AW      Progress/Outcomes (Articulation Goal 1, SLP) progress slower than expected  -AW                User Key  (r) = Recorded By, (t) = Taken By, (c) = Cosigned By      Initials Name Provider Type    Babita Kyle MS CCC-SLP Speech and Language Pathologist                            Time Calculation:      Time Calculation- SLP       Row Name 23 1046             Time Calculation- SLP    SLP Start Time 0953  -AW      SLP Stop Time 1046  -AW      SLP Time Calculation (min) 53 min  -AW      SLP Received On 23  -AW                User Key  (r) = Recorded By, (t) = Taken By, (c) = Cosigned By      Initials Name Provider Type    Babita Kyle MS CCC-SLP Speech and Language Pathologist                                   Babita West MS CCC-SLP  2023   and [facility] Care - Speech Language Pathology   Swallow Treatment Note [facility]     Patient Name: Beryl Montoya  : 1945  MRN: [id number]  Today's Date: 2023               Admit Date: 2023    Visit Dx:     ICD-10-CM ICD-9-CM   1. Cerebrovascular accident (CVA), unspecified mechanism  I63.9 434.91   2. Generalized weakness  R53.1 780.79   3. Expressive aphasia   R47.01 784.3   4. Hypertension, unspecified type  I10 401.9   5. Dysphagia, unspecified type  R13.10 787.20   6. Cognitive communication deficit  R41.841 799.52   7. Dysarthria  R47.1 784.51   8. Cerebrovascular accident (CVA) due to thrombosis of precerebral artery  I63.00 433.91     Patient Active Problem List   Diagnosis    History of L MCA CVA    Essential hypertension    Type 2 diabetes mellitus with complication, with long-term current use of insulin    GERD (gastroesophageal reflux disease)    Bilateral carotid artery stenosis    Abnormal stress test    Severe 3 vessel CAD with angina pectoris (CMS/[facility])    Ischemic cardiomyopathy. LVEF 30%    S/P CABG x 3 on 6/30/20    Acute postop embolic left occipital CVA 7/4/20. Extensive in L PCA distribution but additional areas in R PCA distribution (CMS/[facility])    Bilateral carotid atherosclerosis with moderate left common carotid artery stenosis (50-60%)    Seizure    Generalized weakness    Acute UTI (urinary tract infection)    History of seizure    Stroke    RAJINDER (acute kidney injury)     Past Medical History:   Diagnosis Date    CHF (congestive heart failure)     Coronary artery disease involving native coronary artery of native heart with angina pectoris 6/25/2020    Diabetes mellitus     GERD (gastroesophageal reflux disease)     High cholesterol     Hyperlipidemia     Hypertension     Ischemic cardiomyopathy     Stroke     2013 speech and short term memory     TIA (transient ischemic attack)      Past Surgical History:   Procedure Laterality Date    CARDIAC CATHETERIZATION N/A 6/23/2020    Procedure: Left Heart Cath 16990 C19 @ [facility];  Surgeon: David Faulkner MD;  Location: [facility] CATH INVASIVE LOCATION;  Service: Cardiovascular;  Laterality: N/A;    CATARACT EXTRACTION Bilateral     COLONOSCOPY  2013    CORONARY ARTERY BYPASS GRAFT N/A 6/30/2020    Procedure: SHERI PER ANESTHESIA, MEDIAN STERNOTOMY, CORONARY ARTERY BYPASS GRAFTING X 3 , UTILIZING THE LEFT  INTERNAL MAMMARY ARTERY AND EVH OF RIGHT GREATER SAPHENOUS VEIN;  Surgeon: Jerry Lozano MD;  Location:  [facility] OR;  Service: Cardiothoracic;  Laterality: N/A;  LEG START - 0736  VEIN OUT - 0840  VEIN READY - 0850    EYE SURGERY      HYSTERECTOMY      total    INTERVENTIONAL RADIOLOGY PROCEDURE Bilateral 9/25/2017    Procedure: Carotid Cerebral Angiogram;  Surgeon: Malodnado Casas MD;  Location:  [facility] CATH INVASIVE LOCATION;  Service:        SLP Recommendation and Plan                                               Daily Summary of Progress (SLP): progress towards functional goals is fair (12/19/23 1000)               Treatment Assessment (SLP): continued, aphasia, communication disorder, clinical signs of, aspiration (12/19/23 1000)  Treatment Assessment Comments (SLP): Pt very weak and limited participation. She communicates only via head nod yes/no. Tried to encourage verbal output, but pt really seems too weak to voice. She did clear throat audibly and responses were appropriate for yes/no. (12/19/23 1000)  Plan for Continued Treatment (SLP): continue treatment per plan of care (12/19/23 1000)            Plan of Care Reviewed With: patient  Progress: no change          EDUCATION  The patient has been educated in the following areas:   Dysphagia (Swallowing Impairment).        SLP GOALS       Row Name 12/19/23 1000             (LTG) Patient will demonstrate functional swallow for    Diet Texture (Demonstrate functional swallow) soft to chew (whole) textures  -AW      Liquid viscosity (Demonstrate functional swallow) thin liquids  -AW      [place] (Demonstrate functional swallow) with 1:1 assist/ supervision  -AW      Time Frame (Demonstrate functional swallow) by discharge  -AW      Progress/Outcomes (Demonstrate functional swallow) unable to make needed progress  tried small piece of cracker but pt is unable to bite or manipulate at all, even when placed in mouth. Cracker had to be removed  from oral cavity. Pt does not like puree and hadn't touched breakfast tray, but also unable to advance at this point.  -AW         (STG) Patient will tolerate trials of    Consistencies Trialed (Tolerate trials) pureed textures;nectar/ mildly thick liquids  -AW      Desired Outcome (Tolerate trials) without signs/symptoms of aspiration;with adequate oral prep/transit/clearance;with use of compensatory strategies (see comments)  -AW      [place] (Tolerate trials) with 1:1 assist/ supervision  -AW      Time Frame (Tolerate trials) by discharge  -AW      Progress/Outcomes (Tolerate trials) continuing progress toward goal  -AW      Comment (Tolerate trials) pt tolerating nectar and puree, no s/s, limited intake  -AW         (STG) Patient will tolerate therapeutic trials of    Consistencies Trialed (Tolerate therapeutic trials) thin liquids  -AW      Desired Outcome (Tolerate therapeutic trials) without signs/symptoms of aspiration;without signs of distress  -AW      [place] (Tolerate therapeutic trials) with 1:1 assist/ supervision  -AW      Time Frame (Tolerate therapeutic trials) by discharge  -AW      Progress/Outcomes (Tolerate therapeutic trials) progress slower than expected  -AW      Comment (Tolerate therapeutic trials) pt coughing with thin by tsp and straw, wet vocal quality also noted  -AW         (STG) Lingual Strengthening Goal 1 (SLP)    Activity (Lingual Strengthening Goal 1, SLP) increase tongue back strength;increase lingual tone/sensation/control/coordination/movement  -AW      Increase Lingual Tone/Sensation/Control/Coordination/Movement lingual resistance exercises;swallow trials  -AW      Increase Tongue Back Strength lingual resistance exercises  -AW      [place]/Accuracy (Lingual Strengthening Goal 1, SLP) with maximum cues (25-49% accuracy)  -AW      Time Frame (Lingual Strengthening Goal 1, SLP) short term goal (STG)  -AW      Progress/Outcomes (Lingual Strengthening Goal 1, SLP)  progress slower than expected  -AW      Comment (Lingual Strengthening Goal 1, SLP) pt with significant difficulty participating in exercises, ? apraxia contributing  -AW         (STG) Pharyngeal Strengthening Exercise Goal 1 (SLP)    Activity (Pharyngeal Strengthening Goal 1, SLP) increase timing;increase superior movement of the hyolaryngeal complex;increase anterior movement of the hyolaryngeal complex;increase squeeze/positive pressure generation  -AW      Increase Timing prepping - 3 second prep or suck swallow or 3-step swallow  -AW      Increase Superior Movement of the Hyolaryngeal Complex falsetto  -AW      Increase Anterior Movement of the Hyolaryngeal Complex chin tuck against resistance (CTAR)  -AW      Increase Squeeze/Positive Pressure Generation hard effortful swallow  -AW      [place]/Accuracy (Pharyngeal Strengthening Goal 1, SLP) with maximum cues (25-49% accuracy)  -AW      Time Frame (Pharyngeal Strengthening Goal 1, SLP) short term goal (STG)  -AW      Progress/Outcomes (Pharyngeal Strengthening Goal 1, SLP) progress slower than expected  -AW      Comment (Pharyngeal Strengthening Goal 1, SLP) pt unable to do hard swallow even with max cues and pressure applied to BOT  -AW         Patient will demonstrate functional cognitive-linguistic skills for return to discharge environment    [place] with maximum cues  -AW      Time frame by discharge  -AW      Barriers lethargy;cognitive status;baseline deficits;medically complex;advanced age  -AW      Progress/Outcomes progress slower than expected  -AW         Follow Directions Goal 2 (SLP)    Improve Ability to Follow Directions Goal 1 (SLP) 1 step direction without objects;80%;with moderate cues (50-74%)  -AW      Time Frame (Follow Directions Goal 1, SLP) short term goal (STG)  -AW      Progress (Ability to Follow Directions Goal 1, SLP) 60%;with maximum cues (25-49%)  -AW      Progress/Outcomes (Follow Directions Goal 1, SLP) progress  slower than expected  -AW         Phonation Goal 1 (SLP)    Improve Phonation By Goal 1 (SLP) using loud speech;80%;with maximum cues (25-49%)  -AW      Time Frame (Phonation Goal 1, SLP) short term goal (STG)  -AW      Progress/Outcomes (Phonation Goal 1, SLP) progress slower than expected  -AW      Comment (Phonation Goal 1, SLP) pt would not verbalize at all, only nodding  -AW         Articulation Goal 1 (SLP)    Improve Articulation Goal 1 (SLP) by over-articulating at word level;80%;with maximum cues (25-49%)  -AW      Time Frame (Articulation Goal 1, SLP) short term goal (STG)  -AW      Progress (Articulation Goal 1, SLP) 0%;with maximum cues (25-49%)  -AW      Progress/Outcomes (Articulation Goal 1, SLP) progress slower than expected  -AW                User Key  (r) = Recorded By, (t) = Taken By, (c) = Cosigned By      Initials Name Provider Type    Babita Kyle MS CCC-SLP Speech and Language Pathologist                       Time Calculation:    Time Calculation- SLP       Row Name 12/19/23 1046             Time Calculation- SLP    SLP Start Time 0953  -AW      SLP Stop Time 1046  -AW      SLP Time Calculation (min) 53 min  -AW      SLP Received On 12/19/23  -AW                User Key  (r) = Recorded By, (t) = Taken By, (c) = Cosigned By      Initials Name Provider Type    Babita Kyle MS CCC-SLP Speech and Language Pathologist                             Babita West MS CCC-SLP  12/19/2023

## 2023-12-19 NOTE — THERAPY TREATMENT NOTE
Patient Name: Beryl Montoya  : 1945    MRN: [id number]                              Today's Date: 2023       Admit Date: 2023    Visit Dx:     ICD-10-CM ICD-9-CM   1. Cerebrovascular accident (CVA), unspecified mechanism  I63.9 434.91   2. Generalized weakness  R53.1 780.79   3. Expressive aphasia  R47.01 784.3   4. Hypertension, unspecified type  I10 401.9   5. Dysphagia, unspecified type  R13.10 787.20   6. Cognitive communication deficit  R41.841 799.52   7. Dysarthria  R47.1 784.51   8. Cerebrovascular accident (CVA) due to thrombosis of precerebral artery  I63.00 433.91     Patient Active Problem List   Diagnosis    History of L MCA CVA    Essential hypertension    Type 2 diabetes mellitus with complication, with long-term current use of insulin    GERD (gastroesophageal reflux disease)    Bilateral carotid artery stenosis    Abnormal stress test    Severe 3 vessel CAD with angina pectoris (CMS/HCC)    Ischemic cardiomyopathy. LVEF 30%    S/P CABG x 3 on 20    Acute postop embolic left occipital CVA 20. Extensive in L PCA distribution but additional areas in R PCA distribution (CMS/HCC)    Bilateral carotid atherosclerosis with moderate left common carotid artery stenosis (50-60%)    Seizure    Generalized weakness    Acute UTI (urinary tract infection)    History of seizure    Stroke    RAJINDER (acute kidney injury)     Past Medical History:   Diagnosis Date    CHF (congestive heart failure)     Coronary artery disease involving native coronary artery of native heart with angina pectoris 2020    Diabetes mellitus     GERD (gastroesophageal reflux disease)     High cholesterol     Hyperlipidemia     Hypertension     Ischemic cardiomyopathy     Stroke     2013 speech and short term memory     TIA (transient ischemic attack)      Past Surgical History:   Procedure Laterality Date    CARDIAC CATHETERIZATION N/A 2020    Procedure: Left Heart Cath 97331 C19 @  [facility];  Surgeon:  David Faulkner MD;  Location:  [facility];  Service: Cardiovascular;  Laterality: N/A;    CATARACT EXTRACTION Bilateral     COLONOSCOPY  2013    CORONARY ARTERY BYPASS GRAFT N/A 6/30/2020    Procedure: SHERI PER ANESTHESIA, MEDIAN STERNOTOMY, CORONARY ARTERY BYPASS GRAFTING X 3 , UTILIZING THE LEFT INTERNAL MAMMARY ARTERY AND EVH OF RIGHT GREATER SAPHENOUS VEIN;  Surgeon: Jerry Lozano MD;  Location: [facility] OR;  Service: Cardiothoracic;  Laterality: N/A;  LEG START - 0736  VEIN OUT - 0840  VEIN READY - 0850    EYE SURGERY      HYSTERECTOMY      total    INTERVENTIONAL RADIOLOGY PROCEDURE Bilateral 9/25/2017    Procedure: Carotid Cerebral Angiogram;  Surgeon: Maldonado Casas MD;  Location:  [facility];  Service:       General Information       Row Name 12/19/23 1052          Physical Therapy Time and Intention    Document Type therapy note (daily note)  -ES     Mode of Treatment physical therapy  -ES       Row Name 12/19/23 1052          General Information    Patient Profile Reviewed yes  -ES     Existing Precautions/Restrictions fall;other (see comments)  aphasia, L side weakness  -ES     Barriers to Rehab medically complex;previous functional deficit;cognitive status  -ES       Row Name 12/19/23 1052          Cognition    Orientation Status (Cognition) oriented to;person;other (see comments)  able to nod yes/no with prompting  -ES       Row Name 12/19/23 1052          Safety Issues, Functional Mobility    Safety Issues Affecting Function (Mobility) awareness of need for assistance;insight into deficits/self-awareness;safety precaution awareness;safety precautions follow-through/compliance;sequencing abilities  -ES     Impairments Affecting Function (Mobility) balance;cognition;coordination;endurance/activity tolerance;strength;visual/perceptual  -ES     Cognitive Impairments, Mobility Safety/Performance awareness, need for assistance;insight into  deficits/self-awareness;problem-solving/reasoning;safety precaution awareness;safety precaution follow-through;sequencing abilities  -ES               User Key  (r) = Recorded By, (t) = Taken By, (c) = Cosigned By      Initials Name Provider Type    ES Elli Cornell PT Physical Therapist                   Mobility       Row Name 12/19/23 1053          Bed Mobility    Comment, (Bed Mobility) received EOB  -ES       Row Name 12/19/23 1053          Bed-Chair Transfer    Bed-Chair Foard (Transfers) maximum assist (25% patient effort);2 person assist;verbal cues  -ES     Assistive Device (Bed-Chair Transfers) other (see comments)  BUE support  -ES     Comment, (Bed-Chair Transfer) SPT from EOB to chair. Demo'd forward flexed posture in standing  -ES       Row Name 12/19/23 1053          Sit-Stand Transfer    Sit-Stand Foard (Transfers) maximum assist (25% patient effort);2 person assist;verbal cues  -ES     Assistive Device (Sit-Stand Transfers) other (see comments);walker, front-wheeled  BUE support  -ES     Comment, (Sit-Stand Transfer) STS x3: 1 from EOB, 2 from chair with BUE support then FWW. Demo'd forward flexed posture and max cueing to promote upright posture.  -ES       Row Name 12/19/23 1053          Gait/Stairs (Locomotion)    Foard Level (Gait) unable to assess  -ES     Comment, (Gait/Stairs) Unable to assess further mobility due to instability with t/f  -ES               User Key  (r) = Recorded By, (t) = Taken By, (c) = Cosigned By      Initials Name Provider Type    Elli Sanders PT Physical Therapist                   Obj/Interventions       Row Name 12/19/23 1055          Motor Skills    Therapeutic Exercise hip;knee;ankle  -ES       Row Name 12/19/23 1055          Hip (Therapeutic Exercise)    Hip (Therapeutic Exercise) AAROM (active assistive range of motion)  -ES     Hip AAROM (Therapeutic Exercise) bilateral;flexion;extension;10 repetitions  -ES       Row Name  "12/19/23 1055          Knee (Therapeutic Exercise)    Knee (Therapeutic Exercise) AAROM (active assistive range of motion)  -ES     Knee AAROM (Therapeutic Exercise) bilateral;flexion;extension;10 repetitions  -ES       Row Name 12/19/23 1055          Ankle (Therapeutic Exercise)    Ankle (Therapeutic Exercise) AAROM (active assistive range of motion)  -ES     Ankle AAROM (Therapeutic Exercise) bilateral;dorsiflexion;plantarflexion;10 repetitions  -ES       Row Name 12/19/23 1055          Balance    Balance Assessment sitting static balance;sitting dynamic balance;sit to stand dynamic balance;standing static balance;standing dynamic balance  -ES     Static Sitting Balance minimal assist  -ES     Dynamic Sitting Balance moderate assist;verbal cues  -ES     Position, Sitting Balance supported;sitting in chair;sitting edge of bed  -ES     Sit to Stand Dynamic Balance maximum assist;2-person assist;verbal cues  -ES     Static Standing Balance maximum assist;2-person assist;verbal cues  -ES     Dynamic Standing Balance maximum assist;2-person assist;verbal cues;non-verbal cues (demo/gesture)  -ES     Position/Device Used, Standing Balance supported;walker, front-wheeled;other (see comments)  BUE support  -ES     Balance Interventions sitting;standing;sit to stand;supported;static;dynamic;occupation based/functional task  -ES     Comment, Balance Demo'd posterior lean in sitting/standing, no overt LOB  -ES               User Key  (r) = Recorded By, (t) = Taken By, (c) = Cosigned By      Initials Name Provider Type    ES Elli Cornell PT Physical Therapist                   Goals/Plan    No documentation.                  Clinical Impression       Row Name 12/19/23 1057          Pain    Pretreatment Pain Rating 0/10 - no pain  -ES     Posttreatment Pain Rating 0/10 - no pain  -ES     Pre/Posttreatment Pain Comment shook head \"no\" when asked about pain  -ES     Pain Intervention(s) Repositioned;Ambulation/increased " activity  -ES       Row Name 12/19/23 1058          Plan of Care Review    Plan of Care Reviewed With patient  -ES     Progress improving  -ES     Outcome Evaluation Pt demonstrated improved participation with therapy this date, but continues to be limited by L sided weakness, balance deficits, and decreased functional activity tolerance. Will continue to progress as able to address functional limitations and promote return to PLOF. PT rec SNF at d/c.  -ES       Row Name 12/19/23 1056          Therapy Assessment/Plan (PT)    Rehab Potential (PT) fair, will monitor progress closely  -ES     Criteria for Skilled Interventions Met (PT) yes;meets criteria;skilled treatment is necessary  -ES     Therapy Frequency (PT) daily  -ES       Row Name 12/19/23 1055          Vital Signs    Pre Systolic BP Rehab --  RN cleared for treatment  -ES     O2 Delivery Pre Treatment room air  -ES     O2 Delivery Intra Treatment room air  -ES     O2 Delivery Post Treatment room air  -ES     Pre Patient Position Sitting  -ES     Intra Patient Position Standing  -ES     Post Patient Position Sitting  -ES       Row Name 12/19/23 105          Positioning and Restraints    Pre-Treatment Position in bed  -ES     Post Treatment Position chair  -ES     In Chair notified nsg;reclined;sitting;call light within reach;encouraged to call for assist;exit alarm on;waffle cushion;on mechanical lift sling;legs elevated;heels elevated  -ES               User Key  (r) = Recorded By, (t) = Taken By, (c) = Cosigned By      Initials Name Provider Type    ES Elli Cornell, PT Physical Therapist                   Outcome Measures       Row Name 12/19/23 1053          How much help from another person do you currently need...    Turning from your back to your side while in flat bed without using bedrails? 2  -ES     Moving from lying on back to sitting on the side of a flat bed without bedrails? 2  -ES     Moving to and from a bed to a chair (including a  wheelchair)? 2  -ES     Standing up from a chair using your arms (e.g., wheelchair, bedside chair)? 2  -ES     Climbing 3-5 steps with a railing? 1  -ES     To walk in hospital room? 1  -ES     AM-PAC 6 Clicks Score (PT) 10  -ES     Highest Level of Mobility Goal 4 --> Transfer to chair/commode  -ES       Row Name 12/19/23 1059          Functional Assessment    Outcome Measure Options AM-PAC 6 Clicks Basic Mobility (PT)  -ES               User Key  (r) = Recorded By, (t) = Taken By, (c) = Cosigned By      Initials Name Provider Type    ES Elli Cornell, PT Physical Therapist                                 Physical Therapy Education       Title: PT OT SLP Therapies (In Progress)       Topic: Physical Therapy (In Progress)       Point: Mobility training (In Progress)       Learning Progress Summary             Patient Acceptance, E, NR by CD at 12/14/2023 1651    Comment: SEE FLOWSHEET    Acceptance, E, NR by KR at 12/12/2023 0939                         Point: Home exercise program (In Progress)       Learning Progress Summary             Patient Acceptance, E, NR by CD at 12/14/2023 1651    Comment: SEE FLOWSHEET    Acceptance, E, NR by KR at 12/12/2023 0939                         Point: Body mechanics (In Progress)       Learning Progress Summary             Patient Acceptance, E, NR by CD at 12/14/2023 1651    Comment: SEE FLOWSHEET    Acceptance, E, NR by KR at 12/12/2023 0939                         Point: Precautions (In Progress)       Learning Progress Summary             Patient Acceptance, E, NR by CD at 12/14/2023 1651    Comment: SEE FLOWSHEET    Acceptance, E, NR by KR at 12/12/2023 0939                                         User Key       Initials Effective Dates Name Provider Type Discipline    CD 02/03/23 -  Samantha Winters, PT Physical Therapist PT    KR 12/30/22 -  Malathi Riddle, PT Physical Therapist PT                  PT Recommendation and Plan     Plan of Care Reviewed With:  patient  Progress: improving  Outcome Evaluation: Pt demonstrated improved participation with therapy this date, but continues to be limited by L sided weakness, balance deficits, and decreased functional activity tolerance. Will continue to progress as able to address functional limitations and promote return to PLOF. PT rec SNF at d/c.     Time Calculation:         PT Charges       Row Name 12/19/23 1102             Time Calculation    Start Time 1002  -ES      PT Received On 12/19/23  -ES      PT Goal Re-Cert Due Date 12/22/23  -ES         Time Calculation- PT    Total Timed Code Minutes- PT 15 minute(s)  -ES         Timed Charges    59082 - PT Therapeutic Exercise Minutes 15  -ES         Total Minutes    Timed Charges Total Minutes 15  -ES       Total Minutes 15  -ES                User Key  (r) = Recorded By, (t) = Taken By, (c) = Cosigned By      Initials Name Provider Type    Elli Sanders PT Physical Therapist                  Therapy Charges for Today       Code Description Service Date Service Provider Modifiers Qty    [id number] HC PT THER PROC EA 15 MIN 12/19/2023 Elli Cornell PT GP 1            PT G-Codes  Outcome Measure Options: AM-PAC 6 Clicks Basic Mobility (PT)  AM-PAC 6 Clicks Score (PT): 10  AM-PAC 6 Clicks Score (OT): 7  Modified Adeola Scale: 5 - Severe disability.  Bedridden, incontinent, and requiring constant nursing care and attention.  PT Discharge Summary  Anticipated Discharge Disposition (PT): skilled nursing facility    Elli Cornell PT  12/19/2023

## 2023-12-19 NOTE — PLAN OF CARE
Goal Outcome Evaluation:  Plan of Care Reviewed With: patient        Progress: improving  Outcome Evaluation: Pt demonstrates improved sitting balance, engagement in grooming tasks, and BUE strength. Remains limited by L-sided weakness, cognitive deficits, and impaired balance/coordination - will cont IPOT per POC as tolerated. Rec d/c to SNF.      Anticipated Discharge Disposition (OT): skilled nursing facility

## 2023-12-19 NOTE — PROGRESS NOTES
[facility] Medicine Services  PROGRESS NOTE    Patient Name: Beryl Montoya  : 1945  MRN: [id number]    Date of Admission: 2023  Primary Care Physician: Argelia Gamez MD    Subjective   Subjective     CC:  F/U CVA    HPI:  Seen this morning. She is fairly sleepy, keeps eyes closed but shakes head yes and no. No complains of pain, SOA, nausea.       Objective   Objective     Vital Signs:   Temp:  [97 °F (36.1 °C)-97.9 °F (36.6 °C)] 97 °F (36.1 °C)  Heart Rate:  [52-64] 53  Resp:  [18] 18  BP: (115-162)/(46-74) 157/59     Physical Exam:  Gen-no acute distress  HENT-NCAT, mucous membranes moist  CV-RRR, S1 S2 normal, no m/r/g  Resp-CTAB, no wheezes or rales  Abd-soft, NT, ND, +BS  Ext-no edema  Neuro-keeps eyes closed, nods yes/no appropriately   Skin-no rashes      Results Reviewed:  LAB RESULTS:      Lab 12/15/23  0514   WBC 7.70   HEMOGLOBIN 15.1   HEMATOCRIT 45.3   PLATELETS 219   .7*         Lab 23  0836 12/15/23  0514   SODIUM 146* 143   POTASSIUM 3.9 4.1   CHLORIDE 108* 106   CO2 27.0 25.0   ANION GAP 11.0 12.0   BUN 25* 26*   CREATININE 0.95 1.41*   EGFR 61.5 38.3*   GLUCOSE 147* 266*   CALCIUM 9.0 8.7                         Brief Urine Lab Results  (Last result in the past 365 days)        Color   Clarity   Blood   Leuk Est   Nitrite   Protein   CREAT   Urine HCG        23 1523 Yellow   Cloudy   Trace   Moderate (2+)   Positive   100 mg/dL (2+)                   Microbiology Results Abnormal       None            No radiology results from the last 24 hrs    Results for orders placed during the hospital encounter of 23    Adult Transthoracic Echo Limited W/ Cont if Necessary Per Protocol    Interpretation Summary    Left ventricular ejection fraction appears to be 51 - 55%. Normal left ventricular cavity size and wall thickness noted. All left ventricular wall segments contract normally.    Normal right ventricular cavity size  and systolic function noted.    The left atrial cavity is mildly dilated.    No aortic valve regurgitation or stenosis is present. The aortic valve exhibits sclerosis. The aortic valve appears trileaflet.    The mitral valve is structurally normal with no significant stenosis present. Trace to mild mitral valve regurgitation is present.    The tricuspid valve is structurally normal with no significant stenosis present. Physiologic tricuspid valve regurgitation is present. Estimated right ventricular systolic pressure from tricuspid regurgitation is normal (<35 mmHg).      Current medications:  Scheduled Meds:aspirin, 81 mg, Oral, Daily   Or  aspirin, 300 mg, Rectal, Daily  atorvastatin, 80 mg, Oral, Nightly  carvedilol, 25 mg, Oral, BID  clopidogrel, 75 mg, Oral, Daily  furosemide, 40 mg, Oral, Daily  insulin detemir, 15 Units, Subcutaneous, Q12H  insulin lispro, 2-7 Units, Subcutaneous, 4x Daily AC & at Bedtime  Insulin Lispro, 3 Units, Subcutaneous, TID With Meals  isosorbide mononitrate, 30 mg, Oral, Daily  levETIRAcetam, 500 mg, Oral, Q12H  levothyroxine, 25 mcg, Oral, QAM  melatonin, 5 mg, Oral, Nightly  mineral oil, 1 enema, Rectal, Once  sacubitril-valsartan, 1 tablet, Oral, Q12H  traZODone, 50 mg, Oral, Nightly      Continuous Infusions:   PRN Meds:.  acetaminophen **OR** acetaminophen **OR** acetaminophen    senna-docusate sodium **AND** polyethylene glycol **AND** bisacodyl **AND** bisacodyl    Calcium Replacement - Follow Nurse / BPA Driven Protocol    lactulose    Magnesium Standard Dose Replacement - Follow Nurse / BPA Driven Protocol    ondansetron **OR** ondansetron    Phosphorus Replacement - Follow Nurse / BPA Driven Protocol    Potassium Replacement - Follow Nurse / BPA Driven Protocol    sodium chloride    sodium chloride    Assessment & Plan   Assessment & Plan     Active Hospital Problems    Diagnosis  POA    **Generalized weakness [R53.1]  Yes    RAJINDER (acute kidney injury) [N17.9]  Unknown     Stroke [I63.9]  Yes    Acute UTI (urinary tract infection) [N39.0]  Yes    History of seizure [Z87.898]  Yes    Ischemic cardiomyopathy. LVEF 30% [I25.5]  Yes    Essential hypertension [I10]  Yes    History of L MCA CVA [Z86.73]  Not Applicable    Type 2 diabetes mellitus with complication, with long-term current use of insulin [E11.8, Z79.4]  Not Applicable      Resolved Hospital Problems   No resolved problems to display.        Brief Hospital Course to date:  Beryl Montoya is a 78 y.o. female with hx of CAD s/p CABG, CVA with residual speech deficits, and wheelchair bound at baseline who presented with left arm weakness x 4-5 days. Found to have late acute/subacute right karen CVA, microhemorrhage likely secondary to uncontrolled HTN.     This patient's problems and plans were partially entered by my partner and updated as appropriate by me 12/19/23. Copied text in this note has been reviewed and is accurate as of today's date.      Late acute/subacute right karen CVA presenting with LUE weakness  -Neurology has seen, recommend DAPT, statin. Goal -160, improved with increased Coreg.  -PT/OT follows, plan for rehab placement  -Follow up with stroke clinic in 4-6 weeks    E. coli UTI   -s/p 5 days of IV Rocephin      DMII, insulin dependent with HbA1c 7.8  -Basal bolus regimen  -Will decrease Levemir to 12 units BID today as she is not running very high     Seizure disorder   -Continue home Keppra, EEG this admission was negative for seizure activity    Constipation  -Had BM overnight  -Continue bowel regimen    CAD s/p CABG - DAPT, Coreg, Imdur  HTN - normal goals as above  Obesity, BMI 34    Expected Discharge Location and Transportation: rehab  Expected Discharge   Expected Discharge Date: 12/21/2023; Expected Discharge Time:      DVT prophylaxis:  Mechanical DVT prophylaxis orders are present.     AM-PAC 6 Clicks Score (PT): 10 (12/19/23 2782)    CODE STATUS:   Code Status and Medical Interventions:    Ordered at: 12/11/23 1626     Medical Intervention Limits:    NO intubation (DNI)     Level Of Support Discussed With:    Next of Kin (If No Surrogate)     Code Status (Patient has no pulse and is not breathing):    No CPR (Do Not Attempt to Resuscitate)     Medical Interventions (Patient has pulse or is breathing):    Limited Support       Kamila Talamantes MD  12/19/23

## 2023-12-19 NOTE — PLAN OF CARE
Problem: Adult Inpatient Plan of Care  Goal: Plan of Care Review  Outcome: Ongoing, Progressing  Flowsheets (Taken 12/19/2023 1046)  Progress: no change  Plan of Care Reviewed With: patient   Goal Outcome Evaluation:  Plan of Care Reviewed With: patient        Progress: no change          SLP treatment completed. Will continue to address dysphagia and communication - pt with very limited ability to paricipate, even with max support/cues. Please see note for further details and recommendations.

## 2023-12-19 NOTE — THERAPY TREATMENT NOTE
Patient Name: Beryl Montoya  : 1945    MRN: [id number]                              Today's Date: 2023       Admit Date: 2023    Visit Dx:     ICD-10-CM ICD-9-CM   1. Cerebrovascular accident (CVA), unspecified mechanism  I63.9 434.91   2. Generalized weakness  R53.1 780.79   3. Expressive aphasia  R47.01 784.3   4. Hypertension, unspecified type  I10 401.9   5. Dysphagia, unspecified type  R13.10 787.20   6. Cognitive communication deficit  R41.841 799.52   7. Dysarthria  R47.1 784.51   8. Cerebrovascular accident (CVA) due to thrombosis of precerebral artery  I63.00 433.91     Patient Active Problem List   Diagnosis    History of L MCA CVA    Essential hypertension    Type 2 diabetes mellitus with complication, with long-term current use of insulin    GERD (gastroesophageal reflux disease)    Bilateral carotid artery stenosis    Abnormal stress test    Severe 3 vessel CAD with angina pectoris (CMS/HCC)    Ischemic cardiomyopathy. LVEF 30%    S/P CABG x 3 on 20    Acute postop embolic left occipital CVA 20. Extensive in L PCA distribution but additional areas in R PCA distribution (CMS/HCC)    Bilateral carotid atherosclerosis with moderate left common carotid artery stenosis (50-60%)    Seizure    Generalized weakness    Acute UTI (urinary tract infection)    History of seizure    Stroke    RAJINDER (acute kidney injury)     Past Medical History:   Diagnosis Date    CHF (congestive heart failure)     Coronary artery disease involving native coronary artery of native heart with angina pectoris 2020    Diabetes mellitus     GERD (gastroesophageal reflux disease)     High cholesterol     Hyperlipidemia     Hypertension     Ischemic cardiomyopathy     Stroke     2013 speech and short term memory     TIA (transient ischemic attack)      Past Surgical History:   Procedure Laterality Date    CARDIAC CATHETERIZATION N/A 2020    Procedure: Left Heart Cath 15372 C19 @  [facility];  Surgeon:  David Faulkner MD;  Location:  [facility];  Service: Cardiovascular;  Laterality: N/A;    CATARACT EXTRACTION Bilateral     COLONOSCOPY  2013    CORONARY ARTERY BYPASS GRAFT N/A 6/30/2020    Procedure: SHERI PER ANESTHESIA, MEDIAN STERNOTOMY, CORONARY ARTERY BYPASS GRAFTING X 3 , UTILIZING THE LEFT INTERNAL MAMMARY ARTERY AND EVH OF RIGHT GREATER SAPHENOUS VEIN;  Surgeon: Jerry Lozano MD;  Location:  [facility] OR;  Service: Cardiothoracic;  Laterality: N/A;  LEG START - 0736  VEIN OUT - 0840  VEIN READY - 0850    EYE SURGERY      HYSTERECTOMY      total    INTERVENTIONAL RADIOLOGY PROCEDURE Bilateral 9/25/2017    Procedure: Carotid Cerebral Angiogram;  Surgeon: Maldonado Casas MD;  Location:  [facility];  Service:       General Information       Row Name 12/19/23 1130          OT Time and Intention    Document Type therapy note (daily note)  -CS     Mode of Treatment occupational therapy  -CS       Row Name 12/19/23 1130          General Information    Patient Profile Reviewed yes  -CS     Existing Precautions/Restrictions fall;other (see comments)  aphasia, L side weakness  -CS     Barriers to Rehab medically complex;previous functional deficit;cognitive status  -CS       Row Name 12/19/23 1130          Cognition    Orientation Status (Cognition) oriented to;person;place  -CS       Row Name 12/19/23 1130          Safety Issues, Functional Mobility    Safety Issues Affecting Function (Mobility) insight into deficits/self-awareness;safety precaution awareness;safety precautions follow-through/compliance  -CS     Impairments Affecting Function (Mobility) balance;cognition;coordination;endurance/activity tolerance;strength;visual/perceptual  -CS     Cognitive Impairments, Mobility Safety/Performance awareness, need for assistance;insight into deficits/self-awareness;safety precaution awareness;safety precaution follow-through;judgment;problem-solving/reasoning  -CS                User Key  (r) = Recorded By, (t) = Taken By, (c) = Cosigned By      Initials Name Provider Type    CS Joseluis Khan OT Occupational Therapist                     Mobility/ADL's       Row Name 12/19/23 1131          Bed Mobility    Bed Mobility supine-sit;scooting/bridging  -     Scooting/Bridging [place] (Bed Mobility) 2 person assist;moderate assist (50% patient effort);verbal cues;nonverbal cues (demo/gesture)  -     Supine-Sit [place] (Bed Mobility) moderate assist (50% patient effort);2 person assist;verbal cues;nonverbal cues (demo/gesture)  -     Bed Mobility, Safety Issues decreased use of arms for pushing/pulling;decreased use of legs for bridging/pushing;impaired trunk control for bed mobility;cognitive deficits limit understanding  -     Assistive Device (Bed Mobility) bed rails;draw sheet;head of bed elevated  -CS     Comment, (Bed Mobility) posterior lean upon sitting, improved with time and anterior weight shifting activities  -       Row Name 12/19/23 1131          Transfers    Transfers sit-stand transfer  -     Comment, (Transfers) tactile cues/placement for safe sequencing w/ RW use  -       Row Name 12/19/23 1131          Sit-Stand Transfer    Sit-Stand [place] (Transfers) maximum assist (25% patient effort);2 person assist;verbal cues  -     Assistive Device (Sit-Stand Transfers) walker, front-wheeled  -       Row Name 12/19/23 1131          Activities of Daily Living    BADL Assessment/Intervention lower body dressing;grooming;feeding;upper body dressing  -       Row Name 12/19/23 1131          Lower Body Dressing Assessment/Training    [place] Level (Lower Body Dressing) don;socks;dependent (less than 25% patient effort)  -     Position (Lower Body Dressing) edge of bed sitting  -CS       Row Name 12/19/23 1131          Upper Body Dressing Assessment/Training    [place] Level (Upper Body Dressing) don;pajama/robe;moderate assist  (50% patient effort)  -     Position (Upper Body Dressing) edge of bed sitting  -       Row Name 12/19/23 1131          Grooming Assessment/Training    [place] Level (Grooming) wash face, hands;minimum assist (75% patient effort);hair care, combing/brushing;maximum assist (25% patient effort)  -       Row Name 12/19/23 1131          Toileting Assessment/Training    [place] Level (Toileting) adjust/manage clothing;perform perineal hygiene;dependent (less than 25% patient effort)  -               User Key  (r) = Recorded By, (t) = Taken By, (c) = Cosigned By      Initials Name Provider Type     Joseluis Khan, OT Occupational Therapist                   Obj/Interventions       Row Name 12/19/23 1133          Shoulder (Therapeutic Exercise)    Shoulder (Therapeutic Exercise) AROM (active range of motion)  -     Shoulder AROM (Therapeutic Exercise) bilateral;flexion;extension;aBduction;aDduction;2 sets;5 repetitions  -       Row Name 12/19/23 1133          Elbow/Forearm (Therapeutic Exercise)    Elbow/Forearm (Therapeutic Exercise) AROM (active range of motion)  -     Elbow/Forearm AROM (Therapeutic Exercise) bilateral;flexion;extension;10 repetitions  -       Row Name 12/19/23 1133          Wrist (Therapeutic Exercise)    Wrist (Therapeutic Exercise) AROM (active range of motion)  -     Wrist AROM (Therapeutic Exercise) flexion;extension;bilateral;5 repetitions  -       Row Name 12/19/23 1133          Hand (Therapeutic Exercise)    Hand (Therapeutic Exercise) AROM (active range of motion)  -     Hand AROM/AAROM (Therapeutic Exercise) bilateral;AROM (active range of motion);finger extension;finger flexion;5 repetitions  -       Row Name 12/19/23 1133          Motor Skills    Motor Skills motor control/coordination interventions  -     Motor Control/Coordination Interventions gross motor coordination activities;therapeutic exercise/ROM;occupation/activity based treatment  -      Therapeutic Exercise shoulder;elbow/forearm  -       Row Name 12/19/23 1133          Balance    Balance Assessment sitting static balance;sitting dynamic balance;standing static balance;standing dynamic balance  -CS     Dynamic Sitting Balance moderate assist  -CS     Position, Sitting Balance unsupported;sitting edge of bed  -CS     Static Standing Balance maximum assist;2-person assist  -CS     Dynamic Standing Balance maximum assist;2-person assist  -CS     Balance Interventions sitting;sit to stand;standing;occupation based/functional task;UE activity with balance activity;weight shifting activity;dynamic reaching  -CS               User Key  (r) = Recorded By, (t) = Taken By, (c) = Cosigned By      Initials Name Provider Type     Joseluis Khan OT Occupational Therapist                   Goals/Plan    No documentation.                  Clinical Impression       Row Name 12/19/23 1137          Pain Assessment    Additional Documentation Pain Scale: FACES Pre/Post-Treatment (Group)  -       Row Name 12/19/23 1137          Pain Scale: FACES Pre/Post-Treatment    Pain: FACES Scale, Pretreatment 0-->no hurt  -CS     Posttreatment Pain Rating 0-->no hurt  -CS       Row Name 12/19/23 1137          Plan of Care Review    Plan of Care Reviewed With patient  -CS     Progress improving  -     Outcome Evaluation Pt demonstrates improved sitting balance, engagement in grooming tasks, and BUE strength. Remains limited by L-sided weakness, cognitive deficits, and impaired balance/coordination - will cont IPOT per POC as tolerated. Rec d/c to SNF.  -       Row Name 12/19/23 1137          Therapy Plan Review/Discharge Plan (OT)    Anticipated Discharge Disposition (OT) skilled nursing facility  -       Row Name 12/19/23 1137          Vital Signs    O2 Delivery Pre Treatment room air  -CS     O2 Delivery Intra Treatment room air  -CS     O2 Delivery Post Treatment room air  -CS     Pre Patient Position Supine  -CS      Intra Patient Position Standing  -CS     Post Patient Position Sitting  -CS       Row Name 12/19/23 1137          Positioning and Restraints    Pre-Treatment Position in bed  -CS     Post Treatment Position chair  -CS     In Chair notified nsg;reclined;sitting;call light within reach;encouraged to call for assist;exit alarm on;on mechanical lift sling;waffle cushion;legs elevated  -CS               User Key  (r) = Recorded By, (t) = Taken By, (c) = Cosigned By      Initials Name Provider Type    CS Joseluis Khan, OT Occupational Therapist                   Outcome Measures       Row Name 12/19/23 1140          How much help from another is currently needed...    Putting on and taking off regular lower body clothing? 2  -CS     Bathing (including washing, rinsing, and drying) 2  -CS     Toileting (which includes using toilet bed pan or urinal) 2  -CS     Putting on and taking off regular upper body clothing 2  -CS     Taking care of personal grooming (such as brushing teeth) 2  -CS     Eating meals 2  -CS     AM-PAC 6 Clicks Score (OT) 12  -CS       Row Name 12/19/23 1059          How much help from another person do you currently need...    Turning from your back to your side while in flat bed without using bedrails? 2  -ES     Moving from lying on back to sitting on the side of a flat bed without bedrails? 2  -ES     Moving to and from a bed to a chair (including a wheelchair)? 2  -ES     Standing up from a chair using your arms (e.g., wheelchair, bedside chair)? 2  -ES     Climbing 3-5 steps with a railing? 1  -ES     To walk in hospital room? 1  -ES     AM-PAC 6 Clicks Score (PT) 10  -ES     Highest Level of Mobility Goal 4 --> Transfer to chair/commode  -ES       Row Name 12/19/23 1140          Modified [place] Scale    Modified [place] Scale 4 - Moderately severe disability.  Unable to walk without assistance, and unable to attend to own bodily needs without assistance.  -CS       Row Name 12/19/23 1140  12/19/23 1059       Functional Assessment    Outcome Measure Options AM-PAC 6 Clicks Daily Activity (OT)  -CS AM-PAC 6 Clicks Basic Mobility (PT)  -ES              User Key  (r) = Recorded By, (t) = Taken By, (c) = Cosigned By      Initials Name Provider Type    CS Joseluis Khan OT Occupational Therapist    ES Elli Cornell PT Physical Therapist                    Occupational Therapy Education       Title: PT OT SLP Therapies (In Progress)       Topic: Occupational Therapy (In Progress)       Point: ADL training (In Progress)       Description:   Instruct learner(s) on proper safety adaptation and remediation techniques during self care or transfers.   Instruct in proper use of assistive devices.                  Learning Progress Summary             Patient Acceptance, E, NR by  at 12/14/2023 1214    Acceptance, E,D, NR,NL by  at 12/12/2023 1015                         Point: Home exercise program (In Progress)       Description:   Instruct learner(s) on appropriate technique for monitoring, assisting and/or progressing therapeutic exercises/activities.                  Learning Progress Summary             Patient Acceptance, E, NR by  at 12/14/2023 1214    Acceptance, E,D, NR,NL by  at 12/12/2023 1015                         Point: Precautions (In Progress)       Description:   Instruct learner(s) on prescribed precautions during self-care and functional transfers.                  Learning Progress Summary             Patient Acceptance, E,D, NR,NL by  at 12/12/2023 1015                         Point: Body mechanics (Not Started)       Description:   Instruct learner(s) on proper positioning and spine alignment during self-care, functional mobility activities and/or exercises.                  Learner Progress:  Not documented in this visit.                              User Key       Initials Effective Dates Name Provider Type [facility] 02/03/23 -  Viviane Rodriguez OT Occupational  Therapist OT     06/16/21 -  Joseluis Khan OT Occupational Therapist OT                  OT Recommendation and Plan  Planned Therapy Interventions (OT): functional balance retraining, activity tolerance training, occupation/activity based interventions, ROM/therapeutic exercise, strengthening exercise, patient/caregiver education/training, transfer/mobility retraining, neuromuscular control/coordination retraining, adaptive equipment training  Therapy Frequency (OT): daily  Plan of Care Review  Plan of Care Reviewed With: patient  Progress: improving  Outcome Evaluation: Pt demonstrates improved sitting balance, engagement in grooming tasks, and BUE strength. Remains limited by L-sided weakness, cognitive deficits, and impaired balance/coordination - will cont IPOT per POC as tolerated. Rec d/c to SNF.     Time Calculation:   Evaluation Complexity (OT)  Review Occupational Profile/Medical/Therapy History Complexity: expanded/moderate complexity  Assessment, Occupational Performance/Identification of Deficit Complexity: 3-5 performance deficits  Clinical Decision Making Complexity (OT): detailed assessment/moderate complexity  Overall Complexity of Evaluation (OT): moderate complexity     Time Calculation- OT       Row Name 12/19/23 1141             Time Calculation- OT    OT Start Time 0933  -CS      OT Received On 12/19/23  -CS      OT Goal Re-Cert Due Date 12/22/23  -CS         Timed Charges    43056 - OT Therapeutic Exercise Minutes 10  -CS      16638 - OT Therapeutic Activity Minutes 12  -CS      00906 - OT Self Care/Mgmt Minutes 4  -CS         Total Minutes    Timed Charges Total Minutes 26  -CS       Total Minutes 26  -CS                User Key  (r) = Recorded By, (t) = Taken By, (c) = Cosigned By      Initials Name Provider Type    CS Joseluis Khan OT Occupational Therapist                  Therapy Charges for Today       Code Description Service Date Service Provider Modifiers Qty    42619238642  HC OT THER PROC EA 15 MIN 12/19/2023 Joseluis Khan, OT GO 1    [id number] HC OT THERAPEUTIC ACT EA 15 MIN 12/19/2023 Joseluis Khan, OT GO 1                 Joseluis Khan OT  12/19/2023

## 2023-12-19 NOTE — CASE MANAGEMENT/SOCIAL WORK
Continued Stay Note  ERWIN Cary     Patient Name: Beryl Montoya  MRN: [id number]  Today's Date: 12/19/2023    Admit Date: 12/11/2023    Plan: [facility]   Discharge Plan       Row Name 12/19/23 1225       Plan    Plan [facility]    Patient/Family in Agreement with Plan yes    Plan Comments Spoke with Orly with [facility] and precert has been started for Ms. Montoya's [employer] Medicare for rehab. CM will continue to follow up.    Final Discharge Disposition Code 03 - skilled nursing facility (SNF)                   Discharge Codes    No documentation.                 Expected Discharge Date and Time       Expected Discharge Date Expected Discharge Time    Dec 21, 2023               Katelyn Vaughan RN

## 2023-12-19 NOTE — PLAN OF CARE
Goal Outcome Evaluation:  Plan of Care Reviewed With: patient        Progress: improving  Outcome Evaluation: Pt demonstrated improved participation with therapy this date, but continues to be limited by L sided weakness, balance deficits, and decreased functional activity tolerance. Will continue to progress as able to address functional limitations and promote return to PLOF. PT rec SNF at d/c.      Anticipated Discharge Disposition (PT): skilled nursing facility

## 2023-12-20 LAB
ANION GAP SERPL CALCULATED.3IONS-SCNC: 18 MMOL/L (ref 5–15)
BUN SERPL-MCNC: 32 MG/DL (ref 8–23)
BUN/CREAT SERPL: 25.2 (ref 7–25)
CALCIUM SPEC-SCNC: 9.2 MG/DL (ref 8.6–10.5)
CHLORIDE SERPL-SCNC: 110 MMOL/L (ref 98–107)
CO2 SERPL-SCNC: 20 MMOL/L (ref 22–29)
CREAT SERPL-MCNC: 1.27 MG/DL (ref 0.57–1)
EGFRCR SERPLBLD CKD-EPI 2021: 43.4 ML/MIN/1.73
GLUCOSE BLDC GLUCOMTR-MCNC: 122 MG/DL (ref 70–130)
GLUCOSE BLDC GLUCOMTR-MCNC: 139 MG/DL (ref 70–130)
GLUCOSE BLDC GLUCOMTR-MCNC: 147 MG/DL (ref 70–130)
GLUCOSE BLDC GLUCOMTR-MCNC: 167 MG/DL (ref 70–130)
GLUCOSE BLDC GLUCOMTR-MCNC: 177 MG/DL (ref 70–130)
GLUCOSE SERPL-MCNC: 135 MG/DL (ref 65–99)
POTASSIUM SERPL-SCNC: 4.4 MMOL/L (ref 3.5–5.2)
SODIUM SERPL-SCNC: 148 MMOL/L (ref 136–145)

## 2023-12-20 PROCEDURE — 99232 SBSQ HOSP IP/OBS MODERATE 35: CPT | Performed by: HOSPITALIST

## 2023-12-20 PROCEDURE — 63710000001 INSULIN LISPRO (HUMAN) PER 5 UNITS: Performed by: NURSE PRACTITIONER

## 2023-12-20 PROCEDURE — 63710000001 INSULIN DETEMIR PER 5 UNITS: Performed by: HOSPITALIST

## 2023-12-20 PROCEDURE — 25810000003 SODIUM CHLORIDE 0.9 % SOLUTION: Performed by: HOSPITALIST

## 2023-12-20 PROCEDURE — 80048 BASIC METABOLIC PNL TOTAL CA: CPT | Performed by: HOSPITALIST

## 2023-12-20 PROCEDURE — 82948 REAGENT STRIP/BLOOD GLUCOSE: CPT

## 2023-12-20 RX ORDER — TRAZODONE HYDROCHLORIDE 50 MG/1
25 TABLET ORAL NIGHTLY
Status: DISCONTINUED | OUTPATIENT
Start: 2023-12-20 | End: 2023-12-21 | Stop reason: HOSPADM

## 2023-12-20 RX ORDER — SODIUM CHLORIDE 9 MG/ML
75 INJECTION, SOLUTION INTRAVENOUS CONTINUOUS
Status: ACTIVE | OUTPATIENT
Start: 2023-12-20 | End: 2023-12-21

## 2023-12-20 RX ADMIN — ISOSORBIDE MONONITRATE 30 MG: 30 TABLET, EXTENDED RELEASE ORAL at 09:56

## 2023-12-20 RX ADMIN — INSULIN LISPRO 3 UNITS: 100 INJECTION, SOLUTION INTRAVENOUS; SUBCUTANEOUS at 09:56

## 2023-12-20 RX ADMIN — INSULIN LISPRO 3 UNITS: 100 INJECTION, SOLUTION INTRAVENOUS; SUBCUTANEOUS at 17:20

## 2023-12-20 RX ADMIN — ATORVASTATIN CALCIUM 80 MG: 40 TABLET, FILM COATED ORAL at 21:18

## 2023-12-20 RX ADMIN — Medication 5 MG: at 21:18

## 2023-12-20 RX ADMIN — SODIUM CHLORIDE 75 ML/HR: 9 INJECTION, SOLUTION INTRAVENOUS at 12:55

## 2023-12-20 RX ADMIN — ASPIRIN 81 MG CHEWABLE TABLET 81 MG: 81 TABLET CHEWABLE at 09:57

## 2023-12-20 RX ADMIN — INSULIN DETEMIR 12 UNITS: 100 INJECTION, SOLUTION SUBCUTANEOUS at 09:56

## 2023-12-20 RX ADMIN — LEVETIRACETAM 500 MG: 100 SOLUTION ORAL at 21:18

## 2023-12-20 RX ADMIN — LEVOTHYROXINE SODIUM 25 MCG: 25 TABLET ORAL at 09:56

## 2023-12-20 RX ADMIN — TRAZODONE HYDROCHLORIDE 25 MG: 50 TABLET ORAL at 21:18

## 2023-12-20 RX ADMIN — INSULIN LISPRO 2 UNITS: 100 INJECTION, SOLUTION INTRAVENOUS; SUBCUTANEOUS at 12:01

## 2023-12-20 RX ADMIN — INSULIN LISPRO 3 UNITS: 100 INJECTION, SOLUTION INTRAVENOUS; SUBCUTANEOUS at 12:01

## 2023-12-20 RX ADMIN — FUROSEMIDE 40 MG: 40 TABLET ORAL at 09:57

## 2023-12-20 RX ADMIN — SACUBITRIL AND VALSARTAN 1 TABLET: 49; 51 TABLET, FILM COATED ORAL at 09:57

## 2023-12-20 RX ADMIN — LEVETIRACETAM 500 MG: 100 SOLUTION ORAL at 09:56

## 2023-12-20 RX ADMIN — CARVEDILOL 25 MG: 12.5 TABLET, FILM COATED ORAL at 09:56

## 2023-12-20 RX ADMIN — CLOPIDOGREL BISULFATE 75 MG: 75 TABLET ORAL at 09:57

## 2023-12-20 RX ADMIN — SACUBITRIL AND VALSARTAN 1 TABLET: 49; 51 TABLET, FILM COATED ORAL at 21:18

## 2023-12-20 RX ADMIN — INSULIN DETEMIR 12 UNITS: 100 INJECTION, SOLUTION SUBCUTANEOUS at 21:17

## 2023-12-20 NOTE — CASE MANAGEMENT/SOCIAL WORK
Continued Stay Note   Luis Daniel     Patient Name: Beryl Montoya  MRN: [id number]  Today's Date: 12/20/2023    Admit Date: 12/11/2023    Plan: SNF   Discharge Plan       Row Name 12/20/23 1408       Plan    Plan SNF    Patient/Family in Agreement with Plan yes    Plan Comments Awaiting insurance approval from Ms. Montoya's [facility] Medicare for rehab at [facility]. Attempted to update patient's Son Jose and left voicemail. Awaiting call back. CM will continue to follow.    Note update 1512: Spoke with Son by telephone. He is now interested in the [facility] instead of [facility]. Explained that a precert was in progress and if approved, we could transfer approval to the [facility] if they could offer a bed. Referral faxed to Elizabeth with the [facility] at [phone]. CM will follow up.     Final Discharge Disposition Code 03 - skilled nursing facility (SNF)                   Discharge Codes    No documentation.                 Expected Discharge Date and Time       Expected Discharge Date Expected Discharge Time    Dec 22, 2023               Katelyn Vaughan, THERESA

## 2023-12-20 NOTE — PLAN OF CARE
Goal Outcome Evaluation:         PT alert to self. NSR -SB on monitor. Pt very sleepy today and hard to arouse at times. Pt will follow commands. Will answer yes no questions with nods most of time. Pt is not eating well this shift. PT did sit up in chair for few hours today.                 Pt received semisupine in bed in NAD, RN aware. +IVL

## 2023-12-20 NOTE — DISCHARGE PLACEMENT REQUEST
"Faustina Montoya (78 y.o. Female)     CM Katelyn Vaughan BSN, RN     Rehab Referral     [phone]        Date of Birth   1945    Social Security Number       Address   [address] [place] [address]    Home Phone   [phone]    MRN   [id number]       [facility]    Marital Status                               Admission Date   23    Admission Type   Emergency    Admitting Provider   Kamila Talamantes MD    Attending Provider   Kamila Talamantes MD    Department, Room/Bed   [facility] 3F, S306/1       Discharge Date       Discharge Disposition       Discharge Destination                                 Attending Provider: Kamila Talamantes MD    Allergies: No Known Allergies    Isolation: None   Infection: None   Code Status: No CPR    Ht: 162.6 cm (64\")   Wt: 90.7 kg (200 lb)    Admission Cmt: None   Principal Problem: Stroke [I63.9]                   Active Insurance as of 2023       Primary Coverage       Payor Plan Insurance Group Employer/Plan Group    [facility] MEDICARE REPLACEMENT [facility] GROUP [id number]       Payor Plan Address Payor Plan Phone Number Payor Plan Fax Number Effective Dates    [address] [address]   2023 - None Entered    [facility] [id number]         Subscriber Name Subscriber Birth Date Member ID       FAUSTINA MONTOYA 1945 [id number]                     Emergency Contacts        (Rel.) Home Phone Work Phone Mobile Phone    Jaen Montoya (Spouse) [phone] -- --    Jose Montoya (Son) [phone] -- [phone]                 History & Physical        Eboni Nevarez MD at 23 1819              [facility] Medicine Services  HISTORY AND PHYSICAL    Patient Name: Faustina Montoya  : 1945  MRN: [id number]  Primary Care Physician: Argelia Gamez MD  Date of admission: 2023      Subjective  Subjective     Chief Complaint:  Generalized " weakness, speech difficulty    HPI:  Beryl Montoya is a 78 y.o. female with CAD s/p CABG, prior CVA with some residual speech deficits, HTN, DM2, wheelchair bound at baseline but helps with transfers, who was brought to the ED today by her family due to increased weakness and lethargy the past 4 days.  They deny any known fevers or overt signs of infection.  Her affect has been more flat/depressed than normal and she has not been speaking as much as she typically does, only answering yes/no questions.  She has been too weak to assist with transfers.  Son notes possibly some increased weakness in the left arm compared to right.  In the ED, CT head showed new hypodensity in the right karen which may represent age-indeterminate infarct.  Stroke team evaluated and patient will be admitted for further management.  UA was also suggestive of UTI.      Personal History     Past Medical History:   Diagnosis Date    CHF (congestive heart failure)     Coronary artery disease involving native coronary artery of native heart with angina pectoris 6/25/2020    Diabetes mellitus     GERD (gastroesophageal reflux disease)     High cholesterol     Hyperlipidemia     Hypertension     Ischemic cardiomyopathy     Stroke     2013 speech and short term memory     TIA (transient ischemic attack)              Past Surgical History:   Procedure Laterality Date    CARDIAC CATHETERIZATION N/A 6/23/2020    Procedure: Left Heart Cath 55336 C19 @ Counts include 234 beds at the [facility];  Surgeon: David Faulkner MD;  Location: Counts include 234 beds at the [facility] CATH INVASIVE LOCATION;  Service: Cardiovascular;  Laterality: N/A;    CATARACT EXTRACTION Bilateral     COLONOSCOPY  2013    CORONARY ARTERY BYPASS GRAFT N/A 6/30/2020    Procedure: SHERI PER ANESTHESIA, MEDIAN STERNOTOMY, CORONARY ARTERY BYPASS GRAFTING X 3 , UTILIZING THE LEFT INTERNAL MAMMARY ARTERY AND EVH OF RIGHT GREATER SAPHENOUS VEIN;  Surgeon: Jerry Lozano MD;  Location: Counts include 234 beds at the [facility] OR;  Service: Cardiothoracic;  Laterality:  N/A;  LEG START - 0736  VEIN OUT - 0840  VEIN READY - 0850    EYE SURGERY      HYSTERECTOMY      total    INTERVENTIONAL RADIOLOGY PROCEDURE Bilateral 9/25/2017    Procedure: Carotid Cerebral Angiogram;  Surgeon: Maldonado Casas MD;  Location: [facility] INVASIVE LOCATION;  Service:        Family History: family history includes Breast cancer in her sister; Cancer in her mother; Diabetes in her father, maternal grandmother, and mother; Heart disease in her father; Hypertension in her mother; No Known Problems in her maternal grandfather, paternal grandfather, and paternal grandmother.     Social History:  reports that she quit smoking about 18 years ago. Her smoking use included cigarettes. She has a 1.00 pack-year smoking history. She has never used smokeless tobacco. She reports that she does not drink alcohol and does not use drugs.  Social History     Social History Narrative    Lives in [place] campus    Caffeine: 2 cups coffee daily       Medications:  Available home medication information reviewed.  Medications Prior to Admission   Medication Sig Dispense Refill Last Dose    aspirin (SM Aspirin Low Dose) 81 MG chewable tablet Chew 1 tablet Daily. chew and swallow. 90 tablet 5     atorvastatin (LIPITOR) 80 MG tablet Take 1 tablet by mouth once daily 90 tablet 0     carvedilol (COREG) 12.5 MG tablet Take 1 tablet by mouth twice daily 180 tablet 0     Continuous Blood Gluc Sensor (FreeStyle Flakito 14 Day Sensor) [facility] USE TO CHECK BLOOD SUGAR SIX TIMES DAILY AS DIRECTED *CHANGE EVERY 14 DAYS* 2 each 0     Entresto 24-26 MG tablet TAKE 1 TABLET BY MOUTH EVERY 12 HOURS 180 tablet 0     FREESTYLE LITE test strip Use one each to check glucose six times daily E11.9 300 each 3     furosemide (LASIX) 40 MG tablet Take 1 tablet by mouth Daily. 30 tablet 2     Insulin Lispro Prot & Lispro (HumaLOG Mix 75/25 KwikPen) (75-25) 100 UNIT/ML suspension pen-injector pen INJECT 30 TO 40 UNITS SUBCUTANEOUSLY TWICE DAILY  DEPENDING ON BLOOD GLUCOSE LEVEL 60 mL 0     isosorbide mononitrate (IMDUR) 30 MG 24 hr tablet Take 1 tablet by mouth once daily 90 tablet 0     levETIRAcetam (KEPPRA) 500 MG tablet Take 1 tablet by mouth twice daily 180 tablet 0     levothyroxine (SYNTHROID, LEVOTHROID) 25 MCG tablet TAKE 1 TABLET BY MOUTH ONCE DAILY IN THE MORNING 30 tablet 0     ondansetron ODT (ZOFRAN-ODT) 4 MG disintegrating tablet Place 1 tablet on the tongue Every 8 (Eight) Hours As Needed for Nausea or Vomiting. 21 tablet 3     pantoprazole (PROTONIX) 40 MG EC tablet Take 1 tablet by mouth once daily 90 tablet 0     polyethylene glycol (MIRALAX) packet Take 17 g by mouth Daily.       potassium chloride 10 MEQ CR tablet Take 1 tablet by mouth twice daily 180 tablet 0     ReliOn Pen Needles 32G X 4 MM misc USE THREE TIMES DAILY AS DIRECTED 300 each 0     Xarelto 20 MG tablet Take 1 tablet by mouth once daily 90 tablet 0        No Known Allergies    Objective  Objective     Vital Signs:   Temp:  [98.6 °F (37 °C)] 98.6 °F (37 °C)  Heart Rate:  [54-64] 59  Resp:  [18] 18  BP: (167-206)/(66-94) 180/84  Total (NIH Stroke Scale): 21    Physical Exam   Constitutional: No acute distress, awake, laying in bed, chronically ill appearing  HENT: NCAT, mucous membranes dry  Respiratory: Clear to auscultation bilaterally, respiratory effort normal   Cardiovascular: RRR, no murmurs, rubs, or gallops  Gastrointestinal: Soft, mild diffuse tenderness  Musculoskeletal: No bilateral ankle edema  Psychiatric: Calm, cooperative  Neurologic: Able to answer yes/no questions.  LUE 4/5, RUE 4+/5.  Skin: No rashes on exposed surfaces.    Result Review:  I have personally reviewed the results from the time of this admission to 12/11/2023 18:19 EST and agree with these findings:  [x]  Laboratory list / accordion  []  Microbiology  [x]  Radiology  []  EKG/Telemetry   []  Cardiology/Vascular   []  Pathology  [x]  Old records  []  Other:  Most notable findings include:          LAB RESULTS:      Lab 12/11/23  1314   WBC 6.94   HEMOGLOBIN 15.3   HEMATOCRIT 45.6   PLATELETS 268   NEUTROS ABS 3.58   IMMATURE GRANS (ABS) 0.02   LYMPHS ABS 2.13   MONOS ABS 0.56   EOS ABS 0.56*   .9*         Lab 12/11/23  1314   SODIUM 141   POTASSIUM 4.6   CHLORIDE 103   CO2 24.0   ANION GAP 14.0   BUN 14   CREATININE 0.98   EGFR 59.2*   GLUCOSE 273*   CALCIUM 9.1   TSH 4.620*         Lab 12/11/23  1314   TOTAL PROTEIN 7.2   ALBUMIN 3.8   GLOBULIN 3.4   ALT (SGPT) 23   AST (SGOT) 29   BILIRUBIN 0.9   ALK PHOS 122*         Lab 12/11/23  1314   PROBNP 1,308.0   HSTROP T 15*                 UA          12/11/2023    15:23   Urinalysis   Squamous Epithelial Cells, UA 0-2    Specific Gravity, UA 1.024    Ketones, UA 40 mg/dL (2+)    Blood, UA Trace    Leukocytes, UA Moderate (2+)    Nitrite, UA Positive    RBC, UA 3-5    WBC, UA Too Numerous to Count    Bacteria, UA 4+        Microbiology Results (last 10 days)       ** No results found for the last 240 hours. **            CT Head Without Contrast    Result Date: 12/11/2023  CT HEAD WO CONTRAST Date of Exam: 12/11/2023 1:20 PM EST Indication: AMS, history of stroke, on Xarelto. Comparison: 7/14/2020 Technique: Axial CT images were obtained of the head without contrast administration.  Automated exposure control and iterative construction methods were used. Findings: Parenchyma:No acute intraparenchymal hemorrhage. Sequela of old left-sided infarct with encephalomalacia. New hypodensity within the right karen. Moderate parenchymal volume loss. Scattered periventricular and subcortical white matter hypodensities, nonspecific, but most often consistent with small vessel ischemic changes. No midline shift or herniation. Ventricles and extra axial spaces:Prominent ventricles and sulci secondary to volume loss. No extra axial fluid collection seen. Other:Lens replacements. Scattered paranasal sinus mucosal thickening. Mastoid air cells are clear. Calvarium  is intact. Intracranial atherosclerotic calcification is present.     Impression: Impression: New focal hypodensity within the right karen may represent age-indeterminate infarct. Consider MRI to further evaluate. Chronic changes including old left-sided infarct as above. Findings discussed with stroke team by Dr. Eros Dejesus via telephone on 12/11/2023 3:31 PM EST. Electronically Signed: Eros Dejesus MD  12/11/2023 4:02 PM EST  Workstation ID: [id number]    XR Chest 1 View    Result Date: 12/11/2023  XR CHEST 1 VW Date of Exam: 12/11/2023 1:12 PM EST Indication: Stroke Protocol (Onset > 12 Hrs) Comparison: Chest radiograph dated 8/20/2020 Findings: There is borderline cardiomegaly. There are postsurgical changes of prior CABG. Pulmonary vascularity appears normal. There is no acute airspace consolidation, pleural effusion, or pneumothorax. There are degenerative changes of the thoracic spine.     Impression: Impression: 1. Borderline cardiomegaly with postsurgical changes of CABG. No acute consolidation. Electronically Signed: Jimenez Butcher  12/11/2023 2:02 PM EST  Workstation ID: [id number]     Results for orders placed in visit on 06/30/20    Emergent/Open-Heart Anesthesia SHERI    Narrative  Procedure Performed: Emergent/Open-Heart Anesthesia SHERI  Start Time:  End Time:    Preanesthesia Checklist:  Patient identified, IV assessed, risks and benefits discussed, monitors and equipment assessed, procedure being performed at surgeon's request and anesthesia consent obtained.    General Procedure Information  Diagnostic Indications for Echo:  assessment of ascending aorta, assessment of surgical repair and hemodynamic monitoring  Physician Requesting Echo: Jerry Lozano MD  Location performed:  OR  Intubated  Bite block not placed  Heart visualized  Probe Insertion:  Easy  Probe Type:  Multiplane  Modalities:  Color flow mapping, continuous wave Doppler and pulse wave Doppler    Echocardiographic and  Doppler Measurements    Ventricles    Right Ventricle:  Hypertrophy not present.  Thrombus not present.  Global function normal.  Left Ventricle:  Cavity size normal.  Hypertrophy not present.  Thrombus not present.  Global Function mildly impaired.  Ejection Fraction 52%.        Valves    Aortic Valve:  Annulus normal.  Stenosis not present.  Area: 1.8 cm².  Mean Gradient: 4 mean 2 mmHg.  Regurgitation absent.  Leaflets normal.  Leaflet motions normal.    Mitral Valve:  Annulus normal.  Stenosis not present.  Area: 4.7 cm².  Mean Gradient: 3 mean 1 mmHg.  Regurgitation mild.  Leaflets normal.  Leaflet motions normal.    Tricuspid Valve:  Annulus normal.  Stenosis not present.  Regurgitation trace.  Leaflets normal.  Leaflet motions normal.  Pulmonic Valve:  Annulus normal.  Stenosis not present.        Aorta    Ascending Aorta:  Size normal.  Dissection not present.  Plaque thickness greater than 3 mm.  Mobile plaque not present.  Aortic Arch:  Size normal.  Dissection not present.  Plaque thickness greater than 3 mm.  Mobile plaque not present.  Descending Aorta:  Size normal.  Dissection not present.  Plaque thickness greater than 3 mm.  Mobile plaque not present.        Atria    Right Atrium:  Size normal.  Spontaneous echo contrast not present.  Thrombus not present.  Tumor not present.  Device present.    Left Atrium:  Size normal.  Spontaneous echo contrast not present.  Thrombus not present.  Tumor not present.  Left atrial appendage normal.      Septa    Atrial Septum:  Intra-atrial septal morphology normal.    Ventricular Septum:  Intra-ventricular septum morphology normal.    Diastolic Function Measurements:  Diastolic Dysfunction Grade=  E= ms  A= ms  E/A Ratio= 3.5  DT= ms  S/D= 0.4  IVRT=    Other Findings  Pericardium:  normal  Pleural Effusion:  none  Pulmonary Arteries:  normal  Pulmonary Venous Flow:  normal    Anesthesia Information  Performed Personally  Anesthesiologist:  José Palacio,  MD      Echocardiogram Comments:  Informed consent was obtained preoperatively.  Ismael probe passed without difficulty. Post CABG:  EF ~ 40 % on inotropes with mid to apex moderately HK.  All findings discussed with surgeon.      Assessment & Plan  Assessment & Plan     Active Hospital Problems    Diagnosis  POA    **Generalized weakness [R53.1]  Yes    Acute UTI (urinary tract infection) [N39.0]  Yes    History of seizure [Z87.898]  Yes    Ischemic cardiomyopathy. LVEF 30% [I25.5]  Yes    Essential hypertension [I10]  Yes    History of L MCA CVA [Z86.73]  Not Applicable    Type 2 diabetes mellitus with complication, with long-term current use of insulin [E11.8, Z79.4]  Not Applicable     Generalized weakness  New right karen lesion, age-indeterminate   -Continue ASA  -Statin when patient able to take PO  -PT/OT/SLP  -Echocardiogram  -MRI brain  -NPO until SLP evaluation  -Stroke team following  -Normal BP goals; may need IV medication if elevated given NPO status  -Was on Xarelto at some point but not clear that she has filled this recently and  is unsure if she is taking this (it is on her home med list on his phone).  I called her pharmacy and she has not filled it there since 9/14/2022.  Will ask our pharmacist to verify her medications tomorrow.    UTI  -Add urine culture  -Start ceftriaxone    Seizure history  -IV Keppra while NPO    CHF/ischemic cardiomyopathy  HTN  CAD  -When able to take PO or enteral access established, will need to un-hold carvedilol, IMDUR, Entresto, lasix    DM2  -A1c pending  -Glucommander SQ        DVT prophylaxis:  SCDs      CODE STATUS:    Code Status and Medical Interventions:   Ordered at: 12/11/23 0206     Medical Intervention Limits:    NO intubation (DNI)     Level Of Support Discussed With:    Next of Kin (If No Surrogate)     Code Status (Patient has no pulse and is not breathing):    No CPR (Do Not Attempt to Resuscitate)     Medical Interventions (Patient has pulse or  is breathing):    Limited Support       Expected Discharge   Expected Discharge Date: 12/15/2023; Expected Discharge Time:      Eboni Nevarez MD  23     Electronically signed by Eboni Nevarez MD at 23 1844          Physician Progress Notes (most recent note)        Kamila Talamantes MD at 23 1205              [facility] Medicine Services  PROGRESS NOTE    Patient Name: Beryl Montoya  : 1945  MRN: [id number]    Date of Admission: 2023  Primary Care Physician: Argelia Gamez MD    Subjective   Subjective     CC:  F/U CVA    HPI:  Seen this morning. Again is sleepy and keeps eyes closed, does nod yes/no for me.      Objective   Objective     Vital Signs:   Temp:  [96.7 °F (35.9 °C)-97.6 °F (36.4 °C)] 97.6 °F (36.4 °C)  Heart Rate:  [46-63] 63  Resp:  [16-18] 16  BP: (135-159)/(63-92) 159/63     Physical Exam:  Gen-no acute distress  HENT-NCAT, mucous membranes moist  CV-RRR, S1 S2 normal, no m/r/g  Resp-CTAB, no wheezes or rales  Abd-soft, NT, ND, +BS  Ext-no edema  Neuro-keeps eyes closed, nods yes/no appropriately   Skin-no rashes  No change from 23      Results Reviewed:  LAB RESULTS:      Lab 12/15/23  0514   WBC 7.70   HEMOGLOBIN 15.1   HEMATOCRIT 45.3   PLATELETS 219   .7*         Lab 23  0608 23  0836 12/15/23  0514   SODIUM 148* 146* 143   POTASSIUM 4.4 3.9 4.1   CHLORIDE 110* 108* 106   CO2 20.0* 27.0 25.0   ANION GAP 18.0* 11.0 12.0   BUN 32* 25* 26*   CREATININE 1.27* 0.95 1.41*   EGFR 43.4* 61.5 38.3*   GLUCOSE 135* 147* 266*   CALCIUM 9.2 9.0 8.7                         Brief Urine Lab Results  (Last result in the past 365 days)        Color   Clarity   Blood   Leuk Est   Nitrite   Protein   CREAT   Urine HCG        23 1523 Yellow   Cloudy   Trace   Moderate (2+)   Positive   100 mg/dL (2+)                   Microbiology Results Abnormal       None            No radiology results from the last 24  hrs    Results for orders placed during the hospital encounter of 12/11/23    Adult Transthoracic Echo Limited W/ Cont if Necessary Per Protocol    Interpretation Summary    Left ventricular ejection fraction appears to be 51 - 55%. Normal left ventricular cavity size and wall thickness noted. All left ventricular wall segments contract normally.    Normal right ventricular cavity size and systolic function noted.    The left atrial cavity is mildly dilated.    No aortic valve regurgitation or stenosis is present. The aortic valve exhibits sclerosis. The aortic valve appears trileaflet.    The mitral valve is structurally normal with no significant stenosis present. Trace to mild mitral valve regurgitation is present.    The tricuspid valve is structurally normal with no significant stenosis present. Physiologic tricuspid valve regurgitation is present. Estimated right ventricular systolic pressure from tricuspid regurgitation is normal (<35 mmHg).      Current medications:  Scheduled Meds:aspirin, 81 mg, Oral, Daily   Or  aspirin, 300 mg, Rectal, Daily  atorvastatin, 80 mg, Oral, Nightly  carvedilol, 25 mg, Oral, BID  clopidogrel, 75 mg, Oral, Daily  furosemide, 40 mg, Oral, Daily  insulin detemir, 12 Units, Subcutaneous, Q12H  insulin lispro, 2-7 Units, Subcutaneous, 4x Daily AC & at Bedtime  Insulin Lispro, 3 Units, Subcutaneous, TID With Meals  isosorbide mononitrate, 30 mg, Oral, Daily  levETIRAcetam, 500 mg, Oral, Q12H  levothyroxine, 25 mcg, Oral, QAM  melatonin, 5 mg, Oral, Nightly  sacubitril-valsartan, 1 tablet, Oral, Q12H  traZODone, 25 mg, Oral, Nightly      Continuous Infusions:   PRN Meds:.  acetaminophen **OR** acetaminophen **OR** acetaminophen    senna-docusate sodium **AND** polyethylene glycol **AND** bisacodyl **AND** bisacodyl    Calcium Replacement - Follow Nurse / BPA Driven Protocol    lactulose    Magnesium Standard Dose Replacement - Follow Nurse / BPA Driven Protocol    ondansetron **OR**  ondansetron    Phosphorus Replacement - Follow Nurse / BPA Driven Protocol    Potassium Replacement - Follow Nurse / BPA Driven Protocol    sodium chloride    sodium chloride    Assessment & Plan   Assessment & Plan     Active Hospital Problems    Diagnosis  POA    **Stroke [I63.9]  Yes    RAJINDER (acute kidney injury) [N17.9]  Unknown    Generalized weakness [R53.1]  Yes    Acute UTI (urinary tract infection) [N39.0]  Yes    History of seizure [Z87.898]  Yes    Ischemic cardiomyopathy. LVEF 30% [I25.5]  Yes    Essential hypertension [I10]  Yes    History of L MCA CVA [Z86.73]  Not Applicable    Type 2 diabetes mellitus with complication, with long-term current use of insulin [E11.8, Z79.4]  Not Applicable      Resolved Hospital Problems   No resolved problems to display.        Brief Hospital Course to date:  Beryl Montoya is a 78 y.o. female with hx of CAD s/p CABG, CVA with residual speech deficits, and wheelchair bound at baseline who presented with left arm weakness x 4-5 days. Found to have late acute/subacute right karen CVA, microhemorrhage likely secondary to uncontrolled HTN.     This patient's problems and plans were partially entered by my partner and updated as appropriate by me 12/20/23. Copied text in this note has been reviewed and is accurate as of today's date.      Late acute/subacute right karen CVA presenting with LUE weakness  -Neurology has seen, recommend DAPT, statin. Goal -160, improved with increased Coreg.  -PT/OT follows, plan for rehab placement  -Follow up with stroke clinic in 4-6 weeks    Somnolence  -Will decrease Trazodone from 50 to 25 mg nightly, was started during this admission    Elevated Cr  -Suspect due to poor intake where she has been so somnolent  -IV fluids and repeat labs in AM  -Hold Lasix for now    Hypernatremia  -Suspect due to poor intake/somnolence  -Will start IV fluids and repeat labs in AM    E. coli UTI   -s/p 5 days of IV Rocephin      DMII, insulin  dependent with HbA1c 7.8  -Basal bolus regimen     Seizure disorder   -Continue home Keppra, EEG this admission was negative for seizure activity    Constipation  -Continue bowel regimen    CAD s/p CABG - DAPT, Coreg, Imdur  HTN - normal goals as above  Obesity, BMI 34    Expected Discharge Location and Transportation: rehab  Expected Discharge   Expected Discharge Date: 2023; Expected Discharge Time:      DVT prophylaxis:  Mechanical DVT prophylaxis orders are present.     AM-PAC 6 Clicks Score (PT): 10 (23 1935)    CODE STATUS:   Code Status and Medical Interventions:   Ordered at: 23 1626     Medical Intervention Limits:    NO intubation (DNI)     Level Of Support Discussed With:    Next of Kin (If No Surrogate)     Code Status (Patient has no pulse and is not breathing):    No CPR (Do Not Attempt to Resuscitate)     Medical Interventions (Patient has pulse or is breathing):    Limited Support       Kamila Talamantes MD  23        Electronically signed by Kamila Talamantes MD at 23 1209          Physical Therapy Notes (most recent note)        Elli Cornell, PT at 23 1002  Version 1 of 1         Patient Name: Beryl Montoya  : 1945    MRN: [id number]                              Today's Date: 2023       Admit Date: 2023    Visit Dx:     ICD-10-CM ICD-9-CM   1. Cerebrovascular accident (CVA), unspecified mechanism  I63.9 434.91   2. Generalized weakness  R53.1 780.79   3. Expressive aphasia  R47.01 784.3   4. Hypertension, unspecified type  I10 401.9   5. Dysphagia, unspecified type  R13.10 787.20   6. Cognitive communication deficit  R41.841 799.52   7. Dysarthria  R47.1 784.51   8. Cerebrovascular accident (CVA) due to thrombosis of precerebral artery  I63.00 433.91     Patient Active Problem List   Diagnosis    History of L MCA CVA    Essential hypertension    Type 2 diabetes mellitus with complication, with long-term current use of insulin    GERD  (gastroesophageal reflux disease)    Bilateral carotid artery stenosis    Abnormal stress test    Severe 3 vessel CAD with angina pectoris (CMS/HCC)    Ischemic cardiomyopathy. LVEF 30%    S/P CABG x 3 on 6/30/20    Acute postop embolic left occipital CVA 7/4/20. Extensive in L PCA distribution but additional areas in R PCA distribution (CMS/HCC)    Bilateral carotid atherosclerosis with moderate left common carotid artery stenosis (50-60%)    Seizure    Generalized weakness    Acute UTI (urinary tract infection)    History of seizure    Stroke    RAJINDER (acute kidney injury)     Past Medical History:   Diagnosis Date    CHF (congestive heart failure)     Coronary artery disease involving native coronary artery of native heart with angina pectoris 6/25/2020    Diabetes mellitus     GERD (gastroesophageal reflux disease)     High cholesterol     Hyperlipidemia     Hypertension     Ischemic cardiomyopathy     Stroke     2013 speech and short term memory     TIA (transient ischemic attack)      Past Surgical History:   Procedure Laterality Date    CARDIAC CATHETERIZATION N/A 6/23/2020    Procedure: Left Heart Cath 73895 C19 @ [facility];  Surgeon: David Faulkner MD;  Location: [facility] CATH INVASIVE LOCATION;  Service: Cardiovascular;  Laterality: N/A;    CATARACT EXTRACTION Bilateral     COLONOSCOPY  2013    CORONARY ARTERY BYPASS GRAFT N/A 6/30/2020    Procedure: SHERI PER ANESTHESIA, MEDIAN STERNOTOMY, CORONARY ARTERY BYPASS GRAFTING X 3 , UTILIZING THE LEFT INTERNAL MAMMARY ARTERY AND EVH OF RIGHT GREATER SAPHENOUS VEIN;  Surgeon: Jerry Lozano MD;  Location: [facility] OR;  Service: Cardiothoracic;  Laterality: N/A;  LEG START - 0736  VEIN OUT - 0840  VEIN READY - 0850    EYE SURGERY      HYSTERECTOMY      total    INTERVENTIONAL RADIOLOGY PROCEDURE Bilateral 9/25/2017    Procedure: Carotid Cerebral Angiogram;  Surgeon: Maldonado Casas MD;  Location: [facility] CATH INVASIVE LOCATION;  Service:       General  Information       Row Name 12/19/23 1052          Physical Therapy Time and Intention    Document Type therapy note (daily note)  -ES     Mode of Treatment physical therapy  -ES       Row Name 12/19/23 1052          General Information    Patient Profile Reviewed yes  -ES     Existing Precautions/Restrictions fall;other (see comments)  aphasia, L side weakness  -ES     Barriers to Rehab medically complex;previous functional deficit;cognitive status  -ES       Row Name 12/19/23 1052          Cognition    Orientation Status (Cognition) oriented to;person;other (see comments)  able to nod yes/no with prompting  -ES       Row Name 12/19/23 1052          Safety Issues, Functional Mobility    Safety Issues Affecting Function (Mobility) awareness of need for assistance;insight into deficits/self-awareness;safety precaution awareness;safety precautions follow-through/compliance;sequencing abilities  -ES     Impairments Affecting Function (Mobility) balance;cognition;coordination;endurance/activity tolerance;strength;visual/perceptual  -ES     Cognitive Impairments, Mobility Safety/Performance awareness, need for assistance;insight into deficits/self-awareness;problem-solving/reasoning;safety precaution awareness;safety precaution follow-through;sequencing abilities  -ES               User Key  (r) = Recorded By, (t) = Taken By, (c) = Cosigned By      Initials Name Provider Type    ES Elli Cornell PT Physical Therapist                   Mobility       Row Name 12/19/23 1053          Bed Mobility    Comment, (Bed Mobility) received EOB  -ES       Row Name 12/19/23 1053          Bed-Chair Transfer    Bed-Chair [place] (Transfers) maximum assist (25% patient effort);2 person assist;verbal cues  -ES     Assistive Device (Bed-Chair Transfers) other (see comments)  BUE support  -ES     Comment, (Bed-Chair Transfer) SPT from EOB to chair. Demo'd forward flexed posture in standing  -ES       Row Name 12/19/23 1052           Sit-Stand Transfer    Sit-Stand [place] (Transfers) maximum assist (25% patient effort);2 person assist;verbal cues  -ES     Assistive Device (Sit-Stand Transfers) other (see comments);walker, front-wheeled  BUE support  -ES     Comment, (Sit-Stand Transfer) STS x3: 1 from EOB, 2 from chair with BUE support then FWW. Demo'd forward flexed posture and max cueing to promote upright posture.  -ES       Row Name 12/19/23 1053          Gait/Stairs (Locomotion)    [place] Level (Gait) unable to assess  -ES     Comment, (Gait/Stairs) Unable to assess further mobility due to instability with t/f  -ES               User Key  (r) = Recorded By, (t) = Taken By, (c) = Cosigned By      Initials Name Provider Type    ES Elli Cornell PT Physical Therapist                   Obj/Interventions       Row Name 12/19/23 1055          Motor Skills    Therapeutic Exercise hip;knee;ankle  -ES       Row Name 12/19/23 1055          Hip (Therapeutic Exercise)    Hip (Therapeutic Exercise) AAROM (active assistive range of motion)  -ES     Hip AAROM (Therapeutic Exercise) bilateral;flexion;extension;10 repetitions  -ES       Row Name 12/19/23 1055          Knee (Therapeutic Exercise)    Knee (Therapeutic Exercise) AAROM (active assistive range of motion)  -ES     Knee AAROM (Therapeutic Exercise) bilateral;flexion;extension;10 repetitions  -ES       Row Name 12/19/23 1055          Ankle (Therapeutic Exercise)    Ankle (Therapeutic Exercise) AAROM (active assistive range of motion)  -ES     Ankle AAROM (Therapeutic Exercise) bilateral;dorsiflexion;plantarflexion;10 repetitions  -ES       Row Name 12/19/23 1050          Balance    Balance Assessment sitting static balance;sitting dynamic balance;sit to stand dynamic balance;standing static balance;standing dynamic balance  -ES     Static Sitting Balance minimal assist  -ES     Dynamic Sitting Balance moderate assist;verbal cues  -ES     Position, Sitting Balance supported;sitting  "in chair;sitting edge of bed  -ES     Sit to Stand Dynamic Balance maximum assist;2-person assist;verbal cues  -ES     Static Standing Balance maximum assist;2-person assist;verbal cues  -ES     Dynamic Standing Balance maximum assist;2-person assist;verbal cues;non-verbal cues (demo/gesture)  -ES     Position/Device Used, Standing Balance supported;walker, front-wheeled;other (see comments)  BUE support  -ES     Balance Interventions sitting;standing;sit to stand;supported;static;dynamic;occupation based/functional task  -ES     Comment, Balance Demo'd posterior lean in sitting/standing, no overt LOB  -ES               User Key  (r) = Recorded By, (t) = Taken By, (c) = Cosigned By      Initials Name Provider Type    ES Elli Cornell, PT Physical Therapist                   Goals/Plan    No documentation.                  Clinical Impression       Row Name 12/19/23 1058          Pain    Pretreatment Pain Rating 0/10 - no pain  -ES     Posttreatment Pain Rating 0/10 - no pain  -ES     Pre/Posttreatment Pain Comment shook head \"no\" when asked about pain  -ES     Pain Intervention(s) Repositioned;Ambulation/increased activity  -ES       Row Name 12/19/23 1058          Plan of Care Review    Plan of Care Reviewed With patient  -ES     Progress improving  -ES     Outcome Evaluation Pt demonstrated improved participation with therapy this date, but continues to be limited by L sided weakness, balance deficits, and decreased functional activity tolerance. Will continue to progress as able to address functional limitations and promote return to PLOF. PT rec SNF at d/c.  -ES       Row Name 12/19/23 1058          Therapy Assessment/Plan (PT)    Rehab Potential (PT) fair, will monitor progress closely  -ES     Criteria for Skilled Interventions Met (PT) yes;meets criteria;skilled treatment is necessary  -ES     Therapy Frequency (PT) daily  -ES       Row Name 12/19/23 1052          Vital Signs    Pre Systolic BP Rehab --  " RN cleared for treatment  -ES     O2 Delivery Pre Treatment room air  -ES     O2 Delivery Intra Treatment room air  -ES     O2 Delivery Post Treatment room air  -ES     Pre Patient Position Sitting  -ES     Intra Patient Position Standing  -ES     Post Patient Position Sitting  -ES       Row Name 12/19/23 1057          Positioning and Restraints    Pre-Treatment Position in bed  -ES     Post Treatment Position chair  -ES     In Chair notified nsg;reclined;sitting;call light within reach;encouraged to call for assist;exit alarm on;waffle cushion;on mechanical lift sling;legs elevated;heels elevated  -ES               User Key  (r) = Recorded By, (t) = Taken By, (c) = Cosigned By      Initials Name Provider Type    Elli Sanders PT Physical Therapist                   Outcome Measures       Row Name 12/19/23 1059          How much help from another person do you currently need...    Turning from your back to your side while in flat bed without using bedrails? 2  -ES     Moving from lying on back to sitting on the side of a flat bed without bedrails? 2  -ES     Moving to and from a bed to a chair (including a wheelchair)? 2  -ES     Standing up from a chair using your arms (e.g., wheelchair, bedside chair)? 2  -ES     Climbing 3-5 steps with a railing? 1  -ES     To walk in hospital room? 1  -ES     AM-PAC 6 Clicks Score (PT) 10  -ES     Highest Level of Mobility Goal 4 --> Transfer to chair/commode  -ES       Row Name 12/19/23 1059          Functional Assessment    Outcome Measure Options AM-PAC 6 Clicks Basic Mobility (PT)  -ES               User Key  (r) = Recorded By, (t) = Taken By, (c) = Cosigned By      Initials Name Provider Type    Elli Sanders PT Physical Therapist                                 Physical Therapy Education       Title: PT OT SLP Therapies (In Progress)       Topic: Physical Therapy (In Progress)       Point: Mobility training (In Progress)       Learning Progress Summary              Patient Acceptance, E, NR by CD at 12/14/2023 1651    Comment: SEE FLOWSHEET    Acceptance, E, NR by KR at 12/12/2023 0939                         Point: Home exercise program (In Progress)       Learning Progress Summary             Patient Acceptance, E, NR by CD at 12/14/2023 1651    Comment: SEE FLOWSHEET    Acceptance, E, NR by KR at 12/12/2023 0939                         Point: Body mechanics (In Progress)       Learning Progress Summary             Patient Acceptance, E, NR by CD at 12/14/2023 1651    Comment: SEE FLOWSHEET    Acceptance, E, NR by KR at 12/12/2023 0939                         Point: Precautions (In Progress)       Learning Progress Summary             Patient Acceptance, E, NR by CD at 12/14/2023 1651    Comment: SEE FLOWSHEET    Acceptance, E, NR by KR at 12/12/2023 0939                                         User Key       Initials Effective Dates Name Provider Type Discipline    CD 02/03/23 -  Samantha Winters, PT Physical Therapist PT    KR 12/30/22 -  Malathi Riddle PT Physical Therapist PT                  PT Recommendation and Plan     Plan of Care Reviewed With: patient  Progress: improving  Outcome Evaluation: Pt demonstrated improved participation with therapy this date, but continues to be limited by L sided weakness, balance deficits, and decreased functional activity tolerance. Will continue to progress as able to address functional limitations and promote return to PLOF. PT rec SNF at d/c.     Time Calculation:         PT Charges       Row Name 12/19/23 1102             Time Calculation    Start Time 1002  -ES      PT Received On 12/19/23  -ES      PT Goal Re-Cert Due Date 12/22/23  -ES         Time Calculation- PT    Total Timed Code Minutes- PT 15 minute(s)  -ES         Timed Charges    82323 - PT Therapeutic Exercise Minutes 15  -ES         Total Minutes    Timed Charges Total Minutes 15  -ES       Total Minutes 15  -ES                User Key  (r) = Recorded By, (t) =  Taken By, (c) = Cosigned By      Initials Name Provider Type    ES Elli Cornell, PT Physical Therapist                  Therapy Charges for Today       Code Description Service Date Service Provider Modifiers Qty    37276865321 HC PT THER PROC EA 15 MIN 2023 Elli Cornell, PT GP 1            PT G-Codes  Outcome Measure Options: AM-PAC 6 Clicks Basic Mobility (PT)  AM-PAC 6 Clicks Score (PT): 10  AM-PAC 6 Clicks Score (OT): 7  Modified Adeola Scale: 5 - Severe disability.  Bedridden, incontinent, and requiring constant nursing care and attention.  PT Discharge Summary  Anticipated Discharge Disposition (PT): skilled nursing facility    Elli Cornell PT  2023      Electronically signed by Elli Cornell PT at 23 1102          Occupational Therapy Notes (most recent note)        Joseluis Khan, OT at 23 0906          Patient Name: Beryl Montoya  : 1945    MRN: [id number]                              Today's Date: 2023       Admit Date: 2023    Visit Dx:     ICD-10-CM ICD-9-CM   1. Cerebrovascular accident (CVA), unspecified mechanism  I63.9 434.91   2. Generalized weakness  R53.1 780.79   3. Expressive aphasia  R47.01 784.3   4. Hypertension, unspecified type  I10 401.9   5. Dysphagia, unspecified type  R13.10 787.20   6. Cognitive communication deficit  R41.841 799.52   7. Dysarthria  R47.1 784.51   8. Cerebrovascular accident (CVA) due to thrombosis of precerebral artery  I63.00 433.91     Patient Active Problem List   Diagnosis    History of L MCA CVA    Essential hypertension    Type 2 diabetes mellitus with complication, with long-term current use of insulin    GERD (gastroesophageal reflux disease)    Bilateral carotid artery stenosis    Abnormal stress test    Severe 3 vessel CAD with angina pectoris (CMS/HCC)    Ischemic cardiomyopathy. LVEF 30%    S/P CABG x 3 on 20    Acute postop embolic left occipital CVA 20. Extensive in L PCA  distribution but additional areas in R PCA distribution (CMS/HCC)    Bilateral carotid atherosclerosis with moderate left common carotid artery stenosis (50-60%)    Seizure    Generalized weakness    Acute UTI (urinary tract infection)    History of seizure    Stroke    RAJINDER (acute kidney injury)     Past Medical History:   Diagnosis Date    CHF (congestive heart failure)     Coronary artery disease involving native coronary artery of native heart with angina pectoris 6/25/2020    Diabetes mellitus     GERD (gastroesophageal reflux disease)     High cholesterol     Hyperlipidemia     Hypertension     Ischemic cardiomyopathy     Stroke     2013 speech and short term memory     TIA (transient ischemic attack)      Past Surgical History:   Procedure Laterality Date    CARDIAC CATHETERIZATION N/A 6/23/2020    Procedure: Left Heart Cath 10608 C19 @  [facility];  Surgeon: David Faulkner MD;  Location:  [facility] CATH INVASIVE LOCATION;  Service: Cardiovascular;  Laterality: N/A;    CATARACT EXTRACTION Bilateral     COLONOSCOPY  2013    CORONARY ARTERY BYPASS GRAFT N/A 6/30/2020    Procedure: SHERI PER ANESTHESIA, MEDIAN STERNOTOMY, CORONARY ARTERY BYPASS GRAFTING X 3 , UTILIZING THE LEFT INTERNAL MAMMARY ARTERY AND EVH OF RIGHT GREATER SAPHENOUS VEIN;  Surgeon: Jerry Lozano MD;  Location: [facility] OR;  Service: Cardiothoracic;  Laterality: N/A;  LEG START - 0736  VEIN OUT - 0840  VEIN READY - 0850    EYE SURGERY      HYSTERECTOMY      total    INTERVENTIONAL RADIOLOGY PROCEDURE Bilateral 9/25/2017    Procedure: Carotid Cerebral Angiogram;  Surgeon: Maldonado Casas MD;  Location: [facility] CATH INVASIVE LOCATION;  Service:       General Information       Row Name 12/19/23 1130          OT Time and Intention    Document Type therapy note (daily note)  -CS     Mode of Treatment occupational therapy  -CS       Row Name 12/19/23 1134          General Information    Patient Profile Reviewed yes  -CS     Existing  Precautions/Restrictions fall;other (see comments)  aphasia, L side weakness  -CS     Barriers to Rehab medically complex;previous functional deficit;cognitive status  -CS       Row Name 12/19/23 1130          Cognition    Orientation Status (Cognition) oriented to;person;place  -CS       Row Name 12/19/23 1130          Safety Issues, Functional Mobility    Safety Issues Affecting Function (Mobility) insight into deficits/self-awareness;safety precaution awareness;safety precautions follow-through/compliance  -CS     Impairments Affecting Function (Mobility) balance;cognition;coordination;endurance/activity tolerance;strength;visual/perceptual  -CS     Cognitive Impairments, Mobility Safety/Performance awareness, need for assistance;insight into deficits/self-awareness;safety precaution awareness;safety precaution follow-through;judgment;problem-solving/reasoning  -CS               User Key  (r) = Recorded By, (t) = Taken By, (c) = Cosigned By      Initials Name Provider Type    CS Joseluis Khan OT Occupational Therapist                     Mobility/ADL's       Row Name 12/19/23 1131          Bed Mobility    Bed Mobility supine-sit;scooting/bridging  -CS     Scooting/Bridging [place] (Bed Mobility) 2 person assist;moderate assist (50% patient effort);verbal cues;nonverbal cues (demo/gesture)  -CS     Supine-Sit [place] (Bed Mobility) moderate assist (50% patient effort);2 person assist;verbal cues;nonverbal cues (demo/gesture)  -CS     Bed Mobility, Safety Issues decreased use of arms for pushing/pulling;decreased use of legs for bridging/pushing;impaired trunk control for bed mobility;cognitive deficits limit understanding  -CS     Assistive Device (Bed Mobility) bed rails;draw sheet;head of bed elevated  -CS     Comment, (Bed Mobility) posterior lean upon sitting, improved with time and anterior weight shifting activities  -CS       Row Name 12/19/23 1131          Transfers    Transfers sit-stand  transfer  -     Comment, (Transfers) tactile cues/placement for safe sequencing w/ RW use  -CS       Row Name 12/19/23 1131          Sit-Stand Transfer    Sit-Stand [place] (Transfers) maximum assist (25% patient effort);2 person assist;verbal cues  -     Assistive Device (Sit-Stand Transfers) walker, front-wheeled  -CS       Row Name 12/19/23 1131          Activities of Daily Living    BADL Assessment/Intervention lower body dressing;grooming;feeding;upper body dressing  -CS       Row Name 12/19/23 1131          Lower Body Dressing Assessment/Training    [place] Level (Lower Body Dressing) don;socks;dependent (less than 25% patient effort)  -CS     Position (Lower Body Dressing) edge of bed sitting  -CS       Row Name 12/19/23 1131          Upper Body Dressing Assessment/Training    [place] Level (Upper Body Dressing) don;pajama/robe;moderate assist (50% patient effort)  -CS     Position (Upper Body Dressing) edge of bed sitting  -CS       Row Name 12/19/23 1131          Grooming Assessment/Training    [place] Level (Grooming) wash face, hands;minimum assist (75% patient effort);hair care, combing/brushing;maximum assist (25% patient effort)  -       Row Name 12/19/23 1131          Toileting Assessment/Training    [place] Level (Toileting) adjust/manage clothing;perform perineal hygiene;dependent (less than 25% patient effort)  -               User Key  (r) = Recorded By, (t) = Taken By, (c) = Cosigned By      Initials Name Provider Type     Joseluis Khan, OT Occupational Therapist                   Obj/Interventions       Row Name 12/19/23 1133          Shoulder (Therapeutic Exercise)    Shoulder (Therapeutic Exercise) AROM (active range of motion)  -     Shoulder AROM (Therapeutic Exercise) bilateral;flexion;extension;aBduction;aDduction;2 sets;5 repetitions  -       Row Name 12/19/23 1133          Elbow/Forearm (Therapeutic Exercise)    Elbow/Forearm (Therapeutic  Exercise) AROM (active range of motion)  -     Elbow/Forearm AROM (Therapeutic Exercise) bilateral;flexion;extension;10 repetitions  -       Row Name 12/19/23 1133          Wrist (Therapeutic Exercise)    Wrist (Therapeutic Exercise) AROM (active range of motion)  -     Wrist AROM (Therapeutic Exercise) flexion;extension;bilateral;5 repetitions  -       Row Name 12/19/23 1133          Hand (Therapeutic Exercise)    Hand (Therapeutic Exercise) AROM (active range of motion)  -     Hand AROM/AAROM (Therapeutic Exercise) bilateral;AROM (active range of motion);finger extension;finger flexion;5 repetitions  -       Row Name 12/19/23 1133          Motor Skills    Motor Skills motor control/coordination interventions  -     Motor Control/Coordination Interventions gross motor coordination activities;therapeutic exercise/ROM;occupation/activity based treatment  -     Therapeutic Exercise shoulder;elbow/forearm  -       Row Name 12/19/23 1133          Balance    Balance Assessment sitting static balance;sitting dynamic balance;standing static balance;standing dynamic balance  -     Dynamic Sitting Balance moderate assist  -     Position, Sitting Balance unsupported;sitting edge of bed  -     Static Standing Balance maximum assist;2-person assist  -     Dynamic Standing Balance maximum assist;2-person assist  -     Balance Interventions sitting;sit to stand;standing;occupation based/functional task;UE activity with balance activity;weight shifting activity;dynamic reaching  -               User Key  (r) = Recorded By, (t) = Taken By, (c) = Cosigned By      Initials Name Provider Type     Joseluis Khan OT Occupational Therapist                   Goals/Plan    No documentation.                  Clinical Impression       [facility] Name 12/19/23 1130          Pain Assessment    Additional Documentation Pain Scale: FACES Pre/Post-Treatment (Group)  -[facility] Name 12/19/23 1130          Pain Scale:  FACES Pre/Post-Treatment    Pain: FACES Scale, Pretreatment 0-->no hurt  -CS     Posttreatment Pain Rating 0-->no hurt  -CS       Row Name 12/19/23 1137          Plan of Care Review    Plan of Care Reviewed With patient  -CS     Progress improving  -CS     Outcome Evaluation Pt demonstrates improved sitting balance, engagement in grooming tasks, and BUE strength. Remains limited by L-sided weakness, cognitive deficits, and impaired balance/coordination - will cont IPOT per POC as tolerated. Rec d/c to SNF.  -CS       Row Name 12/19/23 1137          Therapy Plan Review/Discharge Plan (OT)    Anticipated Discharge Disposition (OT) skilled nursing facility  -CS       Row Name 12/19/23 1137          Vital Signs    O2 Delivery Pre Treatment room air  -CS     O2 Delivery Intra Treatment room air  -CS     O2 Delivery Post Treatment room air  -CS     Pre Patient Position Supine  -CS     Intra Patient Position Standing  -CS     Post Patient Position Sitting  -CS       Row Name 12/19/23 1137          Positioning and Restraints    Pre-Treatment Position in bed  -CS     Post Treatment Position chair  -CS     In Chair notified nsg;reclined;sitting;call light within reach;encouraged to call for assist;exit alarm on;on mechanical lift sling;waffle cushion;legs elevated  -CS               User Key  (r) = Recorded By, (t) = Taken By, (c) = Cosigned By      Initials Name Provider Type    CS Joseluis Khan, OT Occupational Therapist                   Outcome Measures       Row Name 12/19/23 1146          How much help from another is currently needed...    Putting on and taking off regular lower body clothing? 2  -CS     Bathing (including washing, rinsing, and drying) 2  -CS     Toileting (which includes using toilet bed pan or urinal) 2  -CS     Putting on and taking off regular upper body clothing 2  -CS     Taking care of personal grooming (such as brushing teeth) 2  -CS     Eating meals 2  -CS     AM-PAC 6 Clicks Score (OT) 12   -       Row Name 12/19/23 1059          How much help from another person do you currently need...    Turning from your back to your side while in flat bed without using bedrails? 2  -ES     Moving from lying on back to sitting on the side of a flat bed without bedrails? 2  -ES     Moving to and from a bed to a chair (including a wheelchair)? 2  -ES     Standing up from a chair using your arms (e.g., wheelchair, bedside chair)? 2  -ES     Climbing 3-5 steps with a railing? 1  -ES     To walk in hospital room? 1  -ES     AM-PAC 6 Clicks Score (PT) 10  -ES     Highest Level of Mobility Goal 4 --> Transfer to chair/commode  -ES       Row Name 12/19/23 1140          Modified Adeola Scale    Modified [place] Scale 4 - Moderately severe disability.  Unable to walk without assistance, and unable to attend to own bodily needs without assistance.  -       Row Name 12/19/23 1140 12/19/23 1059       Functional Assessment    Outcome Measure Options AM-PAC 6 Clicks Daily Activity (OT)  - AM-PAC 6 Clicks Basic Mobility (PT)  -ES              User Key  (r) = Recorded By, (t) = Taken By, (c) = Cosigned By      Initials Name Provider Type    Joseluis Laws OT Occupational Therapist    ES Elli Cornell PT Physical Therapist                    Occupational Therapy Education       Title: PT OT SLP Therapies (In Progress)       Topic: Occupational Therapy (In Progress)       Point: ADL training (In Progress)       Description:   Instruct learner(s) on proper safety adaptation and remediation techniques during self care or transfers.   Instruct in proper use of assistive devices.                  Learning Progress Summary             Patient Acceptance, E, NR by  at 12/14/2023 1214    Acceptance, E,D, NR,NL by AVIVA at 12/12/2023 1015                         Point: Home exercise program (In Progress)       Description:   Instruct learner(s) on appropriate technique for monitoring, assisting and/or progressing therapeutic  exercises/activities.                  Learning Progress Summary             Patient Acceptance, E, NR by JR at 12/14/2023 1214    Acceptance, E,D, NR,NL by  at 12/12/2023 1015                         Point: Precautions (In Progress)       Description:   Instruct learner(s) on prescribed precautions during self-care and functional transfers.                  Learning Progress Summary             Patient Acceptance, E,D, NR,NL by AVIVA at 12/12/2023 1015                         Point: Body mechanics (Not Started)       Description:   Instruct learner(s) on proper positioning and spine alignment during self-care, functional mobility activities and/or exercises.                  Learner Progress:  Not documented in this visit.                              User Key       Initials Effective Dates Name Provider Type Discipline     02/03/23 -  Viviane Rodriguez, OT Occupational Therapist OT    AVIVA 06/16/21 -  Joseluis Khan, OT Occupational Therapist OT                  OT Recommendation and Plan  Planned Therapy Interventions (OT): functional balance retraining, activity tolerance training, occupation/activity based interventions, ROM/therapeutic exercise, strengthening exercise, patient/caregiver education/training, transfer/mobility retraining, neuromuscular control/coordination retraining, adaptive equipment training  Therapy Frequency (OT): daily  Plan of Care Review  Plan of Care Reviewed With: patient  Progress: improving  Outcome Evaluation: Pt demonstrates improved sitting balance, engagement in grooming tasks, and BUE strength. Remains limited by L-sided weakness, cognitive deficits, and impaired balance/coordination - will cont IPOT per POC as tolerated. Rec d/c to SNF.     Time Calculation:   Evaluation Complexity (OT)  Review Occupational Profile/Medical/Therapy History Complexity: expanded/moderate complexity  Assessment, Occupational Performance/Identification of Deficit Complexity: 3-5 performance  deficits  Clinical Decision Making Complexity (OT): detailed assessment/moderate complexity  Overall Complexity of Evaluation (OT): moderate complexity     Time Calculation- OT       Row Name 23 1141             Time Calculation- OT    OT Start Time 0933  -CS      OT Received On 23  -CS      OT Goal Re-Cert Due Date 23  -CS         Timed Charges    16421 - OT Therapeutic Exercise Minutes 10  -CS      43887 - OT Therapeutic Activity Minutes 12  -CS      00351 - OT Self Care/Mgmt Minutes 4  -CS         Total Minutes    Timed Charges Total Minutes 26  -CS       Total Minutes 26  -CS                User Key  (r) = Recorded By, (t) = Taken By, (c) = Cosigned By      Initials Name Provider Type    CS Joseluis Khan OT Occupational Therapist                  Therapy Charges for Today       Code Description Service Date Service Provider Modifiers Qty    76144318592 HC OT THER PROC EA 15 MIN 2023 Joseluis Khan OT GO 1    07966659822 HC OT THERAPEUTIC ACT EA 15 MIN 2023 Joseluis Khan OT GO 1                 Joseluis Khan OT  2023    Electronically signed by Joseluis Khan OT at 23 1142          Speech Language Pathology Notes (most recent note)        Babita West MS CCC-SLP at 23 1047          Acute Care - Speech Language Pathology Treatment Note  [facility]     Patient Name: Beryl Montoya  : 1945  MRN: [id number]  Today's Date: 2023               Admit Date: 2023     Visit Dx:    ICD-10-CM ICD-9-CM   1. Cerebrovascular accident (CVA), unspecified mechanism  I63.9 434.91   2. Generalized weakness  R53.1 780.79   3. Expressive aphasia  R47.01 784.3   4. Hypertension, unspecified type  I10 401.9   5. Dysphagia, unspecified type  R13.10 787.20   6. Cognitive communication deficit  R41.841 799.52   7. Dysarthria  R47.1 784.51   8. Cerebrovascular accident (CVA) due to thrombosis of precerebral artery  I63.00 433.91     Patient Active  Problem List   Diagnosis    History of L MCA CVA    Essential hypertension    Type 2 diabetes mellitus with complication, with long-term current use of insulin    GERD (gastroesophageal reflux disease)    Bilateral carotid artery stenosis    Abnormal stress test    Severe 3 vessel CAD with angina pectoris (CMS/HCC)    Ischemic cardiomyopathy. LVEF 30%    S/P CABG x 3 on 6/30/20    Acute postop embolic left occipital CVA 7/4/20. Extensive in L PCA distribution but additional areas in R PCA distribution (CMS/HCC)    Bilateral carotid atherosclerosis with moderate left common carotid artery stenosis (50-60%)    Seizure    Generalized weakness    Acute UTI (urinary tract infection)    History of seizure    Stroke    RAJINDER (acute kidney injury)     Past Medical History:   Diagnosis Date    CHF (congestive heart failure)     Coronary artery disease involving native coronary artery of native heart with angina pectoris 6/25/2020    Diabetes mellitus     GERD (gastroesophageal reflux disease)     High cholesterol     Hyperlipidemia     Hypertension     Ischemic cardiomyopathy     Stroke     2013 speech and short term memory     TIA (transient ischemic attack)      Past Surgical History:   Procedure Laterality Date    CARDIAC CATHETERIZATION N/A 6/23/2020    Procedure: Left Heart Cath 83405 C19 @  [facility];  Surgeon: David Faulkner MD;  Location:  [facility] CATH INVASIVE LOCATION;  Service: Cardiovascular;  Laterality: N/A;    CATARACT EXTRACTION Bilateral     COLONOSCOPY  2013    CORONARY ARTERY BYPASS GRAFT N/A 6/30/2020    Procedure: SHERI PER ANESTHESIA, MEDIAN STERNOTOMY, CORONARY ARTERY BYPASS GRAFTING X 3 , UTILIZING THE LEFT INTERNAL MAMMARY ARTERY AND EVH OF RIGHT GREATER SAPHENOUS VEIN;  Surgeon: Jerry Lozano MD;  Location:  [facility] OR;  Service: Cardiothoracic;  Laterality: N/A;  LEG START - 0736  VEIN OUT - 0840  VEIN READY - 0850    EYE SURGERY      HYSTERECTOMY      total    INTERVENTIONAL RADIOLOGY PROCEDURE  Bilateral 9/25/2017    Procedure: Carotid Cerebral Angiogram;  Surgeon: Maldonado Casas MD;  [facility];  Service:        SLP Recommendation and Plan                                      Daily Summary of Progress (SLP): progress towards functional goals is fair (12/19/23 1000)           Treatment Assessment (SLP): continued, aphasia, communication disorder, clinical signs of, aspiration (12/19/23 1000)  Treatment Assessment Comments (SLP): Pt very weak and limited participation. She communicates only via head nod yes/no. Tried to encourage verbal output, but pt really seems too weak to voice. She did clear throat audibly and responses were appropriate for yes/no. (12/19/23 1000)  Plan for Continued Treatment (SLP): continue treatment per plan of care (12/19/23 1000)  Plan of Care Reviewed With: patient (12/19/23 1046)  Progress: no change (12/19/23 1046)      SLP EVALUATION (last 72 hours)       SLP SLC Evaluation       Row Name 12/19/23 1000                   Communication Assessment/Intervention    Document Type therapy note (daily note)  -AW        Subjective Information no complaints  -AW        Patient Observations alert;cooperative;lethargic  -AW        Patient/Family/Caregiver Comments/Observations no family present  -AW        Patient Effort adequate  -AW        Symptoms Noted During/After Treatment none  -AW           Pain    Additional Documentation Pain Scale: FACES Pre/Post-Treatment (Group)  -AW           Pain Scale: FACES Pre/Post-Treatment    Pain: FACES Scale, Pretreatment 0-->no hurt  -AW        Posttreatment Pain Rating 0-->no hurt  -AW           SLP Treatment Clinical Impressions    Treatment Assessment (SLP) continued;aphasia;communication disorder;clinical signs of;aspiration  -AW        Treatment Assessment Comments (SLP) Pt very weak and limited participation. She communicates only via head nod yes/no. Tried to encourage verbal output, but pt really seems  too weak to voice. She did clear throat audibly and responses were appropriate for yes/no.  -AW        Daily Summary of Progress (SLP) progress towards functional goals is fair  -AW        Barriers to Overall Progress (SLP) Lethargy;Fatigue;Cognitive status;Baseline deficits;Medically complex  -AW        Plan for Continued Treatment (SLP) continue treatment per plan of care  -AW        Care Plan Review care plan/treatment goals reviewed  -AW                  User Key  (r) = Recorded By, (t) = Taken By, (c) = Cosigned By      Initials Name Effective Dates    AW Babita West, MS CCC-SLP 02/03/23 -                        EDUCATION  The patient has been educated in the following areas:     Communication Impairment.           SLP GOALS       Row Name 12/19/23 1000             (LTG) Patient will demonstrate functional swallow for    Diet Texture (Demonstrate functional swallow) soft to chew (whole) textures  -AW      Liquid viscosity (Demonstrate functional swallow) thin liquids  -AW      [place] (Demonstrate functional swallow) with 1:1 assist/ supervision  -AW      Time Frame (Demonstrate functional swallow) by discharge  -AW      Progress/Outcomes (Demonstrate functional swallow) unable to make needed progress  tried small piece of cracker but pt is unable to bite or manipulate at all, even when placed in mouth. Cracker had to be removed from oral cavity. Pt does not like puree and hadn't touched breakfast tray, but also unable to advance at this point.  -AW         (STG) Patient will tolerate trials of    Consistencies Trialed (Tolerate trials) pureed textures;nectar/ mildly thick liquids  -AW      Desired Outcome (Tolerate trials) without signs/symptoms of aspiration;with adequate oral prep/transit/clearance;with use of compensatory strategies (see comments)  -AW      [place] (Tolerate trials) with 1:1 assist/ supervision  -AW      Time Frame (Tolerate trials) by discharge  -AW      Progress/Outcomes  (Tolerate trials) continuing progress toward goal  -AW      Comment (Tolerate trials) pt tolerating nectar and puree, no s/s, limited intake  -AW         (STG) Patient will tolerate therapeutic trials of    Consistencies Trialed (Tolerate therapeutic trials) thin liquids  -AW      Desired Outcome (Tolerate therapeutic trials) without signs/symptoms of aspiration;without signs of distress  -AW      [place] (Tolerate therapeutic trials) with 1:1 assist/ supervision  -AW      Time Frame (Tolerate therapeutic trials) by discharge  -AW      Progress/Outcomes (Tolerate therapeutic trials) progress slower than expected  -AW      Comment (Tolerate therapeutic trials) pt coughing with thin by tsp and straw, wet vocal quality also noted  -AW         (STG) Lingual Strengthening Goal 1 (SLP)    Activity (Lingual Strengthening Goal 1, SLP) increase tongue back strength;increase lingual tone/sensation/control/coordination/movement  -AW      Increase Lingual Tone/Sensation/Control/Coordination/Movement lingual resistance exercises;swallow trials  -AW      Increase Tongue Back Strength lingual resistance exercises  -AW      [place]/Accuracy (Lingual Strengthening Goal 1, SLP) with maximum cues (25-49% accuracy)  -AW      Time Frame (Lingual Strengthening Goal 1, SLP) short term goal (STG)  -AW      Progress/Outcomes (Lingual Strengthening Goal 1, SLP) progress slower than expected  -AW      Comment (Lingual Strengthening Goal 1, SLP) pt with significant difficulty participating in exercises, ? apraxia contributing  -AW         (STG) Pharyngeal Strengthening Exercise Goal 1 (SLP)    Activity (Pharyngeal Strengthening Goal 1, SLP) increase timing;increase superior movement of the hyolaryngeal complex;increase anterior movement of the hyolaryngeal complex;increase squeeze/positive pressure generation  -AW      Increase Timing prepping - 3 second prep or suck swallow or 3-step swallow  -AW      Increase Superior Movement of  the Hyolaryngeal Complex falsetto  -AW      Increase Anterior Movement of the Hyolaryngeal Complex chin tuck against resistance (CTAR)  -AW      Increase Squeeze/Positive Pressure Generation hard effortful swallow  -AW      [place]/Accuracy (Pharyngeal Strengthening Goal 1, SLP) with maximum cues (25-49% accuracy)  -AW      Time Frame (Pharyngeal Strengthening Goal 1, SLP) short term goal (STG)  -AW      Progress/Outcomes (Pharyngeal Strengthening Goal 1, SLP) progress slower than expected  -AW      Comment (Pharyngeal Strengthening Goal 1, SLP) pt unable to do hard swallow even with max cues and pressure applied to BOT  -AW         Patient will demonstrate functional cognitive-linguistic skills for return to discharge environment    [place] with maximum cues  -AW      Time frame by discharge  -AW      Barriers lethargy;cognitive status;baseline deficits;medically complex;advanced age  -AW      Progress/Outcomes progress slower than expected  -AW         Follow Directions Goal 2 (SLP)    Improve Ability to Follow Directions Goal 1 (SLP) 1 step direction without objects;80%;with moderate cues (50-74%)  -AW      Time Frame (Follow Directions Goal 1, SLP) short term goal (STG)  -AW      Progress (Ability to Follow Directions Goal 1, SLP) 60%;with maximum cues (25-49%)  -AW      Progress/Outcomes (Follow Directions Goal 1, SLP) progress slower than expected  -AW         Phonation Goal 1 (SLP)    Improve Phonation By Goal 1 (SLP) using loud speech;80%;with maximum cues (25-49%)  -AW      Time Frame (Phonation Goal 1, SLP) short term goal (STG)  -AW      Progress/Outcomes (Phonation Goal 1, SLP) progress slower than expected  -AW      Comment (Phonation Goal 1, SLP) pt would not verbalize at all, only nodding  -AW         Articulation Goal 1 (SLP)    Improve Articulation Goal 1 (SLP) by over-articulating at word level;80%;with maximum cues (25-49%)  -AW      Time Frame (Articulation Goal 1, SLP) short term goal  (STG)  -AW      Progress (Articulation Goal 1, SLP) 0%;with maximum cues (25-49%)  -AW      Progress/Outcomes (Articulation Goal 1, SLP) progress slower than expected  -AW                User Key  (r) = Recorded By, (t) = Taken By, (c) = Cosigned By      Initials Name Provider Type    Babita Kyle MS CCC-SLP Speech and Language Pathologist                            Time Calculation:      Time Calculation- SLP       Row Name 23 1046             Time Calculation- SLP    SLP Start Time 0953  -AW      SLP Stop Time 1046  -AW      SLP Time Calculation (min) 53 min  -AW      SLP Received On 23  -AW                User Key  (r) = Recorded By, (t) = Taken By, (c) = Cosigned By      Initials Name Provider Type    Babita Kyle MS CCC-SLP Speech and Language Pathologist                                   Babita West MS CCC-SLP  2023   and Acute Care - Speech Language Pathology   Swallow Treatment Note [facility]     Patient Name: Beryl Montoya  : 1945  MRN: [id number]  Today's Date: 2023               Admit Date: 2023    Visit Dx:     ICD-10-CM ICD-9-CM   1. Cerebrovascular accident (CVA), unspecified mechanism  I63.9 434.91   2. Generalized weakness  R53.1 780.79   3. Expressive aphasia  R47.01 784.3   4. Hypertension, unspecified type  I10 401.9   5. Dysphagia, unspecified type  R13.10 787.20   6. Cognitive communication deficit  R41.841 799.52   7. Dysarthria  R47.1 784.51   8. Cerebrovascular accident (CVA) due to thrombosis of precerebral artery  I63.00 433.91     Patient Active Problem List   Diagnosis    History of L MCA CVA    Essential hypertension    Type 2 diabetes mellitus with complication, with long-term current use of insulin    GERD (gastroesophageal reflux disease)    Bilateral carotid artery stenosis    Abnormal stress test    Severe 3 vessel CAD with angina pectoris (CMS/HCC)    Ischemic cardiomyopathy. LVEF 30%    S/P CABG x 3 on 20     Acute postop embolic left occipital CVA 7/4/20. Extensive in L PCA distribution but additional areas in R PCA distribution (CMS/HCC)    Bilateral carotid atherosclerosis with moderate left common carotid artery stenosis (50-60%)    Seizure    Generalized weakness    Acute UTI (urinary tract infection)    History of seizure    Stroke    RAJINDER (acute kidney injury)     Past Medical History:   Diagnosis Date    CHF (congestive heart failure)     Coronary artery disease involving native coronary artery of native heart with angina pectoris 6/25/2020    Diabetes mellitus     GERD (gastroesophageal reflux disease)     High cholesterol     Hyperlipidemia     Hypertension     Ischemic cardiomyopathy     Stroke     2013 speech and short term memory     TIA (transient ischemic attack)      Past Surgical History:   Procedure Laterality Date    CARDIAC CATHETERIZATION N/A 6/23/2020    Procedure: Left Heart Cath 83498 C19 @ [facility];  Surgeon: David Faulkner MD;  Location:  [facility] CATH INVASIVE LOCATION;  Service: Cardiovascular;  Laterality: N/A;    CATARACT EXTRACTION Bilateral     COLONOSCOPY  2013    CORONARY ARTERY BYPASS GRAFT N/A 6/30/2020    Procedure: SHERI PER ANESTHESIA, MEDIAN STERNOTOMY, CORONARY ARTERY BYPASS GRAFTING X 3 , UTILIZING THE LEFT INTERNAL MAMMARY ARTERY AND EVH OF RIGHT GREATER SAPHENOUS VEIN;  Surgeon: Jerry Lozano MD;  Location:  [facility] OR;  Service: Cardiothoracic;  Laterality: N/A;  LEG START - 0736  VEIN OUT - 0840  VEIN READY - 0850    EYE SURGERY      HYSTERECTOMY      total    INTERVENTIONAL RADIOLOGY PROCEDURE Bilateral 9/25/2017    Procedure: Carotid Cerebral Angiogram;  Surgeon: Maldonado Casas MD;  Location:  [facility] CATH INVASIVE LOCATION;  Service:        SLP Recommendation and Plan                                               Daily Summary of Progress (SLP): progress towards functional goals is fair (12/19/23 1000)               Treatment Assessment (SLP): continued,  aphasia, communication disorder, clinical signs of, aspiration (12/19/23 1000)  Treatment Assessment Comments (SLP): Pt very weak and limited participation. She communicates only via head nod yes/no. Tried to encourage verbal output, but pt really seems too weak to voice. She did clear throat audibly and responses were appropriate for yes/no. (12/19/23 1000)  Plan for Continued Treatment (SLP): continue treatment per plan of care (12/19/23 1000)            Plan of Care Reviewed With: patient  Progress: no change          EDUCATION  The patient has been educated in the following areas:   Dysphagia (Swallowing Impairment).        SLP GOALS       Row Name 12/19/23 1000             (LTG) Patient will demonstrate functional swallow for    Diet Texture (Demonstrate functional swallow) soft to chew (whole) textures  -AW      Liquid viscosity (Demonstrate functional swallow) thin liquids  -AW      [place] (Demonstrate functional swallow) with 1:1 assist/ supervision  -AW      Time Frame (Demonstrate functional swallow) by discharge  -AW      Progress/Outcomes (Demonstrate functional swallow) unable to make needed progress  tried small piece of cracker but pt is unable to bite or manipulate at all, even when placed in mouth. Cracker had to be removed from oral cavity. Pt does not like puree and hadn't touched breakfast tray, but also unable to advance at this point.  -AW         (STG) Patient will tolerate trials of    Consistencies Trialed (Tolerate trials) pureed textures;nectar/ mildly thick liquids  -AW      Desired Outcome (Tolerate trials) without signs/symptoms of aspiration;with adequate oral prep/transit/clearance;with use of compensatory strategies (see comments)  -AW      [place] (Tolerate trials) with 1:1 assist/ supervision  -AW      Time Frame (Tolerate trials) by discharge  -AW      Progress/Outcomes (Tolerate trials) continuing progress toward goal  -AW      Comment (Tolerate trials) pt tolerating  nectar and puree, no s/s, limited intake  -AW         (STG) Patient will tolerate therapeutic trials of    Consistencies Trialed (Tolerate therapeutic trials) thin liquids  -AW      Desired Outcome (Tolerate therapeutic trials) without signs/symptoms of aspiration;without signs of distress  -AW      [place] (Tolerate therapeutic trials) with 1:1 assist/ supervision  -AW      Time Frame (Tolerate therapeutic trials) by discharge  -AW      Progress/Outcomes (Tolerate therapeutic trials) progress slower than expected  -AW      Comment (Tolerate therapeutic trials) pt coughing with thin by tsp and straw, wet vocal quality also noted  -AW         (STG) Lingual Strengthening Goal 1 (SLP)    Activity (Lingual Strengthening Goal 1, SLP) increase tongue back strength;increase lingual tone/sensation/control/coordination/movement  -AW      Increase Lingual Tone/Sensation/Control/Coordination/Movement lingual resistance exercises;swallow trials  -AW      Increase Tongue Back Strength lingual resistance exercises  -AW      [place]/Accuracy (Lingual Strengthening Goal 1, SLP) with maximum cues (25-49% accuracy)  -AW      Time Frame (Lingual Strengthening Goal 1, SLP) short term goal (STG)  -AW      Progress/Outcomes (Lingual Strengthening Goal 1, SLP) progress slower than expected  -AW      Comment (Lingual Strengthening Goal 1, SLP) pt with significant difficulty participating in exercises, ? apraxia contributing  -AW         (STG) Pharyngeal Strengthening Exercise Goal 1 (SLP)    Activity (Pharyngeal Strengthening Goal 1, SLP) increase timing;increase superior movement of the hyolaryngeal complex;increase anterior movement of the hyolaryngeal complex;increase squeeze/positive pressure generation  -AW      Increase Timing prepping - 3 second prep or suck swallow or 3-step swallow  -AW      Increase Superior Movement of the Hyolaryngeal Complex falsetto  -AW      Increase Anterior Movement of the Hyolaryngeal Complex  chin tuck against resistance (CTAR)  -AW      Increase Squeeze/Positive Pressure Generation hard effortful swallow  -AW      [place]/Accuracy (Pharyngeal Strengthening Goal 1, SLP) with maximum cues (25-49% accuracy)  -AW      Time Frame (Pharyngeal Strengthening Goal 1, SLP) short term goal (STG)  -AW      Progress/Outcomes (Pharyngeal Strengthening Goal 1, SLP) progress slower than expected  -AW      Comment (Pharyngeal Strengthening Goal 1, SLP) pt unable to do hard swallow even with max cues and pressure applied to BOT  -AW         Patient will demonstrate functional cognitive-linguistic skills for return to discharge environment    [place] with maximum cues  -AW      Time frame by discharge  -AW      Barriers lethargy;cognitive status;baseline deficits;medically complex;advanced age  -AW      Progress/Outcomes progress slower than expected  -AW         Follow Directions Goal 2 (SLP)    Improve Ability to Follow Directions Goal 1 (SLP) 1 step direction without objects;80%;with moderate cues (50-74%)  -AW      Time Frame (Follow Directions Goal 1, SLP) short term goal (STG)  -AW      Progress (Ability to Follow Directions Goal 1, SLP) 60%;with maximum cues (25-49%)  -AW      Progress/Outcomes (Follow Directions Goal 1, SLP) progress slower than expected  -AW         Phonation Goal 1 (SLP)    Improve Phonation By Goal 1 (SLP) using loud speech;80%;with maximum cues (25-49%)  -AW      Time Frame (Phonation Goal 1, SLP) short term goal (STG)  -AW      Progress/Outcomes (Phonation Goal 1, SLP) progress slower than expected  -AW      Comment (Phonation Goal 1, SLP) pt would not verbalize at all, only nodding  -AW         Articulation Goal 1 (SLP)    Improve Articulation Goal 1 (SLP) by over-articulating at word level;80%;with maximum cues (25-49%)  -AW      Time Frame (Articulation Goal 1, SLP) short term goal (STG)  -AW      Progress (Articulation Goal 1, SLP) 0%;with maximum cues (25-49%)  -AW       Progress/Outcomes (Articulation Goal 1, SLP) progress slower than expected  -                User Key  (r) = Recorded By, (t) = Taken By, (c) = Cosigned By      Initials Name Provider Type    Babita Kyle MS CCC-SLP Speech and Language Pathologist                       Time Calculation:    Time Calculation- SLP       Row Name 12/19/23 1046             Time Calculation- SLP    SLP Start Time 0953  -AW      SLP Stop Time 1046  -      SLP Time Calculation (min) 53 min  -      SLP Received On 12/19/23  -                User Key  (r) = Recorded By, (t) = Taken By, (c) = Cosigned By      Initials Name Provider Type    Babita Kyle MS CCC-SLP Speech and Language Pathologist                             Babita West MS CCC-SLP  12/19/2023    Electronically signed by Babita West MS CCC-SLP at 12/19/23 1047

## 2023-12-20 NOTE — PROGRESS NOTES
[facility] Medicine Services  PROGRESS NOTE    Patient Name: Beryl Montoya  : 1945  MRN: [id number]    Date of Admission: 2023  Primary Care Physician: Argelia Gamez MD    Subjective   Subjective     CC:  F/U CVA    HPI:  Seen this morning. Again is sleepy and keeps eyes closed, does nod yes/no for me.      Objective   Objective     Vital Signs:   Temp:  [96.7 °F (35.9 °C)-97.6 °F (36.4 °C)] 97.6 °F (36.4 °C)  Heart Rate:  [46-63] 63  Resp:  [16-18] 16  BP: (135-159)/(63-92) 159/63     Physical Exam:  Gen-no acute distress  HENT-NCAT, mucous membranes moist  CV-RRR, S1 S2 normal, no m/r/g  Resp-CTAB, no wheezes or rales  Abd-soft, NT, ND, +BS  Ext-no edema  Neuro-keeps eyes closed, nods yes/no appropriately   Skin-no rashes  No change from 23      Results Reviewed:  LAB RESULTS:      Lab 12/15/23  0514   WBC 7.70   HEMOGLOBIN 15.1   HEMATOCRIT 45.3   PLATELETS 219   .7*         Lab 23  0608 23  0836 12/15/23  0514   SODIUM 148* 146* 143   POTASSIUM 4.4 3.9 4.1   CHLORIDE 110* 108* 106   CO2 20.0* 27.0 25.0   ANION GAP 18.0* 11.0 12.0   BUN 32* 25* 26*   CREATININE 1.27* 0.95 1.41*   EGFR 43.4* 61.5 38.3*   GLUCOSE 135* 147* 266*   CALCIUM 9.2 9.0 8.7                         Brief Urine Lab Results  (Last result in the past 365 days)        Color   Clarity   Blood   Leuk Est   Nitrite   Protein   CREAT   Urine HCG        23 1523 Yellow   Cloudy   Trace   Moderate (2+)   Positive   100 mg/dL (2+)                   Microbiology Results Abnormal       None            No radiology results from the last 24 hrs    Results for orders placed during the hospital encounter of 23    Adult Transthoracic Echo Limited W/ Cont if Necessary Per Protocol    Interpretation Summary    Left ventricular ejection fraction appears to be 51 - 55%. Normal left ventricular cavity size and wall thickness noted. All left ventricular wall segments  contract normally.    Normal right ventricular cavity size and systolic function noted.    The left atrial cavity is mildly dilated.    No aortic valve regurgitation or stenosis is present. The aortic valve exhibits sclerosis. The aortic valve appears trileaflet.    The mitral valve is structurally normal with no significant stenosis present. Trace to mild mitral valve regurgitation is present.    The tricuspid valve is structurally normal with no significant stenosis present. Physiologic tricuspid valve regurgitation is present. Estimated right ventricular systolic pressure from tricuspid regurgitation is normal (<35 mmHg).      Current medications:  Scheduled Meds:aspirin, 81 mg, Oral, Daily   Or  aspirin, 300 mg, Rectal, Daily  atorvastatin, 80 mg, Oral, Nightly  carvedilol, 25 mg, Oral, BID  clopidogrel, 75 mg, Oral, Daily  furosemide, 40 mg, Oral, Daily  insulin detemir, 12 Units, Subcutaneous, Q12H  insulin lispro, 2-7 Units, Subcutaneous, 4x Daily AC & at Bedtime  Insulin Lispro, 3 Units, Subcutaneous, TID With Meals  isosorbide mononitrate, 30 mg, Oral, Daily  levETIRAcetam, 500 mg, Oral, Q12H  levothyroxine, 25 mcg, Oral, QAM  melatonin, 5 mg, Oral, Nightly  sacubitril-valsartan, 1 tablet, Oral, Q12H  traZODone, 25 mg, Oral, Nightly      Continuous Infusions:   PRN Meds:.  acetaminophen **OR** acetaminophen **OR** acetaminophen    senna-docusate sodium **AND** polyethylene glycol **AND** bisacodyl **AND** bisacodyl    Calcium Replacement - Follow Nurse / BPA Driven Protocol    lactulose    Magnesium Standard Dose Replacement - Follow Nurse / BPA Driven Protocol    ondansetron **OR** ondansetron    Phosphorus Replacement - Follow Nurse / BPA Driven Protocol    Potassium Replacement - Follow Nurse / BPA Driven Protocol    sodium chloride    sodium chloride    Assessment & Plan   Assessment & Plan     Active Hospital Problems    Diagnosis  POA    **Stroke [I63.9]  Yes    RAJINDER (acute kidney injury) [N17.9]   Unknown    Generalized weakness [R53.1]  Yes    Acute UTI (urinary tract infection) [N39.0]  Yes    History of seizure [Z87.898]  Yes    Ischemic cardiomyopathy. LVEF 30% [I25.5]  Yes    Essential hypertension [I10]  Yes    History of L MCA CVA [Z86.73]  Not Applicable    Type 2 diabetes mellitus with complication, with long-term current use of insulin [E11.8, Z79.4]  Not Applicable      Resolved Hospital Problems   No resolved problems to display.        Brief Hospital Course to date:  Beryl Montoya is a 78 y.o. female with hx of CAD s/p CABG, CVA with residual speech deficits, and wheelchair bound at baseline who presented with left arm weakness x 4-5 days. Found to have late acute/subacute right karen CVA, microhemorrhage likely secondary to uncontrolled HTN.     This patient's problems and plans were partially entered by my partner and updated as appropriate by me 12/20/23. Copied text in this note has been reviewed and is accurate as of today's date.      Late acute/subacute right karen CVA presenting with LUE weakness  -Neurology has seen, recommend DAPT, statin. Goal -160, improved with increased Coreg.  -PT/OT follows, plan for rehab placement  -Follow up with stroke clinic in 4-6 weeks    Somnolence  -Will decrease Trazodone from 50 to 25 mg nightly, was started during this admission    Elevated Cr  -Suspect due to poor intake where she has been so somnolent  -IV fluids and repeat labs in AM  -Hold Lasix for now    Hypernatremia  -Suspect due to poor intake/somnolence  -Will start IV fluids and repeat labs in AM    E. coli UTI   -s/p 5 days of IV Rocephin      DMII, insulin dependent with HbA1c 7.8  -Basal bolus regimen     Seizure disorder   -Continue home Keppra, EEG this admission was negative for seizure activity    Constipation  -Continue bowel regimen    CAD s/p CABG - DAPT, Coreg, Imdur  HTN - normal goals as above  Obesity, BMI 34    Expected Discharge Location and Transportation:  rehab  Expected Discharge   Expected Discharge Date: 12/22/2023; Expected Discharge Time:      DVT prophylaxis:  Mechanical DVT prophylaxis orders are present.     AM-PAC 6 Clicks Score (PT): 10 (12/19/23 1935)    CODE STATUS:   Code Status and Medical Interventions:   Ordered at: 12/11/23 1626     Medical Intervention Limits:    NO intubation (DNI)     Level Of Support Discussed With:    Next of Kin (If No Surrogate)     Code Status (Patient has no pulse and is not breathing):    No CPR (Do Not Attempt to Resuscitate)     Medical Interventions (Patient has pulse or is breathing):    Limited Support       Kamila Talamantes MD  12/20/23

## 2023-12-20 NOTE — PLAN OF CARE
Goal Outcome Evaluation:               PT alert to self, only answering first name today and nodding yes and no to me. Will follow commands. NSR-SB today. Initiated NS IV fluids 75ml/hr. Eating better today.

## 2023-12-21 VITALS
DIASTOLIC BLOOD PRESSURE: 75 MMHG | BODY MASS INDEX: 34.15 KG/M2 | WEIGHT: 200 LBS | OXYGEN SATURATION: 93 % | RESPIRATION RATE: 18 BRPM | SYSTOLIC BLOOD PRESSURE: 121 MMHG | TEMPERATURE: 98 F | HEART RATE: 56 BPM | HEIGHT: 64 IN

## 2023-12-21 LAB
ANION GAP SERPL CALCULATED.3IONS-SCNC: 11 MMOL/L (ref 5–15)
BUN SERPL-MCNC: 28 MG/DL (ref 8–23)
BUN/CREAT SERPL: 29.8 (ref 7–25)
CALCIUM SPEC-SCNC: 8.7 MG/DL (ref 8.6–10.5)
CHLORIDE SERPL-SCNC: 111 MMOL/L (ref 98–107)
CO2 SERPL-SCNC: 23 MMOL/L (ref 22–29)
CREAT SERPL-MCNC: 0.94 MG/DL (ref 0.57–1)
DEPRECATED RDW RBC AUTO: 50.4 FL (ref 37–54)
EGFRCR SERPLBLD CKD-EPI 2021: 62.2 ML/MIN/1.73
ERYTHROCYTE [DISTWIDTH] IN BLOOD BY AUTOMATED COUNT: 13.2 % (ref 12.3–15.4)
GLUCOSE BLDC GLUCOMTR-MCNC: 135 MG/DL (ref 70–130)
GLUCOSE BLDC GLUCOMTR-MCNC: 85 MG/DL (ref 70–130)
GLUCOSE SERPL-MCNC: 98 MG/DL (ref 65–99)
HCT VFR BLD AUTO: 41.2 % (ref 34–46.6)
HGB BLD-MCNC: 13.8 G/DL (ref 12–15.9)
MCH RBC QN AUTO: 34.8 PG (ref 26.6–33)
MCHC RBC AUTO-ENTMCNC: 33.5 G/DL (ref 31.5–35.7)
MCV RBC AUTO: 103.8 FL (ref 79–97)
PLATELET # BLD AUTO: 189 10*3/MM3 (ref 140–450)
PMV BLD AUTO: 10.6 FL (ref 6–12)
POTASSIUM SERPL-SCNC: 3.4 MMOL/L (ref 3.5–5.2)
RBC # BLD AUTO: 3.97 10*6/MM3 (ref 3.77–5.28)
SARS-COV-2 AG RESP QL IA.RAPID: NORMAL
SODIUM SERPL-SCNC: 145 MMOL/L (ref 136–145)
WBC NRBC COR # BLD AUTO: 8.29 10*3/MM3 (ref 3.4–10.8)

## 2023-12-21 PROCEDURE — 85027 COMPLETE CBC AUTOMATED: CPT | Performed by: HOSPITALIST

## 2023-12-21 PROCEDURE — 63710000001 INSULIN DETEMIR PER 5 UNITS: Performed by: HOSPITALIST

## 2023-12-21 PROCEDURE — 82948 REAGENT STRIP/BLOOD GLUCOSE: CPT

## 2023-12-21 PROCEDURE — 87426 SARSCOV CORONAVIRUS AG IA: CPT | Performed by: INTERNAL MEDICINE

## 2023-12-21 PROCEDURE — 80048 BASIC METABOLIC PNL TOTAL CA: CPT | Performed by: HOSPITALIST

## 2023-12-21 PROCEDURE — 99232 SBSQ HOSP IP/OBS MODERATE 35: CPT | Performed by: INTERNAL MEDICINE

## 2023-12-21 RX ORDER — POTASSIUM CHLORIDE 20 MEQ/1
40 TABLET, EXTENDED RELEASE ORAL EVERY 4 HOURS
Status: DISPENSED | OUTPATIENT
Start: 2023-12-21 | End: 2023-12-21

## 2023-12-21 RX ORDER — CARVEDILOL 25 MG/1
25 TABLET ORAL 2 TIMES DAILY
Qty: 60 TABLET | Refills: 0 | Status: SHIPPED | OUTPATIENT
Start: 2023-12-21

## 2023-12-21 RX ORDER — INSULIN LISPRO 100 [IU]/ML
3 INJECTION, SOLUTION INTRAVENOUS; SUBCUTANEOUS
Qty: 270 UNITS | Refills: 0 | Status: SHIPPED | OUTPATIENT
Start: 2023-12-21 | End: 2024-01-20

## 2023-12-21 RX ORDER — LEVOTHYROXINE SODIUM 0.03 MG/1
25 TABLET ORAL EVERY MORNING
Start: 2023-12-21

## 2023-12-21 RX ORDER — CLOPIDOGREL BISULFATE 75 MG/1
75 TABLET ORAL DAILY
Qty: 30 TABLET | Refills: 0 | Status: SHIPPED | OUTPATIENT
Start: 2023-12-22

## 2023-12-21 RX ADMIN — SACUBITRIL AND VALSARTAN 1 TABLET: 49; 51 TABLET, FILM COATED ORAL at 09:26

## 2023-12-21 RX ADMIN — POTASSIUM CHLORIDE 40 MEQ: 1500 TABLET, EXTENDED RELEASE ORAL at 09:26

## 2023-12-21 RX ADMIN — INSULIN DETEMIR 12 UNITS: 100 INJECTION, SOLUTION SUBCUTANEOUS at 09:25

## 2023-12-21 RX ADMIN — CLOPIDOGREL BISULFATE 75 MG: 75 TABLET ORAL at 09:26

## 2023-12-21 RX ADMIN — LEVOTHYROXINE SODIUM 25 MCG: 25 TABLET ORAL at 09:26

## 2023-12-21 RX ADMIN — ASPIRIN 81 MG CHEWABLE TABLET 81 MG: 81 TABLET CHEWABLE at 09:26

## 2023-12-21 RX ADMIN — LEVETIRACETAM 500 MG: 100 SOLUTION ORAL at 09:25

## 2023-12-21 RX ADMIN — CARVEDILOL 25 MG: 12.5 TABLET, FILM COATED ORAL at 09:26

## 2023-12-21 RX ADMIN — ISOSORBIDE MONONITRATE 30 MG: 30 TABLET, EXTENDED RELEASE ORAL at 09:25

## 2023-12-21 NOTE — NURSING NOTE
Calling patient's report to THERESA Zepeda from [facility]. Patient will be transferred by Jacklyn llamas to the facility. Patient will call out when ready.

## 2023-12-21 NOTE — DISCHARGE PLACEMENT REQUEST
"Faustina Montoya (78 y.o. Female)       Date of Birth   1945    Social Security Number       Address   [address] [place] [address]    Home Phone   [phone]    MRN   [id number]       [facility]    Marital Status                               Admission Date   23    Admission Type   Emergency    Admitting Provider   Chloe Haq DO    Attending Provider   Chloe Haq DO    Department, Room/Bed   [facility] 3F, S306/1       Discharge Date       Discharge Disposition   Skilled Nursing Facility (DC - External)    Discharge Destination                                 Attending Provider: Chloe Haq DO    Allergies: No Known Allergies    Isolation: None   Infection: None   Code Status: No CPR    Ht: 162.6 cm (64\")   Wt: 90.7 kg (200 lb)    Admission Cmt: None   Principal Problem: Stroke [I63.9]                   Active Insurance as of 2023       Primary Coverage       Payor Plan Insurance Group Employer/Plan Group    [facility] MEDICARE REPLACEMENT [facility] MED [facility] [employer] [id number]       Payor Plan Address Payor Plan Phone Number Payor Plan Fax Number Effective Dates    [address] [address]   2023 - None Entered    [facility] [id number]         Subscriber Name Subscriber Birth Date Member ID       FAUSTINA MONTOYA 1945 [id number]                     Emergency Contacts        (Rel.) Home Phone Work Phone Mobile Phone    Jean Montoya (Spouse) [phone] -- --    Jose Montoya (Son) [phone] -- [phone]                 Discharge Summary        Chloe Haq DO at 23 1434              [facility]  DISCHARGE SUMMARY    Patient Name: Faustina Montoya  : 1945  MRN: [id number]    Date of Admission: 2023 12:58 PM  Date of Discharge:  2023  Primary Care Physician: Argelia Gamez MD    Consults  "      No orders found from 11/12/2023 to 12/12/2023.            Hospital Course     Presenting Problem: LUE weakness    Active Hospital Problems    Diagnosis  POA    **Stroke [I63.9]  Yes    RAJINDER (acute kidney injury) [N17.9]  Unknown    Generalized weakness [R53.1]  Yes    Acute UTI (urinary tract infection) [N39.0]  Yes    History of seizure [Z87.898]  Yes    Ischemic cardiomyopathy. LVEF 30% [I25.5]  Yes    Essential hypertension [I10]  Yes    History of L MCA CVA [Z86.73]  Not Applicable    Type 2 diabetes mellitus with complication, with long-term current use of insulin [E11.8, Z79.4]  Not Applicable      Resolved Hospital Problems   No resolved problems to display.          Hospital Course:  Beryl Montoya is a 78 y.o. female with hx of CAD s/p CABG, CVA with residual speech deficits, and wheelchair bound at baseline who presented with left arm weakness x 4-5 days. Found to have late acute/subacute right karen CVA, microhemorrhage likely secondary to uncontrolled HTN.      This patient's problems and plans were partially entered by my partner and updated as appropriate by me 12/21/23.      Late acute/subacute right karen CVA presenting with LUE weakness  -Neurology has seen, recommend DAPT, statin.  Goal -160, continue Coreg.  -Follow up with stroke clinic in 4-6 weeks        Elevated Cr, better  -Suspect due to poor intake where she has been so somnolent  -improved with IVF, needs outpatient BMP in 1 week     Hypernatremia, improved  -Suspect due to poor intake/somnolence     E. coli UTI   -s/p 5 days of IV Rocephin      DMII, insulin dependent with HbA1c 7.8  -Continue Levemir and prandial insulin     Seizure disorder   -Continue home Keppra, EEG this admission was negative for seizure activity     Constipation  -Continue bowel regimen     CAD s/p CABG - DAPT, Coreg, Imdur  HTN - normal goals as above  Obesity, BMI 34      Discharge Follow Up Recommendations for outpatient labs/diagnostics:  Neuro  clinic in 4 to 6 weeks  PCP in 1 week    Day of Discharge     HPI:   Not answering many questions for me, difficult to get history     Review of Systems  Limited ROS    Vital Signs:   Temp:  [97.3 °F (36.3 °C)-98 °F (36.7 °C)] 98 °F (36.7 °C)  Heart Rate:  [48-71] 56  Resp:  [16-18] 18  BP: (121-160)/(38-83) 121/75      Physical Exam:  Constitutional: No acute distress, not interacting  HENT: NCAT, mucous membranes moist  Respiratory: Clear to auscultation bilaterally, respiratory effort normal   Cardiovascular: RRR, no murmurs, rubs, or gallops  Musculoskeletal: No bilateral ankle edema  Psychiatric: calm, cooperative  Neurologic: difficult to assess, not following commands  Skin: No rashes    Pertinent  and/or Most Recent Results     LAB RESULTS:      Lab 12/21/23  0504 12/15/23  0514   WBC 8.29 7.70   HEMOGLOBIN 13.8 15.1   HEMATOCRIT 41.2 45.3   PLATELETS 189 219   .8* 102.7*         Lab 12/21/23  0504 12/20/23  0608 12/16/23  0836 12/15/23  0514   SODIUM 145 148* 146* 143   POTASSIUM 3.4* 4.4 3.9 4.1   CHLORIDE 111* 110* 108* 106   CO2 23.0 20.0* 27.0 25.0   ANION GAP 11.0 18.0* 11.0 12.0   BUN 28* 32* 25* 26*   CREATININE 0.94 1.27* 0.95 1.41*   EGFR 62.2 43.4* 61.5 38.3*   GLUCOSE 98 135* 147* 266*   CALCIUM 8.7 9.2 9.0 8.7                         Brief Urine Lab Results  (Last result in the past 365 days)        Color   Clarity   Blood   Leuk Est   Nitrite   Protein   CREAT   Urine HCG        12/11/23 1523 Yellow   Cloudy   Trace   Moderate (2+)   Positive   100 mg/dL (2+)                 Microbiology Results (last 10 days)       Procedure Component Value - Date/Time    COVID-19 RAPID AG,VERITOR,COR/CHRIS/PAD/RACHNA/ANISHA/LAG/MAURY/ IN-HOUSE,DRY SWAB, 1 HR TAT - Swab, Nasal Cavity [[id number]]  (Normal) Collected: 12/21/23 1315    Lab Status: Final result Specimen: Swab from Nasal Cavity Updated: 12/21/23 1337     COVID19 Presumptive Negative    Narrative:      Fact sheets for providers:  https://www.fda.gov/media/384254/download    Fact sheets for patients: https://www.fda.gov/media/791165/download    Urine Culture - Urine, Straight Cath [[id number]]  (Abnormal)  (Susceptibility) Collected: 12/11/23 1523    Lab Status: Final result Specimen: Urine from Straight Cath Updated: 12/13/23 0403     Urine Culture >100,000 CFU/mL Escherichia coli    Narrative:      Colonization of the urinary tract without infection is common. Treatment is discouraged unless the patient is symptomatic, pregnant, or undergoing an invasive urologic procedure.    Susceptibility        Escherichia coli      ELIZABETH      Ampicillin Susceptible      Ampicillin + Sulbactam Susceptible      Cefazolin Susceptible      Cefepime Susceptible      Ceftazidime Susceptible      Ceftriaxone Susceptible      Gentamicin Susceptible      Levofloxacin Susceptible      Nitrofurantoin Susceptible      Piperacillin + Tazobactam Susceptible      Trimethoprim + Sulfamethoxazole Susceptible                                   EEG    Result Date: 12/12/2023  Reason for referral: 78 y.o.female with altered mental status Technical Summary:  A 19 channel digital EEG was performed using the international 10-20 placement system, including eye leads and EKG leads. Duration: 22 minutes Findings: The awake tracing shows diffuse low amplitude intermixed theta and alpha activity present symmetrically over both hemispheres.  EMG artifact and eye blink artifact is seen anteriorly.  A clear posterior rhythm is not seen.  Drowsiness is seen with mild slowing of the tracing but stage II sleep is not clearly seen.  No focal features or epileptiform activity are seen.  Hyperventilation and photic stimulation are not performed. Video: Available Technical quality: Superior EKG: Regular, 70 bpm SUMMARY: Normal EEG in the awake and lightly asleep states No focal features or epileptiform activity are seen     Normal study This report is transcribed using the Dragon dictation  system.      FL Video Swallow With Speech Single Contrast    Result Date: 12/12/2023  FL VIDEO SWALLOW W SPEECH SINGLE-CONTRAST Date of Exam: 12/12/2023 12:03 PM EST Indication: dysphagia Comparison: None available. Technique:   The speech pathologist administered food and/or liquid mixed with barium to the patient with cine/video imaging.  Imaging assistance was provided to the speech pathologist and an image was saved. Fluoroscopic Time: 1 minute and 12 seconds Number of Images: 10 associated fluoroscopic loops were saved Findings: Please see speech therapy report for full details and recommendations.     Impression: Fluoroscopy provided for a modified barium swallow. Please see speech therapy report for full details and recommendations. Electronically Signed: Eduar Phillips MD  12/12/2023 4:18 PM EST  Workstation ID: [id number]    Adult Transthoracic Echo Limited W/ Cont if Necessary Per Protocol    Result Date: 12/12/2023    Left ventricular ejection fraction appears to be 51 - 55%. Normal left ventricular cavity size and wall thickness noted. All left ventricular wall segments contract normally.   Normal right ventricular cavity size and systolic function noted.   The left atrial cavity is mildly dilated.   No aortic valve regurgitation or stenosis is present. The aortic valve exhibits sclerosis. The aortic valve appears trileaflet.   The mitral valve is structurally normal with no significant stenosis present. Trace to mild mitral valve regurgitation is present.   The tricuspid valve is structurally normal with no significant stenosis present. Physiologic tricuspid valve regurgitation is present. Estimated right ventricular systolic pressure from tricuspid regurgitation is normal (<35 mmHg).     MRI Angiogram Head Without Contrast    Result Date: 12/12/2023  MRI ANGIOGRAM HEAD WO CONTRAST Date of Exam: 12/12/2023 4:12 AM EST Indication: Stroke, follow up.  Comparison: CTA head 7/4/2020 Technique:  Routine 3-D  time-of-flight gradient echo imaging was obtained of the head without contrast administration. Findings: Right carotid: The distal cervical ICA is normal. Intracranial ICA segments demonstrate mild atherosclerotic irregularity. The origin of the ophthalmic artery is normal. There is a patent P-comm. There is a patent A-Comm. The A1 segment is normal. There is mild focal narrowing of the proximal right A2 segment. The distal MERCY branches appear patent. There is a stable high-grade focal stenosis at the proximal right M1 segment with near occlusion. The distal M1 segment is widely patent and the distal MCA branches appear patent. Left carotid: The distal cervical ICA is normal. The intracranial ICA segments are widely patent. The origin of the ophthalmic artery is normal. There is a patent P-comm. The A1 and M1 segments and distal MERCY and MCA branches appear patent. Posterior circulation: V4 segments are patent. The basilar artery is normal. Visualized superior cerebellar arteries appear patent. The P1 segments are normal. There is atherosclerotic irregularity in the right PCA branches. There is stable severe disease in the left PCA with at least 2 areas of high-grade stenosis. Overall appearance is unchanged from prior CTA.     Impression: 1.Stable high-grade stenosis at the proximal right M1 segment with near occlusion. 2.Stable severe disease in the left PCA with at least 2 areas of high-grade stenosis. Electronically Signed: Nomi Fletcher MD  12/12/2023 5:53 AM EST  Workstation ID: [id number]    MRI Angiogram Neck Without Contrast    Result Date: 12/12/2023  MRI ANGIOGRAM NECK WO CONTRAST Date of Exam: 12/12/2023 4:13 AM EST Indication: Stroke, follow up.  Comparison: CTA neck 7/4/2020. Technique:  Routine 3-D time-of-flight gradient echo imaging was obtained of the neck without contrast administration. Findings: Aorta: The visualized aortic arch is unremarkable. There is conventional three-vessel arch anatomy. The  right brachiocephalic and right subclavian arteries appear patent. The proximal left subclavian artery is partially obscured, but the distal vessel appears patent. Right carotid: There is mild nonstenosing plaque in the distal CCA. The carotid bifurcation is widely patent. The ECA and distal branches appear patent and the mid and distal cervical ICA is normal. Left carotid: There is mild narrowing of the distal CCA due to eccentric plaque estimated to be less than 25%. The carotid bifurcation is normal. The ECA and distal branches appear normal and the cervical ICA is normal. Vertebral arteries: Vertebral arteries are codominant and following normal course and appear normal caliber throughout the neck.     Impression: 1. Mild, less than 25% narrowing at the distal left CCA. 2. No hemodynamically significant stenosis of the internal carotid or vertebral arteries. Electronically Signed: Nomi Fletcher MD  12/12/2023 5:43 AM EST  Workstation ID: [id number]    MRI Brain Without Contrast    Result Date: 12/12/2023  MRI BRAIN WO CONTRAST Date of Exam: 12/12/2023 4:14 AM EST Indication: Stroke, follow up.  Comparison: CT head 12/11/2023 Technique:  Routine multiplanar/multisequence sequence images of the brain were obtained without contrast administration. Findings: There is a new small area of diffusion restriction in the right karen compatible with acute/subacute lacunar type infarct. There is a large chronic infarct involving the left temporal and left occipital lobes, which is unchanged and exhibits laminar necrosis. There is moderate underlying chronic small vessel ischemic change. There is generalized parenchymal volume loss. There are punctate microhemorrhages in the right karen, medial right temporal lobe and at multiple locations in the area of chronic infarction in the left temporal and occipital lobes. There are additional chronic microhemorrhages in the right temporal white matter and right parietal periventricular  white matter. Midline structures appear intact. There is thinning of the orbital lenses bilaterally. There is complete filling of the right maxillary sinus with findings of chronic sinusitis. Mastoids are clear. The central flow voids appear intact. Calvarial and superficial soft tissue signal is within normal limits.     Impression: 1.New small acute/subacute lacunar type infarct in the right karen. 2.Large chronic infarct in the left temporal and occipital lobes. 3.Background of moderate chronic small vessel ischemic change and generalized parenchymal volume loss. 4.There are multiple chronic microhemorrhages in the right karen, medial right temporal lobe and right parietal periventricular white matter. This could be related to hypertension or amyloid angiopathy. 5.Chronic right maxillary sinusitis. Electronically Signed: Nomi Fletcher MD  12/12/2023 5:36 AM EST  Workstation ID: [id number]    XR Abdomen KUB    Result Date: 12/12/2023  XR ABDOMEN KUB Date of Exam: 12/12/2023 1:11 AM EST Indication: MRI Clearence, please include pelvis too Comparison: 7/10/2020 and 7/6/2020. Findings: Abdomen: There is evidence of prior median sternotomy. There is no unexpected metal artifact. No acute osseous abnormality. No bowel distention. CHEST: There is evidence of prior median sternotomy. There are no unexpected areas of metal artifact. CT HEAD: No unexpected metallic foreign bodies. No contraindication to MRI.     Impression: No contraindication to MRI identified on abdomen or chest radiograph for head CT. Electronically Signed: Nomi Fletcher MD  12/12/2023 1:55 AM EST  Workstation ID: [id number]    CT Head Without Contrast    Result Date: 12/11/2023  CT HEAD WO CONTRAST Date of Exam: 12/11/2023 1:20 PM EST Indication: AMS, history of stroke, on Xarelto. Comparison: 7/14/2020 Technique: Axial CT images were obtained of the head without contrast administration.  Automated exposure control and iterative construction methods were  used. Findings: Parenchyma:No acute intraparenchymal hemorrhage. Sequela of old left-sided infarct with encephalomalacia. New hypodensity within the right karen. Moderate parenchymal volume loss. Scattered periventricular and subcortical white matter hypodensities, nonspecific, but most often consistent with small vessel ischemic changes. No midline shift or herniation. Ventricles and extra axial spaces:Prominent ventricles and sulci secondary to volume loss. No extra axial fluid collection seen. Other:Lens replacements. Scattered paranasal sinus mucosal thickening. Mastoid air cells are clear. Calvarium is intact. Intracranial atherosclerotic calcification is present.     Impression: New focal hypodensity within the right karen may represent age-indeterminate infarct. Consider MRI to further evaluate. Chronic changes including old left-sided infarct as above. Findings discussed with stroke team by Dr. Eros Dejesus via telephone on 12/11/2023 3:31 PM EST. Electronically Signed: Eros Dejesus MD  12/11/2023 4:02 PM EST  Workstation ID: [id number]    XR Chest 1 View    Result Date: 12/11/2023  XR CHEST 1 VW Date of Exam: 12/11/2023 1:12 PM EST Indication: Stroke Protocol (Onset > 12 Hrs) Comparison: Chest radiograph dated 8/20/2020 Findings: There is borderline cardiomegaly. There are postsurgical changes of prior CABG. Pulmonary vascularity appears normal. There is no acute airspace consolidation, pleural effusion, or pneumothorax. There are degenerative changes of the thoracic spine.     Impression: 1. Borderline cardiomegaly with postsurgical changes of CABG. No acute consolidation. Electronically Signed: Jimenez Butcher  12/11/2023 2:02 PM EST  Workstation ID: [id number]     Results for orders placed during the hospital encounter of 06/29/20    Duplex Venous Lower Extremity - Bilateral CAR    Interpretation Summary  · Normal bilateral lower extremity venous duplex scan.      Results for orders placed during the  hospital encounter of 06/29/20    Duplex Venous Lower Extremity - Bilateral CAR    Interpretation Summary  · Normal bilateral lower extremity venous duplex scan.      Results for orders placed during the hospital encounter of 12/11/23    Adult Transthoracic Echo Limited W/ Cont if Necessary Per Protocol    Interpretation Summary    Left ventricular ejection fraction appears to be 51 - 55%. Normal left ventricular cavity size and wall thickness noted. All left ventricular wall segments contract normally.    Normal right ventricular cavity size and systolic function noted.    The left atrial cavity is mildly dilated.    No aortic valve regurgitation or stenosis is present. The aortic valve exhibits sclerosis. The aortic valve appears trileaflet.    The mitral valve is structurally normal with no significant stenosis present. Trace to mild mitral valve regurgitation is present.    The tricuspid valve is structurally normal with no significant stenosis present. Physiologic tricuspid valve regurgitation is present. Estimated right ventricular systolic pressure from tricuspid regurgitation is normal (<35 mmHg).      Plan for Follow-up of Pending Labs/Results:     Discharge Details        Discharge Medications        New Medications        Instructions Start Date   clopidogrel 75 MG tablet  Commonly known as: PLAVIX   75 mg, Oral, Daily   Start Date: December 22, 2023     insulin detemir 100 UNIT/ML injection  Commonly known as: LEVEMIR   6 Units, Subcutaneous, Daily   Start Date: December 22, 2023     Insulin Lispro 100 UNIT/ML injection  Commonly known as: humaLOG   3 Units, Subcutaneous, 3 Times Daily With Meals             Changes to Medications        Instructions Start Date   carvedilol 25 MG tablet  Commonly known as: COREG  What changed:   medication strength  how much to take   25 mg, Oral, 2 Times Daily             Continue These Medications        Instructions Start Date   aspirin 81 MG chewable tablet   81 mg,  Oral, Daily      atorvastatin 80 MG tablet  Commonly known as: LIPITOR   Take 1 tablet by mouth once daily      Entresto 24-26 MG tablet  Generic drug: sacubitril-valsartan   1 tablet, Oral, 2 Times Daily      levETIRAcetam 500 MG tablet  Commonly known as: KEPPRA   Take 1 tablet by mouth twice daily      levothyroxine 25 MCG tablet  Commonly known as: SYNTHROID, LEVOTHROID   25 mcg, Oral, Every Morning             Stop These Medications      Insulin Lispro Prot & Lispro (75-25) 100 UNIT/ML suspension pen-injector pen  Commonly known as: HumaLOG Mix 75/25 KwikPen     omeprazole 20 MG capsule  Commonly known as: priLOSEC              No Known Allergies      Discharge Disposition:  Skilled Nursing Facility (DC - External)    Diet:  Hospital:  Diet Order   Procedures    Diet: Cardiac Diets, Diabetic Diets; Healthy Heart (2-3 Na+); Consistent Carbohydrate; No Mixed Consistencies, Feeding Assistance - Nursing, No Straw; Texture: Pureed (NDD 1); Fluid Consistency: Nectar Thick            Activity:      Restrictions or Other Recommendations:         CODE STATUS:    Code Status and Medical Interventions:   Ordered at: 12/11/23 1626     Medical Intervention Limits:    NO intubation (DNI)     Level Of Support Discussed With:    Next of Kin (If No Surrogate)     Code Status (Patient has no pulse and is not breathing):    No CPR (Do Not Attempt to Resuscitate)     Medical Interventions (Patient has pulse or is breathing):    Limited Support       Future Appointments   Date Time Provider Department Center   4/17/2024  2:00 PM Argelia Gamez MD [facility] Meghan Carroll Gilbert, DO  12/21/23      Time Spent on Discharge:  I spent  25  minutes on this discharge activity which included: face-to-face encounter with the patient, reviewing the data in the system, coordination of the care with the nursing staff as well as consultants, documentation, and entering orders.            Electronically  signed by Chloe Haq,  at 12/21/23 5349

## 2023-12-21 NOTE — DISCHARGE PLACEMENT REQUEST
"Faustina Montoya (78 y.o. Female)       Date of Birth   1945    Social Security Number       Address   [address] [place] [address]    Home Phone   [phone]    MRN   [id number]       [facility]    Marital Status                               Admission Date   23    Admission Type   Emergency    Admitting Provider   Chloe Haq DO    Attending Provider   Chloe Haq DO    Department, Room/Bed   [facility] 3F, S306/1       Discharge Date       Discharge Disposition   Skilled Nursing Facility (DC - External)    Discharge Destination                                 Attending Provider: Chloe Haq DO    Allergies: No Known Allergies    Isolation: None   Infection: COVID (rule out) (23)   Code Status: No CPR    Ht: 162.6 cm (64\")   Wt: 90.7 kg (200 lb)    Admission Cmt: None   Principal Problem: Stroke [I63.9]                   Active Insurance as of 2023       Primary Coverage       Payor Plan Insurance Group Employer/Plan Group    [facility] MEDICARE REPLACEMENT [facility] MED [facility] GROUP [id number]       Payor Plan Address Payor Plan Phone Number Payor Plan Fax Number Effective Dates    [address] [address]   2023 - None Entered    [facility] [address]         Subscriber Name Subscriber Birth Date Member ID       FAUSTINA MONTOYA 1945 [id number]                     Emergency Contacts        (Rel.) Home Phone Work Phone Mobile Phone    Jean Montoya (Spouse) [phone] -- --    Jose Montoya (Son) [phone] -- [phone]                 Discharge Summary        Chloe Haq DO at 23 [facility]              [facility] Medicine Services  DISCHARGE SUMMARY    Patient Name: Faustina Montoya  : 1945  MRN: [id number]    Date of Admission: 2023 12:58 PM  Date of Discharge:  2023  Primary Care Physician: Argelia Gamez " MD Emily    Consults       No orders found from 11/12/2023 to 12/12/2023.            Hospital Course     Presenting Problem: LUE weakness    Active Hospital Problems    Diagnosis  POA    **Stroke [I63.9]  Yes    RAJINDER (acute kidney injury) [N17.9]  Unknown    Generalized weakness [R53.1]  Yes    Acute UTI (urinary tract infection) [N39.0]  Yes    History of seizure [Z87.898]  Yes    Ischemic cardiomyopathy. LVEF 30% [I25.5]  Yes    Essential hypertension [I10]  Yes    History of L MCA CVA [Z86.73]  Not Applicable    Type 2 diabetes mellitus with complication, with long-term current use of insulin [E11.8, Z79.4]  Not Applicable      Resolved Hospital Problems   No resolved problems to display.          Hospital Course:  Beryl Montoya is a 78 y.o. female with hx of CAD s/p CABG, CVA with residual speech deficits, and wheelchair bound at baseline who presented with left arm weakness x 4-5 days. Found to have late acute/subacute right karen CVA, microhemorrhage likely secondary to uncontrolled HTN.      This patient's problems and plans were partially entered by my partner and updated as appropriate by me 12/21/23.      Late acute/subacute right karen CVA presenting with LUE weakness  -Neurology has seen, recommend DAPT, statin.  Goal -160, continue Coreg.  -Follow up with stroke clinic in 4-6 weeks        Elevated Cr, better  -Suspect due to poor intake where she has been so somnolent  -improved with IVF, needs outpatient BMP in 1 week     Hypernatremia, improved  -Suspect due to poor intake/somnolence     E. coli UTI   -s/p 5 days of IV Rocephin      DMII, insulin dependent with HbA1c 7.8  -Continue Levemir and prandial insulin     Seizure disorder   -Continue home Keppra, EEG this admission was negative for seizure activity     Constipation  -Continue bowel regimen     CAD s/p CABG - DAPT, Coreg, Imdur  HTN - normal goals as above  Obesity, BMI 34      Discharge Follow Up Recommendations for outpatient  labs/diagnostics:  Neuro clinic in 4 to 6 weeks  PCP in 1 week    Day of Discharge     HPI:   Not answering many questions for me, difficult to get history     Review of Systems  Limited ROS    Vital Signs:   Temp:  [97.3 °F (36.3 °C)-98 °F (36.7 °C)] 98 °F (36.7 °C)  Heart Rate:  [48-71] 56  Resp:  [16-18] 18  BP: (121-160)/(38-83) 121/75      Physical Exam:  Constitutional: No acute distress, not interacting  HENT: NCAT, mucous membranes moist  Respiratory: Clear to auscultation bilaterally, respiratory effort normal   Cardiovascular: RRR, no murmurs, rubs, or gallops  Musculoskeletal: No bilateral ankle edema  Psychiatric: calm, cooperative  Neurologic: difficult to assess, not following commands  Skin: No rashes    Pertinent  and/or Most Recent Results     LAB RESULTS:      Lab 12/21/23  0504 12/15/23  0514   WBC 8.29 7.70   HEMOGLOBIN 13.8 15.1   HEMATOCRIT 41.2 45.3   PLATELETS 189 219   .8* 102.7*         Lab 12/21/23  0504 12/20/23  0608 12/16/23  0836 12/15/23  0514   SODIUM 145 148* 146* 143   POTASSIUM 3.4* 4.4 3.9 4.1   CHLORIDE 111* 110* 108* 106   CO2 23.0 20.0* 27.0 25.0   ANION GAP 11.0 18.0* 11.0 12.0   BUN 28* 32* 25* 26*   CREATININE 0.94 1.27* 0.95 1.41*   EGFR 62.2 43.4* 61.5 38.3*   GLUCOSE 98 135* 147* 266*   CALCIUM 8.7 9.2 9.0 8.7                         Brief Urine Lab Results  (Last result in the past 365 days)        Color   Clarity   Blood   Leuk Est   Nitrite   Protein   CREAT   Urine HCG        12/11/23 1523 Yellow   Cloudy   Trace   Moderate (2+)   Positive   100 mg/dL (2+)                 Microbiology Results (last 10 days)       Procedure Component Value - Date/Time    COVID-19 RAPID AG,VERITOR,COR/CHRIS/PAD/RACHNA/ANISHA/LAG/MAURY/ IN-HOUSE,DRY SWAB, 1 HR TAT - Swab, Nasal Cavity [[id number]]  (Normal) Collected: 12/21/23 1315    Lab Status: Final result Specimen: Swab from Nasal Cavity Updated: 12/21/23 1337     COVID19 Presumptive Negative    Narrative:      Fact sheets for  providers: https://www.fda.gov/media/207873/download    Fact sheets for patients: https://www.fda.gov/media/966710/download    Urine Culture - Urine, Straight Cath [[id number]]  (Abnormal)  (Susceptibility) Collected: 12/11/23 1523    Lab Status: Final result Specimen: Urine from Straight Cath Updated: 12/13/23 0403     Urine Culture >100,000 CFU/mL Escherichia coli    Narrative:      Colonization of the urinary tract without infection is common. Treatment is discouraged unless the patient is symptomatic, pregnant, or undergoing an invasive urologic procedure.    Susceptibility        Escherichia coli      ELIZABETH      Ampicillin Susceptible      Ampicillin + Sulbactam Susceptible      Cefazolin Susceptible      Cefepime Susceptible      Ceftazidime Susceptible      Ceftriaxone Susceptible      Gentamicin Susceptible      Levofloxacin Susceptible      Nitrofurantoin Susceptible      Piperacillin + Tazobactam Susceptible      Trimethoprim + Sulfamethoxazole Susceptible                                   EEG    Result Date: 12/12/2023  Reason for referral: 78 y.o.female with altered mental status Technical Summary:  A 19 channel digital EEG was performed using the international 10-20 placement system, including eye leads and EKG leads. Duration: 22 minutes Findings: The awake tracing shows diffuse low amplitude intermixed theta and alpha activity present symmetrically over both hemispheres.  EMG artifact and eye blink artifact is seen anteriorly.  A clear posterior rhythm is not seen.  Drowsiness is seen with mild slowing of the tracing but stage II sleep is not clearly seen.  No focal features or epileptiform activity are seen.  Hyperventilation and photic stimulation are not performed. Video: Available Technical quality: Superior EKG: Regular, 70 bpm SUMMARY: Normal EEG in the awake and lightly asleep states No focal features or epileptiform activity are seen     Normal study This report is transcribed using the Dragon  dictation system.      FL Video Swallow With Speech Single Contrast    Result Date: 12/12/2023  FL VIDEO SWALLOW W SPEECH SINGLE-CONTRAST Date of Exam: 12/12/2023 12:03 PM EST Indication: dysphagia Comparison: None available. Technique:   The speech pathologist administered food and/or liquid mixed with barium to the patient with cine/video imaging.  Imaging assistance was provided to the speech pathologist and an image was saved. Fluoroscopic Time: 1 minute and 12 seconds Number of Images: 10 associated fluoroscopic loops were saved Findings: Please see speech therapy report for full details and recommendations.     Impression: Fluoroscopy provided for a modified barium swallow. Please see speech therapy report for full details and recommendations. Electronically Signed: Eduar Phillips MD  12/12/2023 4:18 PM EST  Workstation ID: [id number]    Adult Transthoracic Echo Limited W/ Cont if Necessary Per Protocol    Result Date: 12/12/2023    Left ventricular ejection fraction appears to be 51 - 55%. Normal left ventricular cavity size and wall thickness noted. All left ventricular wall segments contract normally.   Normal right ventricular cavity size and systolic function noted.   The left atrial cavity is mildly dilated.   No aortic valve regurgitation or stenosis is present. The aortic valve exhibits sclerosis. The aortic valve appears trileaflet.   The mitral valve is structurally normal with no significant stenosis present. Trace to mild mitral valve regurgitation is present.   The tricuspid valve is structurally normal with no significant stenosis present. Physiologic tricuspid valve regurgitation is present. Estimated right ventricular systolic pressure from tricuspid regurgitation is normal (<35 mmHg).     MRI Angiogram Head Without Contrast    Result Date: 12/12/2023  MRI ANGIOGRAM HEAD WO CONTRAST Date of Exam: 12/12/2023 4:12 AM EST Indication: Stroke, follow up.  Comparison: CTA head 7/4/2020 Technique:   Routine 3-D time-of-flight gradient echo imaging was obtained of the head without contrast administration. Findings: Right carotid: The distal cervical ICA is normal. Intracranial ICA segments demonstrate mild atherosclerotic irregularity. The origin of the ophthalmic artery is normal. There is a patent P-comm. There is a patent A-Comm. The A1 segment is normal. There is mild focal narrowing of the proximal right A2 segment. The distal MERCY branches appear patent. There is a stable high-grade focal stenosis at the proximal right M1 segment with near occlusion. The distal M1 segment is widely patent and the distal MCA branches appear patent. Left carotid: The distal cervical ICA is normal. The intracranial ICA segments are widely patent. The origin of the ophthalmic artery is normal. There is a patent P-comm. The A1 and M1 segments and distal MERCY and MCA branches appear patent. Posterior circulation: V4 segments are patent. The basilar artery is normal. Visualized superior cerebellar arteries appear patent. The P1 segments are normal. There is atherosclerotic irregularity in the right PCA branches. There is stable severe disease in the left PCA with at least 2 areas of high-grade stenosis. Overall appearance is unchanged from prior CTA.     Impression: 1.Stable high-grade stenosis at the proximal right M1 segment with near occlusion. 2.Stable severe disease in the left PCA with at least 2 areas of high-grade stenosis. Electronically Signed: Nomi Fletcher MD  12/12/2023 5:53 AM EST  Workstation ID: [id number]    MRI Angiogram Neck Without Contrast    Result Date: 12/12/2023  MRI ANGIOGRAM NECK WO CONTRAST Date of Exam: 12/12/2023 4:13 AM EST Indication: Stroke, follow up.  Comparison: CTA neck 7/4/2020. Technique:  Routine 3-D time-of-flight gradient echo imaging was obtained of the neck without contrast administration. Findings: Aorta: The visualized aortic arch is unremarkable. There is conventional three-vessel arch  anatomy. The right brachiocephalic and right subclavian arteries appear patent. The proximal left subclavian artery is partially obscured, but the distal vessel appears patent. Right carotid: There is mild nonstenosing plaque in the distal CCA. The carotid bifurcation is widely patent. The ECA and distal branches appear patent and the mid and distal cervical ICA is normal. Left carotid: There is mild narrowing of the distal CCA due to eccentric plaque estimated to be less than 25%. The carotid bifurcation is normal. The ECA and distal branches appear normal and the cervical ICA is normal. Vertebral arteries: Vertebral arteries are codominant and following normal course and appear normal caliber throughout the neck.     Impression: 1. Mild, less than 25% narrowing at the distal left CCA. 2. No hemodynamically significant stenosis of the internal carotid or vertebral arteries. Electronically Signed: Nomi Fletcher MD  12/12/2023 5:43 AM EST  Workstation ID: [id number]    MRI Brain Without Contrast    Result Date: 12/12/2023  MRI BRAIN WO CONTRAST Date of Exam: 12/12/2023 4:14 AM EST Indication: Stroke, follow up.  Comparison: CT head 12/11/2023 Technique:  Routine multiplanar/multisequence sequence images of the brain were obtained without contrast administration. Findings: There is a new small area of diffusion restriction in the right karen compatible with acute/subacute lacunar type infarct. There is a large chronic infarct involving the left temporal and left occipital lobes, which is unchanged and exhibits laminar necrosis. There is moderate underlying chronic small vessel ischemic change. There is generalized parenchymal volume loss. There are punctate microhemorrhages in the right karen, medial right temporal lobe and at multiple locations in the area of chronic infarction in the left temporal and occipital lobes. There are additional chronic microhemorrhages in the right temporal white matter and right parietal  periventricular white matter. Midline structures appear intact. There is thinning of the orbital lenses bilaterally. There is complete filling of the right maxillary sinus with findings of chronic sinusitis. Mastoids are clear. The central flow voids appear intact. Calvarial and superficial soft tissue signal is within normal limits.     Impression: 1.New small acute/subacute lacunar type infarct in the right karen. 2.Large chronic infarct in the left temporal and occipital lobes. 3.Background of moderate chronic small vessel ischemic change and generalized parenchymal volume loss. 4.There are multiple chronic microhemorrhages in the right karen, medial right temporal lobe and right parietal periventricular white matter. This could be related to hypertension or amyloid angiopathy. 5.Chronic right maxillary sinusitis. Electronically Signed: Nomi Fletcher MD  12/12/2023 5:36 AM EST  Workstation ID: [id number]    XR Abdomen KUB    Result Date: 12/12/2023  XR ABDOMEN KUB Date of Exam: 12/12/2023 1:11 AM EST Indication: MRI Clearence, please include pelvis too Comparison: 7/10/2020 and 7/6/2020. Findings: Abdomen: There is evidence of prior median sternotomy. There is no unexpected metal artifact. No acute osseous abnormality. No bowel distention. CHEST: There is evidence of prior median sternotomy. There are no unexpected areas of metal artifact. CT HEAD: No unexpected metallic foreign bodies. No contraindication to MRI.     Impression: No contraindication to MRI identified on abdomen or chest radiograph for head CT. Electronically Signed: Nomi Fletcher MD  12/12/2023 1:55 AM EST  Workstation ID: [id number]    CT Head Without Contrast    Result Date: 12/11/2023  CT HEAD WO CONTRAST Date of Exam: 12/11/2023 1:20 PM EST Indication: AMS, history of stroke, on Xarelto. Comparison: 7/14/2020 Technique: Axial CT images were obtained of the head without contrast administration.  Automated exposure control and iterative  construction methods were used. Findings: Parenchyma:No acute intraparenchymal hemorrhage. Sequela of old left-sided infarct with encephalomalacia. New hypodensity within the right karen. Moderate parenchymal volume loss. Scattered periventricular and subcortical white matter hypodensities, nonspecific, but most often consistent with small vessel ischemic changes. No midline shift or herniation. Ventricles and extra axial spaces:Prominent ventricles and sulci secondary to volume loss. No extra axial fluid collection seen. Other:Lens replacements. Scattered paranasal sinus mucosal thickening. Mastoid air cells are clear. Calvarium is intact. Intracranial atherosclerotic calcification is present.     Impression: New focal hypodensity within the right karen may represent age-indeterminate infarct. Consider MRI to further evaluate. Chronic changes including old left-sided infarct as above. Findings discussed with stroke team by Dr. Eros Dejesus via telephone on 12/11/2023 3:31 PM EST. Electronically Signed: Eros Dejesus MD  12/11/2023 4:02 PM EST  Workstation ID: [id number]    XR Chest 1 View    Result Date: 12/11/2023  XR CHEST 1 VW Date of Exam: 12/11/2023 1:12 PM EST Indication: Stroke Protocol (Onset > 12 Hrs) Comparison: Chest radiograph dated 8/20/2020 Findings: There is borderline cardiomegaly. There are postsurgical changes of prior CABG. Pulmonary vascularity appears normal. There is no acute airspace consolidation, pleural effusion, or pneumothorax. There are degenerative changes of the thoracic spine.     Impression: 1. Borderline cardiomegaly with postsurgical changes of CABG. No acute consolidation. Electronically Signed: Jimenez Butcher  12/11/2023 2:02 PM EST  Workstation ID: [id number]     Results for orders placed during the hospital encounter of 06/29/20    Duplex Venous Lower Extremity - Bilateral CAR    Interpretation Summary  · Normal bilateral lower extremity venous duplex scan.      Results for  orders placed during the hospital encounter of 06/29/20    Duplex Venous Lower Extremity - Bilateral CAR    Interpretation Summary  · Normal bilateral lower extremity venous duplex scan.      Results for orders placed during the hospital encounter of 12/11/23    Adult Transthoracic Echo Limited W/ Cont if Necessary Per Protocol    Interpretation Summary    Left ventricular ejection fraction appears to be 51 - 55%. Normal left ventricular cavity size and wall thickness noted. All left ventricular wall segments contract normally.    Normal right ventricular cavity size and systolic function noted.    The left atrial cavity is mildly dilated.    No aortic valve regurgitation or stenosis is present. The aortic valve exhibits sclerosis. The aortic valve appears trileaflet.    The mitral valve is structurally normal with no significant stenosis present. Trace to mild mitral valve regurgitation is present.    The tricuspid valve is structurally normal with no significant stenosis present. Physiologic tricuspid valve regurgitation is present. Estimated right ventricular systolic pressure from tricuspid regurgitation is normal (<35 mmHg).      Plan for Follow-up of Pending Labs/Results:     Discharge Details        Discharge Medications        New Medications        Instructions Start Date   clopidogrel 75 MG tablet  Commonly known as: PLAVIX   75 mg, Oral, Daily   Start Date: December 22, 2023     insulin detemir 100 UNIT/ML injection  Commonly known as: LEVEMIR   6 Units, Subcutaneous, Daily   Start Date: December 22, 2023     Insulin Lispro 100 UNIT/ML injection  Commonly known as: humaLOG   3 Units, Subcutaneous, 3 Times Daily With Meals             Changes to Medications        Instructions Start Date   carvedilol 25 MG tablet  Commonly known as: COREG  What changed:   medication strength  how much to take   25 mg, Oral, 2 Times Daily             Continue These Medications        Instructions Start Date   aspirin 81 MG  chewable tablet   81 mg, Oral, Daily      atorvastatin 80 MG tablet  Commonly known as: LIPITOR   Take 1 tablet by mouth once daily      Entresto 24-26 MG tablet  Generic drug: sacubitril-valsartan   1 tablet, Oral, 2 Times Daily      levETIRAcetam 500 MG tablet  Commonly known as: KEPPRA   Take 1 tablet by mouth twice daily      levothyroxine 25 MCG tablet  Commonly known as: SYNTHROID, LEVOTHROID   25 mcg, Oral, Every Morning             Stop These Medications      Insulin Lispro Prot & Lispro (75-25) 100 UNIT/ML suspension pen-injector pen  Commonly known as: HumaLOG Mix 75/25 KwikPen     omeprazole 20 MG capsule  Commonly known as: priLOSEC              No Known Allergies      Discharge Disposition:  Skilled Nursing Facility (DC - External)    Diet:  Hospital:  Diet Order   Procedures    Diet: Cardiac Diets, Diabetic Diets; Healthy Heart (2-3 Na+); Consistent Carbohydrate; No Mixed Consistencies, Feeding Assistance - Nursing, No Straw; Texture: Pureed (NDD 1); Fluid Consistency: Nectar Thick            Activity:      Restrictions or Other Recommendations:         CODE STATUS:    Code Status and Medical Interventions:   Ordered at: 12/11/23 1626     Medical Intervention Limits:    NO intubation (DNI)     Level Of Support Discussed With:    Next of Kin (If No Surrogate)     Code Status (Patient has no pulse and is not breathing):    No CPR (Do Not Attempt to Resuscitate)     Medical Interventions (Patient has pulse or is breathing):    Limited Support       Future Appointments   Date Time Provider Department Center   4/17/2024  2:00 PM Argelia Gamez MD [facility] Meghan Carroll Gilbert, DO  12/21/23      Time Spent on Discharge:  I spent  25  minutes on this discharge activity which included: face-to-face encounter with the patient, reviewing the data in the system, coordination of the care with the nursing staff as well as consultants, documentation, and entering orders.             Electronically signed by Chloe Haq DO at 12/21/23 9849

## 2023-12-21 NOTE — CONSULTS
Diabetes Education    Patient Name:  Beryl Montoya  YOB: 1945  MRN: [id number]  Admit Date:  12/11/2023        Consult received per stroke protocol.  Pt not a candidate for OP stroke class given d/c dispo.      Electronically signed by:  Eboni Argueta RN  12/21/23 15:11 EST

## 2023-12-21 NOTE — DISCHARGE SUMMARY
[facility] Medicine Services  DISCHARGE SUMMARY    Patient Name: Beryl Montoya  : 1945  MRN: [id number]    Date of Admission: 2023 12:58 PM  Date of Discharge:  2023  Primary Care Physician: Argelia Gamez MD    Consults       No orders found from 2023 to 2023.            Hospital Course     Presenting Problem: LUE weakness    Active Hospital Problems    Diagnosis  POA    **Stroke [I63.9]  Yes    RAJINDER (acute kidney injury) [N17.9]  Unknown    Generalized weakness [R53.1]  Yes    Acute UTI (urinary tract infection) [N39.0]  Yes    History of seizure [Z87.898]  Yes    Ischemic cardiomyopathy. LVEF 30% [I25.5]  Yes    Essential hypertension [I10]  Yes    History of L MCA CVA [Z86.73]  Not Applicable    Type 2 diabetes mellitus with complication, with long-term current use of insulin [E11.8, Z79.4]  Not Applicable      Resolved Hospital Problems   No resolved problems to display.          Hospital Course:  Beryl Montoya is a 78 y.o. female with hx of CAD s/p CABG, CVA with residual speech deficits, and wheelchair bound at baseline who presented with left arm weakness x 4-5 days. Found to have late acute/subacute right karen CVA, microhemorrhage likely secondary to uncontrolled HTN.      This patient's problems and plans were partially entered by my partner and updated as appropriate by me 23.      Late acute/subacute right karen CVA presenting with LUE weakness  -Neurology has seen, recommend DAPT, statin.  Goal -160, continue Coreg.  -Follow up with stroke clinic in 4-6 weeks        Elevated Cr, better  -Suspect due to poor intake where she has been so somnolent  -improved with IVF, needs outpatient BMP in 1 week     Hypernatremia, improved  -Suspect due to poor intake/somnolence     E. coli UTI   -s/p 5 days of IV Rocephin      DMII, insulin dependent with HbA1c 7.8  -Continue Levemir and prandial insulin     Seizure disorder    -Continue home Keppra, EEG this admission was negative for seizure activity     Constipation  -Continue bowel regimen     CAD s/p CABG - DAPT, Coreg, Imdur  HTN - normal goals as above  Obesity, BMI 34      Discharge Follow Up Recommendations for outpatient labs/diagnostics:  Neuro clinic in 4 to 6 weeks  PCP in 1 week    Day of Discharge     HPI:   Not answering many questions for me, difficult to get history     Review of Systems  Limited ROS    Vital Signs:   Temp:  [97.3 °F (36.3 °C)-98 °F (36.7 °C)] 98 °F (36.7 °C)  Heart Rate:  [48-71] 56  Resp:  [16-18] 18  BP: (121-160)/(38-83) 121/75      Physical Exam:  Constitutional: No acute distress, not interacting  HENT: NCAT, mucous membranes moist  Respiratory: Clear to auscultation bilaterally, respiratory effort normal   Cardiovascular: RRR, no murmurs, rubs, or gallops  Musculoskeletal: No bilateral ankle edema  Psychiatric: calm, cooperative  Neurologic: difficult to assess, not following commands  Skin: No rashes    Pertinent  and/or Most Recent Results     LAB RESULTS:      Lab 12/21/23  0504 12/15/23  0514   WBC 8.29 7.70   HEMOGLOBIN 13.8 15.1   HEMATOCRIT 41.2 45.3   PLATELETS 189 219   .8* 102.7*         Lab 12/21/23  0504 12/20/23  0608 12/16/23  0836 12/15/23  0514   SODIUM 145 148* 146* 143   POTASSIUM 3.4* 4.4 3.9 4.1   CHLORIDE 111* 110* 108* 106   CO2 23.0 20.0* 27.0 25.0   ANION GAP 11.0 18.0* 11.0 12.0   BUN 28* 32* 25* 26*   CREATININE 0.94 1.27* 0.95 1.41*   EGFR 62.2 43.4* 61.5 38.3*   GLUCOSE 98 135* 147* 266*   CALCIUM 8.7 9.2 9.0 8.7                         Brief Urine Lab Results  (Last result in the past 365 days)        Color   Clarity   Blood   Leuk Est   Nitrite   Protein   CREAT   Urine HCG        12/11/23 1523 Yellow   Cloudy   Trace   Moderate (2+)   Positive   100 mg/dL (2+)                 Microbiology Results (last 10 days)       Procedure Component Value - Date/Time    COVID-19 RAPID  AG,VERITOR,COR/CHRIS/PAD/RACHNA/ANISHA/LAG/MAURY/ IN-HOUSE,DRY SWAB, 1 HR TAT - Swab, Nasal Cavity [[id number]]  (Normal) Collected: 12/21/23 1315    Lab Status: Final result Specimen: Swab from Nasal Cavity Updated: 12/21/23 1337     COVID19 Presumptive Negative    Narrative:      Fact sheets for providers: https://www.fda.gov/media/586542/download    Fact sheets for patients: https://www.fda.gov/media/610240/download    Urine Culture - Urine, Straight Cath [[id number]]  (Abnormal)  (Susceptibility) Collected: 12/11/23 1523    Lab Status: Final result Specimen: Urine from Straight Cath Updated: 12/13/23 0403     Urine Culture >100,000 CFU/mL Escherichia coli    Narrative:      Colonization of the urinary tract without infection is common. Treatment is discouraged unless the patient is symptomatic, pregnant, or undergoing an invasive urologic procedure.    Susceptibility        Escherichia coli      ELIZABETH      Ampicillin Susceptible      Ampicillin + Sulbactam Susceptible      Cefazolin Susceptible      Cefepime Susceptible      Ceftazidime Susceptible      Ceftriaxone Susceptible      Gentamicin Susceptible      Levofloxacin Susceptible      Nitrofurantoin Susceptible      Piperacillin + Tazobactam Susceptible      Trimethoprim + Sulfamethoxazole Susceptible                                   EEG    Result Date: 12/12/2023  Reason for referral: 78 y.o.female with altered mental status Technical Summary:  A 19 channel digital EEG was performed using the international 10-20 placement system, including eye leads and EKG leads. Duration: 22 minutes Findings: The awake tracing shows diffuse low amplitude intermixed theta and alpha activity present symmetrically over both hemispheres.  EMG artifact and eye blink artifact is seen anteriorly.  A clear posterior rhythm is not seen.  Drowsiness is seen with mild slowing of the tracing but stage II sleep is not clearly seen.  No focal features or epileptiform activity are seen.   Hyperventilation and photic stimulation are not performed. Video: Available Technical quality: Superior EKG: Regular, 70 bpm SUMMARY: Normal EEG in the awake and lightly asleep states No focal features or epileptiform activity are seen     Normal study This report is transcribed using the Dragon dictation system.      FL Video Swallow With Speech Single Contrast    Result Date: 12/12/2023  FL VIDEO SWALLOW W SPEECH SINGLE-CONTRAST Date of Exam: 12/12/2023 12:03 PM EST Indication: dysphagia Comparison: None available. Technique:   The speech pathologist administered food and/or liquid mixed with barium to the patient with cine/video imaging.  Imaging assistance was provided to the speech pathologist and an image was saved. Fluoroscopic Time: 1 minute and 12 seconds Number of Images: 10 associated fluoroscopic loops were saved Findings: Please see speech therapy report for full details and recommendations.     Impression: Fluoroscopy provided for a modified barium swallow. Please see speech therapy report for full details and recommendations. Electronically Signed: Eduar Phillips MD  12/12/2023 4:18 PM EST  Workstation ID: [id number]    Adult Transthoracic Echo Limited W/ Cont if Necessary Per Protocol    Result Date: 12/12/2023    Left ventricular ejection fraction appears to be 51 - 55%. Normal left ventricular cavity size and wall thickness noted. All left ventricular wall segments contract normally.   Normal right ventricular cavity size and systolic function noted.   The left atrial cavity is mildly dilated.   No aortic valve regurgitation or stenosis is present. The aortic valve exhibits sclerosis. The aortic valve appears trileaflet.   The mitral valve is structurally normal with no significant stenosis present. Trace to mild mitral valve regurgitation is present.   The tricuspid valve is structurally normal with no significant stenosis present. Physiologic tricuspid valve regurgitation is present. Estimated  right ventricular systolic pressure from tricuspid regurgitation is normal (<35 mmHg).     MRI Angiogram Head Without Contrast    Result Date: 12/12/2023  MRI ANGIOGRAM HEAD WO CONTRAST Date of Exam: 12/12/2023 4:12 AM EST Indication: Stroke, follow up.  Comparison: CTA head 7/4/2020 Technique:  Routine 3-D time-of-flight gradient echo imaging was obtained of the head without contrast administration. Findings: Right carotid: The distal cervical ICA is normal. Intracranial ICA segments demonstrate mild atherosclerotic irregularity. The origin of the ophthalmic artery is normal. There is a patent P-comm. There is a patent A-Comm. The A1 segment is normal. There is mild focal narrowing of the proximal right A2 segment. The distal MERCY branches appear patent. There is a stable high-grade focal stenosis at the proximal right M1 segment with near occlusion. The distal M1 segment is widely patent and the distal MCA branches appear patent. Left carotid: The distal cervical ICA is normal. The intracranial ICA segments are widely patent. The origin of the ophthalmic artery is normal. There is a patent P-comm. The A1 and M1 segments and distal MERCY and MCA branches appear patent. Posterior circulation: V4 segments are patent. The basilar artery is normal. Visualized superior cerebellar arteries appear patent. The P1 segments are normal. There is atherosclerotic irregularity in the right PCA branches. There is stable severe disease in the left PCA with at least 2 areas of high-grade stenosis. Overall appearance is unchanged from prior CTA.     Impression: 1.Stable high-grade stenosis at the proximal right M1 segment with near occlusion. 2.Stable severe disease in the left PCA with at least 2 areas of high-grade stenosis. Electronically Signed: Nomi Fletcher MD  12/12/2023 5:53 AM EST  Workstation ID: [id number]    MRI Angiogram Neck Without Contrast    Result Date: 12/12/2023  MRI ANGIOGRAM NECK WO CONTRAST Date of Exam:  12/12/2023 4:13 AM EST Indication: Stroke, follow up.  Comparison: CTA neck 7/4/2020. Technique:  Routine 3-D time-of-flight gradient echo imaging was obtained of the neck without contrast administration. Findings: Aorta: The visualized aortic arch is unremarkable. There is conventional three-vessel arch anatomy. The right brachiocephalic and right subclavian arteries appear patent. The proximal left subclavian artery is partially obscured, but the distal vessel appears patent. Right carotid: There is mild nonstenosing plaque in the distal CCA. The carotid bifurcation is widely patent. The ECA and distal branches appear patent and the mid and distal cervical ICA is normal. Left carotid: There is mild narrowing of the distal CCA due to eccentric plaque estimated to be less than 25%. The carotid bifurcation is normal. The ECA and distal branches appear normal and the cervical ICA is normal. Vertebral arteries: Vertebral arteries are codominant and following normal course and appear normal caliber throughout the neck.     Impression: 1. Mild, less than 25% narrowing at the distal left CCA. 2. No hemodynamically significant stenosis of the internal carotid or vertebral arteries. Electronically Signed: Nomi Fletcher MD  12/12/2023 5:43 AM EST  Workstation ID: [id number]    MRI Brain Without Contrast    Result Date: 12/12/2023  MRI BRAIN WO CONTRAST Date of Exam: 12/12/2023 4:14 AM EST Indication: Stroke, follow up.  Comparison: CT head 12/11/2023 Technique:  Routine multiplanar/multisequence sequence images of the brain were obtained without contrast administration. Findings: There is a new small area of diffusion restriction in the right karen compatible with acute/subacute lacunar type infarct. There is a large chronic infarct involving the left temporal and left occipital lobes, which is unchanged and exhibits laminar necrosis. There is moderate underlying chronic small vessel ischemic change. There is generalized  parenchymal volume loss. There are punctate microhemorrhages in the right karen, medial right temporal lobe and at multiple locations in the area of chronic infarction in the left temporal and occipital lobes. There are additional chronic microhemorrhages in the right temporal white matter and right parietal periventricular white matter. Midline structures appear intact. There is thinning of the orbital lenses bilaterally. There is complete filling of the right maxillary sinus with findings of chronic sinusitis. Mastoids are clear. The central flow voids appear intact. Calvarial and superficial soft tissue signal is within normal limits.     Impression: 1.New small acute/subacute lacunar type infarct in the right karen. 2.Large chronic infarct in the left temporal and occipital lobes. 3.Background of moderate chronic small vessel ischemic change and generalized parenchymal volume loss. 4.There are multiple chronic microhemorrhages in the right karen, medial right temporal lobe and right parietal periventricular white matter. This could be related to hypertension or amyloid angiopathy. 5.Chronic right maxillary sinusitis. Electronically Signed: Nomi Fletcher MD  12/12/2023 5:36 AM EST  Workstation ID: [id number]    XR Abdomen KUB    Result Date: 12/12/2023  XR ABDOMEN KUB Date of Exam: 12/12/2023 1:11 AM EST Indication: MRI Clearence, please include pelvis too Comparison: 7/10/2020 and 7/6/2020. Findings: Abdomen: There is evidence of prior median sternotomy. There is no unexpected metal artifact. No acute osseous abnormality. No bowel distention. CHEST: There is evidence of prior median sternotomy. There are no unexpected areas of metal artifact. CT HEAD: No unexpected metallic foreign bodies. No contraindication to MRI.     Impression: No contraindication to MRI identified on abdomen or chest radiograph for head CT. Electronically Signed: Nomi Fletcher MD  12/12/2023 1:55 AM EST  Workstation ID: [id number]    CT Head  Without Contrast    Result Date: 12/11/2023  CT HEAD WO CONTRAST Date of Exam: 12/11/2023 1:20 PM EST Indication: AMS, history of stroke, on Xarelto. Comparison: 7/14/2020 Technique: Axial CT images were obtained of the head without contrast administration.  Automated exposure control and iterative construction methods were used. Findings: Parenchyma:No acute intraparenchymal hemorrhage. Sequela of old left-sided infarct with encephalomalacia. New hypodensity within the right karen. Moderate parenchymal volume loss. Scattered periventricular and subcortical white matter hypodensities, nonspecific, but most often consistent with small vessel ischemic changes. No midline shift or herniation. Ventricles and extra axial spaces:Prominent ventricles and sulci secondary to volume loss. No extra axial fluid collection seen. Other:Lens replacements. Scattered paranasal sinus mucosal thickening. Mastoid air cells are clear. Calvarium is intact. Intracranial atherosclerotic calcification is present.     Impression: New focal hypodensity within the right karen may represent age-indeterminate infarct. Consider MRI to further evaluate. Chronic changes including old left-sided infarct as above. Findings discussed with stroke team by Dr. Eros Dejesus via telephone on 12/11/2023 3:31 PM EST. Electronically Signed: Eros Dejesus MD  12/11/2023 4:02 PM EST  Workstation ID: [id number]    XR Chest 1 View    Result Date: 12/11/2023  XR CHEST 1 VW Date of Exam: 12/11/2023 1:12 PM EST Indication: Stroke Protocol (Onset > 12 Hrs) Comparison: Chest radiograph dated 8/20/2020 Findings: There is borderline cardiomegaly. There are postsurgical changes of prior CABG. Pulmonary vascularity appears normal. There is no acute airspace consolidation, pleural effusion, or pneumothorax. There are degenerative changes of the thoracic spine.     Impression: 1. Borderline cardiomegaly with postsurgical changes of CABG. No acute consolidation.  Electronically Signed: Jimenez Butcher  12/11/2023 2:02 PM EST  Workstation ID: [id number]     Results for orders placed during the hospital encounter of 06/29/20    Duplex Venous Lower Extremity - Bilateral CAR    Interpretation Summary  · Normal bilateral lower extremity venous duplex scan.      Results for orders placed during the hospital encounter of 06/29/20    Duplex Venous Lower Extremity - Bilateral CAR    Interpretation Summary  · Normal bilateral lower extremity venous duplex scan.      Results for orders placed during the hospital encounter of 12/11/23    Adult Transthoracic Echo Limited W/ Cont if Necessary Per Protocol    Interpretation Summary    Left ventricular ejection fraction appears to be 51 - 55%. Normal left ventricular cavity size and wall thickness noted. All left ventricular wall segments contract normally.    Normal right ventricular cavity size and systolic function noted.    The left atrial cavity is mildly dilated.    No aortic valve regurgitation or stenosis is present. The aortic valve exhibits sclerosis. The aortic valve appears trileaflet.    The mitral valve is structurally normal with no significant stenosis present. Trace to mild mitral valve regurgitation is present.    The tricuspid valve is structurally normal with no significant stenosis present. Physiologic tricuspid valve regurgitation is present. Estimated right ventricular systolic pressure from tricuspid regurgitation is normal (<35 mmHg).      Plan for Follow-up of Pending Labs/Results:     Discharge Details        Discharge Medications        New Medications        Instructions Start Date   clopidogrel 75 MG tablet  Commonly known as: PLAVIX   75 mg, Oral, Daily   Start Date: December 22, 2023     insulin detemir 100 UNIT/ML injection  Commonly known as: LEVEMIR   6 Units, Subcutaneous, Daily   Start Date: December 22, 2023     Insulin Lispro 100 UNIT/ML injection  Commonly known as: humaLOG   3 Units, Subcutaneous, 3  Times Daily With Meals             Changes to Medications        Instructions Start Date   carvedilol 25 MG tablet  Commonly known as: COREG  What changed:   medication strength  how much to take   25 mg, Oral, 2 Times Daily             Continue These Medications        Instructions Start Date   aspirin 81 MG chewable tablet   81 mg, Oral, Daily      atorvastatin 80 MG tablet  Commonly known as: LIPITOR   Take 1 tablet by mouth once daily      Entresto 24-26 MG tablet  Generic drug: sacubitril-valsartan   1 tablet, Oral, 2 Times Daily      levETIRAcetam 500 MG tablet  Commonly known as: KEPPRA   Take 1 tablet by mouth twice daily      levothyroxine 25 MCG tablet  Commonly known as: SYNTHROID, LEVOTHROID   25 mcg, Oral, Every Morning             Stop These Medications      Insulin Lispro Prot & Lispro (75-25) 100 UNIT/ML suspension pen-injector pen  Commonly known as: HumaLOG Mix 75/25 KwikPen     omeprazole 20 MG capsule  Commonly known as: priLOSEC              No Known Allergies      Discharge Disposition:  Skilled Nursing Facility (DC - External)    Diet:  Hospital:  Diet Order   Procedures    Diet: Cardiac Diets, Diabetic Diets; Healthy Heart (2-3 Na+); Consistent Carbohydrate; No Mixed Consistencies, Feeding Assistance - Nursing, No Straw; Texture: Pureed (NDD 1); Fluid Consistency: Nectar Thick            Activity:      Restrictions or Other Recommendations:         CODE STATUS:    Code Status and Medical Interventions:   Ordered at: 12/11/23 1626     Medical Intervention Limits:    NO intubation (DNI)     Level Of Support Discussed With:    Next of Kin (If No Surrogate)     Code Status (Patient has no pulse and is not breathing):    No CPR (Do Not Attempt to Resuscitate)     Medical Interventions (Patient has pulse or is breathing):    Limited Support       Future Appointments   Date Time Provider Department Center   4/17/2024  2:00 PM Argelia Gamez MD [facility]  Elliott Haq DO  12/21/23      Time Spent on Discharge:  I spent  25  minutes on this discharge activity which included: face-to-face encounter with the patient, reviewing the data in the system, coordination of the care with the nursing staff as well as consultants, documentation, and entering orders.

## 2023-12-21 NOTE — CASE MANAGEMENT/SOCIAL WORK
Case Management Discharge Note      Final Note: Ms. Montoya has a rehab bed at [facility] today if medically ready. Confirmed with Orly with facility. Updated Ms. Montoya's Son Jose and he is agreeable with discharge plan. She will be transported by [facility] [employer] van at 3:30. Pharmacy used by facility is [facility]. Rapid covid test required by facility. Please call report to [phone]. CM will fax discharge summary.    Provided Post Acute Provider List?: Yes  Post Acute Provider List: Inpatient Rehab  Delivered To: Support Person  Support Person: Jean (spouse)  Method of Delivery: Telephone                 Transportation Services  [facility]    Final Discharge Disposition Code: 03 - skilled nursing facility (SNF)

## 2023-12-21 NOTE — PROGRESS NOTES
[facility] Medicine Services  PROGRESS NOTE    Patient Name: Beryl Montoya  : 1945  MRN: [id number]    Date of Admission: 2023  Primary Care Physician: Argelia Gamez MD    Subjective   Subjective     CC:  cva    HPI:  Not answering any questions, not interacting when trying to talk to her      Objective   Objective     Vital Signs:   Temp:  [97.3 °F (36.3 °C)-98 °F (36.7 °C)] 98 °F (36.7 °C)  Heart Rate:  [48-71] 56  Resp:  [16-18] 18  BP: (121-160)/(38-83) 121/75     Physical Exam:  Constitutional: No acute distress, not interacting  HENT: NCAT, mucous membranes moist  Respiratory: Clear to auscultation bilaterally, respiratory effort normal   Cardiovascular: RRR, no murmurs, rubs, or gallops  Musculoskeletal: No bilateral ankle edema  Psychiatric: calm, cooperative  Neurologic: difficult to assess, not following commands  Skin: No rashes      Results Reviewed:  LAB RESULTS:      Lab 23  0504 12/15/23  0514   WBC 8.29 7.70   HEMOGLOBIN 13.8 15.1   HEMATOCRIT 41.2 45.3   PLATELETS 189 219   .8* 102.7*         Lab 23  0504 23  0608 23  0836 12/15/23  0514   SODIUM 145 148* 146* 143   POTASSIUM 3.4* 4.4 3.9 4.1   CHLORIDE 111* 110* 108* 106   CO2 23.0 20.0* 27.0 25.0   ANION GAP 11.0 18.0* 11.0 12.0   BUN 28* 32* 25* 26*   CREATININE 0.94 1.27* 0.95 1.41*   EGFR 62.2 43.4* 61.5 38.3*   GLUCOSE 98 135* 147* 266*   CALCIUM 8.7 9.2 9.0 8.7                         Brief Urine Lab Results  (Last result in the past 365 days)        Color   Clarity   Blood   Leuk Est   Nitrite   Protein   CREAT   Urine HCG        23 1523 Yellow   Cloudy   Trace   Moderate (2+)   Positive   100 mg/dL (2+)                   Microbiology Results Abnormal       None            No radiology results from the last 24 hrs    Results for orders placed during the hospital encounter of 23    Adult Transthoracic Echo Limited W/ Cont if Necessary Per  Protocol    Interpretation Summary    Left ventricular ejection fraction appears to be 51 - 55%. Normal left ventricular cavity size and wall thickness noted. All left ventricular wall segments contract normally.    Normal right ventricular cavity size and systolic function noted.    The left atrial cavity is mildly dilated.    No aortic valve regurgitation or stenosis is present. The aortic valve exhibits sclerosis. The aortic valve appears trileaflet.    The mitral valve is structurally normal with no significant stenosis present. Trace to mild mitral valve regurgitation is present.    The tricuspid valve is structurally normal with no significant stenosis present. Physiologic tricuspid valve regurgitation is present. Estimated right ventricular systolic pressure from tricuspid regurgitation is normal (<35 mmHg).      Current medications:  Scheduled Meds:aspirin, 81 mg, Oral, Daily   Or  aspirin, 300 mg, Rectal, Daily  atorvastatin, 80 mg, Oral, Nightly  carvedilol, 25 mg, Oral, BID  clopidogrel, 75 mg, Oral, Daily  [Held by provider] furosemide, 40 mg, Oral, Daily  [START ON 12/22/2023] insulin detemir, 6 Units, Subcutaneous, Daily  insulin lispro, 2-7 Units, Subcutaneous, 4x Daily AC & at Bedtime  Insulin Lispro, 3 Units, Subcutaneous, TID With Meals  isosorbide mononitrate, 30 mg, Oral, Daily  levETIRAcetam, 500 mg, Oral, Q12H  levothyroxine, 25 mcg, Oral, QAM  melatonin, 5 mg, Oral, Nightly  potassium chloride ER, 40 mEq, Oral, Q4H  sacubitril-valsartan, 1 tablet, Oral, Q12H  traZODone, 25 mg, Oral, Nightly      Continuous Infusions:   PRN Meds:.  acetaminophen **OR** acetaminophen **OR** acetaminophen    senna-docusate sodium **AND** polyethylene glycol **AND** bisacodyl **AND** bisacodyl    Calcium Replacement - Follow Nurse / BPA Driven Protocol    lactulose    Magnesium Standard Dose Replacement - Follow Nurse / BPA Driven Protocol    ondansetron **OR** ondansetron    Phosphorus Replacement - Follow Nurse  / BPA Driven Protocol    Potassium Replacement - Follow Nurse / BPA Driven Protocol    sodium chloride    sodium chloride    Assessment & Plan   Assessment & Plan     Active Hospital Problems    Diagnosis  POA    **Stroke [I63.9]  Yes    RAJINDER (acute kidney injury) [N17.9]  Unknown    Generalized weakness [R53.1]  Yes    Acute UTI (urinary tract infection) [N39.0]  Yes    History of seizure [Z87.898]  Yes    Ischemic cardiomyopathy. LVEF 30% [I25.5]  Yes    Essential hypertension [I10]  Yes    History of L MCA CVA [Z86.73]  Not Applicable    Type 2 diabetes mellitus with complication, with long-term current use of insulin [E11.8, Z79.4]  Not Applicable      Resolved Hospital Problems   No resolved problems to display.        Brief Hospital Course to date:  Beryl Montoya is a 78 y.o. female with hx of CAD s/p CABG, CVA with residual speech deficits, and wheelchair bound at baseline who presented with left arm weakness x 4-5 days. Found to have late acute/subacute right karen CVA, microhemorrhage likely secondary to uncontrolled HTN.      This patient's problems and plans were partially entered by my partner and updated as appropriate by me 12/21/23.      Late acute/subacute right karen CVA presenting with LUE weakness  -Neurology has seen, recommend DAPT, statin. Goal -160, improved with increased Coreg.  -PT/OT follows, waiting on insurance rehab placement  -Follow up with stroke clinic in 4-6 weeks     Somnolence  -Decreased Trazodone from 50 to 25 mg nightly, was started during this admission     Elevated Cr, better  -Suspect due to poor intake where she has been so somnolent  -improved with IVF, continue as needed     Hypernatremia, improved  -Suspect due to poor intake/somnolence    E. coli UTI   -s/p 5 days of IV Rocephin      DMII, insulin dependent with HbA1c 7.8  -Basal bolus regimen     Seizure disorder   -Continue home Keppra, EEG this admission was negative for seizure activity      Constipation  -Continue bowel regimen     CAD s/p CABG - DAPT, Coreg, Imdur  HTN - normal goals as above  Obesity, BMI 34      Expected Discharge Location and Transportation: rehab, pending insurance  Expected Discharge   Expected Discharge Date: 12/22/2023; Expected Discharge Time:      DVT prophylaxis:  Mechanical DVT prophylaxis orders are present.     AM-PAC 6 Clicks Score (PT): 10 (12/21/23 1020)    CODE STATUS:   Code Status and Medical Interventions:   Ordered at: 12/11/23 1626     Medical Intervention Limits:    NO intubation (DNI)     Level Of Support Discussed With:    Next of Kin (If No Surrogate)     Code Status (Patient has no pulse and is not breathing):    No CPR (Do Not Attempt to Resuscitate)     Medical Interventions (Patient has pulse or is breathing):    Limited Support       Chloe Haq, DO  12/21/23

## 2024-03-13 ENCOUNTER — TELEPHONE (OUTPATIENT)
Dept: INTERNAL MEDICINE | Facility: CLINIC | Age: 79
End: 2024-03-13

## 2024-03-13 NOTE — TELEPHONE ENCOUNTER
Caller: [email or web address]    Relationship: Home Health    Best call back number: [phone]     What is the best time to reach you: 010-232 M-F    Who are you requesting to speak with (clinical staff, provider,  specific staff member): CLINICAL    Do you know the name of the person who called: KEN    What was the call regarding: PATIENT IS BEING DISCHARGED FROM [facility] ON 3/15. KEN WOULD LIKE TO KNOW IF THE PCP WILL BE SIGNING THE PLAN OF CARE.    Is it okay if the provider responds through MyChart: NO

## 2024-04-22 RX ORDER — FLASH GLUCOSE SENSOR
KIT MISCELLANEOUS
Qty: 2 EACH | Refills: 0 | Status: SHIPPED | OUTPATIENT
Start: 2024-04-22

## 2024-12-04 NOTE — PROGRESS NOTES
"Subjective   Beryl Montoya is a 74 y.o. female here for follow-up DMII, HTN, HLD. DMII: last A1c 6.3%. No hypoglycemia but does notice glc can go up and down throughout the day. Takes it 5-6 times a day. No hypoglycemia. HTN: no issues, has been at goal. No hypotensive sx. She has hx of CVA and overall functionality is normal. Her  does assist her day to day. HLD: on statin, no myalgias. Overall she is feeling well and has no complaints. Hasn't had eye exam yet this year.     The following portions of the patient's history were reviewed and updated as appropriate: allergies, current medications, past medical history, past social history and problem list.    Review of Systems:  General: negative  Eyes: negative  CV: negative  Respiratory: negative  Neuro: negative  Endo: see hpi    Objective   /82 (BP Location: Left arm, Patient Position: Sitting, Cuff Size: Large Adult)   Temp 98.3 °F (36.8 °C) (Temporal)   Ht 152.4 cm (60\")   Wt 71.2 kg (157 lb)   BMI 30.66 kg/m²     Physical Exam   Constitutional: She is oriented to person, place, and time. She appears well-developed and well-nourished.   Cardiovascular: Normal rate, regular rhythm and normal heart sounds.   Pulmonary/Chest: Effort normal and breath sounds normal. She has no wheezes. She has no rales.   Neurological: She is alert and oriented to person, place, and time.   Slightly slurred speech, at baseline   Skin: Skin is warm and dry.   Psychiatric: She has a normal mood and affect. Her behavior is normal. Thought content normal.   Vitals reviewed.      Assessment/Plan   Beryl was seen today for hypertension and diabetes.    Diagnoses and all orders for this visit:    Type 2 diabetes mellitus with complication, with long-term current use of insulin (CMS/[facility])  -     FREESTYLE LITE test strip; 1 each by Other route 6 (Six) Times a Day.  -     Ertugliflozin L-PyroglutamicAc (STEGLATRO) 15 MG tablet; Take 1 tablet by mouth Every Morning. " Dr. Teran, anesthesiologist, called pat and stated after reviewing pt's chart, he wants pt to have cardiac clearance prior to surgery   Increased dose in hopes of more stable glucose during the day. Warned pt that with the increase may have to decrease insulin as this will bring average glc down. Pt takes glc about 5-6x daily and  monitors as well so if that happens they will notify the office for instruction  -     Insulin Lispro Prot & Lispro (HUMALOG MIX 75/25 KWIKPEN) (75-25) 100 UNIT/ML suspension pen-injector pen; INJECT 40 UNITS SUBCUTANEOUSLY IN THE MORNING, THEN INJECT 30 UNITS SUBCUTANEOUSLY IN THE EVENING  -     POC Glycosylated Hemoglobin (Hb A1C): 6.3%  -rec pt get eye exam, she is to schedule  -microalbumin UTD    Essential hypertension  -at goal, continue current meds    History of CVA (cerebrovascular accident)  -continue asa    Hyperlipidemia, unspecified hyperlipidemia type  -     atorvastatin (LIPITOR) 80 MG tablet; Take 1 tablet by mouth Daily.

## (undated) DEVICE — ROTATING HEMOSTATIC VALVE .096": Brand: RHV

## (undated) DEVICE — PK CATH CARD 10

## (undated) DEVICE — SUT PROLN 4/0 RB1 D/A 36IN 8557H

## (undated) DEVICE — Device: Brand: MEDEX

## (undated) DEVICE — AVANTI + 4F STD W/GW: Brand: AVANTI

## (undated) DEVICE — SUT SILK 0/0 CT2 18IN C027D

## (undated) DEVICE — INTRO SHEATH ART/FEM ENGAGE .038 5F12CM

## (undated) DEVICE — BLD SCLPL BEAVR MINI STR 2BVL 180D LF

## (undated) DEVICE — TUBING, SUCTION, 1/4" X 10', STRAIGHT: Brand: MEDLINE

## (undated) DEVICE — VASOVIEW HEMOPRO: Brand: VASOVIEW HEMOPRO

## (undated) DEVICE — TRAP,MUCUS SPECIMEN,40CC: Brand: MEDLINE

## (undated) DEVICE — SOL NACL 0.9PCT 1000ML

## (undated) DEVICE — SUCTION CANISTER, 2500CC, RIGID: Brand: DEROYAL

## (undated) DEVICE — 12 FOOT DISPOSABLE EXTENSION CABLE WITH SAFE CONNECT / SCREW-DOWN

## (undated) DEVICE — CVR HNDL LIGHT RIGID

## (undated) DEVICE — ANTIBACTERIAL UNDYED BRAIDED (POLYGLACTIN 910), SYNTHETIC ABSORBABLE SUTURE: Brand: COATED VICRYL

## (undated) DEVICE — Device

## (undated) DEVICE — SUT PDS 1 CTX 36IN VIO PDP371T

## (undated) DEVICE — PAD ARMBRD SURG CONVOL 7.5X20X2IN

## (undated) DEVICE — MODEL BT2000 P/N 700287-012KIT CONTENTS: MANIFOLD WITH SALINE AND CONTRAST PORTS, SALINE TUBING WITH SPIKE AND HAND SYRINGE, TRANSDUCER: Brand: BT2000 AUTOMATED MANIFOLD KIT

## (undated) DEVICE — MEDI-VAC NON-CONDUCTIVE SUCTION TUBING: Brand: CARDINAL HEALTH

## (undated) DEVICE — SUT PROLN 3/0 SH D/A 36IN 8522H

## (undated) DEVICE — TEMP PACING WIRE: Brand: MYO/WIRE

## (undated) DEVICE — SUT PROLN 4/0 SH D/A 36IN 8521H

## (undated) DEVICE — SENSR CERBRL O2 SOMASENSOR ADHS A/ LF

## (undated) DEVICE — SAFETY SCALPEL: Brand: DEROYAL

## (undated) DEVICE — PRESSURE MONITORING SET: Brand: TRUWAVE

## (undated) DEVICE — ANGIO-SEAL VIP VASCULAR CLOSURE DEVICE: Brand: ANGIO-SEAL

## (undated) DEVICE — ST INF PRI SMRTSTE 20DRP 2VLV 24ML 117

## (undated) DEVICE — HEMOCONCENTRATOR CAPIOX PED SM W/TB

## (undated) DEVICE — PRESSURE MONITORING SET: Brand: TRUWAVE, VAMP

## (undated) DEVICE — DEV COMP RAD PRELUDESYNC 24CM

## (undated) DEVICE — PK SPECIALTYCARE M/STATE 1 OH 1/2V CUST

## (undated) DEVICE — ST EXT IV SMARTSITE 2VLV SP M LL 5ML IV1

## (undated) DEVICE — GW INQWIRE FC PTFE STD J/1.5 .035 260

## (undated) DEVICE — LIMB HOLDERS: Brand: DEROYAL

## (undated) DEVICE — SUT PROLN 6/0 C1 D/A 30IN 8706H

## (undated) DEVICE — PK HEART OPN 10

## (undated) DEVICE — 2963 MEDIPORE SOFT CLOTH TAPE 3 IN X 10 YD 12 RLS/CS: Brand: 3M™ MEDIPORE™

## (undated) DEVICE — ST ACC MICROPUNCTURE .018 TRANSLSS/SS/TP 5F/10CM 21G/7CM

## (undated) DEVICE — INTRO SHEATH PRELUDE IDEAL SPRNG COIL 021 6F 23X80CM

## (undated) DEVICE — INTRAOPERATIVE COVER KIT, 10 PACK: Brand: SITE-RITE

## (undated) DEVICE — OASIS DRAIN, SINGLE, INLINE & ATS COMPATIBLE: Brand: OASIS

## (undated) DEVICE — CATH DIAG EXPO .045 FL3  5F 100CM

## (undated) DEVICE — CATH TEMPO 5F BER 100CM: Brand: TEMPO

## (undated) DEVICE — SOL NS 500ML

## (undated) DEVICE — MODEL AT P65, P/N 701554-001KIT CONTENTS: HAND CONTROLLER, 3-WAY HIGH-PRESSURE STOPCOCK WITH ROTATING END AND PREMIUM HIGH-PRESSURE TUBING: Brand: ANGIOTOUCH® KIT

## (undated) DEVICE — TOWEL,OR,DSP,ST,BLUE,STD,4/PK,20PK/CS: Brand: MEDLINE

## (undated) DEVICE — SUT SILK 4/0 TIES 18IN A183H

## (undated) DEVICE — DRSNG SURESITE WNDW 4X4.5

## (undated) DEVICE — CLTH CLENS READYCLEANSE PERI CARE PK/5

## (undated) DEVICE — NDL PERC 1PRT THNWALL W/BASEPLT 18G 7CM

## (undated) DEVICE — [HIGH FLOW INSUFFLATOR,  DO NOT USE IF PACKAGE IS DAMAGED,  KEEP DRY,  KEEP AWAY FROM SUNLIGHT,  PROTECT FROM HEAT AND RADIOACTIVE SOURCES.]: Brand: PNEUMOSURE

## (undated) DEVICE — LEX NEURO ANGIOGRAPHY: Brand: MEDLINE INDUSTRIES, INC.

## (undated) DEVICE — SUT PROLN 7/0 CV BV1 24IN 8304H BX/36

## (undated) DEVICE — RADIFOCUS GLIDEWIRE: Brand: GLIDEWIRE

## (undated) DEVICE — SUT SILK 2 SUTUPAK TIE 60IN SA8H 2STRAND

## (undated) DEVICE — SKIN PREP TRAY W/CHG: Brand: MEDLINE INDUSTRIES, INC.

## (undated) DEVICE — CATH DIAG EXPO M/ PK 5F FL4/FR4 PIG